# Patient Record
Sex: FEMALE | Race: WHITE | NOT HISPANIC OR LATINO | Employment: OTHER | URBAN - METROPOLITAN AREA
[De-identification: names, ages, dates, MRNs, and addresses within clinical notes are randomized per-mention and may not be internally consistent; named-entity substitution may affect disease eponyms.]

---

## 2017-02-28 ENCOUNTER — OFFICE VISIT (OUTPATIENT)
Dept: FAMILY MEDICINE | Facility: OTHER | Age: 77
End: 2017-02-28
Attending: FAMILY MEDICINE
Payer: MEDICARE

## 2017-02-28 VITALS
WEIGHT: 205.4 LBS | BODY MASS INDEX: 37.8 KG/M2 | TEMPERATURE: 97.8 F | RESPIRATION RATE: 14 BRPM | HEIGHT: 62 IN | DIASTOLIC BLOOD PRESSURE: 72 MMHG | HEART RATE: 72 BPM | SYSTOLIC BLOOD PRESSURE: 122 MMHG

## 2017-02-28 DIAGNOSIS — Z01.818 PREOP GENERAL PHYSICAL EXAM: Primary | ICD-10-CM

## 2017-02-28 LAB
ERYTHROCYTE [DISTWIDTH] IN BLOOD BY AUTOMATED COUNT: 14.6 % (ref 10–15)
HCT VFR BLD AUTO: 34.1 % (ref 35–47)
HGB BLD-MCNC: 11.5 G/DL (ref 11.7–15.7)
MCH RBC QN AUTO: 35.5 PG (ref 26.5–33)
MCHC RBC AUTO-ENTMCNC: 33.7 G/DL (ref 31.5–36.5)
MCV RBC AUTO: 105 FL (ref 78–100)
PLATELET # BLD AUTO: 182 10E9/L (ref 150–450)
RBC # BLD AUTO: 3.24 10E12/L (ref 3.8–5.2)
WBC # BLD AUTO: 7.4 10E9/L (ref 4–11)

## 2017-02-28 PROCEDURE — 93005 ELECTROCARDIOGRAM TRACING: CPT

## 2017-02-28 PROCEDURE — 99213 OFFICE O/P EST LOW 20 MIN: CPT

## 2017-02-28 PROCEDURE — 99214 OFFICE O/P EST MOD 30 MIN: CPT | Mod: 25 | Performed by: FAMILY MEDICINE

## 2017-02-28 PROCEDURE — 36415 COLL VENOUS BLD VENIPUNCTURE: CPT | Performed by: FAMILY MEDICINE

## 2017-02-28 PROCEDURE — 85027 COMPLETE CBC AUTOMATED: CPT | Performed by: FAMILY MEDICINE

## 2017-02-28 PROCEDURE — 93010 ELECTROCARDIOGRAM REPORT: CPT | Performed by: INTERNAL MEDICINE

## 2017-02-28 RX ORDER — OMEGA-3-ACID ETHYL ESTERS 1 G/1
1 CAPSULE, LIQUID FILLED ORAL DAILY
COMMUNITY
End: 2017-08-09

## 2017-02-28 ASSESSMENT — PAIN SCALES - GENERAL: PAINLEVEL: MODERATE PAIN (4)

## 2017-02-28 NOTE — PROGRESS NOTES
[facility]  [address]  [facility] [address]  [phone]  Dept: [phone]    PRE-OP EVALUATION:  Today's date: 2017    Ileana Davis (: 1940) presents for pre-operative evaluation assessment as requested by Dr. Yo.  She requires evaluation and anesthesia risk assessment prior to undergoing surgery/procedure for treatment of pain in foot .  Proposed procedure: right foot hammertoe correction toes 4 and 5     Date of Surgery/ Procedure: 3/6/17  Time of Surgery/ Procedure: [facility]  Hospital/Surgical Facility: [facility]   Fax number for surgical facility: [facility] to fax  Primary Physician: Iris Becerril  Type of Anesthesia Anticipated: General    Patient has a Health Care Directive or Living Will:  YES at home    1. NO - Do you have a history of heart attack, stroke, stent, bypass or surgery on an artery in the head, neck, heart or legs?  2. NO - Do you ever have any pain or discomfort in your chest?  3. NO - Do you have a history of  Heart Failure?  4. NO - Are you troubled by shortness of breath when: walking on the level, up a slight hill or at night?  5. NO - Do you currently have a cold, bronchitis or other respiratory infection?  6. NO - Do you have a cough, shortness of breath or wheezing?  7. NO - Do you sometimes get pains in the calves of your legs when you walk?  8. NO - Do you or anyone in your family have previous history of blood clots?  9. NO - Do you or does anyone in your family have a serious bleeding problem such as prolonged bleeding following surgeries or cuts?  10. YES - HAVE YOU EVER HAD PROBLEMS WITH ANEMIA OR BEEN TOLD TO TAKE IRON PILLS? After pregnancy  11. NO - Have you had any abnormal blood loss such as black, tarry or bloody stools, or abnormal vaginal bleeding?  12. YES - HAVE YOU EVER HAD A BLOOD TRANSFUSION? After childbirth  13. NO - Have you or any of your relatives ever had problems with anesthesia?  14. NO - Do  you have sleep apnea, excessive snoring or daytime drowsiness?  15. NO - DO YOU HAVE ANY PROSTHETIC HEART VALVES?   16. YES - Do you have prosthetic joints? BOTH KNEES  17. NO - Is there any chance that you may be pregnant?      HPI:                                                      Brief HPI related to upcoming procedure: Symptomatic hammertoes on right foot      HYPERTENSION - Patient has longstanding history of mod-severe HTN , currently denies any symptoms referable to elevated blood pressure. Specifically denies chest pain, palpitations, dyspnea, orthopnea, PND or peripheral edema. Blood pressure readings have been in normal range. Current medication regimen is as listed below. Patient denies any side effects of medication.                                                                                                                                                                                          .  HYPOTHYROIDISM - Patient has a longstanding history of chronic Hypothyroidism. Patient has been doing well, noting no tremor, insomnia, hair loss or changes in skin texture. Last TSH value of 1.28. Continues to take medications as directed, without adverse reactions or side effects.                                                                                                                                                                                                                        .    MEDICAL HISTORY:                                                      Patient Active Problem List    Diagnosis Date Noted     Autoimmune hepatitis (H) 06/02/2016     Priority: Medium     Anemia 08/20/2015     Priority: Medium     Advanced care planning/counseling discussion 09/21/2012     Priority: Medium     ACP (advance care planning) 06/02/2016     Advance Care Planning 6/2/2016: ACP Review of Chart / Resources Provided:  Reviewed chart for advance care plan.  Ileana Davis has been provided  information and resources to begin or update their advance care plan.  Added by Shelly Syed           Degeneration of lumbar or lumbosacral intervertebral disc 01/30/2015     Open wound of knee, leg, and ankle      Hypothyroidism 03/27/2013     Generalized anxiety disorder 03/27/2013     Benign essential hypertension 03/27/2013     GERD (gastroesophageal reflux disease) 03/27/2013     Hepatitis 03/27/2013     Contact dermatitis 03/27/2013     Rosacea 03/27/2013     Osteoarthritis 03/27/2013     Myalgia and myositis 03/27/2013      Past Medical History   Diagnosis Date     Anemia 8/20/2015     Autoimmune hepatitis (H) 6/2/2016     Benign paroxysmal positional vertigo 10/7/2010     Contact dermatitis and other eczema, due to unspecified cause 10/7/2010     Esophageal reflux 10/7/2010     Generalized anxiety disorder 10/7/2010     Generalized osteoarthrosis, involving multiple sites 10/7/2010     Hepatitis, unspecified 10/7/2010     Infected wound      Myalgia and myositis, unspecified 10/7/2010     fibromyalgia     Nonallopathic lesion of cervical region, not elsewhere classified 12/5/2001     Nonallopathic lesion of thoracic region, not elsewhere classified 3/31/2005     Open wound of knee, leg, and ankle      Rosacea 10/7/2010     Unspecified essential hypertension 10/7/2010     Unspecified hypothyroidism 10/7/2010     Past Surgical History   Procedure Laterality Date     Cholecystectomy       Gyn surgery  1985     NICHOLAS BSO     Gyn surgery       cessarian x 2     Gyn surgery       tubal sterilazation     Gi surgery       anal fistula     Appendectomy       Ent surgery       tonsillectomy     Eye surgery       bilateral cataract removal     Back surgery  2015     lumbar epidural injection     Orthopedic surgery       right knee     Orthopedic surgery  1986     plantar fascia release     Orthopedic surgery       left knee     Orthopedic surgery       right trigger finger release     Orthopedic surgery  2013      Right great toe MTPJ fusion, adductor tenotomy,correction of hammer toe     Orthopedic surgery  [id number]     multiple procedures left forefoot     Current Outpatient Prescriptions   Medication Sig Dispense Refill     omega-3 acid ethyl esters (LOVAZA) 1 G capsule Take 1 g by mouth daily       VITAMIN D, CHOLECALCIFEROL, PO Take 5,000 Units by mouth       cyclobenzaprine (FLEXERIL) 10 MG tablet Take 1 tablet (10 mg) by mouth 3 times daily 90 tablet 1     fluticasone (FLONASE) 50 MCG/ACT nasal spray Spray 2 sprays into both nostrils daily as needed 16 g 3     propranolol (INDERAL) 20 MG tablet Take 1 tablet (20 mg) by mouth daily 90 tablet 2     levothyroxine (SYNTHROID, LEVOTHROID) 112 MCG tablet Take one (1) tablet by mouth once daily 90 tablet 2     amitriptyline (ELAVIL) 25 MG tablet Take one (1) tablet by mouth at bedtime 90 tablet 2     ORDER FOR DME Equipment being ordered: LAVERNE stockings, 15-20 mmHg 2 Device 2     AzaTHIOprine (IMURAN PO) Take 50 mg by mouth 2 times daily        Polyethylene Glycol 3350 (MIRALAX PO) Take by mouth daily as needed        omeprazole (PRILOSEC) 40 MG capsule Take  by mouth daily. Before the same meal daily       Nystatin (NYAMYC) 440823 UNIT/GM POWD Apply to affected area nightly as needed for rash (Patient not taking: Reported on 2/28/2017) 1 Bottle 1     naproxen 500 MG TBEC Take 500 mg by mouth 2 times daily (with meals) 60 tablet 5     OTC products: None, except as noted above    Allergies   Allergen Reactions     Codeine Sulfate Other (See Comments)     hallucinations     Morphine Other (See Comments)     Hallucinations with iv therapy     Tape [Adhesive Tape]       Latex Allergy: NO    Social History   Substance Use Topics     Smoking status: Never Smoker     Smokeless tobacco: Never Used     Alcohol use No     History   Drug Use No       REVIEW OF SYSTEMS:                                                    Constitutional, neuro, ENT, endocrine, pulmonary, cardiac,  "gastrointestinal, genitourinary, musculoskeletal, integument and psychiatric systems are negative, except as otherwise noted.    EXAM:                                                    /72 (BP Location: Left arm, Patient Position: Chair, Cuff Size: Adult Large)  Pulse 72  Temp 97.8  F (36.6  C) (Tympanic)  Resp 14  Ht 5' 2\" (1.575 m)  Wt 205 lb 6.4 oz (93.2 kg)  BMI 37.57 kg/m2    GENERAL APPEARANCE: healthy, alert and no distress     EYES: EOMI, PERRL     HENT: ear canals and TM's normal and nose and mouth without ulcers or lesions     NECK: no adenopathy     RESP: lungs clear to auscultation - no rales, rhonchi or wheezes     CV: regular rates and rhythm, normal S1 S2, no S3 or S4 and no murmur, click or rub     ABDOMEN: bowel sounds normal     SKIN: small area of skin breakdown in pannus, no acute infection, healing well     NEURO: Normal strength and tone, sensory exam grossly normal, mentation intact and speech normal     PSYCH: mentation appears normal. and affect normal/bright    DIAGNOSTICS:                                                      EKG: appears normal, NSR, normal axis, normal intervals, no acute ST/T changes c/w ischemia, no LVH by voltage criteria, unchanged from previous tracings    Labs Resulted Today:   Results for orders placed or performed in visit on 02/28/17   CBC with platelets   Result Value Ref Range    WBC 7.4 4.0 - 11.0 10e9/L    RBC Count 3.24 (L) 3.8 - 5.2 10e12/L    Hemoglobin 11.5 (L) 11.7 - 15.7 g/dL    Hematocrit 34.1 (L) 35.0 - 47.0 %     (H) 78 - 100 fl    MCH 35.5 (H) 26.5 - 33.0 pg    MCHC 33.7 31.5 - 36.5 g/dL    RDW 14.6 10.0 - 15.0 %    Platelet Count 182 150 - 450 10e9/L       Recent Labs   Lab Test  11/11/16   1514  04/07/16   0909   HGB  11.4*  12.1   PLT  213  191   NA  137  138   POTASSIUM  3.8  4.2   CR  0.82  0.76        IMPRESSION:                                                    Reason for surgery/procedure: Symptomatic " devonte  Diagnosis/reason for consult: Cardiopulmonary clearance    The proposed surgical procedure is considered INTERMEDIATE risk.    REVISED CARDIAC RISK INDEX  The patient has the following serious cardiovascular risks for perioperative complications such as (MI, PE, VFib and 3  AV Block):  No serious cardiac risks  INTERPRETATION: 0 risks: Class I (very low risk - 0.4% complication rate)    The patient has the following additional risks for perioperative complications:  Autoimmune hepatitis  Poor pain tolerance      ICD-10-CM    1. Preop general physical exam Z01.818 CBC with platelets     EKG 12-lead complete w/read - (Clinic Performed)       RECOMMENDATIONS:                                                        Cardiovascular Risk  Performs 4 METs exercise without symptoms (Light housework (dusting, washing dishes) and Climb a flight of stairs) .   Patient is already on a Beta Blocker. Continue Betablocker therapy after surgery, using Beta blocker order set as necessary for NPO status.      --Patient is to take all scheduled medications on the day of surgery EXCEPT for modifications listed below.    Anticoagulant or Antiplatelet Medication Use  Hold all ASA/NSAIDs/Vitamins/Supplements 7-10 days prior to procedure         APPROVAL GIVEN to proceed with proposed procedure, without further diagnostic evaluation       Signed Electronically by: Iris Becerril MD    Copy of this evaluation report is provided to requesting physician.    [facility] Preop Guidelines

## 2017-02-28 NOTE — MR AVS SNAPSHOT
After Visit Summary   2/28/2017    Ileana Davis    MRN: [id number]           Patient Information     Date Of Birth          1940        Visit Information        Provider Department      2/28/2017 2:30 PM Iris Becerril MD [facility]        Today's Diagnoses     Preop general physical exam    -  1      Care Instructions      Before Your Surgery      Call your surgeon if there is any change in your health. This includes signs of a cold or flu (such as a sore throat, runny nose, cough, rash or fever).    Do not smoke, drink alcohol or take over the counter medicine (unless your surgeon or primary care doctor tells you to) for the 24 hours before and after surgery.    If you take prescribed drugs: Follow your doctor s orders about which medicines to take and which to stop until after surgery.    Eating and drinking prior to surgery: follow the instructions from your surgeon    Take a shower or bath the night before surgery. Use the soap your surgeon gave you to gently clean your skin. If you do not have soap from your surgeon, use your regular soap. Do not shave or scrub the surgery site.  Wear clean pajamas and have clean sheets on your bed.         Follow-ups after your visit        Follow-up notes from your care team     Return in about 1 year (around 2/28/2018).      Who to contact     If you have questions or need follow up information about today's clinic visit or your schedule please contact [facility] directly at [phone].  Normal or non-critical lab and imaging results will be communicated to you by MyChart, letter or phone within 4 business days after the clinic has received the results. If you do not hear from us within 7 days, please contact the clinic through MyChart or phone. If you have a critical or abnormal lab result, we will notify you by phone as soon as possible.  Submit refill requests through [employer] or call your pharmacy and they will  "forward the refill request to us. Please allow 3 business days for your refill to be completed.          Additional Information About Your Visit        [employer] Information     [employer] lets you send messages to your doctor, view your test results, renew your prescriptions, schedule appointments and more. To sign up, go to [email or web address] . Click on \"Log in\" on the left side of the screen, which will take you to the Welcome page. Then click on \"Sign up Now\" on the right side of the page.     You will be asked to enter the access code listed below, as well as some personal information. Please follow the directions to create your username and password.     Your access code is: [email or web address]  Expires: 2017  3:30 PM     Your access code will  in 90 days. If you need help or a new code, please call your [place] clinic or [phone].        Care EveryWhere ID     This is your [facility] EveryWhere ID. This could be used by other organizations to access your [place] medical records  [id number]        Your Vitals Were     Pulse Temperature Respirations Height BMI (Body Mass Index)       72 97.8  F (36.6  C) (Tympanic) 14 5' 2\" (1.575 m) 37.57 kg/m2        Blood Pressure from Last 3 Encounters:   17 122/72   16 146/84   16 122/74    Weight from Last 3 Encounters:   17 205 lb 6.4 oz (93.2 kg)   16 200 lb (90.7 kg)   16 189 lb (85.7 kg)              We Performed the Following     CBC with platelets     EKG 12-lead complete w/read - (Clinic Performed)          Today's Medication Changes          These changes are accurate as of: 17  3:30 PM.  If you have any questions, ask your nurse or doctor.               Stop taking these medicines if you haven't already. Please contact your care team if you have questions.     desonide 0.05 % cream   Commonly known as:  DESOWEN   Stopped by:  Iris Becerril MD                    Primary Care Provider Office Phone # Fax #    " Iris Becerril [phone]       [facility] [address] [facility] [address]        Thank you!     Thank you for choosing [facility]  for your care. Our goal is always to provide you with excellent care. Hearing back from our patients is one way we can continue to improve our services. Please take a few minutes to complete the written survey that you may receive in the mail after your visit with us. Thank you!             Your Updated Medication List - Protect others around you: Learn how to safely use, store and throw away your medicines at www.disposemymeds.org.          This list is accurate as of: 2/28/17  3:30 PM.  Always use your most recent med list.                   Brand Name Dispense Instructions for use    amitriptyline 25 MG tablet    ELAVIL    90 tablet    Take one (1) tablet by mouth at bedtime       cyclobenzaprine 10 MG tablet    FLEXERIL    90 tablet    Take 1 tablet (10 mg) by mouth 3 times daily       fluticasone 50 MCG/ACT spray    FLONASE    16 g    Spray 2 sprays into both nostrils daily as needed       IMURAN PO      Take 50 mg by mouth 2 times daily       levothyroxine 112 MCG tablet    SYNTHROID/LEVOTHROID    90 tablet    Take one (1) tablet by mouth once daily       MIRALAX PO      Take by mouth daily as needed       naproxen 500 MG Tbec     60 tablet    Take 500 mg by mouth 2 times daily (with meals)       NYAMYC 762113 UNIT/GM Powd     1 Bottle    Apply to affected area nightly as needed for rash       omega-3 acid ethyl esters 1 G capsule    Lovaza     Take 1 g by mouth daily       omeprazole 40 MG capsule    priLOSEC     Take  by mouth daily. Before the same meal daily       order for DME     2 Device    Equipment being ordered: LAVERNE stockings, 15-20 mmHg       propranolol 20 MG tablet    INDERAL    90 tablet    Take 1 tablet (20 mg) by mouth daily       VITAMIN D (CHOLECALCIFEROL) PO      Take 5,000 Units by mouth

## 2017-02-28 NOTE — NURSING NOTE
"Chief Complaint   Patient presents with     Pharyngitis     for right toe surgery with Dr. Yo at [facility] on 3/6     Wound Dehiscence     to  lower abdomen x 1 week       Initial /72 (BP Location: Left arm, Patient Position: Chair, Cuff Size: Adult Large)  Pulse 72  Temp 97.8  F (36.6  C) (Tympanic)  Resp 14  Ht 5' 2\" (1.575 m)  Wt 205 lb 6.4 oz (93.2 kg)  BMI 37.57 kg/m2 Estimated body mass index is 37.57 kg/(m^2) as calculated from the following:    Height as of this encounter: 5' 2\" (1.575 m).    Weight as of this encounter: 205 lb 6.4 oz (93.2 kg).  Medication Reconciliation: tran CAAL      "

## 2017-03-06 ENCOUNTER — TRANSFERRED RECORDS (OUTPATIENT)
Dept: HEALTH INFORMATION MANAGEMENT | Facility: HOSPITAL | Age: 77
End: 2017-03-06

## 2017-03-24 ENCOUNTER — TRANSFERRED RECORDS (OUTPATIENT)
Dept: HEALTH INFORMATION MANAGEMENT | Facility: HOSPITAL | Age: 77
End: 2017-03-24

## 2017-04-04 DIAGNOSIS — M60.9 MYOSITIS: ICD-10-CM

## 2017-04-04 DIAGNOSIS — R52 PAIN: ICD-10-CM

## 2017-04-04 DIAGNOSIS — M62.838 MUSCLE SPASM: ICD-10-CM

## 2017-04-04 DIAGNOSIS — M79.10 MYALGIA: ICD-10-CM

## 2017-04-06 RX ORDER — CYCLOBENZAPRINE HCL 10 MG
TABLET ORAL
Qty: 90 TABLET | Refills: 0 | Status: SHIPPED | OUTPATIENT
Start: 2017-04-06 | End: 2017-06-30

## 2017-04-10 DIAGNOSIS — R52 PAIN: ICD-10-CM

## 2017-04-10 DIAGNOSIS — M62.838 MUSCLE SPASM: ICD-10-CM

## 2017-04-10 DIAGNOSIS — E03.9 HYPOTHYROIDISM: ICD-10-CM

## 2017-04-11 RX ORDER — LEVOTHYROXINE SODIUM 112 UG/1
TABLET ORAL
Qty: 90 TABLET | Refills: 0 | Status: SHIPPED | OUTPATIENT
Start: 2017-04-11 | End: 2017-07-20

## 2017-04-11 RX ORDER — CYCLOBENZAPRINE HCL 10 MG
TABLET ORAL
Refills: 0 | OUTPATIENT
Start: 2017-04-11

## 2017-04-14 ENCOUNTER — TRANSFERRED RECORDS (OUTPATIENT)
Dept: HEALTH INFORMATION MANAGEMENT | Facility: HOSPITAL | Age: 77
End: 2017-04-14

## 2017-05-15 DIAGNOSIS — I10 HTN (HYPERTENSION): ICD-10-CM

## 2017-05-16 RX ORDER — PROPRANOLOL HYDROCHLORIDE 20 MG/1
TABLET ORAL
Qty: 90 TABLET | Refills: 1 | Status: SHIPPED | OUTPATIENT
Start: 2017-05-16 | End: 2017-10-18

## 2017-06-05 ENCOUNTER — APPOINTMENT (OUTPATIENT)
Dept: LAB | Facility: OTHER | Age: 77
End: 2017-06-05
Attending: NURSE PRACTITIONER
Payer: MEDICARE

## 2017-06-05 ENCOUNTER — OFFICE VISIT (OUTPATIENT)
Dept: FAMILY MEDICINE | Facility: OTHER | Age: 77
End: 2017-06-05
Attending: NURSE PRACTITIONER
Payer: MEDICARE

## 2017-06-05 ENCOUNTER — TELEPHONE (OUTPATIENT)
Dept: FAMILY MEDICINE | Facility: OTHER | Age: 77
End: 2017-06-05

## 2017-06-05 VITALS
WEIGHT: 205 LBS | BODY MASS INDEX: 37.49 KG/M2 | SYSTOLIC BLOOD PRESSURE: 118 MMHG | HEART RATE: 68 BPM | RESPIRATION RATE: 14 BRPM | TEMPERATURE: 98 F | DIASTOLIC BLOOD PRESSURE: 72 MMHG

## 2017-06-05 DIAGNOSIS — R30.0 DYSURIA: Primary | ICD-10-CM

## 2017-06-05 DIAGNOSIS — R82.79 ABNORMAL FINDINGS ON MICROBIOLOGICAL EXAMINATION OF URINE: ICD-10-CM

## 2017-06-05 LAB
ALBUMIN UR-MCNC: NEGATIVE MG/DL
APPEARANCE UR: ABNORMAL
BACTERIA #/AREA URNS HPF: ABNORMAL /HPF
BILIRUB UR QL STRIP: NEGATIVE
COLOR UR AUTO: YELLOW
GLUCOSE UR STRIP-MCNC: NEGATIVE MG/DL
HGB UR QL STRIP: ABNORMAL
KETONES UR STRIP-MCNC: NEGATIVE MG/DL
LEUKOCYTE ESTERASE UR QL STRIP: ABNORMAL
NITRATE UR QL: NEGATIVE
NON-SQ EPI CELLS #/AREA URNS LPF: ABNORMAL /LPF
PH UR STRIP: 7.5 PH (ref 5–7)
RBC #/AREA URNS AUTO: ABNORMAL /HPF (ref 0–2)
SP GR UR STRIP: 1.01 (ref 1–1.03)
URN SPEC COLLECT METH UR: ABNORMAL
UROBILINOGEN UR STRIP-ACNC: 0.2 EU/DL (ref 0.2–1)
WBC #/AREA URNS AUTO: >100 /HPF (ref 0–2)

## 2017-06-05 PROCEDURE — 99212 OFFICE O/P EST SF 10 MIN: CPT

## 2017-06-05 PROCEDURE — 87186 SC STD MICRODIL/AGAR DIL: CPT | Mod: ZL | Performed by: NURSE PRACTITIONER

## 2017-06-05 PROCEDURE — 87086 URINE CULTURE/COLONY COUNT: CPT | Mod: ZL | Performed by: NURSE PRACTITIONER

## 2017-06-05 PROCEDURE — 87088 URINE BACTERIA CULTURE: CPT | Mod: ZL | Performed by: NURSE PRACTITIONER

## 2017-06-05 PROCEDURE — 99213 OFFICE O/P EST LOW 20 MIN: CPT | Performed by: NURSE PRACTITIONER

## 2017-06-05 PROCEDURE — 81001 URINALYSIS AUTO W/SCOPE: CPT | Mod: ZL | Performed by: NURSE PRACTITIONER

## 2017-06-05 RX ORDER — CIPROFLOXACIN 250 MG/1
250 TABLET, FILM COATED ORAL 2 TIMES DAILY
Qty: 14 TABLET | Refills: 0 | Status: SHIPPED | OUTPATIENT
Start: 2017-06-05 | End: 2017-06-12

## 2017-06-05 ASSESSMENT — ANXIETY QUESTIONNAIRES
1. FEELING NERVOUS, ANXIOUS, OR ON EDGE: SEVERAL DAYS
2. NOT BEING ABLE TO STOP OR CONTROL WORRYING: SEVERAL DAYS
6. BECOMING EASILY ANNOYED OR IRRITABLE: NOT AT ALL
3. WORRYING TOO MUCH ABOUT DIFFERENT THINGS: SEVERAL DAYS
5. BEING SO RESTLESS THAT IT IS HARD TO SIT STILL: NEARLY EVERY DAY
IF YOU CHECKED OFF ANY PROBLEMS ON THIS QUESTIONNAIRE, HOW DIFFICULT HAVE THESE PROBLEMS MADE IT FOR YOU TO DO YOUR WORK, TAKE CARE OF THINGS AT HOME, OR GET ALONG WITH OTHER PEOPLE: NOT DIFFICULT AT ALL

## 2017-06-05 ASSESSMENT — PATIENT HEALTH QUESTIONNAIRE - PHQ9: 5. POOR APPETITE OR OVEREATING: MORE THAN HALF THE DAYS

## 2017-06-05 NOTE — MR AVS SNAPSHOT
After Visit Summary   6/5/2017    Ileana Davis    MRN: [id number]           Patient Information     Date Of Birth          1940        Visit Information        Provider Department      6/5/2017 3:30 PM Nancy White NP [facility]        Today's Diagnoses     Dysuria    -  1    Abnormal findings on microbiological examination of urine          Care Instructions    Results for orders placed or performed in visit on 06/05/17   *UA reflex to Microscopic and Culture - [facility]   Result Value Ref Range    Color Urine Yellow     Appearance Urine Slightly Cloudy     Glucose Urine Negative NEG mg/dL    Bilirubin Urine Negative NEG    Ketones Urine Negative NEG mg/dL    Specific Gravity Urine 1.010 1.003 - 1.035    Blood Urine Small (A) NEG    pH Urine 7.5 (H) 5.0 - 7.0 pH    Protein Albumin Urine Negative NEG mg/dL    Urobilinogen Urine 0.2 0.2 - 1.0 EU/dL    Nitrite Urine Negative NEG    Leukocyte Esterase Urine Large (A) NEG    Source Midstream Urine    Urine Microscopic   Result Value Ref Range    WBC Urine >100 (A) 0 - 2 /HPF    RBC Urine 2-5 (A) 0 - 2 /HPF    Squamous Epithelial /LPF Urine Few FEW /LPF    Bacteria Urine Moderate (A) NEG /HPF         ASSESSMENT/PLAN:                                                      1. Dysuria  -UA reflex to Microscopic and Culture - [facility]  - Urine Microscopic  - ciprofloxacin (CIPRO) 250 MG tablet; Take 1 tablet (250 mg) by mouth 2 times daily for 7 days  Dispense: 14 tablet; Refill: 0  - AZO OTC for burning    2. Abnormal findings on microbiological examination of urine  - Urine Culture Aerobic Bacterial    Nancy White NP  [facility]            Follow-ups after your visit        Who to contact     If you have questions or need follow up information about today's clinic visit or your schedule please contact [facility] directly at [phone].  Normal or non-critical lab and imaging results will be  "communicated to you by [employer], letter or phone within 4 business days after the clinic has received the results. If you do not hear from us within 7 days, please contact the clinic through [employer] or phone. If you have a critical or abnormal lab result, we will notify you by phone as soon as possible.  Submit refill requests through [employer] or call your pharmacy and they will forward the refill request to us. Please allow 3 business days for your refill to be completed.          Additional Information About Your Visit        [employer] Information     [employer] lets you send messages to your doctor, view your test results, renew your prescriptions, schedule appointments and more. To sign up, go to [email or web address] . Click on \"Log in\" on the left side of the screen, which will take you to the Welcome page. Then click on \"Sign up Now\" on the right side of the page.     You will be asked to enter the access code listed below, as well as some personal information. Please follow the directions to create your username and password.     Your access code is: [email or web address]  Expires: 9/3/2017  4:23 PM     Your access code will  in 90 days. If you need help or a new code, please call your [place] clinic or [phone].        Care EveryWhere ID     This is your Care EveryWhere ID. This could be used by other organizations to access your [place] medical records  [id number]        Your Vitals Were     Pulse Temperature Respirations Breastfeeding? BMI (Body Mass Index)       68 98  F (36.7  C) (Tympanic) 14 No 37.49 kg/m2        Blood Pressure from Last 3 Encounters:   17 118/72   17 122/72   16 146/84    Weight from Last 3 Encounters:   17 205 lb (93 kg)   17 205 lb 6.4 oz (93.2 kg)   16 200 lb (90.7 kg)              We Performed the Following     *UA reflex to Microscopic and Culture - [facility]     Urine Culture Aerobic Bacterial     Urine Microscopic          Today's " Medication Changes          These changes are accurate as of: 6/5/17  4:23 PM.  If you have any questions, ask your nurse or doctor.               Start taking these medicines.        Dose/Directions    ciprofloxacin 250 MG tablet   Commonly known as:  CIPRO   Used for:  Dysuria   Started by:  Nancy White NP        Dose:  250 mg   Take 1 tablet (250 mg) by mouth 2 times daily for 7 days   Quantity:  14 tablet   Refills:  0            Where to get your medicines      These medications were sent to [facility] [address]   [address] , [facility] [address]     Phone:  [phone]     ciprofloxacin 250 MG tablet                Primary Care Provider Office Phone # Fax #    Iris Becerril [phone]       [facility] [address] [facility] [address]        Thank you!     Thank you for choosing [facility]  for your care. Our goal is always to provide you with excellent care. Hearing back from our patients is one way we can continue to improve our services. Please take a few minutes to complete the written survey that you may receive in the mail after your visit with us. Thank you!             Your Updated Medication List - Protect others around you: Learn how to safely use, store and throw away your medicines at www.disposemymeds.org.          This list is accurate as of: 6/5/17  4:23 PM.  Always use your most recent med list.                   Brand Name Dispense Instructions for use    amitriptyline 25 MG tablet    ELAVIL    90 tablet    Take one (1) tablet by mouth at bedtime       ciprofloxacin 250 MG tablet    CIPRO    14 tablet    Take 1 tablet (250 mg) by mouth 2 times daily for 7 days       cyclobenzaprine 10 MG tablet    FLEXERIL    90 tablet    Take 1 tablet (10 mg) by mouth 3 times daily       fluticasone 50 MCG/ACT spray    FLONASE    16 g    Spray 2 sprays into both nostrils daily as needed        IMURAN PO      Take 50 mg by mouth 2 times daily       levothyroxine 112 MCG tablet    SYNTHROID/LEVOTHROID    90 tablet    Take one (1) tablet by mouth once daily       MIRALAX PO      Take by mouth daily as needed       naproxen 500 MG Tbec     60 tablet    Take 500 mg by mouth 2 times daily (with meals)       NYAMYC 273094 UNIT/GM Powd   Generic drug:  nystatin     1 Bottle    Apply to affected area nightly as needed for rash       omega-3 acid ethyl esters 1 G capsule    Lovaza     Take 1 g by mouth daily       omeprazole 40 MG capsule    priLOSEC     Take  by mouth daily. Before the same meal daily       order for DME     2 Device    Equipment being ordered: LAVERNE stockings, 15-20 mmHg       propranolol 20 MG tablet    INDERAL    90 tablet    Take 1 tablet (20 mg) by mouth daily       VITAMIN D (CHOLECALCIFEROL) PO      Take 5,000 Units by mouth

## 2017-06-05 NOTE — PROGRESS NOTES
SUBJECTIVE:                                                    Ileana Davis is a 76 year old female who presents to clinic today for the following health issues:      URINARY TRACT SYMPTOMS     Onset: 4 days    Description:   Painful urination (Dysuria): YES  Blood in urine (Hematuria): no   Delay in urine (Hesitency): no     Intensity: moderate    Progression of Symptoms:  worsening    Accompanying Signs & Symptoms:  Fever/chills: no   Flank pain no   Nausea and vomiting: no   Any vaginal symptoms: none  Abdominal/Pelvic Pain: no    History:   History of frequent UTI's: no   History of kidney stones: no   Sexually Active: no   Possibility of pregnancy: No    Precipitating factors:   no         Therapies Tried and outcome: fluids        Problem list and histories reviewed & adjusted, as indicated.  Additional history: as documented    Patient Active Problem List   Diagnosis     Hypothyroidism     Generalized anxiety disorder     Benign essential hypertension     GERD (gastroesophageal reflux disease)     Hepatitis     Contact dermatitis     Rosacea     Osteoarthritis     Myalgia and myositis     Advanced care planning/counseling discussion     Open wound of knee, leg, and ankle     Degeneration of lumbar or lumbosacral intervertebral disc     Anemia     ACP (advance care planning)     Autoimmune hepatitis (H)     Past Surgical History:   Procedure Laterality Date     APPENDECTOMY       BACK SURGERY  2015    lumbar epidural injection     CHOLECYSTECTOMY       ENT SURGERY      tonsillectomy     EYE SURGERY      bilateral cataract removal     GI SURGERY      anal fistula     GYN SURGERY  1985    NICHOLAS BSO     GYN SURGERY      cessarian x 2     GYN SURGERY      tubal sterilazation     ORTHOPEDIC SURGERY      right knee     ORTHOPEDIC SURGERY  1986    plantar fascia release     ORTHOPEDIC SURGERY      left knee     ORTHOPEDIC SURGERY      right trigger finger release     ORTHOPEDIC SURGERY  2013    Right great toe  MTPJ fusion, adductor tenotomy,correction of hammer toe     ORTHOPEDIC SURGERY  [id number]    multiple procedures left forefoot       Social History   Substance Use Topics     Smoking status: Never Smoker     Smokeless tobacco: Never Used     Alcohol use No     Family History   Problem Relation Age of Onset     Arthritis Mother      rheumatoid     HEART DISEASE Mother      CHF     Alcohol/Drug Father      alcoholism     Allergies Father      Thyroid Disease Other      DIABETES No family hx of      Asthma No family hx of          Current Outpatient Prescriptions   Medication Sig Dispense Refill     propranolol (INDERAL) 20 MG tablet Take 1 tablet (20 mg) by mouth daily 90 tablet 1     levothyroxine (SYNTHROID/LEVOTHROID) 112 MCG tablet Take one (1) tablet by mouth once daily 90 tablet 0     cyclobenzaprine (FLEXERIL) 10 MG tablet Take 1 tablet (10 mg) by mouth 3 times daily 90 tablet 0     amitriptyline (ELAVIL) 25 MG tablet Take one (1) tablet by mouth at bedtime 90 tablet 0     omega-3 acid ethyl esters (LOVAZA) 1 G capsule Take 1 g by mouth daily       VITAMIN D, CHOLECALCIFEROL, PO Take 5,000 Units by mouth       fluticasone (FLONASE) 50 MCG/ACT nasal spray Spray 2 sprays into both nostrils daily as needed 16 g 3     Nystatin (NYAMYC) 016656 UNIT/GM POWD Apply to affected area nightly as needed for rash 1 Bottle 1     naproxen 500 MG TBEC Take 500 mg by mouth 2 times daily (with meals) 60 tablet 5     ORDER FOR DME Equipment being ordered: LAVERNE stockings, 15-20 mmHg 2 Device 2     AzaTHIOprine (IMURAN PO) Take 50 mg by mouth 2 times daily        Polyethylene Glycol 3350 (MIRALAX PO) Take by mouth daily as needed        omeprazole (PRILOSEC) 40 MG capsule Take  by mouth daily. Before the same meal daily       Allergies   Allergen Reactions     Codeine Sulfate Other (See Comments)     hallucinations     Morphine Other (See Comments)     Hallucinations with iv therapy     Tape [Adhesive Tape]      BP Readings from Last  3 Encounters:   06/05/17 118/72   02/28/17 122/72   11/11/16 146/84    Wt Readings from Last 3 Encounters:   06/05/17 205 lb (93 kg)   02/28/17 205 lb 6.4 oz (93.2 kg)   11/11/16 200 lb (90.7 kg)                  Labs reviewed in EPIC    Reviewed and updated as needed this visit by clinical staff  Tobacco  Allergies  Meds       Reviewed and updated as needed this visit by Provider         ROS:  Constitutional, HEENT, cardiovascular, pulmonary, gi and gu systems are negative, except as otherwise noted.      OBJECTIVE:                                                    /72 (BP Location: Right arm, Patient Position: Chair, Cuff Size: Adult Large)  Pulse 68  Temp 98  F (36.7  C) (Tympanic)  Resp 14  Wt 205 lb (93 kg)  Breastfeeding? No  BMI 37.49 kg/m2  Body mass index is 37.49 kg/(m^2).     GENERAL: healthy, alert and no distress  EYES: Eyes grossly normal to inspection, PERRL and conjunctivae and sclerae normal  HENT: ear canals and TM's normal, nose and mouth without ulcers or lesions  NECK: no adenopathy, no asymmetry, masses, or scars and thyroid normal to palpation  RESP: lungs clear to auscultation - no rales, rhonchi or wheezes  CV: regular rate and rhythm, normal S1 S2, no S3 or S4, no murmur, click or rub, no peripheral edema and peripheral pulses strong  ABDOMEN: soft, nontender, no hepatosplenomegaly, no masses and bowel sounds normal  MS: no gross musculoskeletal defects noted, no edema  SKIN: no suspicious lesions or rashes      Results for orders placed or performed in visit on 06/05/17 (from the past 24 hour(s))   *UA reflex to Microscopic and Culture - [facility]   Result Value Ref Range    Color Urine Yellow     Appearance Urine Slightly Cloudy     Glucose Urine Negative NEG mg/dL    Bilirubin Urine Negative NEG    Ketones Urine Negative NEG mg/dL    Specific Gravity Urine 1.010 1.003 - 1.035    Blood Urine Small (A) NEG    pH Urine 7.5 (H) 5.0 - 7.0 pH    Protein Albumin Urine  Negative NEG mg/dL    Urobilinogen Urine 0.2 0.2 - 1.0 EU/dL    Nitrite Urine Negative NEG    Leukocyte Esterase Urine Large (A) NEG    Source Midstream Urine    Urine Microscopic   Result Value Ref Range    WBC Urine >100 (A) 0 - 2 /HPF    RBC Urine 2-5 (A) 0 - 2 /HPF    Squamous Epithelial /LPF Urine Few FEW /LPF    Bacteria Urine Moderate (A) NEG /HPF        ASSESSMENT/PLAN:                                                      1. Dysuria  -UA reflex to Microscopic and Culture - [facility]  - Urine Microscopic  - ciprofloxacin (CIPRO) 250 MG tablet; Take 1 tablet (250 mg) by mouth 2 times daily for 7 days  Dispense: 14 tablet; Refill: 0  - AZO OTC for burning    2. Abnormal findings on microbiological examination of urine  - Urine Culture Aerobic Bacterial    Nancy White NP  [facility]

## 2017-06-05 NOTE — PATIENT INSTRUCTIONS
Results for orders placed or performed in visit on 06/05/17   *UA reflex to Microscopic and Culture - [facility]/[place]   Result Value Ref Range    Color Urine Yellow     Appearance Urine Slightly Cloudy     Glucose Urine Negative NEG mg/dL    Bilirubin Urine Negative NEG    Ketones Urine Negative NEG mg/dL    Specific Gravity Urine 1.010 1.003 - 1.035    Blood Urine Small (A) NEG    pH Urine 7.5 (H) 5.0 - 7.0 pH    Protein Albumin Urine Negative NEG mg/dL    Urobilinogen Urine 0.2 0.2 - 1.0 EU/dL    Nitrite Urine Negative NEG    Leukocyte Esterase Urine Large (A) NEG    Source Midstream Urine    Urine Microscopic   Result Value Ref Range    WBC Urine >100 (A) 0 - 2 /HPF    RBC Urine 2-5 (A) 0 - 2 /HPF    Squamous Epithelial /LPF Urine Few FEW /LPF    Bacteria Urine Moderate (A) NEG /HPF         ASSESSMENT/PLAN:                                                      1. Dysuria  -UA reflex to Microscopic and Culture - [facility]  - Urine Microscopic  - ciprofloxacin (CIPRO) 250 MG tablet; Take 1 tablet (250 mg) by mouth 2 times daily for 7 days  Dispense: 14 tablet; Refill: 0  - AZO OTC for burning    2. Abnormal findings on microbiological examination of urine  - Urine Culture Aerobic Bacterial    Nancy White NP  [facility]

## 2017-06-05 NOTE — NURSING NOTE
"Chief Complaint   Patient presents with     UTI     Patient reports cramping  and frequency.       Initial /72 (BP Location: Right arm, Patient Position: Chair, Cuff Size: Adult Large)  Pulse 68  Temp 98  F (36.7  C) (Tympanic)  Resp 14  Wt 205 lb (93 kg)  Breastfeeding? No  BMI 37.49 kg/m2 Estimated body mass index is 37.49 kg/(m^2) as calculated from the following:    Height as of 2/28/17: 5' 2\" (1.575 m).    Weight as of this encounter: 205 lb (93 kg).  Medication Reconciliation: complete   Shell Lieberman      "

## 2017-06-05 NOTE — TELEPHONE ENCOUNTER
11:08 AM    Reason for Call: OVERBOOK    Patient is having the following symptoms: UTI WOULD LIKE TO BE SEEN TODAY  The patient is requesting an appointment for FLAIM     Was an appointment offered for this call? No    Preferred method for responding to this message: Telephone Call    If we cannot reach you directly, may we leave a detailed response at the number you provided? Yes    Can this message wait until your PCP/provider returns, if unavailable today? No,     Hansa Antunez

## 2017-06-06 ASSESSMENT — PATIENT HEALTH QUESTIONNAIRE - PHQ9: SUM OF ALL RESPONSES TO PHQ QUESTIONS 1-9: 12

## 2017-06-07 LAB
BACTERIA SPEC CULT: ABNORMAL
Lab: ABNORMAL
MICRO REPORT STATUS: ABNORMAL
MICROORGANISM SPEC CULT: ABNORMAL
SPECIMEN SOURCE: ABNORMAL

## 2017-06-30 DIAGNOSIS — R52 PAIN: ICD-10-CM

## 2017-06-30 DIAGNOSIS — M62.838 MUSCLE SPASM: ICD-10-CM

## 2017-06-30 RX ORDER — CYCLOBENZAPRINE HCL 10 MG
TABLET ORAL
Qty: 90 TABLET | Refills: 0 | Status: SHIPPED | OUTPATIENT
Start: 2017-06-30 | End: 2017-08-30

## 2017-06-30 NOTE — TELEPHONE ENCOUNTER
Flexeril      Last Written Prescription Date:  4/6/17  Last Fill Quantity: 90,   # refills: 0  Last Office Visit with [facility] prescribing provider: 6/5/17  Future Office visit:       Routing refill request to provider for review/approval because:  Drug not on the [facility] or [facility] refill protocol or controlled substance

## 2017-07-10 DIAGNOSIS — M60.9 MYOSITIS: ICD-10-CM

## 2017-07-10 DIAGNOSIS — M79.10 MYALGIA: ICD-10-CM

## 2017-07-11 ENCOUNTER — TELEPHONE (OUTPATIENT)
Dept: FAMILY MEDICINE | Facility: OTHER | Age: 77
End: 2017-07-11

## 2017-07-11 NOTE — TELEPHONE ENCOUNTER
1:22 PM    Reason for Call: Phone Call    Description: patient has a colonoscopy coming up and would like to know if she will need to take some form of antibiotic    Was an appointment offered for this call? n/a    Preferred method for responding to this message: Telephone Call    If we cannot reach you directly, may we leave a detailed response at the number you provided? Yes    Can this message wait until your PCP/provider returns, if available today? Not applicable, provider in    Vivian Fairchild

## 2017-07-20 DIAGNOSIS — E03.9 HYPOTHYROIDISM: ICD-10-CM

## 2017-07-20 NOTE — TELEPHONE ENCOUNTER
Levothyroxine      Last Written Prescription Date: 4/11/2017  Last Quantity: 90, # refills: 0  Last Office Visit with G, P or [facility] prescribing provider: 6/05/2017        TSH   Date Value Ref Range Status   04/07/2016 1.28 0.40 - 4.00 mU/L Final

## 2017-07-24 RX ORDER — LEVOTHYROXINE SODIUM 112 UG/1
TABLET ORAL
Qty: 90 TABLET | Refills: 1 | Status: SHIPPED | OUTPATIENT
Start: 2017-07-24 | End: 2018-01-16

## 2017-07-27 ENCOUNTER — TRANSFERRED RECORDS (OUTPATIENT)
Dept: HEALTH INFORMATION MANAGEMENT | Facility: HOSPITAL | Age: 77
End: 2017-07-27

## 2017-07-31 ENCOUNTER — TRANSFERRED RECORDS (OUTPATIENT)
Dept: HEALTH INFORMATION MANAGEMENT | Facility: HOSPITAL | Age: 77
End: 2017-07-31

## 2017-07-31 LAB
ALT SERPL-CCNC: 7 IU/L (ref 6–31)
AST SERPL-CCNC: 19 IU/L (ref 10–40)
CREAT SERPL-MCNC: 0.92 MG/DL (ref 0.4–1)
GLUCOSE SERPL-MCNC: 95 MG/DL (ref 70–100)
INR PPP: 1.3 (ref 0.9–1.1)
POTASSIUM SERPL-SCNC: 3.6 MEQ/L (ref 3.4–5.1)

## 2017-08-01 ENCOUNTER — TELEPHONE (OUTPATIENT)
Dept: FAMILY MEDICINE | Facility: OTHER | Age: 77
End: 2017-08-01

## 2017-08-01 NOTE — TELEPHONE ENCOUNTER
10:15 AM    Reason for Call: OVERBOOK    Patient is having the following symptoms: ER Follow up      The patient is requesting an appointment for   Within 2 days  with  Dr. Bruno    Was an appointment offered for this call?   No    Preferred method for responding to this message: [phone]    If we cannot reach you directly, may we leave a detailed response at the number you provided?   Yes      Kaylah García

## 2017-08-03 ENCOUNTER — TRANSFERRED RECORDS (OUTPATIENT)
Dept: HEALTH INFORMATION MANAGEMENT | Facility: HOSPITAL | Age: 77
End: 2017-08-03

## 2017-08-08 DIAGNOSIS — J30.2 SEASONAL ALLERGIC RHINITIS: ICD-10-CM

## 2017-08-09 ENCOUNTER — OFFICE VISIT (OUTPATIENT)
Dept: FAMILY MEDICINE | Facility: OTHER | Age: 77
End: 2017-08-09
Attending: FAMILY MEDICINE
Payer: MEDICARE

## 2017-08-09 VITALS
RESPIRATION RATE: 16 BRPM | WEIGHT: 202 LBS | HEIGHT: 62 IN | TEMPERATURE: 98.4 F | SYSTOLIC BLOOD PRESSURE: 128 MMHG | BODY MASS INDEX: 37.17 KG/M2 | HEART RATE: 72 BPM | DIASTOLIC BLOOD PRESSURE: 76 MMHG

## 2017-08-09 DIAGNOSIS — R51.9 ACUTE INTRACTABLE HEADACHE, UNSPECIFIED HEADACHE TYPE: Primary | ICD-10-CM

## 2017-08-09 PROCEDURE — 99212 OFFICE O/P EST SF 10 MIN: CPT

## 2017-08-09 PROCEDURE — 99214 OFFICE O/P EST MOD 30 MIN: CPT | Performed by: FAMILY MEDICINE

## 2017-08-09 RX ORDER — PREDNISONE 20 MG/1
40 TABLET ORAL DAILY
Qty: 60 TABLET | Refills: 1 | Status: SHIPPED | OUTPATIENT
Start: 2017-08-09 | End: 2017-08-28

## 2017-08-09 ASSESSMENT — ANXIETY QUESTIONNAIRES
IF YOU CHECKED OFF ANY PROBLEMS ON THIS QUESTIONNAIRE, HOW DIFFICULT HAVE THESE PROBLEMS MADE IT FOR YOU TO DO YOUR WORK, TAKE CARE OF THINGS AT HOME, OR GET ALONG WITH OTHER PEOPLE: NOT DIFFICULT AT ALL
7. FEELING AFRAID AS IF SOMETHING AWFUL MIGHT HAPPEN: NOT AT ALL
4. TROUBLE RELAXING: NOT AT ALL
2. NOT BEING ABLE TO STOP OR CONTROL WORRYING: NEARLY EVERY DAY
GAD7 TOTAL SCORE: 7
6. BECOMING EASILY ANNOYED OR IRRITABLE: NOT AT ALL
3. WORRYING TOO MUCH ABOUT DIFFERENT THINGS: NEARLY EVERY DAY
1. FEELING NERVOUS, ANXIOUS, OR ON EDGE: SEVERAL DAYS
5. BEING SO RESTLESS THAT IT IS HARD TO SIT STILL: NOT AT ALL

## 2017-08-09 ASSESSMENT — PATIENT HEALTH QUESTIONNAIRE - PHQ9: SUM OF ALL RESPONSES TO PHQ QUESTIONS 1-9: 1

## 2017-08-09 NOTE — MR AVS SNAPSHOT
After Visit Summary   8/9/2017    Ileana Davis    MRN: [id number]           Patient Information     Date Of Birth          1940        Visit Information        Provider Department      8/9/2017 4:00 PM Iris Becerril MD [facility]        Today's Diagnoses     Acute intractable headache, unspecified headache type    -  1       Follow-ups after your visit        Additional Services     GENERAL SURG ADULT REFERRAL       Your provider has referred you to: [facility] for consideration of temporal artery biopsy    Please be aware that coverage of these services is subject to the terms and limitations of your health insurance plan.  Call member services at your health plan with any benefit or coverage questions.      Please bring the following with you to your appointment:    (1) Any X-Rays, CTs or MRIs which have been performed.  Contact the facility where they were done to arrange for  prior to your scheduled appointment.   (2) List of current medications   (3) This referral request   (4) Any documents/labs given to you for this referral                  Follow-up notes from your care team     Return if symptoms worsen or fail to improve.      Your next 10 appointments already scheduled     Aug 24, 2017  2:30 PM CDT   (Arrive by 2:15 PM)   [facility]    [address]  [place] [address]   [phone]            Sep 26, 2017  2:00 PM CDT   (Arrive by 1:45 PM)   New Visit with Adalberto Martinez DO   [facility] ([facility] )    [address]  [place] [address]   [phone]              Who to contact     If you have questions or need follow up information about today's clinic visit or your schedule please contact [facility] directly at [phone].  Normal or non-critical lab and imaging results  "will be communicated to you by MyChart, letter or phone within 4 business days after the clinic has received the results. If you do not hear from us within 7 days, please contact the clinic through [employer] or phone. If you have a critical or abnormal lab result, we will notify you by phone as soon as possible.  Submit refill requests through [employer] or call your pharmacy and they will forward the refill request to us. Please allow 3 business days for your refill to be completed.          Additional Information About Your Visit        [employer] Information     [employer] lets you send messages to your doctor, view your test results, renew your prescriptions, schedule appointments and more. To sign up, go to [email or web address] . Click on \"Log in\" on the left side of the screen, which will take you to the Welcome page. Then click on \"Sign up Now\" on the right side of the page.     You will be asked to enter the access code listed below, as well as some personal information. Please follow the directions to create your username and password.     Your access code is: [email or web address]  Expires: 9/3/2017  4:23 PM     Your access code will  in 90 days. If you need help or a new code, please call your [facility] clinic or [phone].        Care EveryWhere ID     This is your Care EveryWhere ID. This could be used by other organizations to access your [facility] medical records  [id number]        Your Vitals Were     Pulse Temperature Respirations Height BMI (Body Mass Index)       72 98.4  F (36.9  C) (Tympanic) 16 5' 2\" (1.575 m) 36.95 kg/m2        Blood Pressure from Last 3 Encounters:   17 128/76   17 118/72   17 122/72    Weight from Last 3 Encounters:   17 202 lb (91.6 kg)   17 205 lb (93 kg)   17 205 lb 6.4 oz (93.2 kg)              We Performed the Following     GENERAL SURG ADULT REFERRAL          Today's Medication Changes          These changes are accurate as of: 17 11:59 " PM.  If you have any questions, ask your nurse or doctor.               Start taking these medicines.        Dose/Directions    predniSONE 20 MG tablet   Commonly known as:  DELTASONE   Used for:  Acute intractable headache, unspecified headache type   Started by:  Iris Becerril MD        Dose:  40 mg   Take 2 tablets (40 mg) by mouth daily   Quantity:  60 tablet   Refills:  1         Stop taking these medicines if you haven't already. Please contact your care team if you have questions.     omega-3 acid ethyl esters 1 G capsule   Commonly known as:  Lovaza   Stopped by:  Iris Becerril MD           VITAMIN D (CHOLECALCIFEROL) PO   Stopped by:  Iris Becerril MD                Where to get your medicines      These medications were sent to [facility] - [place] - [address]  [address], [place] [address]     Phone:  [phone]     predniSONE 20 MG tablet                Primary Care Provider Office Phone # Fax #    Iris Becerril [phone] [address] [facility] [address]        Equal Access to Services     [facility]: Hadii taye ku hadasho Soomaali, waaxda luqadaha, qaybta kaalmada adeegyada, waxay jacques may . So [facility] [phone].    ATENCIÓN: Si habla español, tiene a nadujar disposición servicios gratuitos de asistencia lingüística. [facility] [phone].    We comply with applicable federal civil rights laws and [place] laws. We do not discriminate on the basis of race, color, national origin, age, disability sex, sexual orientation or gender identity.            Thank you!     Thank you for choosing [facility]  for your care. Our goal is always to provide you with excellent care. Hearing back from our patients is one way we can continue to improve our services. Please take a few minutes to complete the written survey that you may receive in the mail after your visit with us. Thank you!              Your Updated Medication List - Protect others around you: Learn how to safely use, store and throw away your medicines at www.disposemymeds.org.          This list is accurate as of: 8/9/17 11:59 PM.  Always use your most recent med list.                   Brand Name Dispense Instructions for use Diagnosis    amitriptyline 25 MG tablet    ELAVIL    90 tablet    Take one (1) tablet by mouth at bedtime    Myalgia, Myositis       cyclobenzaprine 10 MG tablet    FLEXERIL    90 tablet    Take 1 tablet (10 mg) by mouth 3 times daily    Muscle spasm, Pain       fluticasone 50 MCG/ACT spray    FLONASE    16 g    Spray 2 sprays into both nostrils daily as needed    Seasonal allergic rhinitis       IMURAN PO      Take 50 mg by mouth 2 times daily        levothyroxine 112 MCG tablet    SYNTHROID/LEVOTHROID    90 tablet    Take one (1) tablet by mouth once daily    Hypothyroidism       MIRALAX PO      Take by mouth daily as needed        naproxen 500 MG Tbec     60 tablet    Take 500 mg by mouth 2 times daily (with meals)    Degeneration of lumbar or lumbosacral intervertebral disc       NYAMYC 100062 UNIT/GM Powd   Generic drug:  nystatin     1 Bottle    Apply to affected area nightly as needed for rash    Yeast dermatitis       omeprazole 40 MG capsule    priLOSEC     Take  by mouth daily. Before the same meal daily        order for DME     2 Device    Equipment being ordered: LAVERNE stockings, 15-20 mmHg    Peripheral edema       predniSONE 20 MG tablet    DELTASONE    60 tablet    Take 2 tablets (40 mg) by mouth daily    Acute intractable headache, unspecified headache type       propranolol 20 MG tablet    INDERAL    90 tablet    Take 1 tablet (20 mg) by mouth daily    HTN (hypertension)

## 2017-08-09 NOTE — Clinical Note
I see that surgery appointment is not until 9/26.  If our surgeons cannot fit her in in the next couple of weeks, please check with Sheldon Mcallister.

## 2017-08-09 NOTE — NURSING NOTE
"Chief Complaint   Patient presents with     Hospital F/U     headaches       Initial /76 (BP Location: Left arm, Patient Position: Sitting, Cuff Size: Adult Regular)  Pulse 72  Temp 98.4  F (36.9  C) (Tympanic)  Resp 16  Ht 5' 2\" (1.575 m)  Wt 202 lb (91.6 kg)  BMI 36.95 kg/m2 Estimated body mass index is 36.95 kg/(m^2) as calculated from the following:    Height as of this encounter: 5' 2\" (1.575 m).    Weight as of this encounter: 202 lb (91.6 kg).  Medication Reconciliation: complete     Shraddha Reeves      "

## 2017-08-09 NOTE — TELEPHONE ENCOUNTER
Flonase      Last Written Prescription Date: 05/25/2017  Last Fill Quantity: 16g,  # refills: 0   Last Office Visit with [facility], [facility] or [facility] prescribing provider: 08/03/2017                                         Next 5 appointments (look out 90 days)     Aug 09, 2017  4:00 PM CDT   (Arrive by 3:45 PM)   SHORT with Iris Becerril MD   [facility] ([facility] )    [address]  [facility] [address]   [phone]

## 2017-08-10 RX ORDER — FLUTICASONE PROPIONATE 50 MCG
SPRAY, SUSPENSION (ML) NASAL
Qty: 16 G | Refills: 3 | Status: SHIPPED | OUTPATIENT
Start: 2017-08-10 | End: 2018-08-13

## 2017-08-10 ASSESSMENT — ANXIETY QUESTIONNAIRES: GAD7 TOTAL SCORE: 7

## 2017-08-10 NOTE — PROGRESS NOTES
SUBJECTIVE:                                                    Ileana Davis is a 76 year old female who presents to clinic today for the following health issues:    Headache  Onset: a couple of weeks ago    Description:   Location: unilateral in the left frontal area   Character: throbbing pain, dull pain  Frequency:  Constant, daily  Duration:  Couple of weeks    Intensity: mild, moderate    Progression of Symptoms:  Improving slightly, but still there    Accompanying Signs & Symptoms:  Stiff neck: no  Neck or upper back pain: no  Fever: YES - at onset  Sinus pressure: no  Nausea or vomiting: no  Dizziness: no  Numbness: no  Weakness: no  Visual changes: no    History:   Head trauma: no  Family history of migraines: YES- daughter has migraines  Previous tests for headaches: see below  Neurologist evaluations: no  Able to do daily activities: YES  Wake with a headaches: YES  Do headaches wake you up: no  Daily pain medication use: no  Work/school stressors/changes: no    Precipitating factors:   Does light make it worse: no  Does sound make it worse: no    Alleviating factors:  Does sleep help: no    Therapies Tried and outcome: Tylenol, ibuprofen have not helped      Patient was in ER twice for headaches.  The first time ([facility]), fever was present, so she did have spinal tap, which was negative for signs of infection.  She was brought to the ER just last week due to confusion.  Headaches were evalauted again at that time, and confusion was thought to be due to tramadol use, which was given in ER for headaches.    Patient notes headache is present over left eye and into temple region.  She denies any visual changes, but has had some jaw symptoms.  She is over the age of 49, she did have fever at onset, and her ESR was quite elevated in the ER.  Suspicion should be high for temporal arteritis, but this was not considered by ER providers as pain is above eye and into temple, not just over  temple.      Problem list and histories reviewed & adjusted, as indicated.  Additional history: as documented    Patient Active Problem List   Diagnosis     Hypothyroidism     Generalized anxiety disorder     Benign essential hypertension     GERD (gastroesophageal reflux disease)     Hepatitis     Contact dermatitis     Rosacea     Osteoarthritis     Myalgia and myositis     Advanced care planning/counseling discussion     Open wound of knee, leg, and ankle     Degeneration of lumbar or lumbosacral intervertebral disc     Anemia     ACP (advance care planning)     Autoimmune hepatitis (H)     Past Surgical History:   Procedure Laterality Date     APPENDECTOMY       BACK SURGERY  2015    lumbar epidural injection     CHOLECYSTECTOMY       COLONOSCOPY  07/27/2017    [facility]     ENT SURGERY      tonsillectomy     ESOPHAGOGASTRODUODENOSCOPY  07/27/2017    [facility]     EYE SURGERY      bilateral cataract removal     GI SURGERY      anal fistula     GYN SURGERY  1985    [facility] BSO     GYN SURGERY      cessarian x 2     GYN SURGERY      tubal sterilazation     ORTHOPEDIC SURGERY      right knee     ORTHOPEDIC SURGERY  1986    plantar fascia release     ORTHOPEDIC SURGERY      left knee     ORTHOPEDIC SURGERY      right trigger finger release     ORTHOPEDIC SURGERY  2013    Right great toe MTPJ fusion, adductor tenotomy,correction of hammer toe     ORTHOPEDIC SURGERY  [id number]    multiple procedures left forefoot       Social History   Substance Use Topics     Smoking status: Never Smoker     Smokeless tobacco: Never Used     Alcohol use No     Family History   Problem Relation Age of Onset     Arthritis Mother      rheumatoid     HEART DISEASE Mother      CHF     Alcohol/Drug Father      alcoholism     Allergies Father      Thyroid Disease Other      DIABETES No family hx of      Asthma No family hx of          Current Outpatient Prescriptions   Medication Sig Dispense Refill     predniSONE (DELTASONE) 20 MG tablet Take 2  "tablets (40 mg) by mouth daily 60 tablet 1     levothyroxine (SYNTHROID/LEVOTHROID) 112 MCG tablet Take one (1) tablet by mouth once daily 90 tablet 1     amitriptyline (ELAVIL) 25 MG tablet Take one (1) tablet by mouth at bedtime 90 tablet 0     cyclobenzaprine (FLEXERIL) 10 MG tablet Take 1 tablet (10 mg) by mouth 3 times daily 90 tablet 0     propranolol (INDERAL) 20 MG tablet Take 1 tablet (20 mg) by mouth daily 90 tablet 1     fluticasone (FLONASE) 50 MCG/ACT nasal spray Spray 2 sprays into both nostrils daily as needed 16 g 3     Nystatin (NYAMYC) 639464 UNIT/GM POWD Apply to affected area nightly as needed for rash 1 Bottle 1     naproxen 500 MG TBEC Take 500 mg by mouth 2 times daily (with meals) 60 tablet 5     ORDER FOR DME Equipment being ordered: LAVERNE stockings, 15-20 mmHg 2 Device 2     AzaTHIOprine (IMURAN PO) Take 50 mg by mouth 2 times daily        Polyethylene Glycol 3350 (MIRALAX PO) Take by mouth daily as needed        omeprazole (PRILOSEC) 40 MG capsule Take  by mouth daily. Before the same meal daily       Allergies   Allergen Reactions     Codeine Sulfate Other (See Comments)     hallucinations     Morphine Other (See Comments)     Hallucinations with iv therapy     Tape [Adhesive Tape]          Reviewed and updated as needed this visit by clinical staffTobacco  Allergies  Meds  Problems  Med Hx  Surg Hx  Fam Hx  Soc Hx        Reviewed and updated as needed this visit by Provider         ROS:  Constitutional, HEENT, cardiovascular, pulmonary, gi and gu systems are negative, except as otherwise noted.      OBJECTIVE:   /76 (BP Location: Left arm, Patient Position: Sitting, Cuff Size: Adult Regular)  Pulse 72  Temp 98.4  F (36.9  C) (Tympanic)  Resp 16  Ht 5' 2\" (1.575 m)  Wt 202 lb (91.6 kg)  BMI 36.95 kg/m2  Body mass index is 36.95 kg/(m^2).  GENERAL: alert and no distress  NEURO: mentation intact and headache indicated over left eye and into left temple  PSYCH: mentation " appears normal, affect normal/bright    Diagnostic Test Results:  none     ASSESSMENT/PLAN:     1. Acute intractable headache, unspecified headache type  I respectfully disagree with ER providers and think temporal arteritis should be pretty high on differential.  Other causes have been ruled out with negative imaging and negative LP.  Will start high dose steroids and refer to General Surgery for consideration of temporal artery biopsy.  Follow-up here as directed.  - GENERAL SURG ADULT REFERRAL  - predniSONE (DELTASONE) 20 MG tablet; Take 2 tablets (40 mg) by mouth daily  Dispense: 60 tablet; Refill: 1    Over 30 minutes are spent with the patient, over 50% of which was in education and counseling regarding current conditions and treatment/therapy options/risks/benefits/etc.      Iris Becerril MD  [facility]

## 2017-08-14 DIAGNOSIS — R51.9 ACUTE INTRACTABLE HEADACHE, UNSPECIFIED HEADACHE TYPE: Primary | ICD-10-CM

## 2017-08-22 ENCOUNTER — TRANSFERRED RECORDS (OUTPATIENT)
Dept: HEALTH INFORMATION MANAGEMENT | Facility: HOSPITAL | Age: 77
End: 2017-08-22

## 2017-08-28 ENCOUNTER — OFFICE VISIT (OUTPATIENT)
Dept: FAMILY MEDICINE | Facility: OTHER | Age: 77
End: 2017-08-28
Attending: FAMILY MEDICINE
Payer: MEDICARE

## 2017-08-28 VITALS
BODY MASS INDEX: 37.17 KG/M2 | DIASTOLIC BLOOD PRESSURE: 78 MMHG | TEMPERATURE: 98.6 F | HEIGHT: 62 IN | SYSTOLIC BLOOD PRESSURE: 120 MMHG | WEIGHT: 202 LBS

## 2017-08-28 DIAGNOSIS — R51.9 ACUTE INTRACTABLE HEADACHE, UNSPECIFIED HEADACHE TYPE: ICD-10-CM

## 2017-08-28 PROBLEM — E66.01 MORBID OBESITY (H): Status: ACTIVE | Noted: 2017-08-28

## 2017-08-28 PROCEDURE — 99213 OFFICE O/P EST LOW 20 MIN: CPT | Performed by: FAMILY MEDICINE

## 2017-08-28 PROCEDURE — 99212 OFFICE O/P EST SF 10 MIN: CPT | Performed by: COUNSELOR

## 2017-08-28 RX ORDER — PREDNISONE 20 MG/1
30 TABLET ORAL DAILY
Qty: 60 TABLET | Refills: 1 | COMMUNITY
Start: 2017-08-28 | End: 2017-10-19

## 2017-08-28 ASSESSMENT — ANXIETY QUESTIONNAIRES
3. WORRYING TOO MUCH ABOUT DIFFERENT THINGS: SEVERAL DAYS
GAD7 TOTAL SCORE: 6
2. NOT BEING ABLE TO STOP OR CONTROL WORRYING: NEARLY EVERY DAY
IF YOU CHECKED OFF ANY PROBLEMS ON THIS QUESTIONNAIRE, HOW DIFFICULT HAVE THESE PROBLEMS MADE IT FOR YOU TO DO YOUR WORK, TAKE CARE OF THINGS AT HOME, OR GET ALONG WITH OTHER PEOPLE: SOMEWHAT DIFFICULT
7. FEELING AFRAID AS IF SOMETHING AWFUL MIGHT HAPPEN: NOT AT ALL
6. BECOMING EASILY ANNOYED OR IRRITABLE: NOT AT ALL
1. FEELING NERVOUS, ANXIOUS, OR ON EDGE: SEVERAL DAYS
5. BEING SO RESTLESS THAT IT IS HARD TO SIT STILL: SEVERAL DAYS

## 2017-08-28 ASSESSMENT — PATIENT HEALTH QUESTIONNAIRE - PHQ9
5. POOR APPETITE OR OVEREATING: NOT AT ALL
SUM OF ALL RESPONSES TO PHQ QUESTIONS 1-9: 11

## 2017-08-28 ASSESSMENT — PAIN SCALES - GENERAL: PAINLEVEL: MILD PAIN (3)

## 2017-08-28 NOTE — MR AVS SNAPSHOT
"              After Visit Summary   2017    Ileana Davis    MRN: [id number]           Patient Information     Date Of Birth          1940        Visit Information        Provider Department      2017 2:45 PM Iris eBcerril MD [facility]        Today's Diagnoses     Acute intractable headache, unspecified headache type           Follow-ups after your visit        Follow-up notes from your care team     Return in about 4 weeks (around 2017).      Who to contact     If you have questions or need follow up information about today's clinic visit or your schedule please contact [facility] directly at [phone].  Normal or non-critical lab and imaging results will be communicated to you by MyChart, letter or phone within 4 business days after the clinic has received the results. If you do not hear from us within 7 days, please contact the clinic through MyChart or phone. If you have a critical or abnormal lab result, we will notify you by phone as soon as possible.  Submit refill requests through [employer] or call your pharmacy and they will forward the refill request to us. Please allow 3 business days for your refill to be completed.          Additional Information About Your Visit        MyChart Information     [employer] lets you send messages to your doctor, view your test results, renew your prescriptions, schedule appointments and more. To sign up, go to [email or web address] . Click on \"Log in\" on the left side of the screen, which will take you to the Welcome page. Then click on \"Sign up Now\" on the right side of the page.     You will be asked to enter the access code listed below, as well as some personal information. Please follow the directions to create your username and password.     Your access code is: [email or web address]  Expires: 9/3/2017  4:23 PM     Your access code will  in 90 days. If you need help or a new code, please call your [place] " "clinic or [phone].        Care EveryWhere ID     This is your Care EveryWhere ID. This could be used by other organizations to access your [facility] medical records  [id number]        Your Vitals Were     Temperature Height BMI (Body Mass Index)             98.6  F (37  C) (Tympanic) 5' 2\" (1.575 m) 36.95 kg/m2          Blood Pressure from Last 3 Encounters:   08/28/17 120/78   08/09/17 128/76   06/05/17 118/72    Weight from Last 3 Encounters:   08/28/17 202 lb (91.6 kg)   08/09/17 202 lb (91.6 kg)   06/05/17 205 lb (93 kg)              Today, you had the following     No orders found for display         Today's Medication Changes          These changes are accurate as of: 8/28/17  3:35 PM.  If you have any questions, ask your nurse or doctor.               These medicines have changed or have updated prescriptions.        Dose/Directions    predniSONE 20 MG tablet   Commonly known as:  DELTASONE   This may have changed:  how much to take   Used for:  Acute intractable headache, unspecified headache type   Changed by:  Iris Becerril MD        Dose:  30 mg   Take 1.5 tablets (30 mg) by mouth daily   Quantity:  60 tablet   Refills:  1                Primary Care Provider Office Phone # Fax #    Iris Becerril [phone] [address] [facility] [address]        Equal Access to Services     [facility]: Hadii taye verdugo [place], waaxda luqadaha, qaybta kaalmabaljit duong . So [facility] [phone].    ATENCIÓN: Si habla español, tiene a andujar disposición servicios gratuitos de asistencia lingüística. Llame al [phone].    We comply with applicable federal civil rights laws and [place] laws. We do not discriminate on the basis of race, color, national origin, age, disability sex, sexual orientation or gender identity.            Thank you!     Thank you for choosing [facility]  for your care. Our goal is " always to provide you with excellent care. Hearing back from our patients is one way we can continue to improve our services. Please take a few minutes to complete the written survey that you may receive in the mail after your visit with us. Thank you!             Your Updated Medication List - Protect others around you: Learn how to safely use, store and throw away your medicines at www.disposemymeds.org.          This list is accurate as of: 8/28/17  3:35 PM.  Always use your most recent med list.                   Brand Name Dispense Instructions for use Diagnosis    amitriptyline 25 MG tablet    ELAVIL    90 tablet    Take one (1) tablet by mouth at bedtime    Myalgia, Myositis       cyclobenzaprine 10 MG tablet    FLEXERIL    90 tablet    Take 1 tablet (10 mg) by mouth 3 times daily    Muscle spasm, Pain       fluticasone 50 MCG/ACT spray    FLONASE    16 g    Spray 2 sprays into both nostrils daily as needed    Seasonal allergic rhinitis       IMURAN PO      Take 50 mg by mouth 2 times daily        levothyroxine 112 MCG tablet    SYNTHROID/LEVOTHROID    90 tablet    Take one (1) tablet by mouth once daily    Hypothyroidism       MIRALAX PO      Take by mouth daily as needed        NYAMYC 251639 UNIT/GM Powd   Generic drug:  nystatin     1 Bottle    Apply to affected area nightly as needed for rash    Yeast dermatitis       omeprazole 40 MG capsule    priLOSEC     Take  by mouth daily. Before the same meal daily        predniSONE 20 MG tablet    DELTASONE    60 tablet    Take 1.5 tablets (30 mg) by mouth daily    Acute intractable headache, unspecified headache type       propranolol 20 MG tablet    INDERAL    90 tablet    Take 1 tablet (20 mg) by mouth daily    HTN (hypertension)

## 2017-08-28 NOTE — PROGRESS NOTES
SUBJECTIVE:   Ileana Davis is a 76 year old female who presents to clinic today for the following health issues:    Migraine//Headache Follow-Up    Headaches symptoms:  Improved , not constant anymore, only intermittent twinges    Frequency: not every day     Duration of headaches: moments now    Able to do normal daily activities/work with migraines: Yes    Rescue/Relief medication:nothing              Effectiveness: n/a    Preventative medication: started on prednisone for presumed temporal arteritis    Neurologic complications: No new stroke-like symptoms, loss of vision or speech, numbness or weakness    Patient feels she is overall doing better.  She did have temporal artery biopsy, and biopsy was negative.  There is a possibility of false negative test, and patient is doing well with prednisone.          Problem list and histories reviewed & adjusted, as indicated.  Additional history: as documented    Patient Active Problem List   Diagnosis     Hypothyroidism     Generalized anxiety disorder     Benign essential hypertension     GERD (gastroesophageal reflux disease)     Hepatitis     Contact dermatitis     Rosacea     Osteoarthritis     Myalgia and myositis     Advanced care planning/counseling discussion     Open wound of knee, leg, and ankle     Degeneration of lumbar or lumbosacral intervertebral disc     Anemia     ACP (advance care planning)     Autoimmune hepatitis (H)     Morbid obesity (H)     Past Surgical History:   Procedure Laterality Date     APPENDECTOMY       BACK SURGERY  2015    lumbar epidural injection     CHOLECYSTECTOMY       COLONOSCOPY  07/27/2017    [facility]     COLONOSCOPY - HIM SCAN  05/15/2001    Small internal hemorrhoids; otherwise normal;[facility]     COLONOSCOPY - HIM SCAN  12/14/2006    Colonoscopy, [facility]     ENT SURGERY      tonsillectomy     ESOPHAGOGASTRODUODENOSCOPY  07/27/2017    [facility]     EYE SURGERY      bilateral cataract removal     GI SURGERY       anal fistula     GYN SURGERY  1985    [facility]     GYN SURGERY      cessarian x 2     GYN SURGERY      tubal sterilazation     ORTHOPEDIC SURGERY      right knee     ORTHOPEDIC SURGERY  1986    plantar fascia release     ORTHOPEDIC SURGERY      left knee     ORTHOPEDIC SURGERY      right trigger finger release     ORTHOPEDIC SURGERY  2013    Right great toe MTPJ fusion, adductor tenotomy,correction of hammer toe     ORTHOPEDIC SURGERY  [id number]    multiple procedures left forefoot       Social History   Substance Use Topics     Smoking status: Never Smoker     Smokeless tobacco: Never Used     Alcohol use No     Family History   Problem Relation Age of Onset     Arthritis Mother      rheumatoid     HEART DISEASE Mother      CHF     Alcohol/Drug Father      alcoholism     Allergies Father      Thyroid Disease Other      DIABETES No family hx of      Asthma No family hx of          Current Outpatient Prescriptions   Medication Sig Dispense Refill     predniSONE (DELTASONE) 20 MG tablet Take 1.5 tablets (30 mg) by mouth daily 60 tablet 1     fluticasone (FLONASE) 50 MCG/ACT spray Spray 2 sprays into both nostrils daily as needed 16 g 3     levothyroxine (SYNTHROID/LEVOTHROID) 112 MCG tablet Take one (1) tablet by mouth once daily 90 tablet 1     amitriptyline (ELAVIL) 25 MG tablet Take one (1) tablet by mouth at bedtime 90 tablet 0     cyclobenzaprine (FLEXERIL) 10 MG tablet Take 1 tablet (10 mg) by mouth 3 times daily 90 tablet 0     propranolol (INDERAL) 20 MG tablet Take 1 tablet (20 mg) by mouth daily 90 tablet 1     Nystatin (NYAMYC) 343572 UNIT/GM POWD Apply to affected area nightly as needed for rash 1 Bottle 1     AzaTHIOprine (IMURAN PO) Take 50 mg by mouth 2 times daily        Polyethylene Glycol 3350 (MIRALAX PO) Take by mouth daily as needed        omeprazole (PRILOSEC) 40 MG capsule Take  by mouth daily. Before the same meal daily       Allergies   Allergen Reactions     Codeine Sulfate Other (See Comments)  "    hallucinations     Morphine Other (See Comments)     Hallucinations with iv therapy     Tape [Adhesive Tape]          Reviewed and updated as needed this visit by clinical staffTobacco  Allergies  Meds       Reviewed and updated as needed this visit by Provider         ROS:  Constitutional, HEENT, cardiovascular, pulmonary, gi and gu systems are negative, except as otherwise noted.      OBJECTIVE:   /78  Temp 98.6  F (37  C) (Tympanic)  Ht 5' 2\" (1.575 m)  Wt 202 lb (91.6 kg)  BMI 36.95 kg/m2  Body mass index is 36.95 kg/(m^2).  GENERAL: healthy, alert and no distress  PSYCH: mentation appears normal, affect normal/bright    Diagnostic Test Results:  none     ASSESSMENT/PLAN:     1. Acute intractable headache, unspecified headache type  Will decrease dose to 30 mg daily.  New medication list is printed and patient is instructed how to take prednisone now.  Follow-up in 3-4 weeks, sooner as needed.  - predniSONE (DELTASONE) 20 MG tablet; Take 1.5 tablets (30 mg) by mouth daily  Dispense: 60 tablet; Refill: 1      Iris Becerril MD  [facility]    "

## 2017-08-28 NOTE — NURSING NOTE
"Chief Complaint   Patient presents with     Headache     Follow up       Initial /78  Temp 98.6  F (37  C) (Tympanic)  Ht 5' 2\" (1.575 m)  Wt 202 lb (91.6 kg)  BMI 36.95 kg/m2 Estimated body mass index is 36.95 kg/(m^2) as calculated from the following:    Height as of this encounter: 5' 2\" (1.575 m).    Weight as of this encounter: 202 lb (91.6 kg).  Medication Reconciliation: complete     Viola Knowles      "

## 2017-08-29 ASSESSMENT — ANXIETY QUESTIONNAIRES: GAD7 TOTAL SCORE: 6

## 2017-08-30 DIAGNOSIS — R52 PAIN: ICD-10-CM

## 2017-08-30 DIAGNOSIS — M62.838 MUSCLE SPASM: ICD-10-CM

## 2017-08-30 RX ORDER — CYCLOBENZAPRINE HCL 10 MG
TABLET ORAL
Qty: 90 TABLET | Refills: 0 | Status: SHIPPED | OUTPATIENT
Start: 2017-08-30 | End: 2017-11-28

## 2017-09-18 ENCOUNTER — TELEPHONE (OUTPATIENT)
Dept: FAMILY MEDICINE | Facility: OTHER | Age: 77
End: 2017-09-18

## 2017-09-18 NOTE — TELEPHONE ENCOUNTER
She should start by cutting down to 20 mg daily for about 2 weeks, then 10 mg daily for 2 weeks, then 10 mg every other day for 2 weeks then stop.

## 2017-09-18 NOTE — TELEPHONE ENCOUNTER
"9:30 AM    Reason for Call: Phone Call    Description: Patient would like to speak with nurse regarding weaning off prednisone and symptoms she has been having. Patient states \" I am bloated in the face and around eyes. I am nervous, not sleeping and waking up shaky. I have gained weight, 2 pounds, and can't stop eating. I feel hungry all the time.\" Patient uses [facility] in [place].    Was an appointment offered for this call? No  If yes : Appointment type              Date    Preferred method for responding to this message: Telephone Call  What is your phone number ?    If we cannot reach you directly, may we leave a detailed response at the number you provided? Yes    Can this message wait until your PCP/provider returns, if available today? Not applicable,     Odalis Rowe"

## 2017-10-05 DIAGNOSIS — M79.10 MYALGIA: ICD-10-CM

## 2017-10-05 DIAGNOSIS — M60.9 MYOSITIS: ICD-10-CM

## 2017-10-12 ENCOUNTER — TELEPHONE (OUTPATIENT)
Dept: FAMILY MEDICINE | Facility: OTHER | Age: 77
End: 2017-10-12

## 2017-10-12 ENCOUNTER — TRANSFERRED RECORDS (OUTPATIENT)
Dept: HEALTH INFORMATION MANAGEMENT | Facility: HOSPITAL | Age: 77
End: 2017-10-12

## 2017-10-12 NOTE — TELEPHONE ENCOUNTER
Weaning down on prednisone and down to 10mg and she has had a few days of cramping in chest like a heart attack it comes and goes horrible, is this a part of weaning down this medicaiton

## 2017-10-13 ENCOUNTER — TRANSFERRED RECORDS (OUTPATIENT)
Dept: HEALTH INFORMATION MANAGEMENT | Facility: HOSPITAL | Age: 77
End: 2017-10-13

## 2017-10-18 DIAGNOSIS — I10 HTN (HYPERTENSION): ICD-10-CM

## 2017-10-19 ENCOUNTER — OFFICE VISIT (OUTPATIENT)
Dept: FAMILY MEDICINE | Facility: OTHER | Age: 77
End: 2017-10-19
Attending: FAMILY MEDICINE
Payer: MEDICARE

## 2017-10-19 VITALS
HEART RATE: 83 BPM | BODY MASS INDEX: 37.73 KG/M2 | DIASTOLIC BLOOD PRESSURE: 76 MMHG | RESPIRATION RATE: 18 BRPM | HEIGHT: 62 IN | OXYGEN SATURATION: 94 % | SYSTOLIC BLOOD PRESSURE: 120 MMHG | WEIGHT: 205 LBS

## 2017-10-19 DIAGNOSIS — R07.89 ATYPICAL CHEST PAIN: Primary | ICD-10-CM

## 2017-10-19 PROCEDURE — 99213 OFFICE O/P EST LOW 20 MIN: CPT

## 2017-10-19 PROCEDURE — 99214 OFFICE O/P EST MOD 30 MIN: CPT | Performed by: FAMILY MEDICINE

## 2017-10-19 ASSESSMENT — ANXIETY QUESTIONNAIRES
5. BEING SO RESTLESS THAT IT IS HARD TO SIT STILL: NOT AT ALL
7. FEELING AFRAID AS IF SOMETHING AWFUL MIGHT HAPPEN: NOT AT ALL
4. TROUBLE RELAXING: MORE THAN HALF THE DAYS
3. WORRYING TOO MUCH ABOUT DIFFERENT THINGS: MORE THAN HALF THE DAYS
IF YOU CHECKED OFF ANY PROBLEMS ON THIS QUESTIONNAIRE, HOW DIFFICULT HAVE THESE PROBLEMS MADE IT FOR YOU TO DO YOUR WORK, TAKE CARE OF THINGS AT HOME, OR GET ALONG WITH OTHER PEOPLE: SOMEWHAT DIFFICULT
GAD7 TOTAL SCORE: 11
6. BECOMING EASILY ANNOYED OR IRRITABLE: NEARLY EVERY DAY
2. NOT BEING ABLE TO STOP OR CONTROL WORRYING: SEVERAL DAYS
1. FEELING NERVOUS, ANXIOUS, OR ON EDGE: NEARLY EVERY DAY

## 2017-10-19 ASSESSMENT — PATIENT HEALTH QUESTIONNAIRE - PHQ9: SUM OF ALL RESPONSES TO PHQ QUESTIONS 1-9: 17

## 2017-10-19 NOTE — NURSING NOTE
"Chief Complaint   Patient presents with     Hospital F/U     med reactions       Initial /76 (BP Location: Left arm, Patient Position: Sitting, Cuff Size: Adult Large)  Pulse 83  Resp 18  Ht 5' 2\" (1.575 m)  Wt 205 lb (93 kg)  SpO2 94%  BMI 37.49 kg/m2 Estimated body mass index is 37.49 kg/(m^2) as calculated from the following:    Height as of this encounter: 5' 2\" (1.575 m).    Weight as of this encounter: 205 lb (93 kg).  Medication Reconciliation: complete     Shraddha Reeves      "

## 2017-10-19 NOTE — MR AVS SNAPSHOT
"              After Visit Summary   10/19/2017    Ileana Davis    MRN: [id number]           Patient Information     Date Of Birth          1940        Visit Information        Provider Department      10/19/2017 2:45 PM Iris Becerril MD [facility]        Today's Diagnoses     Atypical chest pain    -  1       Follow-ups after your visit        Follow-up notes from your care team     Return if symptoms worsen or fail to improve.      Who to contact     If you have questions or need follow up information about today's clinic visit or your schedule please contact [facility] directly at [phone].  Normal or non-critical lab and imaging results will be communicated to you by AuditionBoothhart, letter or phone within 4 business days after the clinic has received the results. If you do not hear from us within 7 days, please contact the clinic through AuditionBoothhart or phone. If you have a critical or abnormal lab result, we will notify you by phone as soon as possible.  Submit refill requests through SnapOne or call your pharmacy and they will forward the refill request to us. Please allow 3 business days for your refill to be completed.          Additional Information About Your Visit        MyChart Information     SnapOne lets you send messages to your doctor, view your test results, renew your prescriptions, schedule appointments and more. To sign up, go to [email or web address] . Click on \"Log in\" on the left side of the screen, which will take you to the Welcome page. Then click on \"Sign up Now\" on the right side of the page.     You will be asked to enter the access code listed below, as well as some personal information. Please follow the directions to create your username and password.     Your access code is: [email or web address]  Expires: 2018  4:33 PM     Your access code will  in 90 days. If you need help or a new code, please call your [facility] or [phone].      " "  Care EveryWhere ID     This is your Care EveryWhere ID. This could be used by other organizations to access your [place] medical records  [id number]        Your Vitals Were     Pulse Respirations Height Pulse Oximetry BMI (Body Mass Index)       83 18 5' 2\" (1.575 m) 94% 37.49 kg/m2        Blood Pressure from Last 3 Encounters:   10/19/17 120/76   08/28/17 120/78   08/09/17 128/76    Weight from Last 3 Encounters:   10/19/17 205 lb (93 kg)   08/28/17 202 lb (91.6 kg)   08/09/17 202 lb (91.6 kg)              Today, you had the following     No orders found for display         Today's Medication Changes          These changes are accurate as of: 10/19/17  4:33 PM.  If you have any questions, ask your nurse or doctor.               Stop taking these medicines if you haven't already. Please contact your care team if you have questions.     predniSONE 20 MG tablet   Commonly known as:  DELTASONE   Stopped by:  Iris Becerril MD                    Primary Care Provider Office Phone # Fax #    Iris Becerril [phone] [address] [facility] [address]        Equal Access to Services     MECHELLE RIDLEY : Nadine Mariscal, warandellda jerson, qaybta kaalmada mert, baljit gonzalez. So [facility] [phone].    ATENCIÓN: Si habla español, tiene a andujar disposición servicios gratuitos de asistencia lingüística. Llame al [phone].    We comply with applicable federal civil rights laws and [place] laws. We do not discriminate on the basis of race, color, national origin, age, disability, sex, sexual orientation, or gender identity.            Thank you!     Thank you for choosing [facility]  for your care. Our goal is always to provide you with excellent care. Hearing back from our patients is one way we can continue to improve our services. Please take a few minutes to complete the written survey that you may receive in the " mail after your visit with us. Thank you!             Your Updated Medication List - Protect others around you: Learn how to safely use, store and throw away your medicines at www.disposemymeds.org.          This list is accurate as of: 10/19/17  4:33 PM.  Always use your most recent med list.                   Brand Name Dispense Instructions for use Diagnosis    amitriptyline 25 MG tablet    ELAVIL    90 tablet    Take one (1) tablet by mouth at bedtime    Myalgia, Myositis       cyclobenzaprine 10 MG tablet    FLEXERIL    90 tablet    Take 1 tablet (10 mg) by mouth 3 times daily    Muscle spasm, Pain       fluticasone 50 MCG/ACT spray    FLONASE    16 g    Spray 2 sprays into both nostrils daily as needed    Seasonal allergic rhinitis       IMURAN PO      Take 50 mg by mouth 2 times daily        levothyroxine 112 MCG tablet    SYNTHROID/LEVOTHROID    90 tablet    Take one (1) tablet by mouth once daily    Hypothyroidism       MIRALAX PO      Take by mouth daily as needed        NYAMYC 577472 UNIT/GM Powd   Generic drug:  nystatin     1 Bottle    Apply to affected area nightly as needed for rash    Yeast dermatitis       omeprazole 40 MG capsule    priLOSEC     Take  by mouth daily. Before the same meal daily        propranolol 20 MG tablet    INDERAL    90 tablet    Take 1 tablet (20 mg) by mouth daily    HTN (hypertension)

## 2017-10-19 NOTE — PROGRESS NOTES
SUBJECTIVE:   Ileana Davis is a 77 year old female who presents to clinic today for the following health issues:      ED/UC Followup:    Facility:  [facility]  Date of visit: October 12-14  Reason for visit:  CHEST PAIN, ANGIOPLASTY  Current Status: No further chest pain, but not feeling well overall.  Upset that she was given Fentanyl, and had an adverse reaction.  Upset that she missed her planned trip to [place] to visit family.  Upset that she had to stay at the hospital an extra day due to her reaction to medication.  Upset that she had a bed alarm.         She is just completing her prednisone, and understands that she will feel a little crummy for a little bit while that is getting out of her system.      Problem list and histories reviewed & adjusted, as indicated.  Additional history: as documented    Patient Active Problem List   Diagnosis     Hypothyroidism     Generalized anxiety disorder     Benign essential hypertension     GERD (gastroesophageal reflux disease)     Hepatitis     Contact dermatitis     Rosacea     Osteoarthritis     Myalgia and myositis     Advanced care planning/counseling discussion     Open wound of knee, leg, and ankle     Degeneration of lumbar or lumbosacral intervertebral disc     Anemia     ACP (advance care planning)     Autoimmune hepatitis (H)     Morbid obesity (H)     Past Surgical History:   Procedure Laterality Date     APPENDECTOMY       BACK SURGERY  2015    lumbar epidural injection     CARDIAC SURGERY  10/2017    angioplasty     CHOLECYSTECTOMY       COLONOSCOPY  07/27/2017    [facility]     COLONOSCOPY - HIM SCAN  05/15/2001    Small internal hemorrhoids; otherwise normal;[facility]     COLONOSCOPY - HIM SCAN  12/14/2006    Colonoscopy, [facility]     ENT SURGERY      tonsillectomy     ESOPHAGOGASTRODUODENOSCOPY  07/27/2017    [facility]     EYE SURGERY      bilateral cataract removal     GI SURGERY      anal fistula     GYN SURGERY  1985    [facility]  [facility]     GYN SURGERY      cessarian x 2     GYN SURGERY      tubal sterilazation     ORTHOPEDIC SURGERY      right knee     ORTHOPEDIC SURGERY  1986    plantar fascia release     ORTHOPEDIC SURGERY      left knee     ORTHOPEDIC SURGERY      right trigger finger release     ORTHOPEDIC SURGERY  2013    Right great toe MTPJ fusion, adductor tenotomy,correction of hammer toe     ORTHOPEDIC SURGERY  [id number]    multiple procedures left forefoot       Social History   Substance Use Topics     Smoking status: Never Smoker     Smokeless tobacco: Never Used     Alcohol use No     Family History   Problem Relation Age of Onset     Arthritis Mother      rheumatoid     HEART DISEASE Mother      CHF     Alcohol/Drug Father      alcoholism     Allergies Father      Thyroid Disease Other      DIABETES No family hx of      Asthma No family hx of          Current Outpatient Prescriptions   Medication Sig Dispense Refill     amitriptyline (ELAVIL) 25 MG tablet Take one (1) tablet by mouth at bedtime 90 tablet 1     cyclobenzaprine (FLEXERIL) 10 MG tablet Take 1 tablet (10 mg) by mouth 3 times daily 90 tablet 0     predniSONE (DELTASONE) 20 MG tablet Take 1.5 tablets (30 mg) by mouth daily 60 tablet 1     fluticasone (FLONASE) 50 MCG/ACT spray Spray 2 sprays into both nostrils daily as needed 16 g 3     levothyroxine (SYNTHROID/LEVOTHROID) 112 MCG tablet Take one (1) tablet by mouth once daily 90 tablet 1     propranolol (INDERAL) 20 MG tablet Take 1 tablet (20 mg) by mouth daily 90 tablet 1     Nystatin (NYAMYC) 434178 UNIT/GM POWD Apply to affected area nightly as needed for rash 1 Bottle 1     AzaTHIOprine (IMURAN PO) Take 50 mg by mouth 2 times daily        Polyethylene Glycol 3350 (MIRALAX PO) Take by mouth daily as needed        omeprazole (PRILOSEC) 40 MG capsule Take  by mouth daily. Before the same meal daily       Allergies   Allergen Reactions     Fentanyl Other (See Comments)     Severe agitation when used during angiogram  "    Codeine Sulfate Other (See Comments)     hallucinations     Morphine Other (See Comments)     Hallucinations with iv therapy     Tape [Adhesive Tape]      Tramadol Other (See Comments)     hallucination         Reviewed and updated as needed this visit by clinical staffTobacco  Allergies  Meds  Problems  Med Hx  Surg Hx  Fam Hx  Soc Hx        Reviewed and updated as needed this visit by Provider         ROS:  Constitutional, HEENT, cardiovascular, pulmonary, gi and gu systems are negative, except as otherwise noted.      OBJECTIVE:   /76 (BP Location: Left arm, Patient Position: Sitting, Cuff Size: Adult Large)  Pulse 83  Resp 18  Ht 5' 2\" (1.575 m)  Wt 205 lb (93 kg)  SpO2 94%  BMI 37.49 kg/m2  Body mass index is 37.49 kg/(m^2).  GENERAL: healthy, alert and no distress  PSYCH: mentation appears normal, affect normal/bright    Diagnostic Test Results:  none     ASSESSMENT/PLAN:     1. Atypical chest pain  Cardiac work-up negative.  I did  the patient that her general \"lousy\" feeling will improve as she is off the steroids for longer.  Follow-up if symptoms worsen or do not improve.      Over 25 minutes are spent with the patient, over 50% of which was in education and counseling regarding current conditions and treatment/therapy options/risks/benefits/etc.    Iris Becerril MD  [facility]"

## 2017-10-20 ASSESSMENT — ANXIETY QUESTIONNAIRES: GAD7 TOTAL SCORE: 11

## 2017-10-23 RX ORDER — PROPRANOLOL HYDROCHLORIDE 20 MG/1
TABLET ORAL
Qty: 90 TABLET | Refills: 1 | Status: SHIPPED | OUTPATIENT
Start: 2017-10-23 | End: 2018-05-14

## 2017-10-25 ENCOUNTER — TELEPHONE (OUTPATIENT)
Dept: FAMILY MEDICINE | Facility: OTHER | Age: 77
End: 2017-10-25

## 2017-10-25 NOTE — TELEPHONE ENCOUNTER
"Son calls, pt was in [facility] , why I don't know. Is very concerned about her condition, was given fentanyl and had a reaction to it.  Pt is very confused and \"not right\".  Told pt if he was concerned about her health and safety should call the police to check on her, outside of that there's nothing we can do.  Reminded him we don't have a release to speak with him about her.  Say's we do have a release.  Told him I would relay the conversation to    "

## 2017-10-25 NOTE — TELEPHONE ENCOUNTER
Called to check on pt, reports still having chest pain/pressure on and off, has no nitro.  Feeling better after the fentanyl got out of system.  Also said it was ok to speak with her son  regarding her health care. told her to call if needs anything further.

## 2017-10-25 NOTE — TELEPHONE ENCOUNTER
Patient was recently hospitalized at [facility] (transferred from [place] with chest pain) and had angiogram.  During the procedure, she was given Fentanyl, and had an adverse reaction to it (combative, confused, etc).  She was kept an extra night, and discharge the next day after symptoms cleared.  Fentanyl has been added to her allergy list.  Can we just call and check in on patient, and then ask if we have permission to talk to her son?

## 2017-10-25 NOTE — TELEPHONE ENCOUNTER
11:28 AM    Reason for Call: Phone Call    Description: Pts son called and states that he would like to have a call back regarding some information on his mother, I looked in her chart and could not find anything stating he could have her information but he insisted on a nurse calling him back.    Was an appointment offered for this call? No  If yes : Appointment type              Date    Preferred method for responding to this message: Telephone Call  What is your phone number ?    If we cannot reach you directly, may we leave a detailed response at the number you provided? Yes    Can this message wait until your PCP/provider returns, if available today? Yes    Nirmala Perez

## 2017-11-14 ENCOUNTER — OFFICE VISIT (OUTPATIENT)
Dept: FAMILY MEDICINE | Facility: OTHER | Age: 77
End: 2017-11-14
Attending: FAMILY MEDICINE
Payer: MEDICARE

## 2017-11-14 VITALS
TEMPERATURE: 97 F | HEART RATE: 76 BPM | DIASTOLIC BLOOD PRESSURE: 80 MMHG | BODY MASS INDEX: 40.4 KG/M2 | SYSTOLIC BLOOD PRESSURE: 136 MMHG | HEIGHT: 61 IN | OXYGEN SATURATION: 96 % | RESPIRATION RATE: 18 BRPM | WEIGHT: 214 LBS

## 2017-11-14 DIAGNOSIS — I10 BENIGN ESSENTIAL HYPERTENSION: ICD-10-CM

## 2017-11-14 DIAGNOSIS — Z01.818 PREOP GENERAL PHYSICAL EXAM: Primary | ICD-10-CM

## 2017-11-14 DIAGNOSIS — Z23 NEED FOR VACCINATION WITH 13-POLYVALENT PNEUMOCOCCAL CONJUGATE VACCINE: ICD-10-CM

## 2017-11-14 DIAGNOSIS — E03.9 HYPOTHYROIDISM, UNSPECIFIED TYPE: ICD-10-CM

## 2017-11-14 DIAGNOSIS — M51.379 DEGENERATION OF LUMBAR OR LUMBOSACRAL INTERVERTEBRAL DISC: ICD-10-CM

## 2017-11-14 LAB
ERYTHROCYTE [DISTWIDTH] IN BLOOD BY AUTOMATED COUNT: 14.3 % (ref 10–15)
HCT VFR BLD AUTO: 33.5 % (ref 35–47)
HGB BLD-MCNC: 11.3 G/DL (ref 11.7–15.7)
MCH RBC QN AUTO: 35.9 PG (ref 26.5–33)
MCHC RBC AUTO-ENTMCNC: 33.7 G/DL (ref 31.5–36.5)
MCV RBC AUTO: 106 FL (ref 78–100)
PLATELET # BLD AUTO: 275 10E9/L (ref 150–450)
RBC # BLD AUTO: 3.15 10E12/L (ref 3.8–5.2)
WBC # BLD AUTO: 6.6 10E9/L (ref 4–11)

## 2017-11-14 PROCEDURE — 90471 IMMUNIZATION ADMIN: CPT | Performed by: FAMILY MEDICINE

## 2017-11-14 PROCEDURE — 99213 OFFICE O/P EST LOW 20 MIN: CPT

## 2017-11-14 PROCEDURE — 36415 COLL VENOUS BLD VENIPUNCTURE: CPT | Mod: ZL | Performed by: FAMILY MEDICINE

## 2017-11-14 PROCEDURE — G0009 ADMIN PNEUMOCOCCAL VACCINE: HCPCS | Performed by: FAMILY MEDICINE

## 2017-11-14 PROCEDURE — 90670 PCV13 VACCINE IM: CPT | Performed by: FAMILY MEDICINE

## 2017-11-14 PROCEDURE — 85027 COMPLETE CBC AUTOMATED: CPT | Mod: ZL | Performed by: FAMILY MEDICINE

## 2017-11-14 PROCEDURE — 99214 OFFICE O/P EST MOD 30 MIN: CPT | Performed by: FAMILY MEDICINE

## 2017-11-14 ASSESSMENT — PATIENT HEALTH QUESTIONNAIRE - PHQ9
SUM OF ALL RESPONSES TO PHQ QUESTIONS 1-9: 14
5. POOR APPETITE OR OVEREATING: SEVERAL DAYS

## 2017-11-14 ASSESSMENT — ANXIETY QUESTIONNAIRES
3. WORRYING TOO MUCH ABOUT DIFFERENT THINGS: SEVERAL DAYS
6. BECOMING EASILY ANNOYED OR IRRITABLE: MORE THAN HALF THE DAYS
IF YOU CHECKED OFF ANY PROBLEMS ON THIS QUESTIONNAIRE, HOW DIFFICULT HAVE THESE PROBLEMS MADE IT FOR YOU TO DO YOUR WORK, TAKE CARE OF THINGS AT HOME, OR GET ALONG WITH OTHER PEOPLE: SOMEWHAT DIFFICULT
7. FEELING AFRAID AS IF SOMETHING AWFUL MIGHT HAPPEN: NEARLY EVERY DAY
GAD7 TOTAL SCORE: 13
5. BEING SO RESTLESS THAT IT IS HARD TO SIT STILL: MORE THAN HALF THE DAYS
1. FEELING NERVOUS, ANXIOUS, OR ON EDGE: NEARLY EVERY DAY
2. NOT BEING ABLE TO STOP OR CONTROL WORRYING: SEVERAL DAYS

## 2017-11-14 ASSESSMENT — PAIN SCALES - GENERAL: PAINLEVEL: EXTREME PAIN (8)

## 2017-11-14 NOTE — PROGRESS NOTES
[facility]  [address]  [facility] [address]  [phone]  Dept: [phone]    PRE-OP EVALUATION:  Today's date: 2017    Ileana Davis (: 1940) presents for pre-operative evaluation assessment as requested by Dr. Harden.  She requires evaluation and anesthesia risk assessment prior to undergoing surgery/procedure for treatment of back pain .  Proposed procedure: Spinal injection    Date of Surgery/ Procedure: 17  Time of Surgery/ Procedure: [facility]  Hospital/Surgical Facility: [facility]  Fax number for surgical facility:   Primary Physician: Iris Becerril  Type of Anesthesia Anticipated: to be determined    Patient has a Health Care Directive or Living Will:  YES - will bring a copy    1. NO - DO YOU HAVE A HISTORY OF HEART ATTACK, STROKE, STENT, BYPASS OR SURGERY ON AN ARTERY IN THE HEAD, NECK, HEART OR LEG?   2. YES - DO YOU EVER HAVE ANY PAIN OR DISCOMFORT IN YOUR CHEST? Chest pain, work-up negative  3. NO - Do you have a history of  Heart Failure?  4. YES - ARE YOUR TROUBLED BY SHORTNESS OF BREATH WHEN WALKING ON THE LEVEL, UP A SLIGHT HILL OR AT NIGHT? All of the above, chronic and stable  5. NO - Do you currently have a cold, bronchitis or other respiratory infection?  6. YES - DO YOU HAVE A COUGH, SHORTNESS OF BREATH OR WHEEZING? Dry cough all the time  7. NO - Do you sometimes get pains in the calves of your legs when you walk?  8. NO - Do you or anyone in your family have previous history of blood clots?  9. NO - Do you or does anyone in your family have a serious bleeding problem such as prolonged bleeding following surgeries or cuts?  10. NO - Have you ever had problems with anemia or been told to take iron pills?  11. NO - Have you had any abnormal blood loss such as black, tarry or bloody stools, or abnormal vaginal bleeding?  12. NO - Have you ever had a blood transfusion?  13. NO - Have you or any of your relatives ever had  problems with anesthesia?  14. NO - Do you have sleep apnea, excessive snoring or daytime drowsiness?  15. NO - Do you have any prosthetic heart valves?  16. YES - DO YOU HAVE PROSTHETIC JOINTS? Bilateral knees  17. NO - Is there any chance that you may be pregnant?        HPI:                                                      Brief HPI related to upcoming procedure: Recurrent back pain      HYPERTENSION - Patient has longstanding history of mod-severe HTN , currently denies any symptoms referable to elevated blood pressure. Specifically denies chest pain, palpitations, dyspnea, orthopnea, PND or peripheral edema. Blood pressure readings have been in normal range. Current medication regimen is as listed below. Patient denies any side effects of medication.                                                                                                                                                                                          .  HYPOTHYROIDISM - Patient has a longstanding history of chronic Hypothyroidism. Patient has been doing well, noting no tremor, insomnia, hair loss or changes in skin texture. Continues to take medications as directed, without adverse reactions or side effects.                                                                                                                                                                                                                        .    MEDICAL HISTORY:                                                    Patient Active Problem List    Diagnosis Date Noted     Morbid obesity (H) 08/28/2017     Priority: Medium     ACP (advance care planning) 06/02/2016     Priority: Medium     Advance Care Planning 6/2/2016: ACP Review of Chart / Resources Provided:  Reviewed chart for advance care plan.  Ileana Davis has been provided information and resources to begin or update their advance care plan.  Added by Shelly Syed            Autoimmune hepatitis (H) 06/02/2016     Priority: Medium     Anemia 08/20/2015     Priority: Medium     Degeneration of lumbar or lumbosacral intervertebral disc 01/30/2015     Priority: Medium     Open wound of knee, leg, and ankle      Priority: Medium     Hypothyroidism 03/27/2013     Priority: Medium     Generalized anxiety disorder 03/27/2013     Priority: Medium     Benign essential hypertension 03/27/2013     Priority: Medium     GERD (gastroesophageal reflux disease) 03/27/2013     Priority: Medium     Hepatitis 03/27/2013     Priority: Medium     Contact dermatitis 03/27/2013     Priority: Medium     Rosacea 03/27/2013     Priority: Medium     Osteoarthritis 03/27/2013     Priority: Medium     Myalgia and myositis 03/27/2013     Priority: Medium     Advanced care planning/counseling discussion 09/21/2012     Priority: Medium      Past Medical History:   Diagnosis Date     Anemia 8/20/2015     Autoimmune hepatitis (H) 6/2/2016     Benign paroxysmal positional vertigo 10/7/2010     Contact dermatitis and other eczema, due to unspecified cause 10/7/2010     Esophageal reflux 10/7/2010     Generalized anxiety disorder 10/7/2010     Generalized osteoarthrosis, involving multiple sites 10/7/2010     Hepatitis, unspecified 10/7/2010     Infected wound      Myalgia and myositis, unspecified 10/7/2010    fibromyalgia     Nonallopathic lesion of cervical region, not elsewhere classified 12/5/2001     Nonallopathic lesion of thoracic region, not elsewhere classified 3/31/2005     Open wound of knee, leg, and ankle      Rosacea 10/7/2010     Unspecified essential hypertension 10/7/2010     Unspecified hypothyroidism 10/7/2010     Past Surgical History:   Procedure Laterality Date     APPENDECTOMY       BACK SURGERY  2015    lumbar epidural injection     CARDIAC SURGERY  10/2017    angioplasty     CHOLECYSTECTOMY       COLONOSCOPY  07/27/2017    [facility]     COLONOSCOPY - HIM SCAN  05/15/2001    Small internal  hemorrhoids; otherwise normal;[facility]     COLONOSCOPY - HIM SCAN  12/14/2006    Colonoscopy, REMV LESN, SNARE     ENT SURGERY      tonsillectomy     ESOPHAGOGASTRODUODENOSCOPY  07/27/2017    [facility]     EYE SURGERY      bilateral cataract removal     GI SURGERY      anal fistula     GYN SURGERY  1985    NICHOLAS BSO     GYN SURGERY      cessarian x 2     GYN SURGERY      tubal sterilazation     ORTHOPEDIC SURGERY      right knee     ORTHOPEDIC SURGERY  1986    plantar fascia release     ORTHOPEDIC SURGERY      left knee     ORTHOPEDIC SURGERY      right trigger finger release     ORTHOPEDIC SURGERY  2013    Right great toe MTPJ fusion, adductor tenotomy,correction of hammer toe     ORTHOPEDIC SURGERY  [id number]    multiple procedures left forefoot     Current Outpatient Prescriptions   Medication Sig Dispense Refill     NAPROXEN PO Take 250 mg by mouth as needed for moderate pain Patient states she only take when needed.       propranolol (INDERAL) 20 MG tablet Take 1 tablet (20 mg) by mouth daily 90 tablet 1     amitriptyline (ELAVIL) 25 MG tablet Take one (1) tablet by mouth at bedtime 90 tablet 1     cyclobenzaprine (FLEXERIL) 10 MG tablet Take 1 tablet (10 mg) by mouth 3 times daily 90 tablet 0     fluticasone (FLONASE) 50 MCG/ACT spray Spray 2 sprays into both nostrils daily as needed 16 g 3     levothyroxine (SYNTHROID/LEVOTHROID) 112 MCG tablet Take one (1) tablet by mouth once daily 90 tablet 1     Nystatin (NYAMYC) 634663 UNIT/GM POWD Apply to affected area nightly as needed for rash 1 Bottle 1     AzaTHIOprine (IMURAN PO) Take 50 mg by mouth 2 times daily        Polyethylene Glycol 3350 (MIRALAX PO) Take by mouth daily as needed        omeprazole (PRILOSEC) 40 MG capsule Take  by mouth daily. Before the same meal daily       OTC products: None, except as noted above    Allergies   Allergen Reactions     Fentanyl Other (See Comments)     Severe agitation when used during angiogram     Codeine Sulfate Other (See  "Comments)     hallucinations     Fentanyl And Related      Other reaction(s): Confusion     Morphine Other (See Comments)     Hallucinations with iv therapy     Tape [Adhesive Tape]      Tramadol Other (See Comments)     hallucination      Latex Allergy: NO; does react to adhesives    Social History   Substance Use Topics     Smoking status: Never Smoker     Smokeless tobacco: Never Used     Alcohol use No     History   Drug Use No       REVIEW OF SYSTEMS:                                                    Constitutional, neuro, ENT, endocrine, pulmonary, cardiac, gastrointestinal, genitourinary, musculoskeletal, integument and psychiatric systems are negative, except as otherwise noted.      EXAM:                                                    /80 (BP Location: Left arm, Patient Position: Sitting, Cuff Size: Adult Regular)  Pulse 76  Temp 97  F (36.1  C) (Tympanic)  Resp 18  Ht 5' 1\" (1.549 m)  Wt 214 lb (97.1 kg)  SpO2 96%  BMI 40.43 kg/m2    GENERAL APPEARANCE: healthy, alert and no distress     EYES: EOMI, PERRL     HENT: ear canals and TM's normal and nose and mouth without ulcers or lesions     NECK: no adenopathy     RESP: lungs clear to auscultation - no rales, rhonchi or wheezes     CV: regular rates and rhythm, normal S1 S2, no S3 or S4 and no murmur, click or rub     SKIN: no suspicious lesions or rashes     NEURO: Normal strength and tone, sensory exam grossly normal, mentation intact and speech normal     PSYCH: mentation appears normal. and affect normal/bright    DIAGNOSTICS:                                                      EKG: Patient had EKG and then had angiogram which was negative in 10/2017    Labs Resulted Today:   Results for orders placed or performed in visit on 11/14/17   CBC with platelets   Result Value Ref Range    WBC 6.6 4.0 - 11.0 10e9/L    RBC Count 3.15 (L) 3.8 - 5.2 10e12/L    Hemoglobin 11.3 (L) 11.7 - 15.7 g/dL    Hematocrit 33.5 (L) 35.0 - 47.0 %     " (H) 78 - 100 fl    MCH 35.9 (H) 26.5 - 33.0 pg    MCHC 33.7 31.5 - 36.5 g/dL    RDW 14.3 10.0 - 15.0 %    Platelet Count 275 150 - 450 10e9/L       Recent Labs   Lab Test 07/31/17 02/28/17   1501  11/11/16   1514  04/07/16   0909   HGB   --   11.5*  11.4*  12.1   PLT   --   182  213  191   INR  1.3*   --    --    --    NA   --    --   137  138   POTASSIUM  3.6   --   3.8  4.2   CR  0.92   --   0.82  0.76        IMPRESSION:                                                    Reason for surgery/procedure: Recurrent back pain  Diagnosis/reason for consult: Cardiopulmonary clearance    The proposed surgical procedure is considered LOW risk.    REVISED CARDIAC RISK INDEX  The patient has the following serious cardiovascular risks for perioperative complications such as (MI, PE, VFib and 3  AV Block):  No serious cardiac risks  INTERPRETATION: 0 risks: Class I (very low risk - 0.4% complication rate)    The patient has the following additional risks for perioperative complications:  No identified additional risks      ICD-10-CM    1. Preop general physical exam Z01.818 CBC with platelets   2. Degeneration of lumbar or lumbosacral intervertebral disc M51.37    3. Benign essential hypertension I10 CANCELED: Basic metabolic panel   4. Hypothyroidism, unspecified type E03.9    5. Need for vaccination with 13-polyvalent pneumococcal conjugate vaccine Z23 PNEUMOCOCCAL CONJ VACCINE 13 VALENT IM     ADMIN 1st VACCINE       RECOMMENDATIONS:                                                        Cardiovascular Risk  Patient had clear angiogram in 10/2017        --Patient is to take all scheduled medications on the day of surgery EXCEPT for modifications listed below.    Anticoagulant or Antiplatelet Medication Use  Hold all ASA/NSAIDs/Vitamins/Supplements 7-10 days prior to procedure           APPROVAL GIVEN to proceed with proposed procedure, without further diagnostic evaluation       Signed Electronically by: Iris Becerril  MD    Copy of this evaluation report is provided to requesting physician.    [place] Preop Guidelines

## 2017-11-14 NOTE — MR AVS SNAPSHOT
After Visit Summary   11/14/2017    Ileana Davis    MRN: [id number]           Patient Information     Date Of Birth          1940        Visit Information        Provider Department      11/14/2017 11:30 AM Iris Becerril MD [facility]        Today's Diagnoses     Preop general physical exam    -  1    Degeneration of lumbar or lumbosacral intervertebral disc        Benign essential hypertension        Hypothyroidism, unspecified type        Need for vaccination with 13-polyvalent pneumococcal conjugate vaccine          Care Instructions      Before Your Surgery      Call your surgeon if there is any change in your health. This includes signs of a cold or flu (such as a sore throat, runny nose, cough, rash or fever).    Do not smoke, drink alcohol or take over the counter medicine (unless your surgeon or primary care doctor tells you to) for the 24 hours before and after surgery.    If you take prescribed drugs: Follow your doctor s orders about which medicines to take and which to stop until after surgery.    Eating and drinking prior to surgery: follow the instructions from your surgeon    Take a shower or bath the night before surgery. Use the soap your surgeon gave you to gently clean your skin. If you do not have soap from your surgeon, use your regular soap. Do not shave or scrub the surgery site.  Wear clean pajamas and have clean sheets on your bed.           Follow-ups after your visit        Follow-up notes from your care team     Return if symptoms worsen or fail to improve.      Who to contact     If you have questions or need follow up information about today's clinic visit or your schedule please contact [facility] directly at [phone].  Normal or non-critical lab and imaging results will be communicated to you by MyChart, letter or phone within 4 business days after the clinic has received the results. If you do not hear from us within 7  "days, please contact the clinic through [employer] or phone. If you have a critical or abnormal lab result, we will notify you by phone as soon as possible.  Submit refill requests through [employer] or call your pharmacy and they will forward the refill request to us. Please allow 3 business days for your refill to be completed.          Additional Information About Your Visit        [employer] Information     [employer] lets you send messages to your doctor, view your test results, renew your prescriptions, schedule appointments and more. To sign up, go to [email or web address] . Click on \"Log in\" on the left side of the screen, which will take you to the Welcome page. Then click on \"Sign up Now\" on the right side of the page.     You will be asked to enter the access code listed below, as well as some personal information. Please follow the directions to create your username and password.     Your access code is: [email or web address]  Expires: 2018  3:33 PM     Your access code will  in 90 days. If you need help or a new code, please call your [place] clinic or [phone].        Care EveryWhere ID     This is your Care EveryWhere ID. This could be used by other organizations to access your [place] medical records  [id number]        Your Vitals Were     Pulse Temperature Respirations Height Pulse Oximetry BMI (Body Mass Index)    76 97  F (36.1  C) (Tympanic) 18 5' 1\" (1.549 m) 96% 40.43 kg/m2       Blood Pressure from Last 3 Encounters:   17 136/80   10/19/17 120/76   17 120/78    Weight from Last 3 Encounters:   17 214 lb (97.1 kg)   10/19/17 205 lb (93 kg)   17 202 lb (91.6 kg)              We Performed the Following     ADMIN 1st VACCINE     CBC with platelets     PNEUMOCOCCAL CONJ VACCINE 13 VALENT IM        Primary Care Provider Office Phone # Fax #    Iris Becerril [phone] [address] [facility] [address]        Equal Access to Services "     GISELLE RIDLEY : Hadii aad ku kartik Mariscal, waaxda luqadaha, qaybta kaalmada adeelver, baljit jacques yeimyangie sumnerluz marinarolando may . So [facility] [phone].    ATENCIÓN: Si habla español, tiene a andujar disposición servicios gratuitos de asistencia lingüística. Llame al [phone].    We comply with applicable federal civil rights laws and [place] laws. We do not discriminate on the basis of race, color, national origin, age, disability, sex, sexual orientation, or gender identity.            Thank you!     Thank you for choosing [facility]  for your care. Our goal is always to provide you with excellent care. Hearing back from our patients is one way we can continue to improve our services. Please take a few minutes to complete the written survey that you may receive in the mail after your visit with us. Thank you!             Your Updated Medication List - Protect others around you: Learn how to safely use, store and throw away your medicines at www.disposemymeds.org.          This list is accurate as of: 11/14/17 11:59 PM.  Always use your most recent med list.                   Brand Name Dispense Instructions for use Diagnosis    amitriptyline 25 MG tablet    ELAVIL    90 tablet    Take one (1) tablet by mouth at bedtime    Myalgia, Myositis       cyclobenzaprine 10 MG tablet    FLEXERIL    90 tablet    Take 1 tablet (10 mg) by mouth 3 times daily    Muscle spasm, Pain       fluticasone 50 MCG/ACT spray    FLONASE    16 g    Spray 2 sprays into both nostrils daily as needed    Seasonal allergic rhinitis       IMURAN PO      Take 50 mg by mouth 2 times daily        levothyroxine 112 MCG tablet    SYNTHROID/LEVOTHROID    90 tablet    Take one (1) tablet by mouth once daily    Hypothyroidism       MIRALAX PO      Take by mouth daily as needed        NAPROXEN PO      Take 250 mg by mouth as needed for moderate pain Patient states she only take when needed.        NYAMYC 294540 UNIT/GM Powd   Generic  drug:  nystatin     1 Bottle    Apply to affected area nightly as needed for rash    Yeast dermatitis       omeprazole 40 MG capsule    priLOSEC     Take  by mouth daily. Before the same meal daily        propranolol 20 MG tablet    INDERAL    90 tablet    Take 1 tablet (20 mg) by mouth daily    HTN (hypertension)

## 2017-11-14 NOTE — NURSING NOTE
"Chief Complaint   Patient presents with     Pre-Op Exam     11/17/17, spine injection, Dr. Cagle, [facility]       Initial /80 (BP Location: Left arm, Patient Position: Sitting, Cuff Size: Adult Regular)  Pulse 76  Temp 97  F (36.1  C) (Tympanic)  Resp 18  Ht 5' 1\" (1.549 m)  Wt 214 lb (97.1 kg)  SpO2 96%  BMI 40.43 kg/m2 Estimated body mass index is 40.43 kg/(m^2) as calculated from the following:    Height as of this encounter: 5' 1\" (1.549 m).    Weight as of this encounter: 214 lb (97.1 kg).  Medication Reconciliation: complete   Myla Evans MA  "

## 2017-11-15 ASSESSMENT — ANXIETY QUESTIONNAIRES: GAD7 TOTAL SCORE: 13

## 2017-11-20 NOTE — PROGRESS NOTES
Faxed PreOp 11/14/17 & labs to Dr. Modi, [facility]  Ph. [phone]  Fx. [phone]

## 2017-11-28 DIAGNOSIS — M62.838 MUSCLE SPASM: ICD-10-CM

## 2017-11-28 DIAGNOSIS — R52 PAIN: ICD-10-CM

## 2017-11-28 RX ORDER — CYCLOBENZAPRINE HCL 10 MG
10 TABLET ORAL 3 TIMES DAILY PRN
Qty: 90 TABLET | Refills: 0 | Status: SHIPPED | OUTPATIENT
Start: 2017-11-28 | End: 2018-01-31

## 2018-01-16 DIAGNOSIS — E03.9 HYPOTHYROIDISM: ICD-10-CM

## 2018-01-16 DIAGNOSIS — E03.9 HYPOTHYROIDISM, UNSPECIFIED TYPE: Primary | ICD-10-CM

## 2018-01-16 RX ORDER — LEVOTHYROXINE SODIUM 112 UG/1
112 TABLET ORAL DAILY
Qty: 90 TABLET | Refills: 1 | Status: SHIPPED | OUTPATIENT
Start: 2018-01-16 | End: 2018-04-19

## 2018-01-16 NOTE — TELEPHONE ENCOUNTER
levothyroxine (SYNTHROID/LEVOTHROID) 112 MCG tablet  Last Written Prescription Date:  7/24/17  Last Fill Quantity: 90,   # refills: 1  Last Office Visit: 11/14/17  Future Office visit:

## 2018-01-31 DIAGNOSIS — B37.2 YEAST DERMATITIS: ICD-10-CM

## 2018-01-31 DIAGNOSIS — R52 PAIN: ICD-10-CM

## 2018-01-31 DIAGNOSIS — M62.838 MUSCLE SPASM: ICD-10-CM

## 2018-01-31 RX ORDER — NYSTATIN 100000 [USP'U]/G
POWDER TOPICAL
Qty: 60 G | Refills: 1 | Status: SHIPPED | OUTPATIENT
Start: 2018-01-31 | End: 2019-09-16

## 2018-01-31 NOTE — TELEPHONE ENCOUNTER
Nystatin Powder      Last Written Prescription Date:  6/17/2016  Last Fill Quantity: 1,   # refills: 1  Last Office Visit: 11/14/2017    Flexeril      Last Written Prescription Date:  11/28/2017  Last Fill Quantity: 90,   # refills: 0  Last Office Visit: 11/14/2017  Future Office visit:         Future Office visit:

## 2018-02-01 RX ORDER — CYCLOBENZAPRINE HCL 10 MG
TABLET ORAL
Qty: 90 TABLET | Refills: 1 | Status: SHIPPED | OUTPATIENT
Start: 2018-02-01 | End: 2018-04-26

## 2018-04-04 DIAGNOSIS — M79.10 MYALGIA: ICD-10-CM

## 2018-04-04 DIAGNOSIS — R52 PAIN: ICD-10-CM

## 2018-04-04 DIAGNOSIS — M62.838 MUSCLE SPASM: ICD-10-CM

## 2018-04-04 DIAGNOSIS — M60.9 MYOSITIS: ICD-10-CM

## 2018-04-04 RX ORDER — CYCLOBENZAPRINE HCL 10 MG
TABLET ORAL
Qty: 90 TABLET | Refills: 0 | Status: SHIPPED | OUTPATIENT
Start: 2018-04-04 | End: 2018-06-22

## 2018-04-04 NOTE — TELEPHONE ENCOUNTER
Flexeril  Last Written Prescription Date:  2/1/18  Last Fill Qty:  90, # Refills:  1  Last Office Visit:  11/14/17    PCP is Dr. Jones.  Medication is pended.  Thank you.

## 2018-04-12 DIAGNOSIS — K75.4 AUTOIMMUNE HEPATITIS (H): Primary | ICD-10-CM

## 2018-04-13 RX ORDER — AZATHIOPRINE 50 MG/1
TABLET ORAL
Qty: 60 TABLET | Refills: 2 | Status: SHIPPED | OUTPATIENT
Start: 2018-04-13 | End: 2018-07-18

## 2018-04-19 DIAGNOSIS — E03.9 HYPOTHYROIDISM, UNSPECIFIED TYPE: ICD-10-CM

## 2018-04-20 RX ORDER — LEVOTHYROXINE SODIUM 112 UG/1
TABLET ORAL
Qty: 90 TABLET | Refills: 0 | Status: SHIPPED | OUTPATIENT
Start: 2018-04-20 | End: 2018-10-13

## 2018-04-20 NOTE — TELEPHONE ENCOUNTER
Thyroid Protocol Failed  levothyroxine (SYNTHROID/LEVOTHROID) 112 MCG tablet [Pharmacy Med Name: Levothyroxine Sodium Tablet 112 MCG]       Rerun Protocol (4/19/2018 10:07 AM)        Normal TSH on file in past 12 months           Recent Labs   Lab Test  04/07/16   0909   TSH  1.28             Synthroid      Last Written Prescription Date:  1/16/18  Last Fill Quantity: 90,   # refills: 1  Last Office Visit: 11/14/17  Future Office visit:       Routing refill request to provider for review/approval because:  Lab    Dr. Jones to advise.

## 2018-04-26 ENCOUNTER — OFFICE VISIT (OUTPATIENT)
Dept: FAMILY MEDICINE | Facility: OTHER | Age: 78
End: 2018-04-26
Attending: FAMILY MEDICINE
Payer: MEDICARE

## 2018-04-26 VITALS
WEIGHT: 208 LBS | BODY MASS INDEX: 39.27 KG/M2 | HEIGHT: 61 IN | RESPIRATION RATE: 18 BRPM | HEART RATE: 65 BPM | TEMPERATURE: 97.4 F | SYSTOLIC BLOOD PRESSURE: 114 MMHG | DIASTOLIC BLOOD PRESSURE: 76 MMHG | OXYGEN SATURATION: 96 %

## 2018-04-26 DIAGNOSIS — R82.79 ABNORMAL FINDINGS ON MICROBIOLOGICAL EXAMINATION OF URINE: ICD-10-CM

## 2018-04-26 DIAGNOSIS — R31.9 URINARY TRACT INFECTION WITH HEMATURIA, SITE UNSPECIFIED: Primary | ICD-10-CM

## 2018-04-26 DIAGNOSIS — E03.9 HYPOTHYROIDISM, UNSPECIFIED TYPE: ICD-10-CM

## 2018-04-26 DIAGNOSIS — R35.0 URINARY FREQUENCY: ICD-10-CM

## 2018-04-26 DIAGNOSIS — N39.0 URINARY TRACT INFECTION WITH HEMATURIA, SITE UNSPECIFIED: Primary | ICD-10-CM

## 2018-04-26 PROBLEM — F32.0 MILD MAJOR DEPRESSION (H): Status: ACTIVE | Noted: 2018-04-26

## 2018-04-26 LAB
ALBUMIN UR-MCNC: NEGATIVE MG/DL
APPEARANCE UR: ABNORMAL
BACTERIA #/AREA URNS HPF: ABNORMAL /HPF
BILIRUB UR QL STRIP: NEGATIVE
COLOR UR AUTO: YELLOW
GLUCOSE UR STRIP-MCNC: NEGATIVE MG/DL
HGB UR QL STRIP: ABNORMAL
KETONES UR STRIP-MCNC: NEGATIVE MG/DL
LEUKOCYTE ESTERASE UR QL STRIP: ABNORMAL
NITRATE UR QL: POSITIVE
PH UR STRIP: 6 PH (ref 5–7)
RBC #/AREA URNS AUTO: ABNORMAL /HPF
SOURCE: ABNORMAL
SP GR UR STRIP: 1.01 (ref 1–1.03)
TSH SERPL DL<=0.005 MIU/L-ACNC: 0.94 MU/L (ref 0.4–4)
UROBILINOGEN UR STRIP-ACNC: 0.2 EU/DL (ref 0.2–1)
WBC #/AREA URNS AUTO: >100 /HPF

## 2018-04-26 PROCEDURE — 99214 OFFICE O/P EST MOD 30 MIN: CPT | Performed by: FAMILY MEDICINE

## 2018-04-26 PROCEDURE — 84443 ASSAY THYROID STIM HORMONE: CPT | Mod: ZL | Performed by: FAMILY MEDICINE

## 2018-04-26 PROCEDURE — 87088 URINE BACTERIA CULTURE: CPT | Mod: ZL | Performed by: FAMILY MEDICINE

## 2018-04-26 PROCEDURE — 36415 COLL VENOUS BLD VENIPUNCTURE: CPT | Mod: ZL | Performed by: FAMILY MEDICINE

## 2018-04-26 PROCEDURE — G0463 HOSPITAL OUTPT CLINIC VISIT: HCPCS

## 2018-04-26 PROCEDURE — 87086 URINE CULTURE/COLONY COUNT: CPT | Mod: ZL | Performed by: FAMILY MEDICINE

## 2018-04-26 PROCEDURE — 87186 SC STD MICRODIL/AGAR DIL: CPT | Mod: ZL | Performed by: FAMILY MEDICINE

## 2018-04-26 PROCEDURE — 81001 URINALYSIS AUTO W/SCOPE: CPT | Mod: ZL | Performed by: FAMILY MEDICINE

## 2018-04-26 RX ORDER — SULFAMETHOXAZOLE/TRIMETHOPRIM 800-160 MG
1 TABLET ORAL 2 TIMES DAILY
Qty: 14 TABLET | Refills: 0 | Status: SHIPPED | OUTPATIENT
Start: 2018-04-26 | End: 2018-05-03

## 2018-04-26 ASSESSMENT — ANXIETY QUESTIONNAIRES
2. NOT BEING ABLE TO STOP OR CONTROL WORRYING: SEVERAL DAYS
IF YOU CHECKED OFF ANY PROBLEMS ON THIS QUESTIONNAIRE, HOW DIFFICULT HAVE THESE PROBLEMS MADE IT FOR YOU TO DO YOUR WORK, TAKE CARE OF THINGS AT HOME, OR GET ALONG WITH OTHER PEOPLE: SOMEWHAT DIFFICULT
6. BECOMING EASILY ANNOYED OR IRRITABLE: NOT AT ALL
1. FEELING NERVOUS, ANXIOUS, OR ON EDGE: SEVERAL DAYS
5. BEING SO RESTLESS THAT IT IS HARD TO SIT STILL: NOT AT ALL
3. WORRYING TOO MUCH ABOUT DIFFERENT THINGS: SEVERAL DAYS
GAD7 TOTAL SCORE: 3
7. FEELING AFRAID AS IF SOMETHING AWFUL MIGHT HAPPEN: NOT AT ALL

## 2018-04-26 ASSESSMENT — PATIENT HEALTH QUESTIONNAIRE - PHQ9: 5. POOR APPETITE OR OVEREATING: NOT AT ALL

## 2018-04-26 ASSESSMENT — PAIN SCALES - GENERAL: PAINLEVEL: MODERATE PAIN (4)

## 2018-04-26 NOTE — PROGRESS NOTES
SUBJECTIVE:                                                    Ileana Davis is a 77 year old female who presents to clinic today for the following health issues:      URINARY TRACT SYMPTOMS      Duration: about a month    Description  frequency, urgency, retention, incontinence and back pain    Intensity:  moderate    Accompanying signs and symptoms:  Fever/chills: YES- chills  Flank pain YES  Nausea and vomiting: no   Vaginal symptoms: none  Abdominal/Pelvic Pain: YES    History  History of frequent UTI's: YES  History of kidney stones: no   Sexually Active: no   Possibility of pregnancy: No    Precipitating or alleviating factors: None    Therapies tried and outcome: cranberry juice, increase fluid intake and naproxin   Outcome: it does for a little while but then it comes back      Hypothyroidism Follow-up      Since last visit, patient describes the following symptoms: Weight stable, no hair loss, no skin changes, no constipation, no loose stools      Problem list and histories reviewed & adjusted, as indicated.  Additional history: as documented    Patient Active Problem List   Diagnosis     Hypothyroidism     Generalized anxiety disorder     Benign essential hypertension     GERD (gastroesophageal reflux disease)     Hepatitis     Contact dermatitis     Rosacea     Osteoarthritis     Fibromyalgia     Advanced care planning/counseling discussion     Open wound of knee, leg, and ankle     Degeneration of lumbar or lumbosacral intervertebral disc     Anemia     ACP (advance care planning)     Autoimmune hepatitis (H)     Morbid obesity (H)     Mild major depression (H)     Past Surgical History:   Procedure Laterality Date     APPENDECTOMY       BACK SURGERY  2015    lumbar epidural injection     CARDIAC SURGERY  10/2017    angioplasty     CHOLECYSTECTOMY       COLONOSCOPY  07/27/2017    [facility]     COLONOSCOPY - HIM SCAN  05/15/2001    Small internal hemorrhoids; otherwise normal;[facility]     COLONOSCOPY -  HIM SCAN  12/14/2006    Colonoscopy, REMV LESN, SNARE     ENT SURGERY      tonsillectomy     ESOPHAGOGASTRODUODENOSCOPY  07/27/2017    [facility]     EYE SURGERY      bilateral cataract removal     GI SURGERY      anal fistula     GYN SURGERY  1985    NICHOLAS BSO     GYN SURGERY      cessarian x 2     GYN SURGERY      tubal sterilazation     ORTHOPEDIC SURGERY      right knee     ORTHOPEDIC SURGERY  1986    plantar fascia release     ORTHOPEDIC SURGERY      left knee     ORTHOPEDIC SURGERY      right trigger finger release     ORTHOPEDIC SURGERY  2013    Right great toe MTPJ fusion, adductor tenotomy,correction of hammer toe     ORTHOPEDIC SURGERY  [id number]    multiple procedures left forefoot       Social History   Substance Use Topics     Smoking status: Never Smoker     Smokeless tobacco: Never Used     Alcohol use No     Family History   Problem Relation Age of Onset     Arthritis Mother      rheumatoid     HEART DISEASE Mother      CHF     Alcohol/Drug Father      alcoholism     Allergies Father      Thyroid Disease Other      DIABETES No family hx of      Asthma No family hx of          Current Outpatient Prescriptions   Medication Sig Dispense Refill     amitriptyline (ELAVIL) 25 MG tablet Take one (1) tablet by mouth at bedtime 90 tablet 1     azaTHIOprine (IMURAN) 50 MG tablet Take two (2) tablets by mouth once daily 60 tablet 2     cyclobenzaprine (FLEXERIL) 10 MG tablet Take 1 tablet (10 mg) by mouth 3 times daily as needed for muscle spasms 90 tablet 0     fluticasone (FLONASE) 50 MCG/ACT spray Spray 2 sprays into both nostrils daily as needed 16 g 3     levothyroxine (SYNTHROID/LEVOTHROID) 112 MCG tablet Take 1 tablet (112 mcg) by mouth daily 90 tablet 0     NAPROXEN PO Take 250 mg by mouth as needed for moderate pain Patient states she only take when needed.       nystatin (MYCOSTATIN) 768322 UNIT/GM POWD Apply to affected area nightly as needed for rash 60 g 1     omeprazole (PRILOSEC) 40 MG capsule Take   "by mouth daily. Before the same meal daily       Polyethylene Glycol 3350 (MIRALAX PO) Take by mouth daily as needed        propranolol (INDERAL) 20 MG tablet Take 1 tablet (20 mg) by mouth daily 90 tablet 1     sulfamethoxazole-trimethoprim (BACTRIM DS/SEPTRA DS) 800-160 MG per tablet Take 1 tablet by mouth 2 times daily for 7 days 14 tablet 0     Allergies   Allergen Reactions     Fentanyl Other (See Comments)     Severe agitation when used during angiogram     Codeine Sulfate Other (See Comments)     hallucinations     Fentanyl And Related      Other reaction(s): Confusion     Morphine Other (See Comments)     Hallucinations with iv therapy     Tape [Adhesive Tape]      Tramadol Other (See Comments)     hallucination       ROS:  Constitutional, HEENT, cardiovascular, pulmonary, gi and gu systems are negative, except as otherwise noted.    OBJECTIVE:     /76 (BP Location: Right arm, Patient Position: Sitting, Cuff Size: Adult Regular)  Pulse 65  Temp 97.4  F (36.3  C) (Tympanic)  Resp 18  Ht 5' 1\" (1.549 m)  Wt 208 lb (94.3 kg)  SpO2 96%  BMI 39.3 kg/m2  Body mass index is 39.3 kg/(m^2).  GENERAL: healthy, alert and no distress  PSYCH: mentation appears normal, affect normal/bright    Diagnostic Test Results:  Results for orders placed or performed in visit on 04/26/18 (from the past 24 hour(s))   *UA reflex to Microscopic and Culture - [facility]   Result Value Ref Range    Color Urine Yellow     Appearance Urine Cloudy     Glucose Urine Negative NEG^Negative mg/dL    Bilirubin Urine Negative NEG^Negative    Ketones Urine Negative NEG^Negative mg/dL    Specific Gravity Urine 1.010 1.003 - 1.035    Blood Urine Small (A) NEG^Negative    pH Urine 6.0 5.0 - 7.0 pH    Protein Albumin Urine Negative NEG^Negative mg/dL    Urobilinogen Urine 0.2 0.2 - 1.0 EU/dL    Nitrite Urine Positive (A) NEG^Negative    Leukocyte Esterase Urine Large (A) NEG^Negative    Source Midstream Urine    Urine Microscopic "   Result Value Ref Range    WBC Urine >100 (A) OTO5^0 - 5 /HPF    RBC Urine 5-10 (A) OTO2^O - 2 /HPF    Bacteria Urine Many (A) NEG^Negative /HPF       ASSESSMENT/PLAN:     1. Urinary tract infection with hematuria, site unspecified  Bactrim prescribed.  Symptomatic cares recommended.  Follow-up if no improvement noted.  - sulfamethoxazole-trimethoprim (BACTRIM DS/SEPTRA DS) 800-160 MG per tablet; Take 1 tablet by mouth 2 times daily for 7 days  Dispense: 14 tablet; Refill: 0    2. Urinary frequency  - *UA reflex to Microscopic and Culture - [facility]  - Urine Microscopic    3. Abnormal findings on microbiological examination of urine  - Urine Culture Aerobic Bacterial    4. Hypothyroidism, unspecified type  Labs updated.  - TSH        Iris Becerril MD  [facility]

## 2018-04-26 NOTE — MR AVS SNAPSHOT
"              After Visit Summary   4/26/2018    Ileana Davis    MRN: [id number]           Patient Information     Date Of Birth          1940        Visit Information        Provider Department      4/26/2018 10:15 AM Iris Becerril MD [facility]        Today's Diagnoses     Urinary tract infection with hematuria, site unspecified    -  1    Urinary frequency        Abnormal findings on microbiological examination of urine        Hypothyroidism, unspecified type           Follow-ups after your visit        Follow-up notes from your care team     Return if symptoms worsen or fail to improve.      Who to contact     If you have questions or need follow up information about today's clinic visit or your schedule please contact [facility] directly at [phone].  Normal or non-critical lab and imaging results will be communicated to you by MyChart, letter or phone within 4 business days after the clinic has received the results. If you do not hear from us within 7 days, please contact the clinic through MyChart or phone. If you have a critical or abnormal lab result, we will notify you by phone as soon as possible.  Submit refill requests through [employer] or call your pharmacy and they will forward the refill request to us. Please allow 3 business days for your refill to be completed.          Additional Information About Your Visit        MyChart Information     [employer] lets you send messages to your doctor, view your test results, renew your prescriptions, schedule appointments and more. To sign up, go to [email or web address] . Click on \"Log in\" on the left side of the screen, which will take you to the Welcome page. Then click on \"Sign up Now\" on the right side of the page.     You will be asked to enter the access code listed below, as well as some personal information. Please follow the directions to create your username and password.     Your access code is: " [email or web address]  Expires: 2018 12:42 PM     Your access code will  in 90 days. If you need help or a new code, please call your [place] clinic or [phone].        Care EveryWhere ID     This is your Care EveryWhere ID. This could be used by other organizations to access your [place] medical records  [id number]        Your Vitals Were     Pulse Temperature Respirations Height Pulse Oximetry BMI (Body Mass Index)    65 97.4  F (36.3  C) (Tympanic) 18 5' 1\" (1.549 m) 96% 39.3 kg/m2       Blood Pressure from Last 3 Encounters:   18 114/76   17 136/80   10/19/17 120/76    Weight from Last 3 Encounters:   18 208 lb (94.3 kg)   17 214 lb (97.1 kg)   10/19/17 205 lb (93 kg)              We Performed the Following     *UA reflex to Microscopic and Culture - [facility]     Urine Culture Aerobic Bacterial     Urine Microscopic          Today's Medication Changes          These changes are accurate as of 18 12:42 PM.  If you have any questions, ask your nurse or doctor.               Start taking these medicines.        Dose/Directions    sulfamethoxazole-trimethoprim 800-160 MG per tablet   Commonly known as:  BACTRIM DS/SEPTRA DS   Used for:  Urinary tract infection with hematuria, site unspecified   Started by:  Iris Becerril MD        Dose:  1 tablet   Take 1 tablet by mouth 2 times daily for 7 days   Quantity:  14 tablet   Refills:  0            Where to get your medicines      These medications were sent to [facility] - [place], [place] - [address]   [address] [facility] [address]     Phone:  [phone]     sulfamethoxazole-trimethoprim 800-160 MG per tablet                Primary Care Provider Office Phone # Fax #    Iris Becerril [phone] [address] [facility] [address]        Equal Access to Services     GISELLE RIDLEY AH: Hadii taye caldwell hadasho Soomaali, waaxda luqadaha, qaybta " baljit diazflora may ah. So [facility] [phone].    ATENCIÓN: Si romelia salcedo, tiene a andujar disposición servicios gratuitos de asistencia lingüística. Kristen al [phone].    We comply with applicable federal civil rights laws and [place] laws. We do not discriminate on the basis of race, color, national origin, age, disability, sex, sexual orientation, or gender identity.            Thank you!     Thank you for choosing [facility]  for your care. Our goal is always to provide you with excellent care. Hearing back from our patients is one way we can continue to improve our services. Please take a few minutes to complete the written survey that you may receive in the mail after your visit with us. Thank you!             Your Updated Medication List - Protect others around you: Learn how to safely use, store and throw away your medicines at www.disposemymeds.org.          This list is accurate as of 4/26/18 12:42 PM.  Always use your most recent med list.                   Brand Name Dispense Instructions for use Diagnosis    amitriptyline 25 MG tablet    ELAVIL    90 tablet    Take one (1) tablet by mouth at bedtime    Myalgia, Myositis       azaTHIOprine 50 MG tablet    IMURAN    60 tablet    Take two (2) tablets by mouth once daily    Autoimmune hepatitis (H)       cyclobenzaprine 10 MG tablet    FLEXERIL    90 tablet    Take 1 tablet (10 mg) by mouth 3 times daily as needed for muscle spasms    Muscle spasm, Pain       fluticasone 50 MCG/ACT spray    FLONASE    16 g    Spray 2 sprays into both nostrils daily as needed    Seasonal allergic rhinitis       levothyroxine 112 MCG tablet    SYNTHROID/LEVOTHROID    90 tablet    Take 1 tablet (112 mcg) by mouth daily    Hypothyroidism, unspecified type       MIRALAX PO      Take by mouth daily as needed        NAPROXEN PO      Take 250 mg by mouth as needed for moderate pain Patient states she only take when needed.         nystatin [id number] UNIT/GM Powd    MYCOSTATIN    60 g    Apply to affected area nightly as needed for rash    Yeast dermatitis       omeprazole 40 MG capsule    priLOSEC     Take  by mouth daily. Before the same meal daily        propranolol 20 MG tablet    INDERAL    90 tablet    Take 1 tablet (20 mg) by mouth daily    HTN (hypertension)       sulfamethoxazole-trimethoprim 800-160 MG per tablet    BACTRIM DS/SEPTRA DS    14 tablet    Take 1 tablet by mouth 2 times daily for 7 days    Urinary tract infection with hematuria, site unspecified

## 2018-04-26 NOTE — NURSING NOTE
"Chief Complaint   Patient presents with     UTI       Initial /76 (BP Location: Right arm, Patient Position: Sitting, Cuff Size: Adult Regular)  Pulse 65  Temp 97.4  F (36.3  C) (Tympanic)  Resp 18  Ht 5' 1\" (1.549 m)  Wt 208 lb (94.3 kg)  SpO2 96%  BMI 39.3 kg/m2 Estimated body mass index is 39.3 kg/(m^2) as calculated from the following:    Height as of this encounter: 5' 1\" (1.549 m).    Weight as of this encounter: 208 lb (94.3 kg).  Medication Reconciliation: complete   Myla Evans MA  "

## 2018-04-27 ASSESSMENT — PATIENT HEALTH QUESTIONNAIRE - PHQ9: SUM OF ALL RESPONSES TO PHQ QUESTIONS 1-9: 14

## 2018-04-27 ASSESSMENT — ANXIETY QUESTIONNAIRES: GAD7 TOTAL SCORE: 3

## 2018-04-28 LAB
BACTERIA SPEC CULT: ABNORMAL
SPECIMEN SOURCE: ABNORMAL

## 2018-05-18 ENCOUNTER — TRANSFERRED RECORDS (OUTPATIENT)
Dept: HEALTH INFORMATION MANAGEMENT | Facility: CLINIC | Age: 78
End: 2018-05-18

## 2018-05-18 ENCOUNTER — TELEPHONE (OUTPATIENT)
Dept: FAMILY MEDICINE | Facility: OTHER | Age: 78
End: 2018-05-18

## 2018-05-18 NOTE — TELEPHONE ENCOUNTER
Patient called and her left hip sciatica area is 10plus/10.Cannot get in for injection until July.Could barely walk back to bed this AM. Updated her that she may need to go to ER/UC. Patient would like to know MD recommendations.Please advise.Thank you.

## 2018-05-22 ENCOUNTER — OFFICE VISIT (OUTPATIENT)
Dept: FAMILY MEDICINE | Facility: OTHER | Age: 78
End: 2018-05-22
Attending: FAMILY MEDICINE
Payer: MEDICARE

## 2018-05-22 VITALS
DIASTOLIC BLOOD PRESSURE: 80 MMHG | RESPIRATION RATE: 18 BRPM | BODY MASS INDEX: 39.46 KG/M2 | OXYGEN SATURATION: 91 % | HEART RATE: 78 BPM | HEIGHT: 61 IN | TEMPERATURE: 98.6 F | SYSTOLIC BLOOD PRESSURE: 150 MMHG | WEIGHT: 209 LBS

## 2018-05-22 DIAGNOSIS — M54.32 SCIATICA OF LEFT SIDE: Primary | ICD-10-CM

## 2018-05-22 PROCEDURE — G0463 HOSPITAL OUTPT CLINIC VISIT: HCPCS

## 2018-05-22 PROCEDURE — 99213 OFFICE O/P EST LOW 20 MIN: CPT | Performed by: FAMILY MEDICINE

## 2018-05-22 RX ORDER — HYDROCODONE BITARTRATE AND ACETAMINOPHEN 5; 325 MG/1; MG/1
1 TABLET ORAL EVERY 6 HOURS PRN
COMMUNITY
End: 2018-06-20

## 2018-05-22 RX ORDER — PREDNISONE 20 MG/1
20 TABLET ORAL DAILY
Qty: 5 TABLET | Refills: 0 | Status: SHIPPED | OUTPATIENT
Start: 2018-05-22 | End: 2018-05-27

## 2018-05-22 ASSESSMENT — ANXIETY QUESTIONNAIRES
GAD7 TOTAL SCORE: 0
7. FEELING AFRAID AS IF SOMETHING AWFUL MIGHT HAPPEN: NOT AT ALL
3. WORRYING TOO MUCH ABOUT DIFFERENT THINGS: NOT AT ALL
6. BECOMING EASILY ANNOYED OR IRRITABLE: NOT AT ALL
IF YOU CHECKED OFF ANY PROBLEMS ON THIS QUESTIONNAIRE, HOW DIFFICULT HAVE THESE PROBLEMS MADE IT FOR YOU TO DO YOUR WORK, TAKE CARE OF THINGS AT HOME, OR GET ALONG WITH OTHER PEOPLE: NOT DIFFICULT AT ALL
2. NOT BEING ABLE TO STOP OR CONTROL WORRYING: NOT AT ALL
1. FEELING NERVOUS, ANXIOUS, OR ON EDGE: NOT AT ALL
5. BEING SO RESTLESS THAT IT IS HARD TO SIT STILL: NOT AT ALL

## 2018-05-22 ASSESSMENT — PAIN SCALES - GENERAL: PAINLEVEL: MODERATE PAIN (4)

## 2018-05-22 ASSESSMENT — PATIENT HEALTH QUESTIONNAIRE - PHQ9: 5. POOR APPETITE OR OVEREATING: NOT AT ALL

## 2018-05-22 NOTE — MR AVS SNAPSHOT
"              After Visit Summary   2018    Ileana Davis    MRN: [id number]           Patient Information     Date Of Birth          1940        Visit Information        Provider Department      2018 2:30 PM Iris Becerril MD [facility]        Today's Diagnoses     Sciatica of left side    -  1       Follow-ups after your visit        Follow-up notes from your care team     Return if symptoms worsen or fail to improve.      Who to contact     If you have questions or need follow up information about today's clinic visit or your schedule please contact [facility] directly at [phone].  Normal or non-critical lab and imaging results will be communicated to you by [employer], letter or phone within 4 business days after the clinic has received the results. If you do not hear from us within 7 days, please contact the clinic through [employer] or phone. If you have a critical or abnormal lab result, we will notify you by phone as soon as possible.  Submit refill requests through [employer] or call your pharmacy and they will forward the refill request to us. Please allow 3 business days for your refill to be completed.          Additional Information About Your Visit        MyChart Information     [employer] lets you send messages to your doctor, view your test results, renew your prescriptions, schedule appointments and more. To sign up, go to [email or web address] . Click on \"Log in\" on the left side of the screen, which will take you to the Welcome page. Then click on \"Sign up Now\" on the right side of the page.     You will be asked to enter the access code listed below, as well as some personal information. Please follow the directions to create your username and password.     Your access code is: [email or web address]  Expires: 2018 12:42 PM     Your access code will  in 90 days. If you need help or a new code, please call your [facility] or [phone].      " "  Care EveryWhere ID     This is your Care EveryWhere ID. This could be used by other organizations to access your [place] medical records  [id number]        Your Vitals Were     Pulse Temperature Respirations Height Pulse Oximetry BMI (Body Mass Index)    78 98.6  F (37  C) (Tympanic) 18 5' 1\" (1.549 m) 91% 39.49 kg/m2       Blood Pressure from Last 3 Encounters:   05/22/18 150/80   04/26/18 114/76   11/14/17 136/80    Weight from Last 3 Encounters:   05/22/18 209 lb (94.8 kg)   04/26/18 208 lb (94.3 kg)   11/14/17 214 lb (97.1 kg)              Today, you had the following     No orders found for display         Today's Medication Changes          These changes are accurate as of 5/22/18  2:41 PM.  If you have any questions, ask your nurse or doctor.               Start taking these medicines.        Dose/Directions    predniSONE 20 MG tablet   Commonly known as:  DELTASONE   Used for:  Sciatica of left side   Started by:  Iris Becerril MD        Dose:  20 mg   Take 1 tablet (20 mg) by mouth daily for 5 days   Quantity:  5 tablet   Refills:  0            Where to get your medicines      These medications were sent to [id number] - [place], [place] - [address]  [address], [facility] [address]     Phone:  [phone]     predniSONE 20 MG tablet               Information about OPIOIDS     PRESCRIPTION OPIOIDS: WHAT YOU NEED TO KNOW   You have a prescription for an opioid (narcotic) pain medicine. Opioids can cause addiction. If you have a history of chemical dependency of any type, you are at a higher risk of becoming addicted to opioids. Only take this medicine after all other options have been tried. Take it for as short a time and as few doses as possible.     Do not:    Drive. If you drive while taking these medicines, you could be arrested for driving under the influence (DUI).    Operate heavy machinery    Do any other dangerous activities while " taking these medicines.     Drink any alcohol while taking these medicines.      Take with any other medicines that contain acetaminophen. Read all labels carefully. Look for the word  acetaminophen  or  Tylenol.  Ask your pharmacist if you have questions or are unsure.    Store your pills in a secure place, locked if possible. We will not replace any lost or stolen medicine. If you don t finish your medicine, please throw away (dispose) as directed by your pharmacist. The [place] Pollution [employer] has more information about safe disposal: https://www.pca.[facility].[place].us/living-green/managing-unwanted-medications    All opioids tend to cause constipation. Drink plenty of water and eat foods that have a lot of fiber, such as fruits, vegetables, prune juice, apple juice and high-fiber cereal. Take a laxative (Miralax, milk of magnesia, Colace, Senna) if you don t move your bowels at least every other day.          Primary Care Provider Office Phone # Fax #    Iris Becerril [phone] [address] [facility] [address]        Equal Access to Services     [facility]: Hadii taye mio [place], waaxda luqadaha, qaybta kaalmakim painting, baljit may . So [facility] [phone].    ATENCIÓN: Si habla español, tiene a andujar disposición servicios gratuitos de asistencia lingüística. Kristen al [phone].    We comply with applicable federal civil rights laws and [place] laws. We do not discriminate on the basis of race, color, national origin, age, disability, sex, sexual orientation, or gender identity.            Thank you!     Thank you for choosing [facility]  for your care. Our goal is always to provide you with excellent care. Hearing back from our patients is one way we can continue to improve our services. Please take a few minutes to complete the written survey that you may receive in the mail after your visit with us.  Thank you!             Your Updated Medication List - Protect others around you: Learn how to safely use, store and throw away your medicines at www.disposemymeds.org.          This list is accurate as of 5/22/18  2:41 PM.  Always use your most recent med list.                   Brand Name Dispense Instructions for use Diagnosis    amitriptyline 25 MG tablet    ELAVIL    90 tablet    Take one (1) tablet by mouth at bedtime    Myalgia, Myositis       azaTHIOprine 50 MG tablet    IMURAN    60 tablet    Take two (2) tablets by mouth once daily    Autoimmune hepatitis (H)       cyclobenzaprine 10 MG tablet    FLEXERIL    90 tablet    Take 1 tablet (10 mg) by mouth 3 times daily as needed for muscle spasms    Muscle spasm, Pain       fluticasone 50 MCG/ACT spray    FLONASE    16 g    Spray 2 sprays into both nostrils daily as needed    Seasonal allergic rhinitis       HYDROcodone-acetaminophen 5-325 MG per tablet    NORCO     Take 1 tablet by mouth every 6 hours as needed for severe pain        levothyroxine 112 MCG tablet    SYNTHROID/LEVOTHROID    90 tablet    Take 1 tablet (112 mcg) by mouth daily    Hypothyroidism, unspecified type       MIRALAX PO      Take by mouth daily as needed        NAPROXEN PO      Take 250 mg by mouth as needed for moderate pain Patient states she only take when needed.        nystatin [id number] UNIT/GM Powd    MYCOSTATIN    60 g    Apply to affected area nightly as needed for rash    Yeast dermatitis       omeprazole 40 MG capsule    priLOSEC     Take  by mouth daily. Before the same meal daily        predniSONE 20 MG tablet    DELTASONE    5 tablet    Take 1 tablet (20 mg) by mouth daily for 5 days    Sciatica of left side       propranolol 20 MG tablet    INDERAL    90 tablet    TAKE 1 TABLET (20 MG) BY MOUTH DAILY    HTN (hypertension)

## 2018-05-22 NOTE — PROGRESS NOTES
SUBJECTIVE:   Ileana Davis is a 77 year old female who presents to clinic today for the following health issues:      Back Pain Follow Up      Description:   Location of pain:  left  Character of pain: stabbing and intermittent  Pain radiation: Does not radiate and radiates into the left buttocks  Since last visit, pain is:  worsened  New numbness or weakness in legs, not attributed to pain:  no     Intensity: At its worst 10/10    History:   Pain interferes with job: Not applicable  Therapies tried without relief: opioids, sitting and standing  Therapies tried with relief: opioids and steroid injection     Accompanying Signs & Symptoms:  Risk of Fracture:  Age >64  Risk of Cauda Equina:  None  Risk of Infection:  None  Risk of Cancer:  None        Problem list and histories reviewed & adjusted, as indicated.  Additional history: as documented    Patient Active Problem List   Diagnosis     Hypothyroidism     Generalized anxiety disorder     Benign essential hypertension     GERD (gastroesophageal reflux disease)     Hepatitis     Contact dermatitis     Rosacea     Osteoarthritis     Fibromyalgia     Advanced care planning/counseling discussion     Open wound of knee, leg, and ankle     Degeneration of lumbar or lumbosacral intervertebral disc     Anemia     ACP (advance care planning)     Autoimmune hepatitis (H)     Morbid obesity (H)     Mild major depression (H)     Past Surgical History:   Procedure Laterality Date     APPENDECTOMY       BACK SURGERY  2015    lumbar epidural injection     CARDIAC SURGERY  10/2017    angioplasty     CHOLECYSTECTOMY       COLONOSCOPY  07/27/2017    [facility]     COLONOSCOPY - HIM SCAN  05/15/2001    Small internal hemorrhoids; otherwise normal;EB     COLONOSCOPY - HIM SCAN  12/14/2006    [facility]     ENT SURGERY      tonsillectomy     ESOPHAGOGASTRODUODENOSCOPY  07/27/2017    [facility]     EYE SURGERY      bilateral cataract removal     GI SURGERY       anal fistula     GYN SURGERY  1985    NICHOLAS BSO     GYN SURGERY      cessarian x 2     GYN SURGERY      tubal sterilazation     ORTHOPEDIC SURGERY      right knee     ORTHOPEDIC SURGERY  1986    plantar fascia release     ORTHOPEDIC SURGERY      left knee     ORTHOPEDIC SURGERY      right trigger finger release     ORTHOPEDIC SURGERY  2013    Right great toe MTPJ fusion, adductor tenotomy,correction of hammer toe     ORTHOPEDIC SURGERY  [id number]    multiple procedures left forefoot       Social History   Substance Use Topics     Smoking status: Never Smoker     Smokeless tobacco: Never Used     Alcohol use No     Family History   Problem Relation Age of Onset     Arthritis Mother      rheumatoid     HEART DISEASE Mother      CHF     Alcohol/Drug Father      alcoholism     Allergies Father      Thyroid Disease Other      DIABETES No family hx of      Asthma No family hx of          Current Outpatient Prescriptions   Medication Sig Dispense Refill     amitriptyline (ELAVIL) 25 MG tablet Take one (1) tablet by mouth at bedtime 90 tablet 1     azaTHIOprine (IMURAN) 50 MG tablet Take two (2) tablets by mouth once daily 60 tablet 2     cyclobenzaprine (FLEXERIL) 10 MG tablet Take 1 tablet (10 mg) by mouth 3 times daily as needed for muscle spasms 90 tablet 0     fluticasone (FLONASE) 50 MCG/ACT spray Spray 2 sprays into both nostrils daily as needed 16 g 3     HYDROcodone-acetaminophen (NORCO) 5-325 MG per tablet Take 1 tablet by mouth every 6 hours as needed for severe pain       levothyroxine (SYNTHROID/LEVOTHROID) 112 MCG tablet Take 1 tablet (112 mcg) by mouth daily 90 tablet 0     nystatin (MYCOSTATIN) 474375 UNIT/GM POWD Apply to affected area nightly as needed for rash 60 g 1     omeprazole (PRILOSEC) 40 MG capsule Take  by mouth daily. Before the same meal daily       Polyethylene Glycol 3350 (MIRALAX PO) Take by mouth daily as needed        predniSONE (DELTASONE) 20 MG tablet Take 1 tablet (20 mg) by mouth daily  "for 5 days 5 tablet 0     propranolol (INDERAL) 20 MG tablet TAKE 1 TABLET (20 MG) BY MOUTH DAILY 90 tablet 2     NAPROXEN PO Take 250 mg by mouth as needed for moderate pain Patient states she only take when needed.       Allergies   Allergen Reactions     Fentanyl Other (See Comments)     Severe agitation when used during angiogram     Codeine Sulfate Other (See Comments)     hallucinations     Fentanyl And Related      Other reaction(s): Confusion     Morphine Other (See Comments)     Hallucinations with iv therapy     Tape [Adhesive Tape]      Tramadol Other (See Comments)     hallucination       Reviewed and updated as needed this visit by clinical staff  Tobacco  Allergies  Meds       Reviewed and updated as needed this visit by Provider         ROS:  Constitutional, HEENT, cardiovascular, pulmonary, gi and gu systems are negative, except as otherwise noted.    OBJECTIVE:     /80  Pulse 78  Temp 98.6  F (37  C) (Tympanic)  Resp 18  Ht 5' 1\" (1.549 m)  Wt 209 lb (94.8 kg)  SpO2 91%  BMI 39.49 kg/m2  Body mass index is 39.49 kg/(m^2).  GENERAL: healthy, alert and no distress  PSYCH: mentation appears normal, affect normal/bright    Diagnostic Test Results:  none     ASSESSMENT/PLAN:     1. Sciatica of left side  Prednisone burst prescribed, as this has helped in the past.  Schedule with Dr. Modi when he returns to clinic.  Follow-up as needed.  - predniSONE (DELTASONE) 20 MG tablet; Take 1 tablet (20 mg) by mouth daily for 5 days  Dispense: 5 tablet; Refill: 0        Iris Becerril MD  [facility] [place]  "

## 2018-05-23 ASSESSMENT — PATIENT HEALTH QUESTIONNAIRE - PHQ9: SUM OF ALL RESPONSES TO PHQ QUESTIONS 1-9: 4

## 2018-05-23 ASSESSMENT — ANXIETY QUESTIONNAIRES: GAD7 TOTAL SCORE: 0

## 2018-06-14 ENCOUNTER — TRANSFERRED RECORDS (OUTPATIENT)
Dept: HEALTH INFORMATION MANAGEMENT | Facility: CLINIC | Age: 78
End: 2018-06-14

## 2018-06-15 ENCOUNTER — TELEPHONE (OUTPATIENT)
Dept: FAMILY MEDICINE | Facility: OTHER | Age: 78
End: 2018-06-15

## 2018-06-15 ENCOUNTER — TRANSFERRED RECORDS (OUTPATIENT)
Dept: HEALTH INFORMATION MANAGEMENT | Facility: CLINIC | Age: 78
End: 2018-06-15

## 2018-06-15 NOTE — TELEPHONE ENCOUNTER
Son called and patient in ER yesterday for right shoulder and chest wall pain.FU ER visit needed.They did give her Oxycodone and did help.FU recommended 4 days.She is doing OK today and was tired,and hurt when she had to pick something off the floor.She layed down and is feeling better now.Updated if s/s increase over weekend go to ER/UC.Verbalized understanding.Appointment scheduled.

## 2018-06-20 ENCOUNTER — OFFICE VISIT (OUTPATIENT)
Dept: FAMILY MEDICINE | Facility: OTHER | Age: 78
End: 2018-06-20
Attending: FAMILY MEDICINE
Payer: MEDICARE

## 2018-06-20 VITALS
HEART RATE: 61 BPM | DIASTOLIC BLOOD PRESSURE: 78 MMHG | WEIGHT: 205 LBS | TEMPERATURE: 97 F | BODY MASS INDEX: 38.73 KG/M2 | RESPIRATION RATE: 14 BRPM | OXYGEN SATURATION: 99 % | SYSTOLIC BLOOD PRESSURE: 136 MMHG

## 2018-06-20 DIAGNOSIS — S46.911D STRAIN OF RIGHT SHOULDER, SUBSEQUENT ENCOUNTER: Primary | ICD-10-CM
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
## 2018-06-20 PROCEDURE — 99213 OFFICE O/P EST LOW 20 MIN: CPT | Performed by: FAMILY MEDICINE

## 2018-06-20 PROCEDURE — G0463 HOSPITAL OUTPT CLINIC VISIT: HCPCS

## 2018-06-20 RX ORDER — DOXYCYCLINE HYCLATE 100 MG
100 TABLET ORAL
COMMUNITY
End: 2019-03-08

## 2018-06-20 ASSESSMENT — ANXIETY QUESTIONNAIRES
IF YOU CHECKED OFF ANY PROBLEMS ON THIS QUESTIONNAIRE, HOW DIFFICULT HAVE THESE PROBLEMS MADE IT FOR YOU TO DO YOUR WORK, TAKE CARE OF THINGS AT HOME, OR GET ALONG WITH OTHER PEOPLE: SOMEWHAT DIFFICULT
7. FEELING AFRAID AS IF SOMETHING AWFUL MIGHT HAPPEN: NOT AT ALL
3. WORRYING TOO MUCH ABOUT DIFFERENT THINGS: MORE THAN HALF THE DAYS
1. FEELING NERVOUS, ANXIOUS, OR ON EDGE: SEVERAL DAYS
2. NOT BEING ABLE TO STOP OR CONTROL WORRYING: MORE THAN HALF THE DAYS
6. BECOMING EASILY ANNOYED OR IRRITABLE: SEVERAL DAYS
GAD7 TOTAL SCORE: 6
5. BEING SO RESTLESS THAT IT IS HARD TO SIT STILL: NOT AT ALL

## 2018-06-20 ASSESSMENT — PAIN SCALES - GENERAL: PAINLEVEL: MODERATE PAIN (5)

## 2018-06-20 ASSESSMENT — PATIENT HEALTH QUESTIONNAIRE - PHQ9: 5. POOR APPETITE OR OVEREATING: NOT AT ALL

## 2018-06-20 NOTE — PROGRESS NOTES
SUBJECTIVE:   Ileana Davis is a 77 year old female who presents to clinic today for the following health issues:      ED/UC Followup:    Facility:  [facility]  Date of visit: 6/15/18  Reason for visit: shoulder pain and tick bite  Current Status: Improved overall.  Pain is controlled with low dose Tylenol daily, and rash seems to be improving.  She is tolerating medication OK.         Problem list and histories reviewed & adjusted, as indicated.  Additional history: as documented    Patient Active Problem List   Diagnosis     Hypothyroidism     Generalized anxiety disorder     Benign essential hypertension     GERD (gastroesophageal reflux disease)     Hepatitis     Contact dermatitis     Rosacea     Osteoarthritis     Fibromyalgia     Advanced care planning/counseling discussion     Open wound of knee, leg, and ankle     Degeneration of lumbar or lumbosacral intervertebral disc     Anemia     ACP (advance care planning)     Autoimmune hepatitis (H)     Morbid obesity (H)     Mild major depression (H)     Past Surgical History:   Procedure Laterality Date     APPENDECTOMY       BACK SURGERY  2015    lumbar epidural injection     CARDIAC SURGERY  10/2017    angioplasty     CHOLECYSTECTOMY       COLONOSCOPY  07/27/2017    [facility]     COLONOSCOPY - HIM SCAN  05/15/2001    Small internal hemorrhoids; otherwise normal;[facility]     COLONOSCOPY - HIM SCAN  12/14/2006    Colonoscopy, [facility], SNARE     ENT SURGERY      tonsillectomy     ESOPHAGOGASTRODUODENOSCOPY  07/27/2017    [facility]     EYE SURGERY      bilateral cataract removal     GI SURGERY      anal fistula     GYN SURGERY  1985    NICHOLAS BSO     GYN SURGERY      cessarian x 2     GYN SURGERY      tubal sterilazation     ORTHOPEDIC SURGERY      right knee     ORTHOPEDIC SURGERY  1986    plantar fascia release     ORTHOPEDIC SURGERY      left knee     ORTHOPEDIC SURGERY      right trigger finger release     ORTHOPEDIC SURGERY  2013    Right great toe MTPJ  fusion, adductor tenotomy,correction of hammer toe     ORTHOPEDIC SURGERY  [id number]    multiple procedures left forefoot       Social History   Substance Use Topics     Smoking status: Never Smoker     Smokeless tobacco: Never Used     Alcohol use No     Family History   Problem Relation Age of Onset     Arthritis Mother      rheumatoid     HEART DISEASE Mother      CHF     Alcohol/Drug Father      alcoholism     Allergies Father      Thyroid Disease Other      Diabetes No family hx of      Asthma No family hx of          Current Outpatient Prescriptions   Medication Sig Dispense Refill     Acetaminophen (TYLENOL PO) Take 500 mg by mouth       amitriptyline (ELAVIL) 25 MG tablet Take one (1) tablet by mouth at bedtime 90 tablet 1     azaTHIOprine (IMURAN) 50 MG tablet Take two (2) tablets by mouth once daily 60 tablet 2     cyclobenzaprine (FLEXERIL) 10 MG tablet Take 1 tablet (10 mg) by mouth 3 times daily as needed for muscle spasms 90 tablet 0     doxycycline (VIBRA-TABS) 100 MG tablet Take 100 mg by mouth       fluticasone (FLONASE) 50 MCG/ACT spray Spray 2 sprays into both nostrils daily as needed 16 g 3     IBUPROFEN PO Take 1,000 mg by mouth       levothyroxine (SYNTHROID/LEVOTHROID) 112 MCG tablet Take 1 tablet (112 mcg) by mouth daily 90 tablet 0     NAPROXEN PO Take 250 mg by mouth as needed for moderate pain Patient states she only take when needed.       nystatin (MYCOSTATIN) 000313 UNIT/GM POWD Apply to affected area nightly as needed for rash 60 g 1     omeprazole (PRILOSEC) 40 MG capsule Take  by mouth daily. Before the same meal daily       Polyethylene Glycol 3350 (MIRALAX PO) Take by mouth daily as needed        propranolol (INDERAL) 20 MG tablet TAKE 1 TABLET (20 MG) BY MOUTH DAILY 90 tablet 2     Allergies   Allergen Reactions     Fentanyl Other (See Comments)     Severe agitation when used during angiogram     Codeine Sulfate Other (See Comments)     hallucinations     Fentanyl And Related       Other reaction(s): Confusion     Morphine Other (See Comments)     Hallucinations with iv therapy     Tape [Adhesive Tape]      Tramadol Other (See Comments)     hallucination       Reviewed and updated as needed this visit by clinical staff  Tobacco  Meds       Reviewed and updated as needed this visit by Provider         ROS:  Constitutional, HEENT, cardiovascular, pulmonary, gi and gu systems are negative, except as otherwise noted.    OBJECTIVE:     /78 (BP Location: Right arm, Patient Position: Sitting, Cuff Size: Adult Regular)  Pulse 61  Temp 97  F (36.1  C) (Tympanic)  Resp 14  Wt 205 lb (93 kg)  SpO2 99%  BMI 38.73 kg/m2  Body mass index is 38.73 kg/(m^2).  GENERAL: healthy, alert and no distress  SKIN: mild erythematous rash on left upper arm  PSYCH: mentation appears normal, affect normal/bright    Diagnostic Test Results:  none     ASSESSMENT/PLAN:     1. Strain of right shoulder, subsequent encounter  Continue tylenol and ibuprofen as recommended.    2. Tick bite, subsequent encounter  Complete doxycycline.          Iris Becerril MD  [facility]

## 2018-06-20 NOTE — MR AVS SNAPSHOT
After Visit Summary   6/20/2018    Ileana Davis    MRN: [id number]           Patient Information     Date Of Birth          1940        Visit Information        Provider Department      6/20/2018 11:30 AM Iris Becerril MD [facility]        Today's Diagnoses     Strain of right shoulder, subsequent encounter    -  1    Tick bite, subsequent encounter           Follow-ups after your visit        Follow-up notes from your care team     Return if symptoms worsen or fail to improve.      Who to contact     If you have questions or need follow up information about today's clinic visit or your schedule please contact [facility] directly at [phone].  Normal or non-critical lab and imaging results will be communicated to you by MyChart, letter or phone within 4 business days after the clinic has received the results. If you do not hear from us within 7 days, please contact the clinic through MyChart or phone. If you have a critical or abnormal lab result, we will notify you by phone as soon as possible.  Submit refill requests through [employer] or call your pharmacy and they will forward the refill request to us. Please allow 3 business days for your refill to be completed.          Additional Information About Your Visit        Care EveryWhere ID     This is your Care EveryWhere ID. This could be used by other organizations to access your [place] medical records  [id number]        Your Vitals Were     Pulse Temperature Respirations Pulse Oximetry BMI (Body Mass Index)       61 97  F (36.1  C) (Tympanic) 14 99% 38.73 kg/m2        Blood Pressure from Last 3 Encounters:   06/20/18 136/78   05/22/18 150/80   04/26/18 114/76    Weight from Last 3 Encounters:   06/20/18 205 lb (93 kg)   05/22/18 209 lb (94.8 kg)   04/26/18 208 lb (94.3 kg)              Today, you had the following     No orders found for display       Primary Care Provider Office Phone # Fax #     Iris Becerril [phone]       [address] [facility] [address]        Equal Access to Services     GISELLE RIDLEY : Nadine Mariscal, sarah arthur, hennytonny aguirrepalakkim painting, waxsherlyn jordanain hayhomero johnsonflora taishaluz marinarolando gonzalez. So [facility] [phone].    ATENCIÓN: Si habla español, tiene a andujar disposición servicios gratuitos de asistencia lingüística. Llame al [phone].    We comply with applicable federal civil rights laws and [place] laws. We do not discriminate on the basis of race, color, national origin, age, disability, sex, sexual orientation, or gender identity.            Thank you!     Thank you for choosing [facility]]  for your care. Our goal is always to provide you with excellent care. Hearing back from our patients is one way we can continue to improve our services. Please take a few minutes to complete the written survey that you may receive in the mail after your visit with us. Thank you!             Your Updated Medication List - Protect others around you: Learn how to safely use, store and throw away your medicines at www.disposemymeds.org.          This list is accurate as of 6/20/18  1:49 PM.  Always use your most recent med list.                   Brand Name Dispense Instructions for use Diagnosis    amitriptyline 25 MG tablet    ELAVIL    90 tablet    Take one (1) tablet by mouth at bedtime    Myalgia, Myositis       azaTHIOprine 50 MG tablet    IMURAN    60 tablet    Take two (2) tablets by mouth once daily    Autoimmune hepatitis (H)       cyclobenzaprine 10 MG tablet    FLEXERIL    90 tablet    Take 1 tablet (10 mg) by mouth 3 times daily as needed for muscle spasms    Muscle spasm, Pain       doxycycline 100 MG tablet    VIBRA-TABS     Take 100 mg by mouth        fluticasone 50 MCG/ACT spray    FLONASE    16 g    Spray 2 sprays into both nostrils daily as needed    Seasonal allergic rhinitis       IBUPROFEN PO      Take 1,000 mg  by mouth        levothyroxine 112 MCG tablet    SYNTHROID/LEVOTHROID    90 tablet    Take 1 tablet (112 mcg) by mouth daily    Hypothyroidism, unspecified type       MIRALAX PO      Take by mouth daily as needed        NAPROXEN PO      Take 250 mg by mouth as needed for moderate pain Patient states she only take when needed.        nystatin 771016 UNIT/GM Powd    MYCOSTATIN    60 g    Apply to affected area nightly as needed for rash    Yeast dermatitis       omeprazole 40 MG capsule    priLOSEC     Take  by mouth daily. Before the same meal daily        propranolol 20 MG tablet    INDERAL    90 tablet    TAKE 1 TABLET (20 MG) BY MOUTH DAILY    HTN (hypertension)       TYLENOL PO      Take 500 mg by mouth

## 2018-06-20 NOTE — NURSING NOTE
"Chief Complaint   Patient presents with     RECHECK     shoulder and chest wall pain.       Initial /78 (BP Location: Right arm, Patient Position: Sitting, Cuff Size: Adult Regular)  Pulse 61  Temp 97  F (36.1  C) (Tympanic)  Resp 14  Wt 205 lb (93 kg)  SpO2 99%  BMI 38.73 kg/m2 Estimated body mass index is 38.73 kg/(m^2) as calculated from the following:    Height as of 5/22/18: 5' 1\" (1.549 m).    Weight as of this encounter: 205 lb (93 kg).  Medication Reconciliation: complete    Shell Lieberman LPN    "

## 2018-06-21 ASSESSMENT — ANXIETY QUESTIONNAIRES: GAD7 TOTAL SCORE: 6

## 2018-06-22 DIAGNOSIS — M62.838 MUSCLE SPASM: ICD-10-CM

## 2018-06-22 DIAGNOSIS — R52 PAIN: ICD-10-CM

## 2018-06-22 RX ORDER — CYCLOBENZAPRINE HCL 10 MG
TABLET ORAL
Qty: 90 TABLET | Refills: 1 | Status: SHIPPED | OUTPATIENT
Start: 2018-06-22 | End: 2018-11-08

## 2018-06-27 ENCOUNTER — TELEPHONE (OUTPATIENT)
Dept: FAMILY MEDICINE | Facility: OTHER | Age: 78
End: 2018-06-27

## 2018-06-27 NOTE — TELEPHONE ENCOUNTER
We did not work on this PA. It has been denied. Denial letter is in EPIC under the media tab. Whomever worked on this did not document in EPIC.

## 2018-06-27 NOTE — TELEPHONE ENCOUNTER
"Reason for call:  Medication     1. Medication Name? Flexaril  2. Is this request for a refill? No  3. What Pharmacy do you use? [facility] in [place]  4. Have you contacted your pharmacy? Yes    5. If yes, when?  (Please note that the turn-around-time for prescriptions is 72 business hours; I am sending your request at this time. SEND TO  [facility]  )  Description: pharmacy now charging for Flexaril.  Wants to discuss as has a \"plan\"  Was an appointment offered for this a call? No   Preferred method for responding to this messageTelephone Call  If we cannot reach you directly, may we leave a detailed response at the number you provided? Yes [phone]  Can this message wait until your PCP/Provider returns if not available today? N/A provider is in today  "

## 2018-07-18 DIAGNOSIS — K75.4 AUTOIMMUNE HEPATITIS (H): ICD-10-CM

## 2018-07-19 RX ORDER — AZATHIOPRINE 50 MG/1
TABLET ORAL
Qty: 60 TABLET | Refills: 2 | Status: SHIPPED | OUTPATIENT
Start: 2018-07-19 | End: 2018-10-23

## 2018-07-19 NOTE — TELEPHONE ENCOUNTER
AZATHIOPRINE 50MG TAB    Last Written Prescription Date:  4/13/2018  Last Fill Quantity: 60,   # refills: 2  Last Office Visit: 6/20/2018  Future Office visit:

## 2018-08-13 DIAGNOSIS — J30.2 SEASONAL ALLERGIC RHINITIS: ICD-10-CM

## 2018-08-14 RX ORDER — FLUTICASONE PROPIONATE 50 MCG
SPRAY, SUSPENSION (ML) NASAL
Qty: 16 G | Refills: 1 | Status: SHIPPED | OUTPATIENT
Start: 2018-08-14 | End: 2019-02-07

## 2018-08-17 ENCOUNTER — OFFICE VISIT (OUTPATIENT)
Dept: PODIATRY | Facility: OTHER | Age: 78
End: 2018-08-17
Attending: PODIATRIST
Payer: MEDICARE

## 2018-08-17 ENCOUNTER — HOSPITAL ENCOUNTER (OUTPATIENT)
Dept: GENERAL RADIOLOGY | Facility: OTHER | Age: 78
Discharge: HOME | End: 2018-08-17
Attending: PODIATRIST | Admitting: PODIATRIST
Payer: MEDICARE

## 2018-08-17 VITALS
HEART RATE: 69 BPM | OXYGEN SATURATION: 89 % | TEMPERATURE: 97.4 F | DIASTOLIC BLOOD PRESSURE: 80 MMHG | SYSTOLIC BLOOD PRESSURE: 132 MMHG

## 2018-08-17 DIAGNOSIS — M20.12 HALLUX VALGUS (ACQUIRED), LEFT FOOT: Primary | ICD-10-CM

## 2018-08-17 DIAGNOSIS — M20.12 HALLUX VALGUS (ACQUIRED), LEFT FOOT: ICD-10-CM

## 2018-08-17 DIAGNOSIS — M79.672 FOOT PAIN, LEFT: ICD-10-CM

## 2018-08-17 PROCEDURE — 73630 X-RAY EXAM OF FOOT: CPT | Mod: TC,LT

## 2018-08-17 PROCEDURE — 99202 OFFICE O/P NEW SF 15 MIN: CPT | Performed by: PODIATRIST

## 2018-08-17 ASSESSMENT — PAIN SCALES - GENERAL: PAINLEVEL: NO PAIN (0)

## 2018-08-17 NOTE — MR AVS SNAPSHOT
After Visit Summary   8/17/2018    Ileana Davis    MRN: [id number]           Patient Information     Date Of Birth          1940        Visit Information        Provider Department      8/17/2018 1:15 PM Gina Rodriguez DPM [facility] [place]        Today's Diagnoses     Hallux valgus (acquired), left foot    -  1    Foot pain, left           Follow-ups after your visit        Your next 10 appointments already scheduled     Oct 18, 2018 10:00 AM CDT   (Arrive by 9:45 AM)   Return Visit with Gina Rodriguez DPM   [facility] ([facility] [place] )    [address] [place] [address]  [place] [address] [phone]              Future tests that were ordered for you today     Open Standing Orders        Priority Remaining Interval Expires Ordered    XR Foot Left G/E 3 Views Routine 3/4  8/17/2019 8/17/2018            Who to contact     If you have questions or need follow up information about today's clinic visit or your schedule please contact [facility] directly at [phone].  Normal or non-critical lab and imaging results will be communicated to you by MyChart, letter or phone within 4 business days after the clinic has received the results. If you do not hear from us within 7 days, please contact the clinic through MyChart or phone. If you have a critical or abnormal lab result, we will notify you by phone as soon as possible.  Submit refill requests through [employer] or call your pharmacy and they will forward the refill request to us. Please allow 3 business days for your refill to be completed.          Additional Information About Your Visit        Care EveryWhere ID     This is your Care EveryWhere ID. This could be used by other organizations to access your [place] medical records  [id number]        Your Vitals Were     Pulse Temperature Pulse Oximetry             69 97.4  F (36.3  C) (Tympanic) 89%          Blood Pressure from Last  3 Encounters:   08/17/18 132/80   06/20/18 136/78   05/22/18 150/80    Weight from Last 3 Encounters:   06/20/18 205 lb (93 kg)   05/22/18 209 lb (94.8 kg)   04/26/18 208 lb (94.3 kg)               Primary Care Provider Office Phone # Fax #    Iris SANDY Becerril [phone] [address] [facility] [address]        Equal Access to Services     GISELLE RIDLEY : Hadii tyae ku hadasho Soomaali, waaxda luqadaha, qaybta kaalmada adeegyada, waxay jordanain hayzacn facundo gonzalez. So [facility] [phone].    ATENCIÓN: Si habla español, tiene a andujar disposición servicios gratuitos de asistencia lingüística. LlSelect Medical [facility], [place] [phone].    We comply with applicable federal civil rights laws and [place] laws. We do not discriminate on the basis of race, color, national origin, age, disability, sex, sexual orientation, or gender identity.            Thank you!     Thank you [facility]  for your care. Our goal is always to provide you with excellent care. Hearing back from our patients is one way we can continue to improve our services. Please take a few minutes to complete the written survey that you may receive in the mail after your visit with us. Thank you!             Your Updated Medication List - Protect others around you: Learn how to safely use, store and throw away your medicines at www.disposemymeds.org.          This list is accurate as of 8/17/18  4:13 PM.  Always use your most recent med list.                   Brand Name Dispense Instructions for use Diagnosis    amitriptyline 25 MG tablet    ELAVIL    90 tablet    TAKE ONE (1) TABLET BY MOUTH AT BEDTIME    Myalgia, Myositis       azaTHIOprine 50 MG tablet    IMURAN    60 tablet    TAKE TWO (2) TABLETS BY MOUTH ONCE DAILY    Autoimmune hepatitis (H)       cyclobenzaprine 10 MG tablet    FLEXERIL    90 tablet    TAKE 1 TABLET (10 MG) BY MOUTH 3 TIMES DAILY AS NEEDED FOR MUSCLE SPASMS    Muscle spasm, Pain        doxycycline 100 MG tablet    VIBRA-TABS     Take 100 mg by mouth        fluticasone 50 MCG/ACT spray    FLONASE    16 g    SPRAY 2 SPRAYS INTO BOTH NOSTRILS DAILY AS NEEDED    Seasonal allergic rhinitis       IBUPROFEN PO      Take 1,000 mg by mouth        levothyroxine 112 MCG tablet    SYNTHROID/LEVOTHROID    90 tablet    Take 1 tablet (112 mcg) by mouth daily    Hypothyroidism, unspecified type       MIRALAX PO      Take by mouth daily as needed        NAPROXEN PO      Take 250 mg by mouth as needed for moderate pain Patient states she only take when needed.        nystatin [id number] UNIT/GM Powd    MYCOSTATIN    60 g    Apply to affected area nightly as needed for rash    Yeast dermatitis       omeprazole 40 MG capsule    priLOSEC     Take  by mouth daily. Before the same meal daily        propranolol 20 MG tablet    INDERAL    90 tablet    TAKE 1 TABLET (20 MG) BY MOUTH DAILY    HTN (hypertension)       TYLENOL PO      Take 500 mg by mouth

## 2018-08-17 NOTE — NURSING NOTE
"Chief Complaint   Patient presents with     Musculoskeletal Problem       Initial /80 (BP Location: Left arm, Patient Position: Sitting, Cuff Size: Adult Regular)  Pulse 69  Temp 97.4  F (36.3  C) (Tympanic)  SpO2 (!) 89% Estimated body mass index is 38.73 kg/(m^2) as calculated from the following:    Height as of 5/22/18: 5' 1\" (1.549 m).    Weight as of 6/20/18: 205 lb (93 kg).  Medication Reconciliation: complete    Viki Waldron LPN  "

## 2018-08-17 NOTE — PROGRESS NOTES
Chief complaint: Patient presents with:  Musculoskeletal Problem        History of Present Illness: This 77 year old female with osteoporosis, Hepatitis C, degeneration of lumbosacral intervertebral discs (injections every 6 months), and a history of multiple foot surgeries is being seen for evaluation and suggestions of management of a LEFT foot bunion deformity. She had a RIGHT foot 1st MTPJ, Hammertoe correction digits 2-5, and two neuroma removals in 2014. She later saw the same ortho doctor from [facility] for the LEFT foot to fix more hammertoes and a neuroma. She was told to stretch the bunion manually into place but she didn't think it would work. She is here today to see what options she has for the bunion on the LEFT foot. The bunion itself is not painful, but the hallux overriding the 2nd and 3rd digits makes the 2nd digit very painful. No further pedal complaints at this time.      /80 (BP Location: Left arm, Patient Position: Sitting, Cuff Size: Adult Regular)  Pulse 69  Temp 97.4  F (36.3  C) (Tympanic)  SpO2 (!) 89%    Patient Active Problem List   Diagnosis     Hypothyroidism     Generalized anxiety disorder     Benign essential hypertension     GERD (gastroesophageal reflux disease)     Hepatitis     Contact dermatitis     Rosacea     Osteoarthritis     Fibromyalgia     Advanced care planning/counseling discussion     Open wound of knee, leg, and ankle     Degeneration of lumbar or lumbosacral intervertebral disc     Anemia     ACP (advance care planning)     Autoimmune hepatitis (H)     Morbid obesity (H)     Mild major depression (H)       Past Surgical History:   Procedure Laterality Date     APPENDECTOMY       BACK SURGERY  2015    lumbar epidural injection     CARDIAC SURGERY  10/2017    angioplasty     CHOLECYSTECTOMY       COLONOSCOPY  07/27/2017    [facility]     COLONOSCOPY - HIM SCAN  05/15/2001    Small internal hemorrhoids; otherwise normal;[facility]     COLONOSCOPY - HIM  SCAN  12/14/2006    Colonoscopy, [facility], SNARE     ENT SURGERY      tonsillectomy     ESOPHAGOGASTRODUODENOSCOPY  07/27/2017    [facility]     EYE SURGERY      bilateral cataract removal     GI SURGERY      anal fistula     GYN SURGERY  1985    [facility]     GYN SURGERY      cessarian x 2     GYN SURGERY      tubal sterilazation     ORTHOPEDIC SURGERY      right knee     ORTHOPEDIC SURGERY  1986    plantar fascia release     ORTHOPEDIC SURGERY      left knee     ORTHOPEDIC SURGERY      right trigger finger release     ORTHOPEDIC SURGERY  2013    Right great toe MTPJ fusion, adductor tenotomy,correction of hammer toe     ORTHOPEDIC SURGERY  [id number]    multiple procedures left forefoot       Current Outpatient Prescriptions   Medication     amitriptyline (ELAVIL) 25 MG tablet     azaTHIOprine (IMURAN) 50 MG tablet     cyclobenzaprine (FLEXERIL) 10 MG tablet     fluticasone (FLONASE) 50 MCG/ACT spray     IBUPROFEN PO     levothyroxine (SYNTHROID/LEVOTHROID) 112 MCG tablet     nystatin (MYCOSTATIN) 641629 UNIT/GM POWD     omeprazole (PRILOSEC) 40 MG capsule     Polyethylene Glycol 3350 (MIRALAX PO)     propranolol (INDERAL) 20 MG tablet     Acetaminophen (TYLENOL PO)     doxycycline (VIBRA-TABS) 100 MG tablet     NAPROXEN PO     No current facility-administered medications for this visit.           Allergies   Allergen Reactions     Fentanyl Other (See Comments)     Severe agitation when used during angiogram     Codeine Sulfate Other (See Comments)     hallucinations     Fentanyl And Related      Other reaction(s): Confusion     Morphine Other (See Comments)     Hallucinations with iv therapy     Tape [Adhesive Tape]      Tramadol Other (See Comments)     hallucination       ROS: 10 point ROS neg other than the symptoms noted above in the HPI.  EXAM  Constitutional: healthy, alert and no distress    Psychiatric: mentation appears normal and affect normal/bright    VASCULAR:  -Dorsalis pedis pulse +2/4 b/l  -Posterior  tibial pulse weakly palpable secondary to edema  -2+ pitting edema to b/l ankles and 1+ pitting edema to dorsum of foot b/l  NEURO:  -Light touch sensation intact to b/l plantar forefoot  DERM:  -Cicatrix to LEFT foot 2nd and 3rd digit and 1st interspace.  -Cicatrix to multiple locations of RIGHT forefoot  MSK:  -Severe hallux abducto valgus of LEFT, overridding 2nd and 3rd digits, semi-reducible enough to offload 2nd digit while holding hallux. NO crepitus with ROM.   -Flexion deformity of RIGHT hallux IPJ  -No motion to RIGHT 1st MTPJ (previous arthrodesis)  -LEFT 2nd and 3rd digit and RIGHT lesser digits with no ROM at PIPJ (previous fusions)    RADIOGRAPHS LEFT FOOT 2 VIEWS 02/10/2017 [facility] REPORT    Exam Accession# [id number]    XR FOOT LEFT 2 VIEWS    HISTORY: 76 years Female Left foot pain;     COMPARISON: 11/14/2014    TECHNIQUE:2 views left foot were obtained.      FINDINGS:There is osteopenia. There is hallux valgus with old healed resection along the medial aspect of the first metatarsal head. There are degenerative changes of the mid foot. There is no evidence of fracture or dislocation.      IMPRESSION: Osteopenia with degenerative changes of the midfoot. There is no significant change from the prior study.    RADIOGRAPHS RIGHT FOOT 2 VIEWS 02/10/2017 [facility] REPORT  This document is currently in Final Status    Exam Accession# [id number]    XR FOOT RIGHT 2 VIEWS    HISTORY: 76 years Female Right foot pain;     COMPARISON: 11/14/2014    TECHNIQUE:Right foot 2 views      FINDINGS:There is screw-plate arthrodesis of the first metatarsophalangeal articulation. This was present on the prior study. Again seen are healed surgical resections of the proximal second and third phalanges.    There is advanced osteoarthritic change of the first cuneiform, first metatarsal articulation. There is normal in size of fracture or dislocation.      IMPRESSION: No significant interval  change.    ============================================================    ASSESSMENT:  (M20.12) Hallux valgus (acquired), left foot  (primary encounter diagnosis)    (M79.672) Foot pain, left    PLAN:  -Patient evaluated and examined. Treatment options discussed with no educational barriers noted.  -Sent patient for LEFT foot WB radiographs 4 views (3 standard views and sesamoid axial view)  -Discussed conservative vs. Surgical options. Patient will attempt conservative options at this time and see if the pain resolves. If conservative treatment does not work, will discuss potential 1st MTPJ fusion vs. 2nd digit amputation.  -Instructed patient to go to [facility] and  visco gel toe tube for hammertoe, toe spacer, and +/- bunion splint. Patient will apply a silipose sleeve over the 2nd digit and attempt to separate the hallux and 2nd digit with or without a bunion splint to see if this manages the pain of the 2nd digit from the hallux.  -Briefly discussed post-op recovery for a 1st MTPJ fusion vs. 2nd digit amputation and risks and benefits of each. Will discuss at next appointment if conservative treatment fails.    RTC 2 months to evaluate if conservative treatment decreased bunion/hammertoe pain  Earlier f/u if patient wants to discuss surgical options earlier    Gina Rodriguez DPM

## 2018-10-23 DIAGNOSIS — K75.4 AUTOIMMUNE HEPATITIS (H): ICD-10-CM

## 2018-10-23 RX ORDER — AZATHIOPRINE 50 MG/1
TABLET ORAL
Qty: 60 TABLET | Refills: 5 | Status: SHIPPED | OUTPATIENT
Start: 2018-10-23 | End: 2019-04-29

## 2018-10-23 NOTE — TELEPHONE ENCOUNTER
Azathioprine 50mg      Last Written Prescription Date:  7/19/18  Last Fill Quantity: 60,   # refills: 2  Last Office Visit: 6/20/18

## 2018-11-08 DIAGNOSIS — R52 PAIN: ICD-10-CM

## 2018-11-08 DIAGNOSIS — M62.838 MUSCLE SPASM: ICD-10-CM

## 2018-11-08 RX ORDER — CYCLOBENZAPRINE HCL 10 MG
TABLET ORAL
Qty: 90 TABLET | Refills: 1 | Status: SHIPPED | OUTPATIENT
Start: 2018-11-08 | End: 2019-04-04

## 2018-11-08 NOTE — TELEPHONE ENCOUNTER
cyclobenzaprine (FLEXERIL) 10 MG tablet  Last Written Prescription Date:  6/22/18  Last Fill Quantity: 90,   # refills: 1  Last Office Visit: 6/20/18  Future Office visit:       Routing refill request to provider for review/approval because:  Drug not on the [facility], P or [facility] refill protocol or controlled substance

## 2018-12-10 ENCOUNTER — TRANSFERRED RECORDS (OUTPATIENT)
Dept: HEALTH INFORMATION MANAGEMENT | Facility: CLINIC | Age: 78
End: 2018-12-10

## 2019-01-09 DIAGNOSIS — E03.9 HYPOTHYROIDISM, UNSPECIFIED TYPE: ICD-10-CM

## 2019-01-09 RX ORDER — LEVOTHYROXINE SODIUM 112 UG/1
TABLET ORAL
Qty: 90 TABLET | Refills: 0 | Status: SHIPPED | OUTPATIENT
Start: 2019-01-09 | End: 2019-04-24

## 2019-01-09 NOTE — TELEPHONE ENCOUNTER
levothyroxine  Last Written Prescription Date: 10/15/18  Last Fill Quantity: 90 # of Refills: 0  Last Office Visit: 6/20/18

## 2019-02-07 DIAGNOSIS — J30.2 SEASONAL ALLERGIC RHINITIS: ICD-10-CM

## 2019-02-07 RX ORDER — FLUTICASONE PROPIONATE 50 MCG
SPRAY, SUSPENSION (ML) NASAL
Qty: 16 G | Refills: 1 | Status: SHIPPED | OUTPATIENT
Start: 2019-02-07 | End: 2020-08-19

## 2019-02-07 NOTE — TELEPHONE ENCOUNTER
fluticasone (FLONASE) 50 MCG/ACT spray  Last Written Prescription Date:  8/14/18  Last Fill Quantity: 16g,   # refills: 1  Last Office Visit: 6/20/18  Future Office visit:

## 2019-02-18 DIAGNOSIS — I10 HTN (HYPERTENSION): ICD-10-CM

## 2019-02-18 NOTE — TELEPHONE ENCOUNTER
propranolol (INDERAL) 20 MG tablet  Last Written Prescription Date:  5/16/18  Last Fill Quantity: 90,   # refills: 2  Last Office Visit: 6/20/18  Future Office visit:

## 2019-02-19 RX ORDER — PROPRANOLOL HYDROCHLORIDE 20 MG/1
20 TABLET ORAL DAILY
Qty: 90 TABLET | Refills: 0 | Status: SHIPPED | OUTPATIENT
Start: 2019-02-19 | End: 2019-04-29

## 2019-03-05 ENCOUNTER — TRANSFERRED RECORDS (OUTPATIENT)
Dept: HEALTH INFORMATION MANAGEMENT | Facility: CLINIC | Age: 79
End: 2019-03-05

## 2019-03-05 ENCOUNTER — TELEPHONE (OUTPATIENT)
Dept: FAMILY MEDICINE | Facility: OTHER | Age: 79
End: 2019-03-05

## 2019-03-05 NOTE — TELEPHONE ENCOUNTER
GENERIC ADULT  Ileana Davis is a 78 year old female who calls with severe back pain rates 10/10, pain located right side and posterior back pain, pt reports she believes this is related to her cirrhosis. Pt also reports swelling is present on right side. Pt reports these symptoms are new.  Writer attempted to call [facility] clinic and provider unable to reach.   Advised pt to ER.     RECOMMENDED DISPOSITION:  Go to Emergency Department or Urgent Care now pt reported her son can bring her in a couple hours, but she is unable to go immediately.     Will comply with recommendation: Yes    If further questions/concerns or if symptoms do not improve, worsen or new symptoms develop, call your PCP or [facility] as soon as possible.      Guideline used: Adult Telephone Protocols Office Version 3rd Edition - Scar Alvarez MD, FACEP      Laura Rosales, LUCA

## 2019-03-08 ENCOUNTER — OFFICE VISIT (OUTPATIENT)
Dept: FAMILY MEDICINE | Facility: OTHER | Age: 79
End: 2019-03-08
Attending: FAMILY MEDICINE
Payer: MEDICARE

## 2019-03-08 VITALS
BODY MASS INDEX: 37.76 KG/M2 | HEIGHT: 61 IN | DIASTOLIC BLOOD PRESSURE: 80 MMHG | WEIGHT: 200 LBS | OXYGEN SATURATION: 98 % | RESPIRATION RATE: 14 BRPM | SYSTOLIC BLOOD PRESSURE: 130 MMHG | TEMPERATURE: 97.7 F | HEART RATE: 70 BPM

## 2019-03-08 DIAGNOSIS — N28.89 KIDNEY MASS: Primary | ICD-10-CM

## 2019-03-08 DIAGNOSIS — M79.7 FIBROMYALGIA: ICD-10-CM

## 2019-03-08 DIAGNOSIS — M51.379 DEGENERATION OF LUMBAR OR LUMBOSACRAL INTERVERTEBRAL DISC: ICD-10-CM

## 2019-03-08 PROCEDURE — G0463 HOSPITAL OUTPT CLINIC VISIT: HCPCS

## 2019-03-08 PROCEDURE — 99214 OFFICE O/P EST MOD 30 MIN: CPT | Performed by: FAMILY MEDICINE

## 2019-03-08 RX ORDER — HYDROCODONE BITARTRATE AND ACETAMINOPHEN 5; 325 MG/1; MG/1
TABLET ORAL
COMMUNITY
Start: 2018-05-18 | End: 2020-02-05

## 2019-03-08 RX ORDER — OXYCODONE HYDROCHLORIDE 5 MG/1
TABLET ORAL
Refills: 0 | COMMUNITY
Start: 2019-03-05 | End: 2019-04-29

## 2019-03-08 ASSESSMENT — PAIN SCALES - GENERAL: PAINLEVEL: NO PAIN (0)

## 2019-03-08 ASSESSMENT — MIFFLIN-ST. JEOR: SCORE: 1319.57

## 2019-03-08 NOTE — PROGRESS NOTES
SUBJECTIVE:   Ileana Davis is a 78 year old female who presents to clinic today for the following health issues:      ED/UC Followup:    Facility:  [facility]  Date of visit: 3/05/19  Reason for visit: Severe flank pain  Current Status: Pain controlled by pain medication.  Patient does have upcoming appointment with Urology to discuss diagnostic options.       Patient would also like to try Voltaren gel for back pain      Problem list and histories reviewed & adjusted, as indicated.  Additional history: as documented    Patient Active Problem List   Diagnosis     Hypothyroidism     Generalized anxiety disorder     Benign essential hypertension     GERD (gastroesophageal reflux disease)     Hepatitis     Contact dermatitis     Rosacea     Osteoarthritis     Fibromyalgia     Advanced care planning/counseling discussion     Open wound of knee, leg, and ankle     Degeneration of lumbar or lumbosacral intervertebral disc     Anemia     ACP (advance care planning)     Autoimmune hepatitis (H)     Morbid obesity (H)     Mild major depression (H)     Past Surgical History:   Procedure Laterality Date     APPENDECTOMY       BACK SURGERY  2015    lumbar epidural injection     CARDIAC SURGERY  10/2017    angioplasty     CHOLECYSTECTOMY       COLONOSCOPY  07/27/2017    [facility]     COLONOSCOPY - HIM SCAN  05/15/2001    Small internal hemorrhoids; otherwise normal;[facility]     COLONOSCOPY - HIM SCAN  12/14/2006    Colonoscopy, [facility], SNARE     ENT SURGERY      tonsillectomy     ESOPHAGOGASTRODUODENOSCOPY  07/27/2017    [facility]     EYE SURGERY      bilateral cataract removal     GI SURGERY      anal fistula     GYN SURGERY  1985    [facility]     GYN SURGERY      cessarian x 2     GYN SURGERY      tubal sterilazation     ORTHOPEDIC SURGERY      right knee     ORTHOPEDIC SURGERY  1986    plantar fascia release     ORTHOPEDIC SURGERY      left knee     ORTHOPEDIC SURGERY      right trigger finger release      ORTHOPEDIC SURGERY  2013    Right great toe MTPJ fusion, adductor tenotomy,correction of hammer toe     ORTHOPEDIC SURGERY  [id number]    multiple procedures left forefoot       Social History     Tobacco Use     Smoking status: Never Smoker     Smokeless tobacco: Never Used   Substance Use Topics     Alcohol use: No     Family History   Problem Relation Age of Onset     Arthritis Mother         rheumatoid     Heart Disease Mother         CHF     Alcohol/Drug Father         alcoholism     Allergies Father      Thyroid Disease Other      Diabetes No family hx of      Asthma No family hx of          Current Outpatient Medications   Medication Sig Dispense Refill     Acetaminophen (TYLENOL PO) Take 500 mg by mouth       amitriptyline (ELAVIL) 25 MG tablet Take 0.5 tablets (12.5 mg) by mouth At Bedtime 30 tablet 0     azaTHIOprine (IMURAN) 50 MG tablet TAKE TWO (2) TABLETS BY MOUTH ONCE DAILY 60 tablet 5     cyclobenzaprine (FLEXERIL) 10 MG tablet TAKE 1 TABLET (10 MG) BY MOUTH 3 TIMES DAILY AS NEEDED FOR MUSCLE SPASMS 90 tablet 1     fluticasone (FLONASE) 50 MCG/ACT nasal spray SPRAY 2 SPRAYS INTO BOTH NOSTRILS DAILY AS NEEDED 16 g 1     HYDROcodone-acetaminophen (NORCO) 5-325 MG tablet        IBUPROFEN PO Take 1,000 mg by mouth       levothyroxine (SYNTHROID/LEVOTHROID) 112 MCG tablet TAKE 1 TABLET (112 MCG) BY MOUTH DAILY 90 tablet 0     NAPROXEN PO Take 250 mg by mouth as needed for moderate pain Patient states she only take when needed.       nystatin (MYCOSTATIN) 101833 UNIT/GM POWD Apply to affected area nightly as needed for rash 60 g 1     omeprazole (PRILOSEC) 40 MG capsule Take  by mouth daily. Before the same meal daily       oxyCODONE (ROXICODONE) 5 MG tablet TAKE 1 TAB BY MOUTH EVERY FOUR HOURS AS NEEDED FOR PAIN FOR UP TO 10 DAYS.  0     Polyethylene Glycol 3350 (MIRALAX PO) Take by mouth daily as needed        propranolol (INDERAL) 20 MG tablet Take 1 tablet (20 mg) by mouth daily 90 tablet 0      "diclofenac (VOLTAREN) 1 % topical gel Place 4 g onto the skin 4 times daily 100 g 1     Allergies   Allergen Reactions     Fentanyl Other (See Comments)     Severe agitation when used during angiogram     Codeine Sulfate Other (See Comments)     hallucinations     Fentanyl And Related      Other reaction(s): Confusion     Morphine Other (See Comments)     Hallucinations with iv therapy     Tape [Adhesive Tape]      Tramadol Other (See Comments)     hallucination       Reviewed and updated as needed this visit by clinical staff       Reviewed and updated as needed this visit by Provider         ROS:  Constitutional, HEENT, cardiovascular, pulmonary, gi and gu systems are negative, except as otherwise noted.    OBJECTIVE:     /80 (BP Location: Left arm, Patient Position: Sitting, Cuff Size: Adult Regular)   Pulse 70   Temp 97.7  F (36.5  C) (Tympanic)   Resp 14   Ht 1.549 m (5' 1\")   Wt 90.7 kg (200 lb)   SpO2 98%   BMI 37.79 kg/m    Body mass index is 37.79 kg/m .  GENERAL: healthy, alert and no distress  PSYCH: mentation appears normal, affect normal/bright    Diagnostic Test Results:  Scans reviewed    ASSESSMENT/PLAN:     1. Kidney mass  Keep upcoming appointments, watch for notes.    2. Degeneration of lumbar or lumbosacral intervertebral disc  Diclofenac gel prescribed, may be weaning off Elavil due to side effects.  Zoloft was also recommended, but use of Elavil was for pain, not for mood.    3. Fibromyalgia  As above.  - amitriptyline (ELAVIL) 25 MG tablet; Take 0.5 tablets (12.5 mg) by mouth At Bedtime  Dispense: 30 tablet; Refill: 0      Over 30 minutes are spent with the patient, over 20 minutes of which was in education and counseling regarding current conditions and treatment/therapy options/risks/benefits/etc, including recent scan findings, further work-up and planning, and requested medication adjustments.      Iris Becerril MD  [facility]    "

## 2019-03-08 NOTE — NURSING NOTE
"Chief Complaint   Patient presents with     ER F/U       Initial /80 (BP Location: Left arm, Patient Position: Sitting, Cuff Size: Adult Regular)   Pulse 70   Temp 97.7  F (36.5  C) (Tympanic)   Resp 14   Ht 1.549 m (5' 1\")   Wt 90.7 kg (200 lb)   SpO2 98%   BMI 37.79 kg/m   Estimated body mass index is 37.79 kg/m  as calculated from the following:    Height as of this encounter: 1.549 m (5' 1\").    Weight as of this encounter: 90.7 kg (200 lb).  Medication Reconciliation: complete    Shell Lieberman LPN    "

## 2019-03-11 ENCOUNTER — TELEPHONE (OUTPATIENT)
Dept: FAMILY MEDICINE | Facility: OTHER | Age: 79
End: 2019-03-11

## 2019-03-11 DIAGNOSIS — M51.379 DEGENERATION OF LUMBAR OR LUMBOSACRAL INTERVERTEBRAL DISC: ICD-10-CM

## 2019-04-04 DIAGNOSIS — M62.838 MUSCLE SPASM: ICD-10-CM

## 2019-04-04 DIAGNOSIS — R52 PAIN: ICD-10-CM

## 2019-04-04 RX ORDER — CYCLOBENZAPRINE HCL 10 MG
TABLET ORAL
Qty: 90 TABLET | Refills: 1 | Status: SHIPPED | OUTPATIENT
Start: 2019-04-04 | End: 2019-07-01

## 2019-04-19 DIAGNOSIS — M79.7 FIBROMYALGIA: ICD-10-CM

## 2019-04-23 NOTE — PROGRESS NOTES
[facility]  [address]  [place] [address]  [phone]  Dept: [phone]    PRE-OP EVALUATION:  Today's date: 2019    Ileana Davis (: 1940) presents for pre-operative evaluation assessment as requested by Dr. Mcelroy.  She requires evaluation and anesthesia risk assessment prior to undergoing surgery/procedure for treatment of kidney mass.    Proposed Surgery/ Procedure: Robotic assisted right radical nephrectomy  Date of Surgery/ Procedure: 19  Time of Surgery/ Procedure: [facility]  Hospital/Surgical Facility: [place], [place]  Primary Physician: Iris Becerril  Type of Anesthesia Anticipated: to be determined    Patient has a Health Care Directive or Living Will:  YES on file here    1. NO - Do you have a history of heart attack, stroke, stent, bypass or surgery on an artery in the head, neck, heart or legs?  2. NO - Do you ever have any pain or discomfort in your chest?  3. NO - Do you have a history of  Heart Failure?  4. NO - Are you troubled by shortness of breath when: walking on the level, up a slight hill or at night?  5. NO - Do you currently have a cold, bronchitis or other respiratory infection?  6. NO - Do you have a cough, shortness of breath or wheezing?  7. NO - Do you sometimes get pains in the calves of your legs when you walk?  8. NO - Do you or anyone in your family have previous history of blood clots?  9. NO - Do you or does anyone in your family have a serious bleeding problem such as prolonged bleeding following surgeries or cuts?  10. yes - Have you ever had problems with anemia or been told to take iron pills? History of anemia  11. YES - Have you had any abnormal blood loss such as black, tarry or bloody stools, or abnormal vaginal bleeding? Hemorrhoids  12. YES - Have you ever had a blood transfusion? Yes, with childbirht  13. YES - Have you or any of your relatives ever had problems with  anesthesia? Behavioral issues with pain medication  14. NO - Do you have sleep apnea, excessive snoring or daytime drowsiness?  15. NO - Do you have any prosthetic heart valves?  16. YES - Do you have prosthetic joints? Bilateral knees  17. NO - Is there any chance that you may be pregnant?      HPI:     HPI related to upcoming procedure: Kidney mass      HYPERTENSION - Patient has longstanding history of HTN , currently denies any symptoms referable to elevated blood pressure. Specifically denies chest pain, palpitations, dyspnea, orthopnea, PND or peripheral edema. Blood pressure readings have been in normal range. Current medication regimen is as listed below. Patient denies any side effects of medication.                                                                                                                                                                                          .  HYPOTHYROIDISM - Patient has a longstanding history of chronic Hypothyroidism. Patient has been doing well, noting no tremor, insomnia, hair loss or changes in skin texture. Continues to take medications as directed, without adverse reactions or side effects. Last TSH   Lab Results   Component Value Date    TSH 0.94 04/26/2018   .                                                                                                                                                                                                                        .    MEDICAL HISTORY:     Patient Active Problem List    Diagnosis Date Noted     Mild major depression (H) 04/26/2018     Priority: Medium     Morbid obesity (H) 08/28/2017     Priority: Medium     ACP (advance care planning) 06/02/2016     Priority: Medium     Advance Care Planning 6/2/2016: ACP Review of Chart / Resources Provided:  Reviewed chart for advance care plan.  Ileana Davis has been provided information and resources to begin or update their advance care plan.  Added by  Shelly Syed           Autoimmune hepatitis (H) 06/02/2016     Priority: Medium     Anemia 08/20/2015     Priority: Medium     Degeneration of lumbar or lumbosacral intervertebral disc 01/30/2015     Priority: Medium     Open wound of knee, leg, and ankle      Priority: Medium     Hypothyroidism 03/27/2013     Priority: Medium     Generalized anxiety disorder 03/27/2013     Priority: Medium     Benign essential hypertension 03/27/2013     Priority: Medium     GERD (gastroesophageal reflux disease) 03/27/2013     Priority: Medium     Hepatitis 03/27/2013     Priority: Medium     Contact dermatitis 03/27/2013     Priority: Medium     Rosacea 03/27/2013     Priority: Medium     Osteoarthritis 03/27/2013     Priority: Medium     Fibromyalgia 03/27/2013     Priority: Medium     Advanced care planning/counseling discussion 09/21/2012     Priority: Medium      Past Medical History:   Diagnosis Date     Anemia 8/20/2015     Autoimmune hepatitis (H) 6/2/2016     Benign paroxysmal positional vertigo 10/7/2010     Contact dermatitis and other eczema, due to unspecified cause 10/7/2010     Esophageal reflux 10/7/2010     Generalized anxiety disorder 10/7/2010     Generalized osteoarthrosis, involving multiple sites 10/7/2010     Hepatitis, unspecified 10/7/2010     Infected wound      Myalgia and myositis, unspecified 10/7/2010    fibromyalgia     Nonallopathic lesion of cervical region, not elsewhere classified 12/5/2001     Nonallopathic lesion of thoracic region, not elsewhere classified 3/31/2005     Open wound of knee, leg, and ankle      Rosacea 10/7/2010     Unspecified essential hypertension 10/7/2010     Unspecified hypothyroidism 10/7/2010     Past Surgical History:   Procedure Laterality Date     APPENDECTOMY       BACK SURGERY  2015    lumbar epidural injection     CARDIAC SURGERY  10/2017    angioplasty     CHOLECYSTECTOMY       COLONOSCOPY  07/27/2017    [facility]     COLONOSCOPY - HIM SCAN  05/15/2001     Small internal hemorrhoids; otherwise normal;[facility]     COLONOSCOPY - HIM SCAN  12/14/2006    Colonoscopy, [facility], SNARE     ENT SURGERY      tonsillectomy     ESOPHAGOGASTRODUODENOSCOPY  07/27/2017    [facility]     EYE SURGERY      bilateral cataract removal     GI SURGERY      anal fistula     GYN SURGERY  1985    [facility] BSO     GYN SURGERY      cessarian x 2     GYN SURGERY      tubal sterilazation     ORTHOPEDIC SURGERY      right knee     ORTHOPEDIC SURGERY  1986    plantar fascia release     ORTHOPEDIC SURGERY      left knee     ORTHOPEDIC SURGERY      right trigger finger release     ORTHOPEDIC SURGERY  2013    Right great toe MTPJ fusion, adductor tenotomy,correction of hammer toe     ORTHOPEDIC SURGERY  [id number]    multiple procedures left forefoot     Current Outpatient Medications   Medication Sig Dispense Refill     azaTHIOprine (IMURAN) 50 MG tablet Take 2 tablets (100 mg) by mouth daily 180 tablet 1     propranolol (INDERAL) 20 MG tablet Take 1 tablet (20 mg) by mouth daily 90 tablet 1     amitriptyline (ELAVIL) 25 MG tablet TAKE 1/2 TAB (12.5 MG) BY MOUTH AT BEDTIME 30 tablet 0     cyclobenzaprine (FLEXERIL) 10 MG tablet TAKE 1 TAB BY MOUTH THREE TIMES A DAY AS NEEDED FOR MUSCLE SPASMS 90 tablet 1     fluticasone (FLONASE) 50 MCG/ACT nasal spray SPRAY 2 SPRAYS INTO BOTH NOSTRILS DAILY AS NEEDED 16 g 1     HYDROcodone-acetaminophen (NORCO) 5-325 MG tablet        IBUPROFEN PO Take 1,000 mg by mouth       levothyroxine (SYNTHROID/LEVOTHROID) 112 MCG tablet TAKE 1 TABLET (112 MCG) BY MOUTH DAILY 90 tablet 0     nystatin (MYCOSTATIN) 936200 UNIT/GM POWD Apply to affected area nightly as needed for rash 60 g 1     omeprazole (PRILOSEC) 40 MG capsule Take  by mouth daily. Before the same meal daily       Polyethylene Glycol 3350 (MIRALAX PO) Take by mouth daily as needed        OTC products: None, except as noted above    Allergies   Allergen Reactions     Fentanyl Other (See Comments)     Severe agitation when  "used during angiogram     Codeine Sulfate Other (See Comments)     hallucinations     Fentanyl And Related      Other reaction(s): Confusion     Morphine Other (See Comments)     Hallucinations with iv therapy     Tape [Adhesive Tape]      Tramadol Other (See Comments)     hallucination      Latex Allergy: NO; patient is very sensitive to adhesives, use Kerlex or non-adhesive dressings when possible    Social History     Tobacco Use     Smoking status: Never Smoker     Smokeless tobacco: Never Used   Substance Use Topics     Alcohol use: No     History   Drug Use No       REVIEW OF SYSTEMS:   Constitutional, neuro, ENT, endocrine, pulmonary, cardiac, gastrointestinal, genitourinary, musculoskeletal, integument and psychiatric systems are negative, except as otherwise noted.    EXAM:   /88 (BP Location: Right arm, Patient Position: Chair, Cuff Size: Adult Large)   Pulse 68   Temp 96.7  F (35.9  C) (Tympanic)   Resp 16   Ht 1.549 m (5' 1\")   Wt 94.3 kg (208 lb)   SpO2 98%   BMI 39.30 kg/m      GENERAL APPEARANCE: healthy, alert and no distress     EYES: EOMI, PERRL     HENT: nose and mouth without ulcers or lesions     NECK: no adenopathy     RESP: lungs clear to auscultation - no rales, rhonchi or wheezes     CV: regular rates and rhythm, normal S1 S2, no S3 or S4 and no murmur, click or rub     ABDOMEN:  soft, nontender, no HSM or masses and bowel sounds normal     SKIN: no suspicious lesions or rashes     NEURO: Normal strength and tone, sensory exam grossly normal, mentation intact and speech normal     PSYCH: mentation appears normal. and affect normal/bright    DIAGNOSTICS:     EKG: appears normal, NSR, normal axis, normal intervals, no acute ST/T changes c/w ischemia, no LVH by voltage criteria, unchanged from previous tracings    Labs Resulted Today:   Results for orders placed or performed in visit on 04/29/19   CBC with platelets   Result Value Ref Range    WBC 5.7 4.0 - 11.0 10e9/L    RBC Count " 3.22 (L) 3.8 - 5.2 10e12/L    Hemoglobin 11.4 (L) 11.7 - 15.7 g/dL    Hematocrit 34.2 (L) 35.0 - 47.0 %     (H) 78 - 100 fl    MCH 35.4 (H) 26.5 - 33.0 pg    MCHC 33.3 31.5 - 36.5 g/dL    RDW 14.3 10.0 - 15.0 %    Platelet Count 205 150 - 450 10e9/L   Basic metabolic panel   Result Value Ref Range    Sodium 138 133 - 144 mmol/L    Potassium 3.8 3.4 - 5.3 mmol/L    Chloride 103 94 - 109 mmol/L    Carbon Dioxide 28 20 - 32 mmol/L    Anion Gap 7 3 - 14 mmol/L    Glucose 96 70 - 99 mg/dL    Urea Nitrogen 15 7 - 30 mg/dL    Creatinine 0.90 0.52 - 1.04 mg/dL    GFR Estimate 60 (L) >60 mL/min/[1.73_m2]    GFR Estimate If Black 70 >60 mL/min/[1.73_m2]    Calcium 8.7 8.5 - 10.1 mg/dL   *UA reflex to Microscopic and Culture - [facility]   Result Value Ref Range    Color Urine Yellow     Appearance Urine Slightly Cloudy     Glucose Urine Negative NEG^Negative mg/dL    Bilirubin Urine Negative NEG^Negative    Ketones Urine Negative NEG^Negative mg/dL    Specific Gravity Urine 1.010 1.003 - 1.035    Blood Urine Negative NEG^Negative    pH Urine 7.0 5.0 - 7.0 pH    Protein Albumin Urine Negative NEG^Negative mg/dL    Urobilinogen Urine 0.2 0.2 - 1.0 EU/dL    Nitrite Urine Positive (A) NEG^Negative    Leukocyte Esterase Urine Small (A) NEG^Negative    Source Midstream Urine    Urine Microscopic   Result Value Ref Range    WBC Urine 10-25 (A) OTO5^0 - 5 /HPF    RBC Urine O - 2 OTO2^O - 2 /HPF    Squamous Epithelial /LPF Urine Few FEW^Few /LPF    Bacteria Urine Moderate (A) NEG^Negative /HPF       Recent Labs   Lab Test 11/14/17  1203 07/31/17 02/28/17  1501 11/11/16  1514 04/07/16  0909   HGB 11.3*  --  11.5* 11.4* 12.1     --  182 213 191   INR  --  1.3*  --   --   --    NA  --   --   --  137 138   POTASSIUM  --  3.6  --  3.8 4.2   CR  --  0.92  --  0.82 0.76        IMPRESSION:   Reason for surgery/procedure: Renal mass  Diagnosis/reason for consult: Cardiopulmonary clearance    The proposed surgical procedure  is considered INTERMEDIATE risk.    REVISED CARDIAC RISK INDEX  The patient has the following serious cardiovascular risks for perioperative complications such as (MI, PE, VFib and 3  AV Block):  No serious cardiac risks  INTERPRETATION: 0 risks: Class I (very low risk - 0.4% complication rate)    The patient has the following additional risks for perioperative complications:  No identified additional risks      ICD-10-CM    1. Preop general physical exam Z01.818    2. Kidney mass N28.89    3. Benign essential hypertension I10 propranolol (INDERAL) 20 MG tablet   4. Hypothyroidism, unspecified type E03.9    5. Autoimmune hepatitis (H) K75.4 azaTHIOprine (IMURAN) 50 MG tablet       RECOMMENDATIONS:       Cardiovascular Risk  Performs 4 METs exercise without symptoms (Light housework (dusting, washing dishes) and Climb a flight of stairs) .   Patient is already on a Beta Blocker. Continue Betablocker therapy after surgery, using Beta blocker order set as necessary for NPO status.      --Patient is to take all scheduled medications on the day of surgery EXCEPT for modifications listed below.    Anticoagulant or Antiplatelet Medication Use  Hold all ASA/NSAIDs/Vitamins/Supplements 7-10 days prior to procedure         APPROVAL GIVEN to proceed with proposed procedure, without further diagnostic evaluation       Signed Electronically by: Iris Becerril MD    Copy of this evaluation report is provided to requesting physician.    [place] Preop Guidelines    Revised Cardiac Risk Index

## 2019-04-24 DIAGNOSIS — E03.9 HYPOTHYROIDISM, UNSPECIFIED TYPE: ICD-10-CM

## 2019-04-24 RX ORDER — LEVOTHYROXINE SODIUM 112 UG/1
TABLET ORAL
Qty: 90 TABLET | Refills: 0 | Status: SHIPPED | OUTPATIENT
Start: 2019-04-24 | End: 2019-07-01

## 2019-04-29 ENCOUNTER — OFFICE VISIT (OUTPATIENT)
Dept: FAMILY MEDICINE | Facility: OTHER | Age: 79
End: 2019-04-29
Attending: FAMILY MEDICINE
Payer: MEDICARE

## 2019-04-29 VITALS
SYSTOLIC BLOOD PRESSURE: 130 MMHG | RESPIRATION RATE: 16 BRPM | BODY MASS INDEX: 39.27 KG/M2 | TEMPERATURE: 96.7 F | HEART RATE: 68 BPM | HEIGHT: 61 IN | WEIGHT: 208 LBS | DIASTOLIC BLOOD PRESSURE: 88 MMHG | OXYGEN SATURATION: 98 %

## 2019-04-29 DIAGNOSIS — E03.9 HYPOTHYROIDISM, UNSPECIFIED TYPE: ICD-10-CM

## 2019-04-29 DIAGNOSIS — K75.4 AUTOIMMUNE HEPATITIS (H): ICD-10-CM

## 2019-04-29 DIAGNOSIS — I10 BENIGN ESSENTIAL HYPERTENSION: ICD-10-CM

## 2019-04-29 DIAGNOSIS — N28.89 KIDNEY MASS: ICD-10-CM

## 2019-04-29 DIAGNOSIS — Z01.818 PREOP GENERAL PHYSICAL EXAM: Primary | ICD-10-CM

## 2019-04-29 DIAGNOSIS — R82.90 NONSPECIFIC FINDING ON EXAMINATION OF URINE: ICD-10-CM

## 2019-04-29 LAB
ALBUMIN UR-MCNC: NEGATIVE MG/DL
ANION GAP SERPL CALCULATED.3IONS-SCNC: 7 MMOL/L (ref 3–14)
APPEARANCE UR: ABNORMAL
BACTERIA #/AREA URNS HPF: ABNORMAL /HPF
BILIRUB UR QL STRIP: NEGATIVE
BUN SERPL-MCNC: 15 MG/DL (ref 7–30)
CALCIUM SERPL-MCNC: 8.7 MG/DL (ref 8.5–10.1)
CHLORIDE SERPL-SCNC: 103 MMOL/L (ref 94–109)
CO2 SERPL-SCNC: 28 MMOL/L (ref 20–32)
COLOR UR AUTO: YELLOW
CREAT SERPL-MCNC: 0.9 MG/DL (ref 0.52–1.04)
ERYTHROCYTE [DISTWIDTH] IN BLOOD BY AUTOMATED COUNT: 14.3 % (ref 10–15)
GFR SERPL CREATININE-BSD FRML MDRD: 60 ML/MIN/{1.73_M2}
GLUCOSE SERPL-MCNC: 96 MG/DL (ref 70–99)
GLUCOSE UR STRIP-MCNC: NEGATIVE MG/DL
HCT VFR BLD AUTO: 34.2 % (ref 35–47)
HGB BLD-MCNC: 11.4 G/DL (ref 11.7–15.7)
HGB UR QL STRIP: NEGATIVE
KETONES UR STRIP-MCNC: NEGATIVE MG/DL
LEUKOCYTE ESTERASE UR QL STRIP: ABNORMAL
MCH RBC QN AUTO: 35.4 PG (ref 26.5–33)
MCHC RBC AUTO-ENTMCNC: 33.3 G/DL (ref 31.5–36.5)
MCV RBC AUTO: 106 FL (ref 78–100)
NITRATE UR QL: POSITIVE
NON-SQ EPI CELLS #/AREA URNS LPF: ABNORMAL /LPF
PH UR STRIP: 7 PH (ref 5–7)
PLATELET # BLD AUTO: 205 10E9/L (ref 150–450)
POTASSIUM SERPL-SCNC: 3.8 MMOL/L (ref 3.4–5.3)
RBC # BLD AUTO: 3.22 10E12/L (ref 3.8–5.2)
RBC #/AREA URNS AUTO: ABNORMAL /HPF
SODIUM SERPL-SCNC: 138 MMOL/L (ref 133–144)
SOURCE: ABNORMAL
SP GR UR STRIP: 1.01 (ref 1–1.03)
UROBILINOGEN UR STRIP-ACNC: 0.2 EU/DL (ref 0.2–1)
WBC # BLD AUTO: 5.7 10E9/L (ref 4–11)
WBC #/AREA URNS AUTO: ABNORMAL /HPF

## 2019-04-29 PROCEDURE — 81001 URINALYSIS AUTO W/SCOPE: CPT | Mod: ZL | Performed by: FAMILY MEDICINE

## 2019-04-29 PROCEDURE — 80048 BASIC METABOLIC PNL TOTAL CA: CPT | Mod: ZL | Performed by: FAMILY MEDICINE

## 2019-04-29 PROCEDURE — 87086 URINE CULTURE/COLONY COUNT: CPT | Mod: ZL | Performed by: FAMILY MEDICINE

## 2019-04-29 PROCEDURE — 36415 COLL VENOUS BLD VENIPUNCTURE: CPT | Mod: ZL | Performed by: FAMILY MEDICINE

## 2019-04-29 PROCEDURE — 85027 COMPLETE CBC AUTOMATED: CPT | Mod: ZL | Performed by: FAMILY MEDICINE

## 2019-04-29 PROCEDURE — 93010 ELECTROCARDIOGRAM REPORT: CPT | Performed by: INTERNAL MEDICINE

## 2019-04-29 PROCEDURE — G0463 HOSPITAL OUTPT CLINIC VISIT: HCPCS

## 2019-04-29 PROCEDURE — 99214 OFFICE O/P EST MOD 30 MIN: CPT | Mod: 25 | Performed by: FAMILY MEDICINE

## 2019-04-29 PROCEDURE — 93005 ELECTROCARDIOGRAM TRACING: CPT

## 2019-04-29 RX ORDER — PROPRANOLOL HYDROCHLORIDE 20 MG/1
20 TABLET ORAL DAILY
Qty: 90 TABLET | Refills: 1 | Status: SHIPPED | OUTPATIENT
Start: 2019-04-29 | End: 2019-11-04

## 2019-04-29 RX ORDER — AZATHIOPRINE 50 MG/1
100 TABLET ORAL DAILY
Qty: 180 TABLET | Refills: 1 | Status: SHIPPED | OUTPATIENT
Start: 2019-04-29 | End: 2019-10-22

## 2019-04-29 ASSESSMENT — PAIN SCALES - GENERAL: PAINLEVEL: NO PAIN (0)

## 2019-04-29 ASSESSMENT — MIFFLIN-ST. JEOR: SCORE: 1355.86

## 2019-04-29 NOTE — NURSING NOTE
"Chief Complaint   Patient presents with     Pre-Op Exam       Initial /88 (BP Location: Right arm, Patient Position: Chair, Cuff Size: Adult Large)   Pulse 68   Temp 96.7  F (35.9  C) (Tympanic)   Resp 16   Ht 1.549 m (5' 1\")   Wt 94.3 kg (208 lb)   SpO2 98%   BMI 39.30 kg/m   Estimated body mass index is 39.3 kg/m  as calculated from the following:    Height as of this encounter: 1.549 m (5' 1\").    Weight as of this encounter: 94.3 kg (208 lb).  Medication Reconciliation: complete    Pamela M. Lechevalier, LPN    "

## 2019-04-30 NOTE — PROGRESS NOTES
Faxed PreOp ECG and labs to Dr. Bourgeois @ [facility]  Ph. [phone] Fx. [phone]

## 2019-05-01 DIAGNOSIS — N39.0 URINARY TRACT INFECTION WITHOUT HEMATURIA, SITE UNSPECIFIED: Primary | ICD-10-CM

## 2019-05-01 LAB
BACTERIA SPEC CULT: ABNORMAL
SPECIMEN SOURCE: ABNORMAL

## 2019-05-01 RX ORDER — SULFAMETHOXAZOLE/TRIMETHOPRIM 800-160 MG
1 TABLET ORAL 2 TIMES DAILY
Qty: 6 TABLET | Refills: 0 | Status: SHIPPED | OUTPATIENT
Start: 2019-05-01 | End: 2019-09-12

## 2019-05-02 ENCOUNTER — TRANSFERRED RECORDS (OUTPATIENT)
Dept: HEALTH INFORMATION MANAGEMENT | Facility: CLINIC | Age: 79
End: 2019-05-02

## 2019-06-12 DIAGNOSIS — M79.7 FIBROMYALGIA: ICD-10-CM

## 2019-07-01 DIAGNOSIS — R52 PAIN: ICD-10-CM

## 2019-07-01 DIAGNOSIS — E03.9 HYPOTHYROIDISM, UNSPECIFIED TYPE: ICD-10-CM

## 2019-07-01 DIAGNOSIS — M62.838 MUSCLE SPASM: ICD-10-CM

## 2019-07-01 DIAGNOSIS — M79.7 FIBROMYALGIA: ICD-10-CM

## 2019-07-02 RX ORDER — CYCLOBENZAPRINE HCL 10 MG
TABLET ORAL
Qty: 90 TABLET | Refills: 1 | Status: SHIPPED | OUTPATIENT
Start: 2019-07-02 | End: 2020-01-16

## 2019-07-02 RX ORDER — LEVOTHYROXINE SODIUM 112 UG/1
TABLET ORAL
Qty: 30 TABLET | Refills: 0 | Status: SHIPPED | OUTPATIENT
Start: 2019-07-02 | End: 2019-08-13

## 2019-07-02 NOTE — TELEPHONE ENCOUNTER
cyclobenzaprine (FLEXERIL) 10 MG tablet  Last Written Prescription Date:  4/4/2019  Last Fill Quantity: 90,   # refills: 1  Last Office Visit: 3/19/2019  Future Office visit:       Routing refill request to provider for review/approval because:  Drug not on the [facility], [facility] or [facility] refill protocol or controlled substance

## 2019-08-09 ENCOUNTER — TELEPHONE (OUTPATIENT)
Dept: FAMILY MEDICINE | Facility: OTHER | Age: 79
End: 2019-08-09

## 2019-08-09 DIAGNOSIS — R82.79 ABNORMAL FINDINGS ON MICROBIOLOGICAL EXAMINATION OF URINE: Primary | ICD-10-CM

## 2019-08-09 DIAGNOSIS — N39.0 URINARY TRACT INFECTION WITHOUT HEMATURIA, SITE UNSPECIFIED: Primary | ICD-10-CM

## 2019-08-09 DIAGNOSIS — N39.0 URINARY TRACT INFECTION WITHOUT HEMATURIA, SITE UNSPECIFIED: ICD-10-CM

## 2019-08-09 LAB
ALBUMIN UR-MCNC: NEGATIVE MG/DL
APPEARANCE UR: CLEAR
BACTERIA #/AREA URNS HPF: ABNORMAL /HPF
BILIRUB UR QL STRIP: NEGATIVE
COLOR UR AUTO: YELLOW
GLUCOSE UR STRIP-MCNC: NEGATIVE MG/DL
HGB UR QL STRIP: ABNORMAL
KETONES UR STRIP-MCNC: NEGATIVE MG/DL
LEUKOCYTE ESTERASE UR QL STRIP: ABNORMAL
NITRATE UR QL: NEGATIVE
NON-SQ EPI CELLS #/AREA URNS LPF: ABNORMAL /LPF
PH UR STRIP: 6.5 PH (ref 5–7)
RBC #/AREA URNS AUTO: ABNORMAL /HPF
SOURCE: ABNORMAL
SP GR UR STRIP: <=1.005 (ref 1–1.03)
UROBILINOGEN UR STRIP-ACNC: 0.2 EU/DL (ref 0.2–1)
WBC #/AREA URNS AUTO: ABNORMAL /HPF

## 2019-08-09 PROCEDURE — 87086 URINE CULTURE/COLONY COUNT: CPT | Mod: ZL | Performed by: FAMILY MEDICINE

## 2019-08-09 PROCEDURE — 81001 URINALYSIS AUTO W/SCOPE: CPT | Mod: ZL | Performed by: FAMILY MEDICINE

## 2019-08-09 RX ORDER — SULFAMETHOXAZOLE/TRIMETHOPRIM 800-160 MG
1 TABLET ORAL 2 TIMES DAILY
Qty: 6 TABLET | Refills: 0 | Status: SHIPPED | OUTPATIENT
Start: 2019-08-09 | End: 2019-09-12

## 2019-08-09 RX ORDER — SULFAMETHOXAZOLE/TRIMETHOPRIM 800-160 MG
1 TABLET ORAL 2 TIMES DAILY
Qty: 6 TABLET | Refills: 0 | Status: SHIPPED | OUTPATIENT
Start: 2019-08-09 | End: 2019-08-09

## 2019-08-09 NOTE — TELEPHONE ENCOUNTER
"PCP [facility]     Patient calling requesting a UA for a bladder infection.  \" I have had multiple of these, I know this is what I have.  Dr. Becerril has treated me for these many times\"  Patient would prefer to not have appointment but would like to just come in and give a urine sample.  Please advise   "

## 2019-08-09 NOTE — TELEPHONE ENCOUNTER
Patient does not have mychart so not evisit possible.  Do you want an appointment or the RN pended the UA?

## 2019-08-12 LAB
BACTERIA SPEC CULT: ABNORMAL
Lab: ABNORMAL
SPECIMEN SOURCE: ABNORMAL

## 2019-08-13 DIAGNOSIS — I10 BENIGN ESSENTIAL HYPERTENSION: Primary | ICD-10-CM

## 2019-08-13 DIAGNOSIS — E03.9 HYPOTHYROIDISM, UNSPECIFIED TYPE: ICD-10-CM

## 2019-08-13 DIAGNOSIS — M79.7 FIBROMYALGIA: ICD-10-CM

## 2019-08-14 RX ORDER — LEVOTHYROXINE SODIUM 112 UG/1
TABLET ORAL
Qty: 30 TABLET | Refills: 0 | Status: SHIPPED | OUTPATIENT
Start: 2019-08-14 | End: 2019-09-16

## 2019-08-16 ENCOUNTER — TRANSFERRED RECORDS (OUTPATIENT)
Dept: HEALTH INFORMATION MANAGEMENT | Facility: CLINIC | Age: 79
End: 2019-08-16

## 2019-09-12 ENCOUNTER — OFFICE VISIT (OUTPATIENT)
Dept: FAMILY MEDICINE | Facility: OTHER | Age: 79
End: 2019-09-12
Attending: FAMILY MEDICINE
Payer: MEDICARE

## 2019-09-12 VITALS
BODY MASS INDEX: 37.38 KG/M2 | RESPIRATION RATE: 15 BRPM | HEIGHT: 61 IN | WEIGHT: 198 LBS | SYSTOLIC BLOOD PRESSURE: 124 MMHG | TEMPERATURE: 97.4 F | HEART RATE: 68 BPM | DIASTOLIC BLOOD PRESSURE: 80 MMHG | OXYGEN SATURATION: 99 %

## 2019-09-12 DIAGNOSIS — B37.2 YEAST DERMATITIS: ICD-10-CM

## 2019-09-12 DIAGNOSIS — F32.0 MILD MAJOR DEPRESSION (H): ICD-10-CM

## 2019-09-12 DIAGNOSIS — K75.4 AUTOIMMUNE HEPATITIS (H): ICD-10-CM

## 2019-09-12 DIAGNOSIS — I10 BENIGN ESSENTIAL HYPERTENSION: Primary | ICD-10-CM

## 2019-09-12 DIAGNOSIS — M79.7 FIBROMYALGIA: ICD-10-CM

## 2019-09-12 DIAGNOSIS — E03.9 HYPOTHYROIDISM, UNSPECIFIED TYPE: ICD-10-CM

## 2019-09-12 LAB
ALBUMIN SERPL-MCNC: 3.3 G/DL (ref 3.4–5)
ALP SERPL-CCNC: 82 U/L (ref 40–150)
ALT SERPL W P-5'-P-CCNC: 16 U/L (ref 0–50)
AST SERPL W P-5'-P-CCNC: 14 U/L (ref 0–45)
BILIRUB DIRECT SERPL-MCNC: 0.2 MG/DL (ref 0–0.2)
BILIRUB SERPL-MCNC: 0.5 MG/DL (ref 0.2–1.3)
CHOLEST SERPL-MCNC: 156 MG/DL
HDLC SERPL-MCNC: 51 MG/DL
LDLC SERPL CALC-MCNC: 80 MG/DL
NONHDLC SERPL-MCNC: 105 MG/DL
PROT SERPL-MCNC: 6.7 G/DL (ref 6.8–8.8)
T4 FREE SERPL-MCNC: 1.28 NG/DL (ref 0.76–1.46)
TRIGL SERPL-MCNC: 123 MG/DL
TSH SERPL DL<=0.005 MIU/L-ACNC: 0.13 MU/L (ref 0.4–4)

## 2019-09-12 PROCEDURE — 80061 LIPID PANEL: CPT | Mod: ZL | Performed by: FAMILY MEDICINE

## 2019-09-12 PROCEDURE — 80076 HEPATIC FUNCTION PANEL: CPT | Mod: ZL | Performed by: FAMILY MEDICINE

## 2019-09-12 PROCEDURE — 84443 ASSAY THYROID STIM HORMONE: CPT | Mod: ZL | Performed by: FAMILY MEDICINE

## 2019-09-12 PROCEDURE — 36415 COLL VENOUS BLD VENIPUNCTURE: CPT | Mod: ZL | Performed by: FAMILY MEDICINE

## 2019-09-12 PROCEDURE — 84439 ASSAY OF FREE THYROXINE: CPT | Mod: ZL | Performed by: FAMILY MEDICINE

## 2019-09-12 PROCEDURE — G0463 HOSPITAL OUTPT CLINIC VISIT: HCPCS

## 2019-09-12 PROCEDURE — 99214 OFFICE O/P EST MOD 30 MIN: CPT | Performed by: FAMILY MEDICINE

## 2019-09-12 RX ORDER — NYSTATIN 100000 U/G
CREAM TOPICAL 2 TIMES DAILY PRN
Qty: 30 G | Refills: 2 | Status: SHIPPED | OUTPATIENT
Start: 2019-09-12 | End: 2020-02-05

## 2019-09-12 ASSESSMENT — MIFFLIN-ST. JEOR: SCORE: 1305.5

## 2019-09-12 ASSESSMENT — PAIN SCALES - GENERAL: PAINLEVEL: NO PAIN (0)

## 2019-09-12 NOTE — PROGRESS NOTES
Camilo aDvis is a 79 year old female who presents to clinic today for the following health issues:    HPI   Hypertension Follow-up      Do you check your blood pressure regularly outside of the clinic? No     Are you following a low salt diet? No    Are your blood pressures ever more than 140 on the top number (systolic) OR more   than 90 on the bottom number (diastolic), for example 140/90? Unknown      Depression and Anxiety Follow-Up    How are you doing with your depression since your last visit? Worsened     How are you doing with your anxiety since your last visit?  No change    Are you having other symptoms that might be associated with depression or anxiety? No    Have you had a significant life event? OTHER: son moved in with her     Do you have any concerns with your use of alcohol or other drugs? No    Social History     Tobacco Use     Smoking status: Never Smoker     Smokeless tobacco: Never Used   Substance Use Topics     Alcohol use: No     Drug use: No     PHQ 4/26/2018 5/22/2018 6/20/2018   PHQ-9 Total Score 14 4 -   Q9: Thoughts of better off dead/self-harm past 2 weeks Not at all Not at all More than half the days   F/U: Thoughts of suicide or self-harm - - No   F/U: Safety concerns - - No     DESTINEE-7 SCORE 4/26/2018 5/22/2018 6/20/2018   Total Score 3 0 6       Suicide Assessment Five-step Evaluation and Treatment (SAFE-T)      Hypothyroidism Follow-up      Since last visit, patient describes the following symptoms: Weight stable, no hair loss, no skin changes, no constipation, no loose stools        How many servings of fruits and vegetables do you eat daily?  2-3    On average, how many sweetened beverages do you drink each day (soda, juice, sweet tea, etc)?   0-1    How many days per week do you miss taking your medication? 0      Patient would like to have her liver tests checked.  She states she hasn't been sleeping well, she started taking a full tablet of amitriptyline  again.  She also has recurrent yeast infections in her abdominal folds and in the groin, she states she has used a cream in the past and she would like a refill.      Patient Active Problem List   Diagnosis     Hypothyroidism     Generalized anxiety disorder     Benign essential hypertension     GERD (gastroesophageal reflux disease)     Hepatitis     Contact dermatitis     Rosacea     Osteoarthritis     Fibromyalgia     Advanced care planning/counseling discussion     Open wound of knee, leg, and ankle     Degeneration of lumbar or lumbosacral intervertebral disc     Anemia     ACP (advance care planning)     Autoimmune hepatitis (H)     Morbid obesity (H)     Mild major depression (H)     Past Surgical History:   Procedure Laterality Date     APPENDECTOMY       BACK SURGERY  2015    lumbar epidural injection     CARDIAC SURGERY  10/2017    angioplasty     CHOLECYSTECTOMY       COLONOSCOPY  07/27/2017    [facility]     COLONOSCOPY - HIM SCAN  05/15/2001    Small internal hemorrhoids; otherwise normal;[facility]     COLONOSCOPY - HIM SCAN  12/14/2006    Colonoscopy, [facility], SNARE     ENT SURGERY      tonsillectomy     ESOPHAGOGASTRODUODENOSCOPY  07/27/2017    [facility]     EYE SURGERY      bilateral cataract removal     GI SURGERY      anal fistula     GYN SURGERY  1985    [facility]SO     GYN SURGERY      cessarian x 2     GYN SURGERY      tubal sterilazation     ORTHOPEDIC SURGERY      right knee     ORTHOPEDIC SURGERY  1986    plantar fascia release     ORTHOPEDIC SURGERY      left knee     ORTHOPEDIC SURGERY      right trigger finger release     ORTHOPEDIC SURGERY  2013    Right great toe MTPJ fusion, adductor tenotomy,correction of hammer toe     ORTHOPEDIC SURGERY  [id number]    multiple procedures left forefoot       Social History     Tobacco Use     Smoking status: Never Smoker     Smokeless tobacco: Never Used   Substance Use Topics     Alcohol use: No     Family History   Problem Relation Age of Onset     Arthritis  Mother         rheumatoid     Heart Disease Mother         CHF     Alcohol/Drug Father         alcoholism     Allergies Father      Thyroid Disease Other      Diabetes No family hx of      Asthma No family hx of          Current Outpatient Medications   Medication Sig Dispense Refill     amitriptyline (ELAVIL) 25 MG tablet Take 1 tablet (25 mg) by mouth At Bedtime 30 tablet 5     azaTHIOprine (IMURAN) 50 MG tablet Take 2 tablets (100 mg) by mouth daily 180 tablet 1     cyclobenzaprine (FLEXERIL) 10 MG tablet TAKE 1 TAB BY MOUTH THREE TIMES A DAY AS NEEDED FOR MUSCLE SPASMS 90 tablet 1     fluticasone (FLONASE) 50 MCG/ACT nasal spray SPRAY 2 SPRAYS INTO BOTH NOSTRILS DAILY AS NEEDED 16 g 1     HYDROcodone-acetaminophen (NORCO) 5-325 MG tablet        IBUPROFEN PO Take 1,000 mg by mouth       levothyroxine (SYNTHROID/LEVOTHROID) 112 MCG tablet TAKE 1 TABLET BY MOUTH EVERY DAY 30 tablet 0     nystatin (MYCOSTATIN) 789825 UNIT/GM external cream Apply topically 2 times daily as needed for dry skin 30 g 2     nystatin (MYCOSTATIN) 548915 UNIT/GM POWD Apply to affected area nightly as needed for rash 60 g 1     omeprazole (PRILOSEC) 40 MG capsule Take  by mouth daily. Before the same meal daily       Polyethylene Glycol 3350 (MIRALAX PO) Take by mouth daily as needed        propranolol (INDERAL) 20 MG tablet Take 1 tablet (20 mg) by mouth daily 90 tablet 1     Allergies   Allergen Reactions     Fentanyl Other (See Comments)     Severe agitation when used during angiogram     Codeine Sulfate Other (See Comments)     hallucinations     Fentanyl And Related      Other reaction(s): Confusion     Morphine Other (See Comments)     Hallucinations with iv therapy     Tape [Adhesive Tape]      Tramadol Other (See Comments)     hallucination       Reviewed and updated as needed this visit by Provider  Tobacco  Allergies  Meds  Problems  Med Hx  Surg Hx  Fam Hx         Review of Systems   ROS COMP: Constitutional, HEENT,  "cardiovascular, pulmonary, gi and gu systems are negative, except as otherwise noted.      Objective    /80 (BP Location: Left arm, Patient Position: Sitting, Cuff Size: Adult Regular)   Pulse 68   Temp 97.4  F (36.3  C) (Tympanic)   Resp 15   Ht 1.549 m (5' 1\")   Wt 89.8 kg (198 lb)   SpO2 99%   BMI 37.41 kg/m    Body mass index is 37.41 kg/m .  Physical Exam   GENERAL: healthy, alert and no distress  RESP: lungs clear to auscultation - no rales, rhonchi or wheezes  CV: regular rates and rhythm, normal S1 S2, no S3 or S4 and no murmur, click or rub  PSYCH: mentation appears normal, affect normal/bright    Diagnostic Test Results:  Orders placed        Assessment & Plan     1. Benign essential hypertension  Labs updated, no medication changes recommended at this time.  Follow-up on annual basis.  - Lipid Profile    2. Mild major depression (H)  No changes to current medications, other than full tablet of amitriptyline listed below.    3. Hypothyroidism, unspecified type  Labs updated.  - TSH with free T4 reflex    4. Autoimmune hepatitis (H)  Labs updated.  - Hepatic panel (Albumin, ALT, AST, Bili, Alk Phos, TP)    5. Fibromyalgia  Will keep at a full tablet nightly, hopefully she will get better sleep and this will improve her mood as well.  Follow-up if no improvement noted.  - amitriptyline (ELAVIL) 25 MG tablet; Take 1 tablet (25 mg) by mouth At Bedtime  Dispense: 30 tablet; Refill: 5    6. Yeast dermatitis  Cream refilled.  - nystatin (MYCOSTATIN) 836796 UNIT/GM external cream; Apply topically 2 times daily as needed for dry skin  Dispense: 30 g; Refill: 2       BMI:   Estimated body mass index is 37.41 kg/m  as calculated from the following:    Height as of this encounter: 1.549 m (5' 1\").    Weight as of this encounter: 89.8 kg (198 lb).         Return in about 6 months (around 3/12/2020) for Medication review.    Iris Becerril MD  [facility]"

## 2019-09-12 NOTE — NURSING NOTE
"Chief Complaint   Patient presents with     Hypertension     Thyroid Problem     Depression       Initial /80 (BP Location: Left arm, Patient Position: Sitting, Cuff Size: Adult Regular)   Pulse 68   Temp 97.4  F (36.3  C) (Tympanic)   Resp 15   Ht 1.549 m (5' 1\")   Wt 89.8 kg (198 lb)   SpO2 99%   BMI 37.41 kg/m   Estimated body mass index is 37.41 kg/m  as calculated from the following:    Height as of this encounter: 1.549 m (5' 1\").    Weight as of this encounter: 89.8 kg (198 lb).  Medication Reconciliation: complete   Kaitlyn Longo LPN    "

## 2019-10-15 ENCOUNTER — APPOINTMENT (OUTPATIENT)
Dept: LAB | Facility: OTHER | Age: 79
End: 2019-10-15
Attending: FAMILY MEDICINE
Payer: MEDICARE

## 2019-10-15 ENCOUNTER — OFFICE VISIT (OUTPATIENT)
Dept: FAMILY MEDICINE | Facility: OTHER | Age: 79
End: 2019-10-15
Attending: FAMILY MEDICINE
Payer: MEDICARE

## 2019-10-15 ENCOUNTER — TELEPHONE (OUTPATIENT)
Dept: FAMILY MEDICINE | Facility: OTHER | Age: 79
End: 2019-10-15

## 2019-10-15 VITALS
HEART RATE: 70 BPM | SYSTOLIC BLOOD PRESSURE: 126 MMHG | OXYGEN SATURATION: 98 % | TEMPERATURE: 96.7 F | DIASTOLIC BLOOD PRESSURE: 80 MMHG | BODY MASS INDEX: 37.41 KG/M2 | RESPIRATION RATE: 16 BRPM | WEIGHT: 198 LBS

## 2019-10-15 DIAGNOSIS — R35.0 URINARY FREQUENCY: ICD-10-CM

## 2019-10-15 DIAGNOSIS — R82.79 ABNORMAL FINDINGS ON MICROBIOLOGICAL EXAMINATION OF URINE: ICD-10-CM

## 2019-10-15 DIAGNOSIS — N39.0 URINARY TRACT INFECTION WITHOUT HEMATURIA, SITE UNSPECIFIED: Primary | ICD-10-CM

## 2019-10-15 LAB
ALBUMIN UR-MCNC: ABNORMAL MG/DL
APPEARANCE UR: ABNORMAL
BACTERIA #/AREA URNS HPF: ABNORMAL /HPF
BILIRUB UR QL STRIP: NEGATIVE
COLOR UR AUTO: YELLOW
GLUCOSE UR STRIP-MCNC: NEGATIVE MG/DL
HGB UR QL STRIP: ABNORMAL
KETONES UR STRIP-MCNC: NEGATIVE MG/DL
LEUKOCYTE ESTERASE UR QL STRIP: ABNORMAL
NITRATE UR QL: NEGATIVE
NON-SQ EPI CELLS #/AREA URNS LPF: ABNORMAL /LPF
PH UR STRIP: 6.5 PH (ref 5–7)
RBC #/AREA URNS AUTO: ABNORMAL /HPF
SOURCE: ABNORMAL
SP GR UR STRIP: 1.01 (ref 1–1.03)
UROBILINOGEN UR STRIP-ACNC: 0.2 EU/DL (ref 0.2–1)
WBC #/AREA URNS AUTO: >100 /HPF

## 2019-10-15 PROCEDURE — 87086 URINE CULTURE/COLONY COUNT: CPT | Mod: ZL | Performed by: FAMILY MEDICINE

## 2019-10-15 PROCEDURE — 99213 OFFICE O/P EST LOW 20 MIN: CPT | Performed by: FAMILY MEDICINE

## 2019-10-15 PROCEDURE — 87186 SC STD MICRODIL/AGAR DIL: CPT | Mod: ZL | Performed by: FAMILY MEDICINE

## 2019-10-15 PROCEDURE — 81001 URINALYSIS AUTO W/SCOPE: CPT | Mod: ZL | Performed by: FAMILY MEDICINE

## 2019-10-15 PROCEDURE — G0463 HOSPITAL OUTPT CLINIC VISIT: HCPCS

## 2019-10-15 PROCEDURE — 87088 URINE BACTERIA CULTURE: CPT | Mod: ZL | Performed by: FAMILY MEDICINE

## 2019-10-15 RX ORDER — CIPROFLOXACIN 250 MG/1
250 TABLET, FILM COATED ORAL 2 TIMES DAILY
Qty: 14 TABLET | Refills: 0 | Status: SHIPPED | OUTPATIENT
Start: 2019-10-15 | End: 2020-02-05

## 2019-10-15 NOTE — NURSING NOTE
"Chief Complaint   Patient presents with     UTI       Initial /80 (BP Location: Right arm, Patient Position: Sitting, Cuff Size: Adult Regular)   Pulse 70   Temp 96.7  F (35.9  C) (Tympanic)   Resp 16   Wt 89.8 kg (198 lb)   SpO2 98%   BMI 37.41 kg/m   Estimated body mass index is 37.41 kg/m  as calculated from the following:    Height as of 9/12/19: 1.549 m (5' 1\").    Weight as of this encounter: 89.8 kg (198 lb).  Medication Reconciliation: complete  Myla Evans MA  "

## 2019-10-15 NOTE — TELEPHONE ENCOUNTER
Patient called for appointment and your schedule was full she has all the signs of bladder infection burning pain for 2 days, wondering if she can do a lab only to get it checked out

## 2019-10-15 NOTE — PROGRESS NOTES
Subjective     Ileeva Davis is a 79 year old female who presents to clinic today for the following health issues:    HPI   URINARY TRACT SYMPTOMS      Duration: 3-4 days    Description  frequency, incontinence, back pain and pressure    Intensity:  mild    Accompanying signs and symptoms:  Fever/chills: no   Flank pain YES- small amount  Nausea and vomiting: no   Vaginal symptoms: none  Abdominal/Pelvic Pain: YES- pressure    History  History of frequent UTI's: YES  History of kidney stones: no   Sexually Active: no   Possibility of pregnancy: No    Precipitating or alleviating factors: None    Therapies tried and outcome: cranberry juice and Tylenol   Outcome: no relief      Patient Active Problem List   Diagnosis     Hypothyroidism     Generalized anxiety disorder     Benign essential hypertension     GERD (gastroesophageal reflux disease)     Hepatitis     Contact dermatitis     Rosacea     Osteoarthritis     Fibromyalgia     Advanced care planning/counseling discussion     Open wound of knee, leg, and ankle     Degeneration of lumbar or lumbosacral intervertebral disc     Anemia     ACP (advance care planning)     Autoimmune hepatitis (H)     Morbid obesity (H)     Mild major depression (H)     Past Surgical History:   Procedure Laterality Date     APPENDECTOMY       BACK SURGERY  2015    lumbar epidural injection     CARDIAC SURGERY  10/2017    angioplasty     CHOLECYSTECTOMY       COLONOSCOPY  07/27/2017    [facility]     COLONOSCOPY - HIM SCAN  05/15/2001    Small internal hemorrhoids; otherwise normal;[facility]     COLONOSCOPY - HIM SCAN  12/14/2006    Colonoscopy, [facility]     ENT SURGERY      tonsillectomy     ESOPHAGOGASTRODUODENOSCOPY  07/27/2017    [facility]     EYE SURGERY      bilateral cataract removal     GI SURGERY      anal fistula     GYN SURGERY  1985    [facility]     GYN SURGERY      cessarian x 2     GYN SURGERY      tubal sterilazation     ORTHOPEDIC SURGERY      right knee     ORTHOPEDIC  SURGERY  1986    plantar fascia release     ORTHOPEDIC SURGERY      left knee     ORTHOPEDIC SURGERY      right trigger finger release     ORTHOPEDIC SURGERY  2013    Right great toe MTPJ fusion, adductor tenotomy,correction of hammer toe     ORTHOPEDIC SURGERY  [id number]    multiple procedures left forefoot       Social History     Tobacco Use     Smoking status: Never Smoker     Smokeless tobacco: Never Used   Substance Use Topics     Alcohol use: No     Family History   Problem Relation Age of Onset     Arthritis Mother         rheumatoid     Heart Disease Mother         CHF     Alcohol/Drug Father         alcoholism     Allergies Father      Thyroid Disease Other      Diabetes No family hx of      Asthma No family hx of          Current Outpatient Medications   Medication Sig Dispense Refill     amitriptyline (ELAVIL) 25 MG tablet Take 1 tablet (25 mg) by mouth At Bedtime 30 tablet 5     azaTHIOprine (IMURAN) 50 MG tablet Take 2 tablets (100 mg) by mouth daily 180 tablet 1     ciprofloxacin (CIPRO) 250 MG tablet Take 1 tablet (250 mg) by mouth 2 times daily for 7 days 14 tablet 0     cyclobenzaprine (FLEXERIL) 10 MG tablet TAKE 1 TAB BY MOUTH THREE TIMES A DAY AS NEEDED FOR MUSCLE SPASMS 90 tablet 1     fluticasone (FLONASE) 50 MCG/ACT nasal spray SPRAY 2 SPRAYS INTO BOTH NOSTRILS DAILY AS NEEDED 16 g 1     HYDROcodone-acetaminophen (NORCO) 5-325 MG tablet        IBUPROFEN PO Take 1,000 mg by mouth       levothyroxine (SYNTHROID/LEVOTHROID) 112 MCG tablet TAKE 1 TABLET BY MOUTH EVERY DAY 90 tablet 3     nystatin (MYCOSTATIN) 971740 UNIT/GM external cream Apply topically 2 times daily as needed for dry skin 30 g 2     nystatin (MYCOSTATIN) 158118 UNIT/GM external powder APPLY TO AFFECTED AREA NIGHTLY AS NEEDED FOR RASH 60 g 1     omeprazole (PRILOSEC) 40 MG capsule Take  by mouth daily. Before the same meal daily       Polyethylene Glycol 3350 (MIRALAX PO) Take by mouth daily as needed        propranolol (INDERAL)  20 MG tablet Take 1 tablet (20 mg) by mouth daily 90 tablet 1     Allergies   Allergen Reactions     Fentanyl Other (See Comments)     Severe agitation when used during angiogram     Codeine Sulfate Other (See Comments)     hallucinations     Fentanyl And Related      Other reaction(s): Confusion     Morphine Other (See Comments)     Hallucinations with iv therapy     Tape [Adhesive Tape]      Tramadol Other (See Comments)     hallucination         Reviewed and updated as needed this visit by Provider         Review of Systems   ROS COMP: Constitutional, HEENT, cardiovascular, pulmonary, gi and gu systems are negative, except as otherwise noted.      Objective    /80 (BP Location: Right arm, Patient Position: Sitting, Cuff Size: Adult Regular)   Pulse 70   Temp 96.7  F (35.9  C) (Tympanic)   Resp 16   Wt 89.8 kg (198 lb)   SpO2 98%   BMI 37.41 kg/m    Body mass index is 37.41 kg/m .  Physical Exam   GENERAL: healthy, alert and no distress  PSYCH: mentation appears normal, affect normal/bright    Diagnostic Test Results:  Results for orders placed or performed in visit on 10/15/19 (from the past 24 hour(s))   UA with Microscopic reflex to Culture - [facility]   Result Value Ref Range    Color Urine Yellow     Appearance Urine Cloudy     Glucose Urine Negative NEG^Negative mg/dL    Bilirubin Urine Negative NEG^Negative    Ketones Urine Negative NEG^Negative mg/dL    Specific Gravity Urine 1.010 1.003 - 1.035    pH Urine 6.5 5.0 - 7.0 pH    Protein Albumin Urine Trace (A) NEG^Negative mg/dL    Urobilinogen Urine 0.2 0.2 - 1.0 EU/dL    Nitrite Urine Negative NEG^Negative    Blood Urine Small (A) NEG^Negative    Leukocyte Esterase Urine Large (A) NEG^Negative    Source Midstream Urine     WBC Urine >100 (A) OTO5^0 - 5 /HPF    RBC Urine O - 2 OTO2^O - 2 /HPF    Squamous Epithelial /LPF Urine Few FEW^Few /LPF    Bacteria Urine Few (A) NEG^Negative /HPF           Assessment & Plan     1. Urinary tract  "infection without hematuria, site unspecified  Cipro prescribed, symptomatic cares recommended.  Follow-up if no improvement noted.  - ciprofloxacin (CIPRO) 250 MG tablet; Take 1 tablet (250 mg) by mouth 2 times daily for 7 days  Dispense: 14 tablet; Refill: 0    2. Urinary frequency  - UA with Microscopic reflex to Culture - [facility]    3. Abnormal findings on microbiological examination of urine  - Urine Culture Aerobic Bacterial     BMI:   Estimated body mass index is 37.41 kg/m  as calculated from the following:    Height as of 9/12/19: 1.549 m (5' 1\").    Weight as of this encounter: 89.8 kg (198 lb).         Return in about 5 months (around 3/15/2020) for Chronic Disease Management.    Iris Becerril MD  [facility]      "

## 2019-10-17 LAB
BACTERIA SPEC CULT: ABNORMAL
SPECIMEN SOURCE: ABNORMAL

## 2019-10-22 DIAGNOSIS — K75.4 AUTOIMMUNE HEPATITIS (H): ICD-10-CM

## 2019-10-24 RX ORDER — AZATHIOPRINE 50 MG/1
100 TABLET ORAL DAILY
Qty: 180 TABLET | Refills: 1 | Status: SHIPPED | OUTPATIENT
Start: 2019-10-24 | End: 2020-01-16

## 2019-10-24 NOTE — TELEPHONE ENCOUNTER
azaTHIOprine (IMURAN) 50 MG       Last Written Prescription Date:  4/29/2019  Last Fill Quantity: 180,   # refills: 1  Last Office Visit: 10/15/2019  Future Office visit:       Routing refill request to provider for review/approval because:  Drug not on the [facility], [facility] or [facility] refill protocol or controlled substance

## 2019-11-04 DIAGNOSIS — I10 BENIGN ESSENTIAL HYPERTENSION: ICD-10-CM

## 2019-11-05 RX ORDER — PROPRANOLOL HYDROCHLORIDE 20 MG/1
20 TABLET ORAL DAILY
Qty: 90 TABLET | Refills: 1 | Status: SHIPPED | OUTPATIENT
Start: 2019-11-05 | End: 2020-02-03

## 2019-12-16 ENCOUNTER — TELEPHONE (OUTPATIENT)
Dept: FAMILY MEDICINE | Facility: OTHER | Age: 79
End: 2019-12-16

## 2019-12-16 DIAGNOSIS — R30.0 DYSURIA: Primary | ICD-10-CM

## 2019-12-16 NOTE — TELEPHONE ENCOUNTER
Pt called, reports suspected UTI. C/o dysuria and frequency. No hematuria, no low back pain, no fever. Pt wondering if she can come in for lab only or if she needs to be seen. Please advise. Thank you!

## 2019-12-17 DIAGNOSIS — R30.0 DYSURIA: ICD-10-CM

## 2019-12-17 DIAGNOSIS — N39.0 URINARY TRACT INFECTION WITHOUT HEMATURIA, SITE UNSPECIFIED: Primary | ICD-10-CM

## 2019-12-17 DIAGNOSIS — R82.79 ABNORMAL FINDINGS ON MICROBIOLOGICAL EXAMINATION OF URINE: Primary | ICD-10-CM

## 2019-12-17 LAB
ALBUMIN UR-MCNC: NEGATIVE MG/DL
APPEARANCE UR: ABNORMAL
BACTERIA #/AREA URNS HPF: ABNORMAL /HPF
BILIRUB UR QL STRIP: NEGATIVE
COLOR UR AUTO: YELLOW
GLUCOSE UR STRIP-MCNC: NEGATIVE MG/DL
HGB UR QL STRIP: ABNORMAL
KETONES UR STRIP-MCNC: NEGATIVE MG/DL
LEUKOCYTE ESTERASE UR QL STRIP: ABNORMAL
NITRATE UR QL: POSITIVE
NON-SQ EPI CELLS #/AREA URNS LPF: ABNORMAL /LPF
PH UR STRIP: 7 PH (ref 5–7)
RBC #/AREA URNS AUTO: ABNORMAL /HPF
SOURCE: ABNORMAL
SP GR UR STRIP: 1.01 (ref 1–1.03)
UROBILINOGEN UR STRIP-ACNC: 1 EU/DL (ref 0.2–1)
WBC #/AREA URNS AUTO: ABNORMAL /HPF

## 2019-12-17 PROCEDURE — 81001 URINALYSIS AUTO W/SCOPE: CPT | Mod: ZL | Performed by: FAMILY MEDICINE

## 2019-12-17 PROCEDURE — 87086 URINE CULTURE/COLONY COUNT: CPT | Mod: ZL | Performed by: FAMILY MEDICINE

## 2019-12-17 PROCEDURE — 87186 SC STD MICRODIL/AGAR DIL: CPT | Mod: ZL | Performed by: FAMILY MEDICINE

## 2019-12-17 RX ORDER — SULFAMETHOXAZOLE/TRIMETHOPRIM 800-160 MG
1 TABLET ORAL 2 TIMES DAILY
Qty: 14 TABLET | Refills: 0 | Status: SHIPPED | OUTPATIENT
Start: 2019-12-17 | End: 2020-02-05

## 2019-12-19 LAB
BACTERIA SPEC CULT: ABNORMAL
Lab: ABNORMAL
SPECIMEN SOURCE: ABNORMAL

## 2020-01-14 DIAGNOSIS — R52 PAIN: ICD-10-CM

## 2020-01-14 DIAGNOSIS — K75.4 AUTOIMMUNE HEPATITIS (H): ICD-10-CM

## 2020-01-14 DIAGNOSIS — M62.838 MUSCLE SPASM: ICD-10-CM

## 2020-01-15 NOTE — TELEPHONE ENCOUNTER
azaTHIOprine (IMURAN) 50 MG tablet  Last visit date with prescribing provider: 9-  Last refill date: 10-  Quantity: 180, Refills: 1    cyclobenzaprine (FLEXERIL) 10 MG tablet  Last visit date with prescribing provider: 9-  Last refill date: 7-2-2019  Quantity: 90, Refills: 1    Shelly Syed LPN

## 2020-01-16 RX ORDER — AZATHIOPRINE 50 MG/1
100 TABLET ORAL DAILY
Qty: 180 TABLET | Refills: 1 | Status: SHIPPED | OUTPATIENT
Start: 2020-01-16 | End: 2020-04-15

## 2020-01-16 RX ORDER — CYCLOBENZAPRINE HCL 10 MG
TABLET ORAL
Qty: 90 TABLET | Refills: 1 | Status: SHIPPED | OUTPATIENT
Start: 2020-01-16 | End: 2020-04-06

## 2020-02-03 DIAGNOSIS — I10 BENIGN ESSENTIAL HYPERTENSION: ICD-10-CM

## 2020-02-03 RX ORDER — PROPRANOLOL HYDROCHLORIDE 20 MG/1
TABLET ORAL
Qty: 90 TABLET | Refills: 0 | Status: SHIPPED | OUTPATIENT
Start: 2020-02-03 | End: 2020-05-04

## 2020-02-05 ENCOUNTER — OFFICE VISIT (OUTPATIENT)
Dept: PODIATRY | Facility: OTHER | Age: 80
End: 2020-02-05
Attending: PODIATRIST
Payer: MEDICARE

## 2020-02-05 VITALS
RESPIRATION RATE: 16 BRPM | HEART RATE: 70 BPM | OXYGEN SATURATION: 99 % | SYSTOLIC BLOOD PRESSURE: 128 MMHG | TEMPERATURE: 97.9 F | DIASTOLIC BLOOD PRESSURE: 78 MMHG

## 2020-02-05 DIAGNOSIS — M79.672 FOOT PAIN, LEFT: ICD-10-CM

## 2020-02-05 DIAGNOSIS — K75.4 AUTOIMMUNE HEPATITIS (H): ICD-10-CM

## 2020-02-05 DIAGNOSIS — M20.12 HALLUX VALGUS (ACQUIRED), LEFT FOOT: Primary | ICD-10-CM

## 2020-02-05 DIAGNOSIS — M79.7 FIBROMYALGIA: ICD-10-CM

## 2020-02-05 PROCEDURE — G0463 HOSPITAL OUTPT CLINIC VISIT: HCPCS

## 2020-02-05 PROCEDURE — 99213 OFFICE O/P EST LOW 20 MIN: CPT | Performed by: PODIATRIST

## 2020-02-05 RX ORDER — ACETAMINOPHEN 500 MG
500 TABLET ORAL EVERY 8 HOURS PRN
Status: ON HOLD | COMMUNITY
End: 2022-04-19

## 2020-02-05 ASSESSMENT — PAIN SCALES - GENERAL: PAINLEVEL: WORST PAIN (10)

## 2020-02-05 NOTE — PROGRESS NOTES
Chief complaint: Patient presents with:  Foot Problems: Follow up from 08/2018. Left and right feet issues        History of Present Illness: This 79 year old female with osteoporosis, Hepatitis C, degeneration of lumbosacral intervertebral discs (injections every 6 months), and a history of multiple foot surgeries is being seen with her  for follow-up management of a LEFT foot bunion deformity. The bunion itself does not cause pain, but it is starting to overlap her 2nd and 3rd digit and it has become very painful when stepping on the 2nd and 3rd digits. She has tried toes sleeves and multiple padding devices, but she still has a lot of pain in the foot. She would like it surgically corrected as soon as possible because it is causing too much pain and it is difficult to walk with the pain.    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 She has a follow-up for the RIGHT sided pain near the location of where her kidney was removed, but the appointment isn't until April, 2020. She is trying to move this appointment closer and she is wondering if this would affect the surgery date. No further pedal complaints today.     Historically:    Patient had a RIGHT foot 1st MTPJ fusion, Hammertoe correction digits 2-5, and two neuroma removals in 2014. She later saw the same ortho doctor from [facility] for the LEFT foot to fix more hammertoes and a neuroma. She was told to stretch the bunion manually into place but she says it didn't do much so she stopped stretching it.      /78   Pulse 70   Temp 97.9  F (36.6  C) (Tympanic)   Resp 16   SpO2 99%     Patient Active Problem List   Diagnosis     Hypothyroidism     Generalized anxiety disorder     Benign essential hypertension     GERD  (gastroesophageal reflux disease)     Hepatitis     Contact dermatitis     Rosacea     Osteoarthritis     Fibromyalgia     Advanced care planning/counseling discussion     Open wound of knee, leg, and ankle     Degeneration of lumbar or lumbosacral intervertebral disc     Anemia     ACP (advance care planning)     Autoimmune hepatitis (H)     Morbid obesity (H)     Mild major depression (H)       Past Surgical History:   Procedure Laterality Date     APPENDECTOMY       BACK SURGERY  2015    lumbar epidural injection     CARDIAC SURGERY  10/2017    angioplasty     CHOLECYSTECTOMY       COLONOSCOPY  07/27/2017    [facility]     COLONOSCOPY - HIM SCAN  05/15/2001    Small internal hemorrhoids; otherwise normal;[facility]     COLONOSCOPY - HIM SCAN  12/14/2006    [facility]oscopy, [facility]     ENT SURGERY      tonsillectomy     ESOPHAGOGASTRODUODENOSCOPY  07/27/2017    [facility]     EYE SURGERY      bilateral cataract removal     GI SURGERY      anal fistula     GYN SURGERY  1985    [facility]     GYN SURGERY      cessarian x 2     GYN SURGERY      tubal sterilazation     ORTHOPEDIC SURGERY      right knee     ORTHOPEDIC SURGERY  1986    plantar fascia release     ORTHOPEDIC SURGERY      left knee     ORTHOPEDIC SURGERY      right trigger finger release     ORTHOPEDIC SURGERY  2013    Right great toe MTPJ fusion, adductor tenotomy,correction of hammer toe     ORTHOPEDIC SURGERY  [id number]    multiple procedures left forefoot       Current Outpatient Medications   Medication     acetaminophen (TYLENOL) 500 MG tablet     amitriptyline (ELAVIL) 25 MG tablet     azaTHIOprine (IMURAN) 50 MG tablet     cyclobenzaprine (FLEXERIL) 10 MG tablet     fluticasone (FLONASE) 50 MCG/ACT nasal spray     levothyroxine (SYNTHROID/LEVOTHROID) 112 MCG tablet     nystatin (MYCOSTATIN) 661389 UNIT/GM external powder     omeprazole (PRILOSEC) 40 MG capsule     Polyethylene Glycol 3350 (MIRALAX PO)     propranolol (INDERAL) 20 MG tablet     No current  facility-administered medications for this visit.           Allergies   Allergen Reactions     Fentanyl Other (See Comments)     Severe agitation when used during angiogram     Codeine Sulfate Other (See Comments)     hallucinations     Fentanyl And Related      Other reaction(s): Confusion     Morphine Other (See Comments)     Hallucinations with iv therapy     Tape [Adhesive Tape]      Tramadol Other (See Comments)     hallucination       ROS: 10 point ROS neg other than the symptoms noted above in the HPI.  EXAM  Constitutional: healthy, alert and no distress    Psychiatric: mentation appears normal and affect normal/bright    VASCULAR:  -Dorsalis pedis pulse +2/4 b/l  -Posterior tibial pulse weakly palpable secondary to edema  -2+ pitting edema to b/l ankles and 1+ pitting edema to dorsum of foot b/l  NEURO:  -Light touch sensation intact to b/l plantar forefoot  DERM:  -Cicatrix to LEFT foot 2nd and 3rd digit and 1st interspace.  -Cicatrix to multiple locations of RIGHT forefoot  MSK:  -Severe hallux abducto valgus of LEFT, overridding 2nd and 3rd digits, semi-reducible enough to offload 2nd digit while holding hallux. NO crepitus with ROM.   -Multiple medial and lateral deviations at the IPJs of the lesser digits  -Flexion deformity of RIGHT hallux IPJ  -No motion to RIGHT 1st MTPJ (previous arthrodesis)  -LEFT 2nd and 3rd digit and RIGHT lesser digits with no ROM at PIPJ (previous fusions)    RADIOGRAPHS LEFT FOOT 2 VIEWS 02/10/2017 [facility] HEALTH REPORT    Exam Accession# [id number]    XR FOOT LEFT 2 VIEWS    HISTORY: 76 years Female Left foot pain;     COMPARISON: 11/14/2014    TECHNIQUE:2 views left foot were obtained.      FINDINGS:There is osteopenia. There is hallux valgus with old healed resection along the medial aspect of the first metatarsal head. There are degenerative changes of the mid foot. There is no evidence of fracture or dislocation.      IMPRESSION: Osteopenia with degenerative changes of  the midfoot. There is no significant change from the prior study.    RADIOGRAPHS RIGHT FOOT 2 VIEWS 02/10/2017 [facility] REPORT  This document is currently in Final Status    Exam Accession# [id number]    XR FOOT RIGHT 2 VIEWS    HISTORY: 76 years Female Right foot pain;     COMPARISON: 11/14/2014    TECHNIQUE:Right foot 2 views      FINDINGS:There is screw-plate arthrodesis of the first metatarsophalangeal articulation. This was present on the prior study. Again seen are healed surgical resections of the proximal second and third phalanges.    There is advanced osteoarthritic change of the first cuneiform, first metatarsal articulation. There is normal in size of fracture or dislocation.      IMPRESSION: No significant interval change.    LEFT FOOT RADIOGRAPH 08/17/2028  IMPRESSION:   1. Fairly severe bunion deformity with lateral dislocation of medial  and lateral sesamoids.  2. Degenerative change in multiple interphalangeal joints with  postsurgical changes and PIP joints of second and third toes.  3. Degenerative change in the midfoot.     RADHAMES DUBON MD    ============================================================    ASSESSMENT:  (M20.12) Hallux valgus (acquired), left foot  (primary encounter diagnosis)    (M79.672) Foot pain, left    (K75.4) Autoimmune hepatitis (H)    (M79.7) Fibromyalgia      PLAN:  -Patient evaluated and examined. Treatment options discussed with no educational barriers noted.  -Reviewed radiographs from 2018. Patient would not be a good candidate for a fusion at this time. She has decreased bone quality and it would require a long post-op recovery with a higher risk of non-union or fixation failure. A Bethea osteotomy with a possible interpositional arthroplasty may be a viable option with a fast recovery (immediate post-op weightbearing) and less comorbidities with a MAC anesthesia vs. General anesthesia. The risk is increased loss of balance without a sturdy hallux to step  onto when walking, but the pain from the hallux over the digits would be corrected. Patient is in agreement with this option.  ---However, with patient's recent kidney history, she is to follow-up with her new back pain and be cleared for surgery prior to scheduling her for surgery.    -Patient in agreement with the above plan    RT 05/06/2020 to discuss LEFT foot surgery -- patient will call for an earlier appointment if she follows up for her renal concerns earlier and she is cleared for surgery      Gina Rodriguez DPM

## 2020-02-05 NOTE — NURSING NOTE
"Chief Complaint   Patient presents with     Foot Problems     Follow up from 08/2018. Left and right feet issues       Initial /78   Pulse 70   Temp 97.9  F (36.6  C) (Tympanic)   Resp 16   SpO2 99%  Estimated body mass index is 37.41 kg/m  as calculated from the following:    Height as of 9/12/19: 1.549 m (5' 1\").    Weight as of 10/15/19: 89.8 kg (198 lb).  Medication Reconciliation: complete  Jessica Black LPN\  "

## 2020-03-12 ENCOUNTER — OFFICE VISIT (OUTPATIENT)
Dept: FAMILY MEDICINE | Facility: OTHER | Age: 80
End: 2020-03-12
Attending: NURSE PRACTITIONER
Payer: MEDICARE

## 2020-03-12 ENCOUNTER — TELEPHONE (OUTPATIENT)
Dept: FAMILY MEDICINE | Facility: OTHER | Age: 80
End: 2020-03-12

## 2020-03-12 ENCOUNTER — APPOINTMENT (OUTPATIENT)
Dept: LAB | Facility: OTHER | Age: 80
End: 2020-03-12
Attending: NURSE PRACTITIONER
Payer: MEDICARE

## 2020-03-12 ENCOUNTER — NURSE TRIAGE (OUTPATIENT)
Dept: FAMILY MEDICINE | Facility: OTHER | Age: 80
End: 2020-03-12

## 2020-03-12 VITALS
SYSTOLIC BLOOD PRESSURE: 124 MMHG | OXYGEN SATURATION: 99 % | WEIGHT: 202 LBS | HEIGHT: 61 IN | HEART RATE: 68 BPM | RESPIRATION RATE: 20 BRPM | TEMPERATURE: 97.9 F | BODY MASS INDEX: 38.14 KG/M2 | DIASTOLIC BLOOD PRESSURE: 84 MMHG

## 2020-03-12 DIAGNOSIS — R82.79 URINE CULTURE POSITIVE: ICD-10-CM

## 2020-03-12 DIAGNOSIS — M79.7 FIBROMYALGIA: ICD-10-CM

## 2020-03-12 DIAGNOSIS — R82.79 ABNORMAL FINDINGS ON MICROBIOLOGICAL EXAMINATION OF URINE: ICD-10-CM

## 2020-03-12 DIAGNOSIS — N30.01 ACUTE CYSTITIS WITH HEMATURIA: ICD-10-CM

## 2020-03-12 DIAGNOSIS — M54.6 ACUTE BILATERAL THORACIC BACK PAIN: Primary | ICD-10-CM

## 2020-03-12 LAB
ALBUMIN UR-MCNC: NEGATIVE MG/DL
APPEARANCE UR: ABNORMAL
BACTERIA #/AREA URNS HPF: ABNORMAL /HPF
BASOPHILS # BLD AUTO: 0 10E9/L (ref 0–0.2)
BASOPHILS NFR BLD AUTO: 0.5 %
BILIRUB UR QL STRIP: NEGATIVE
COLOR UR AUTO: YELLOW
DIFFERENTIAL METHOD BLD: ABNORMAL
EOSINOPHIL # BLD AUTO: 0.2 10E9/L (ref 0–0.7)
EOSINOPHIL NFR BLD AUTO: 4.1 %
ERYTHROCYTE [DISTWIDTH] IN BLOOD BY AUTOMATED COUNT: 14.1 % (ref 10–15)
GLUCOSE UR STRIP-MCNC: NEGATIVE MG/DL
HCT VFR BLD AUTO: 27.8 % (ref 35–47)
HGB BLD-MCNC: 9.6 G/DL (ref 11.7–15.7)
HGB UR QL STRIP: ABNORMAL
KETONES UR STRIP-MCNC: NEGATIVE MG/DL
LEUKOCYTE ESTERASE UR QL STRIP: ABNORMAL
LYMPHOCYTES # BLD AUTO: 1 10E9/L (ref 0.8–5.3)
LYMPHOCYTES NFR BLD AUTO: 22.7 %
MCH RBC QN AUTO: 37.4 PG (ref 26.5–33)
MCHC RBC AUTO-ENTMCNC: 34.5 G/DL (ref 31.5–36.5)
MCV RBC AUTO: 108 FL (ref 78–100)
MONOCYTES # BLD AUTO: 0.3 10E9/L (ref 0–1.3)
MONOCYTES NFR BLD AUTO: 7.7 %
NEUTROPHILS # BLD AUTO: 2.9 10E9/L (ref 1.6–8.3)
NEUTROPHILS NFR BLD AUTO: 65 %
NITRATE UR QL: POSITIVE
PH UR STRIP: 7 PH (ref 5–7)
PLATELET # BLD AUTO: 194 10E9/L (ref 150–450)
RBC # BLD AUTO: 2.57 10E12/L (ref 3.8–5.2)
RBC #/AREA URNS AUTO: ABNORMAL /HPF
SOURCE: ABNORMAL
SP GR UR STRIP: 1.01 (ref 1–1.03)
UROBILINOGEN UR STRIP-ACNC: 0.2 EU/DL (ref 0.2–1)
WBC # BLD AUTO: 4.4 10E9/L (ref 4–11)
WBC #/AREA URNS AUTO: >100 /HPF

## 2020-03-12 PROCEDURE — 87086 URINE CULTURE/COLONY COUNT: CPT | Mod: ZL | Performed by: NURSE PRACTITIONER

## 2020-03-12 PROCEDURE — 87186 SC STD MICRODIL/AGAR DIL: CPT | Mod: ZL | Performed by: NURSE PRACTITIONER

## 2020-03-12 PROCEDURE — 85025 COMPLETE CBC W/AUTO DIFF WBC: CPT | Mod: ZL | Performed by: NURSE PRACTITIONER

## 2020-03-12 PROCEDURE — 81001 URINALYSIS AUTO W/SCOPE: CPT | Mod: ZL | Performed by: NURSE PRACTITIONER

## 2020-03-12 PROCEDURE — G0463 HOSPITAL OUTPT CLINIC VISIT: HCPCS | Mod: 25

## 2020-03-12 PROCEDURE — 87088 URINE BACTERIA CULTURE: CPT | Mod: ZL | Performed by: NURSE PRACTITIONER

## 2020-03-12 PROCEDURE — 96372 THER/PROPH/DIAG INJ SC/IM: CPT

## 2020-03-12 PROCEDURE — 36415 COLL VENOUS BLD VENIPUNCTURE: CPT | Mod: ZL | Performed by: NURSE PRACTITIONER

## 2020-03-12 PROCEDURE — 99213 OFFICE O/P EST LOW 20 MIN: CPT | Performed by: NURSE PRACTITIONER

## 2020-03-12 RX ORDER — CEFTRIAXONE SODIUM 1 G
1 VIAL (EA) INJECTION ONCE
Status: COMPLETED | OUTPATIENT
Start: 2020-03-12 | End: 2020-03-12

## 2020-03-12 RX ORDER — AMOXICILLIN 500 MG/1
500 CAPSULE ORAL 2 TIMES DAILY
Qty: 14 CAPSULE | Refills: 0 | Status: SHIPPED | OUTPATIENT
Start: 2020-03-12 | End: 2020-04-06

## 2020-03-12 RX ADMIN — CEFTRIAXONE SODIUM 1 G: 1 INJECTION, POWDER, FOR SOLUTION INTRAMUSCULAR; INTRAVENOUS at 17:07

## 2020-03-12 ASSESSMENT — MIFFLIN-ST. JEOR: SCORE: 1323.65

## 2020-03-12 ASSESSMENT — PAIN SCALES - GENERAL: PAINLEVEL: MODERATE PAIN (5)

## 2020-03-12 NOTE — PROGRESS NOTES
"Chief Complaint   Patient presents with     Urinary Problem       Initial /84 (BP Location: Right arm, Patient Position: Sitting, Cuff Size: Adult Large)   Pulse 68   Temp 97.9  F (36.6  C) (Tympanic)   Resp 20   Ht 1.549 m (5' 1\")   Wt 91.6 kg (202 lb)   SpO2 99%   BMI 38.17 kg/m   Estimated body mass index is 38.17 kg/m  as calculated from the following:    Height as of this encounter: 1.549 m (5' 1\").    Weight as of this encounter: 91.6 kg (202 lb).  Medication Reconciliation: complete  Shell Lieberman LPN    "

## 2020-03-12 NOTE — NURSING NOTE
Clinic Administered Medication Documentation      Injectable Medication Documentation    Patient was given Penicillin G Benzathine (Bicillin). Prior to medication administration, verified patients identity using patient s name and date of birth. Please see MAR and medication order for additional information. Patient instructed to remain in clinic for 15 minutes and report any adverse reaction to staff immediately .      Was entire vial of medication used? Yes  Vial/Syringe: Single dose vial  Expiration Date:  2/28/22  Was this medication supplied by the patient? No   Shell Lieberman LPN

## 2020-03-12 NOTE — TELEPHONE ENCOUNTER
Scheduled.    Reason for Disposition    Age > 50 years    Additional Information    Negative: Shock suspected (e.g., cold/pale/clammy skin, too weak to stand, low BP, rapid pulse)    Negative: Sounds like a life-threatening emergency to the triager    Negative: Followed a genital area injury    Negative: Taking antibiotic for urinary tract infection (UTI)    Negative: Pregnant    Negative: Postpartum < 1 month    Negative: [1] Unable to urinate (or only a few drops) > 4 hours AND [2] bladder feels very full (e.g., palpable bladder or strong urge to urinate)    Negative: Vomiting    Negative: Patient sounds very sick or weak to the triager    Negative: [1] SEVERE pain with urination  (e.g., excruciating) AND [2] not improved after 2 hours of pain medicine and Sitz bath    Negative: Fever > 100.5 F (38.1 C)    Negative: Side (flank) or lower back pain present    Negative: Diabetes mellitus or weak immune system (e.g., HIV positive, cancer chemotherapy, transplant patient)    Negative: Bedridden (e.g., nursing home patient, CVA, chronic illness, recovering from surgery)    Negative: Artificial heart valve or artificial joint    Negative: Blood in urine (red, pink, or tea-colored)    Negative: Possibility of pregnancy    Negative: Patient is worried about sexually transmitted disease (STD)    Negative: Unusual vaginal discharge (e.g., bad smelling, yellow, green, or foamy-white)    Protocols used: URINATION PAIN - FEMALE-A-

## 2020-03-12 NOTE — PROGRESS NOTES
Subjective     Ileeva Davis is a 79 year old female who presents to clinic today for the following health issues:    HPI   URINARY TRACT SYMPTOMS      Duration: 3-4 days     Description  frequency, urgency, hesitancy and lower to mid back pain bilaterally, fatigue    Intensity:  moderate    Accompanying signs and symptoms:  Fever/chills: no   Flank pain no   Nausea and vomiting: no   Vaginal symptoms: none  Abdominal/Pelvic Pain: YES- abdominal pressure: Patient points to bladder region.    History  History of frequent UTI's: YES  History of kidney stones: no   Sexually Active: no   Possibility of pregnancy: No    Precipitating or alleviating factors: None    Therapies tried and outcome: increase fluid intake   Outcome: ongoing      Patient Active Problem List   Diagnosis     Hypothyroidism     Generalized anxiety disorder     Benign essential hypertension     GERD (gastroesophageal reflux disease)     Hepatitis     Contact dermatitis     Rosacea     Osteoarthritis     Fibromyalgia     Advanced care planning/counseling discussion     Open wound of knee, leg, and ankle     Degeneration of lumbar or lumbosacral intervertebral disc     Anemia     ACP (advance care planning)     Autoimmune hepatitis (H)     Morbid obesity (H)     Mild major depression (H)     Past Surgical History:   Procedure Laterality Date     APPENDECTOMY       BACK SURGERY  2015    lumbar epidural injection     CARDIAC SURGERY  10/2017    angioplasty     CHOLECYSTECTOMY       COLONOSCOPY  07/27/2017    [facility]     COLONOSCOPY - HIM SCAN  05/15/2001    Small internal hemorrhoids; otherwise normal;[facility]     COLONOSCOPY - HIM SCAN  12/14/2006    Colonoscopy, [facility]     ENT SURGERY      tonsillectomy     ESOPHAGOGASTRODUODENOSCOPY  07/27/2017    [facility]     EYE SURGERY      bilateral cataract removal     GI SURGERY      anal fistula     GYN SURGERY  1985    [facility]     GYN SURGERY      cessarian x 2     GYN SURGERY      tubal  sterilazation     NEPHRECTOMY Right 05/02/2019     ORTHOPEDIC SURGERY      right knee     ORTHOPEDIC SURGERY  1986    plantar fascia release     ORTHOPEDIC SURGERY      left knee     ORTHOPEDIC SURGERY      right trigger finger release     ORTHOPEDIC SURGERY  2013    Right great toe MTPJ fusion, adductor tenotomy,correction of hammer toe     ORTHOPEDIC SURGERY  [id number]    multiple procedures left forefoot       Social History     Tobacco Use     Smoking status: Never Smoker     Smokeless tobacco: Never Used   Substance Use Topics     Alcohol use: No     Family History   Problem Relation Age of Onset     Arthritis Mother         rheumatoid     Heart Disease Mother         CHF     Alcohol/Drug Father         alcoholism     Allergies Father      Thyroid Disease Other      Diabetes No family hx of      Asthma No family hx of          Current Outpatient Medications   Medication Sig Dispense Refill     acetaminophen (TYLENOL) 500 MG tablet Take 500 mg by mouth every 8 hours as needed for mild pain       amoxicillin (AMOXIL) 500 MG capsule Take 1 capsule (500 mg) by mouth 2 times daily for 7 days 14 capsule 0     azaTHIOprine (IMURAN) 50 MG tablet TAKE 2 TABLETS (100 MG) BY MOUTH DAILY 180 tablet 1     cyclobenzaprine (FLEXERIL) 10 MG tablet TAKE 1 TAB BY MOUTH THREE TIMES A DAY AS NEEDED FOR MUSCLE SPASMS 90 tablet 1     fluticasone (FLONASE) 50 MCG/ACT nasal spray SPRAY 2 SPRAYS INTO BOTH NOSTRILS DAILY AS NEEDED 16 g 1     levothyroxine (SYNTHROID/LEVOTHROID) 112 MCG tablet TAKE 1 TABLET BY MOUTH EVERY DAY 90 tablet 3     nystatin (MYCOSTATIN) 404738 UNIT/GM external powder APPLY TO AFFECTED AREA NIGHTLY AS NEEDED FOR RASH 60 g 1     omeprazole (PRILOSEC) 40 MG capsule Take  by mouth daily. Before the same meal daily       Polyethylene Glycol 3350 (MIRALAX PO) Take by mouth daily as needed        propranolol (INDERAL) 20 MG tablet TAKE 1 TABLET (20 MG) BY MOUTH ONCE DAILY 90 tablet 0     amitriptyline (ELAVIL) 25 MG  "tablet TAKE 1 TABLET (25 MG) BY MOUTH AT BEDTIME 30 tablet 3     Allergies   Allergen Reactions     Fentanyl Other (See Comments)     Severe agitation when used during angiogram     Codeine Sulfate Other (See Comments)     hallucinations     Fentanyl And Related      Other reaction(s): Confusion     Morphine Other (See Comments)     Hallucinations with iv therapy     Tape [Adhesive Tape]      Tramadol Other (See Comments)     hallucination     Recent Labs   Lab Test 09/12/19  0912 04/29/19  1123 04/26/18  1049 07/31/17 11/11/16  1514   LDL 80  --   --   --   --    HDL 51  --   --   --   --    TRIG 123  --   --   --   --    ALT 16  --   --  7 13   CR  --  0.90  --  0.92 0.82   GFRESTIMATED  --  60*  --   --  68   GFRESTBLACK  --  70  --   --  82   POTASSIUM  --  3.8  --  3.6 3.8   TSH 0.13*  --  0.94  --   --       BP Readings from Last 3 Encounters:   03/12/20 124/84   02/05/20 128/78   10/15/19 126/80    Wt Readings from Last 3 Encounters:   03/12/20 91.6 kg (202 lb)   10/15/19 89.8 kg (198 lb)   09/12/19 89.8 kg (198 lb)            Reviewed and updated as needed this visit by Provider         Review of Systems   ROS COMP: CONSTITUTIONAL: NEGATIVE for fever, chills, change in weight  ENT/MOUTH: NEGATIVE for ear, mouth and throat problems  RESP: NEGATIVE for significant cough or SOB  CV: NEGATIVE for chest pain, palpitations or peripheral edema  : See HPI and also negative for and vaginal discharge or bleeding.  GI: NEGATIVE for constipation, nausea, or vomiting.  MUSCULOSKELETAL: See HPI  NEURO: NEGATIVE for weakness, dizziness, paresthesias, confusion  PSYCHIATRIC: NEGATIVE for changes in mood or affect      Objective    /84 (BP Location: Right arm, Patient Position: Sitting, Cuff Size: Adult Large)   Pulse 68   Temp 97.9  F (36.6  C) (Tympanic)   Resp 20   Ht 1.549 m (5' 1\")   Wt 91.6 kg (202 lb)   SpO2 99%   BMI 38.17 kg/m    Body mass index is 38.17 kg/m .     Physical Exam   GENERAL: healthy, " alert and no distress  EYES: Eyes grossly normal to inspection, PERRL and conjunctivae and sclerae normal  NECK: no adenopathy, no asymmetry, masses, or scars and thyroid normal to palpation  RESP: lungs clear to auscultation - no rales, rhonchi or wheezes  CV: regular rates and rhythm, normal S1 S2, no S3 or S4, no murmur, click or rub and radial pulses strong  ABDOMEN: soft, nontender and bowel sounds present  MS: extremities normal for age- no gross deformities noted  SKIN: no suspicious lesions or rashes  BACK: no CVA tenderness, no paralumbar tenderness  PSYCH: mentation appears normal, affect normal/bright    Results for orders placed or performed in visit on 03/12/20   *UA reflex to Microscopic and Culture - [facility]     Status: Abnormal    Specimen: Midstream Urine   Result Value Ref Range    Color Urine Yellow     Appearance Urine Cloudy     Glucose Urine Negative NEG^Negative mg/dL    Bilirubin Urine Negative NEG^Negative    Ketones Urine Negative NEG^Negative mg/dL    Specific Gravity Urine 1.010 1.003 - 1.035    Blood Urine Small (A) NEG^Negative    pH Urine 7.0 5.0 - 7.0 pH    Protein Albumin Urine Negative NEG^Negative mg/dL    Urobilinogen Urine 0.2 0.2 - 1.0 EU/dL    Nitrite Urine Positive (A) NEG^Negative    Leukocyte Esterase Urine Large (A) NEG^Negative    Source Midstream Urine    CBC with platelets and differential     Status: Abnormal   Result Value Ref Range    WBC 4.4 4.0 - 11.0 10e9/L    RBC Count 2.57 (L) 3.8 - 5.2 10e12/L    Hemoglobin 9.6 (L) 11.7 - 15.7 g/dL    Hematocrit 27.8 (L) 35.0 - 47.0 %     (H) 78 - 100 fl    MCH 37.4 (H) 26.5 - 33.0 pg    MCHC 34.5 31.5 - 36.5 g/dL    RDW 14.1 10.0 - 15.0 %    Platelet Count 194 150 - 450 10e9/L    % Neutrophils 65.0 %    % Lymphocytes 22.7 %    % Monocytes 7.7 %    % Eosinophils 4.1 %    % Basophils 0.5 %    Absolute Neutrophil 2.9 1.6 - 8.3 10e9/L    Absolute Lymphocytes 1.0 0.8 - 5.3 10e9/L    Absolute Monocytes 0.3 0.0 - 1.3  10e9/L    Absolute Eosinophils 0.2 0.0 - 0.7 10e9/L    Absolute Basophils 0.0 0.0 - 0.2 10e9/L    Diff Method Automated Method    Urine Microscopic     Status: Abnormal   Result Value Ref Range    WBC Urine >100 (A) OTO5^0 - 5 /HPF    RBC Urine 2-5 (A) OTO2^O - 2 /HPF    Bacteria Urine Moderate (A) NEG^Negative /HPF   Urine Culture Aerobic Bacterial     Status: Abnormal    Specimen: Midstream Urine   Result Value Ref Range    Specimen Description Midstream Urine     Special Requests PREPLATED     Culture Micro >100,000 colonies/mL  Escherichia coli   (A)        Susceptibility    Escherichia coli - MAIK     Amoxicillin/Clav <=2 Sensitive ug/mL     AMPICILLIN <=2 Sensitive ug/mL     CEFAZOLIN* <=4 Sensitive ug/mL      * Cefazolin AMIK breakpoints are for the treatment of uncomplicated urinary tract infections.  For the treatment of systemic infections, please contact the laboratory for additional testing.     CEFTAZIDIME <=1 Sensitive ug/mL     CEFTRIAXONE <=1 Sensitive ug/mL     CIPROFLOXACIN <=0.25 Sensitive ug/mL     GENTAMICIN <=1 Sensitive ug/mL     LEVOFLOXACIN <=0.12 Sensitive ug/mL     NITROFURANTOIN <=16 Sensitive ug/mL     TOBRAMYCIN <=1 Sensitive ug/mL     Trimethoprim/Sulfa <=1/19 Sensitive ug/mL     AMPICILLIN/SULBACTAM <=2 Sensitive ug/mL     Piperacillin/Tazo <=4 Sensitive ug/mL     CEFEPIME <=1 Sensitive ug/mL             Assessment & Plan      Case shared with her PCP, Dr. Jones in light of renal function. Due to presentation with positive UTI symptoms including back pain and fatigue, her history of other kidney being removed, and current UA results, I have had a lengthy  discussion with Ileana that we will treat this aggressively with both IM ceftriaxone and oral amoxicillin.     1. Back pain  - *UA reflex to Microscopic and Culture - [facility]  - CBC with platelets and differential  - Urine Microscopic    2. Urine culture positive    3. Abnormal findings on microbiological examination of  "urine  - Urine Culture Aerobic Bacterial    4. Acute cystitis with hematuria  - cefTRIAXone (ROCEPHIN) injection 1 g  - amoxicillin (AMOXIL) 500 MG capsule; Take 1 capsule (500 mg) by mouth 2 times daily for 7 days  Dispense: 14 capsule; Refill: 0       BMI:   Estimated body mass index is 38.17 kg/m  as calculated from the following:    Height as of this encounter: 1.549 m (5' 1\").    Weight as of this encounter: 91.6 kg (202 lb).   Counseling of weight not appropriate given age. However, discussion nutrition and hydration to promote wellness discussed briefly.       Return if symptoms worsen or fail to improve.    Dayan Hines, CNP  [facility]      "

## 2020-03-12 NOTE — PATIENT INSTRUCTIONS
Acute cystitis with hematuria  - cefTRIAXone (ROCEPHIN) injection 1 g  - amoxicillin (AMOXIL) 500 MG capsule; Take 1 capsule (500 mg) by mouth 2 times daily for 7 days  Dispense: 14 capsule; Refill: 0      Back pain  - *UA reflex to Microscopic and Culture - [facility]  - CBC with platelets and differential  - Urine Microscopic    Urine culture positive    Abnormal findings on microbiological examination of urine  - Urine Culture Aerobic Bacterial

## 2020-03-13 NOTE — RESULT ENCOUNTER NOTE
I did go ahead and treat with rocephin and amoxicillin as discussed. Please note CBC. With her complex hx, she may need f/up with you on this.

## 2020-03-14 LAB
BACTERIA SPEC CULT: ABNORMAL
Lab: ABNORMAL
SPECIMEN SOURCE: ABNORMAL

## 2020-04-06 ENCOUNTER — OFFICE VISIT (OUTPATIENT)
Dept: FAMILY MEDICINE | Facility: OTHER | Age: 80
End: 2020-04-06
Attending: FAMILY MEDICINE
Payer: MEDICARE

## 2020-04-06 ENCOUNTER — NURSE TRIAGE (OUTPATIENT)
Dept: FAMILY MEDICINE | Facility: OTHER | Age: 80
End: 2020-04-06

## 2020-04-06 VITALS
WEIGHT: 196 LBS | BODY MASS INDEX: 37.03 KG/M2 | TEMPERATURE: 97.3 F | HEART RATE: 80 BPM | RESPIRATION RATE: 20 BRPM | OXYGEN SATURATION: 98 % | SYSTOLIC BLOOD PRESSURE: 130 MMHG | DIASTOLIC BLOOD PRESSURE: 88 MMHG

## 2020-04-06 DIAGNOSIS — M62.838 MUSCLE SPASM: ICD-10-CM

## 2020-04-06 DIAGNOSIS — R52 PAIN: ICD-10-CM

## 2020-04-06 PROCEDURE — G0463 HOSPITAL OUTPT CLINIC VISIT: HCPCS

## 2020-04-06 PROCEDURE — 99213 OFFICE O/P EST LOW 20 MIN: CPT

## 2020-04-06 RX ORDER — CYCLOBENZAPRINE HCL 10 MG
10 TABLET ORAL 3 TIMES DAILY PRN
Qty: 90 TABLET | Refills: 1 | Status: SHIPPED | OUTPATIENT
Start: 2020-04-06 | End: 2020-06-04

## 2020-04-06 ASSESSMENT — PAIN SCALES - GENERAL: PAINLEVEL: WORST PAIN (10)

## 2020-04-06 NOTE — NURSING NOTE
"Chief Complaint   Patient presents with     Neck Pain       Initial /88 (BP Location: Right arm, Patient Position: Chair, Cuff Size: Adult Large)   Pulse 80   Temp 97.3  F (36.3  C) (Tympanic)   Resp 20   Wt 88.9 kg (196 lb)   SpO2 98%   BMI 37.03 kg/m   Estimated body mass index is 37.03 kg/m  as calculated from the following:    Height as of 3/12/20: 1.549 m (5' 1\").    Weight as of this encounter: 88.9 kg (196 lb).  Medication Reconciliation: complete  Hannah Aranda LPN    "

## 2020-04-06 NOTE — PROGRESS NOTES
Subjective     Ileana Davis is a 79 year old female who presents to clinic today for the following health issues:    HPI   Musculoskeletal problem/pain      Duration: 3 days    Description  Location: neck pain    Intensity:  10/10    Accompanying signs and symptoms: radiation of pain to head, also a little dizziness    History  Previous similar problem: no   Previous evaluation:  none    Precipitating or alleviating factors:  Trauma or overuse: no   Aggravating factors include: moving at all    Therapies tried and outcome: aleve, tylenol, ibuprofen, flexeril.   She awoke with this pain 2 days ago and has tried flexeril and ice which has helped relieve her pain. She has arthritis and has had injections of the spine for this. No rash, no fever. She had a sialadenitis in the past in 2015 but these symptoms are different. Pain is sharp and stabbing with movement. No pain with eating. She has had a temporal artery biopsy at [facility] in the past that was negative. Turning her head increases the pain      Patient Active Problem List   Diagnosis     Hypothyroidism     Generalized anxiety disorder     Benign essential hypertension     GERD (gastroesophageal reflux disease)     Hepatitis     Contact dermatitis     Rosacea     Osteoarthritis     Fibromyalgia     Advanced care planning/counseling discussion     Open wound of knee, leg, and ankle     Degeneration of lumbar or lumbosacral intervertebral disc     Anemia     ACP (advance care planning)     Autoimmune hepatitis (H)     Morbid obesity (H)     Mild major depression (H)     Past Surgical History:   Procedure Laterality Date     APPENDECTOMY       BACK SURGERY  2015    lumbar epidural injection     CARDIAC SURGERY  10/2017    angioplasty     CHOLECYSTECTOMY       COLONOSCOPY  07/27/2017    [facility]     COLONOSCOPY - HIM SCAN  05/15/2001    Small internal hemorrhoids; otherwise normal;[facility]     COLONOSCOPY - HIM SCAN  12/14/2006    [facility]on[facility], [facility]      ENT SURGERY      tonsillectomy     ESOPHAGOGASTRODUODENOSCOPY  07/27/2017    [facility]     EYE SURGERY      bilateral cataract removal     GI SURGERY      anal fistula     GYN SURGERY  1985    [facility]     GYN SURGERY      cessarian x 2     GYN SURGERY      tubal sterilazation     NEPHRECTOMY Right 05/02/2019     ORTHOPEDIC SURGERY      right knee     ORTHOPEDIC SURGERY  1986    plantar fascia release     ORTHOPEDIC SURGERY      left knee     ORTHOPEDIC SURGERY      right trigger finger release     ORTHOPEDIC SURGERY  2013    Right great toe MTPJ fusion, adductor tenotomy,correction of hammer toe     ORTHOPEDIC SURGERY  [id number]    multiple procedures left forefoot       Social History     Tobacco Use     Smoking status: Never Smoker     Smokeless tobacco: Never Used   Substance Use Topics     Alcohol use: No     Family History   Problem Relation Age of Onset     Arthritis Mother         rheumatoid     Heart Disease Mother         CHF     Alcohol/Drug Father         alcoholism     Allergies Father      Thyroid Disease Other      Diabetes No family hx of      Asthma No family hx of          Current Outpatient Medications   Medication Sig Dispense Refill     acetaminophen (TYLENOL) 500 MG tablet Take 500 mg by mouth every 8 hours as needed for mild pain       amitriptyline (ELAVIL) 25 MG tablet TAKE 1 TABLET (25 MG) BY MOUTH AT BEDTIME 30 tablet 3     azaTHIOprine (IMURAN) 50 MG tablet TAKE 2 TABLETS (100 MG) BY MOUTH DAILY 180 tablet 1     cyclobenzaprine (FLEXERIL) 10 MG tablet Take 1 tablet (10 mg) by mouth 3 times daily as needed for muscle spasms 90 tablet 1     fluticasone (FLONASE) 50 MCG/ACT nasal spray SPRAY 2 SPRAYS INTO BOTH NOSTRILS DAILY AS NEEDED 16 g 1     levothyroxine (SYNTHROID/LEVOTHROID) 112 MCG tablet TAKE 1 TABLET BY MOUTH EVERY DAY 90 tablet 3     nystatin (MYCOSTATIN) 733272 UNIT/GM external powder APPLY TO AFFECTED AREA NIGHTLY AS NEEDED FOR RASH 60 g 1     omeprazole (PRILOSEC) 40 MG capsule  Take  by mouth daily. Before the same meal daily       Polyethylene Glycol 3350 (MIRALAX PO) Take by mouth daily as needed        propranolol (INDERAL) 20 MG tablet TAKE 1 TABLET (20 MG) BY MOUTH ONCE DAILY 90 tablet 0     Allergies   Allergen Reactions     Fentanyl Other (See Comments)     Severe agitation when used during angiogram     Codeine Sulfate Other (See Comments)     hallucinations     Fentanyl And Related      Other reaction(s): Confusion     Morphine Other (See Comments)     Hallucinations with iv therapy     Tape [Adhesive Tape]      Tramadol Other (See Comments)     hallucination     BP Readings from Last 3 Encounters:   04/06/20 130/88   03/12/20 124/84   02/05/20 128/78    Wt Readings from Last 3 Encounters:   04/06/20 88.9 kg (196 lb)   03/12/20 91.6 kg (202 lb)   10/15/19 89.8 kg (198 lb)                      Reviewed and updated as needed this visit by Provider         Review of Systems   ROS COMP: Constitutional, HEENT, cardiovascular, pulmonary, gi and gu systems are negative, except as otherwise noted.      Objective    /88 (BP Location: Right arm, Patient Position: Chair, Cuff Size: Adult Large)   Pulse 80   Temp 97.3  F (36.3  C) (Tympanic)   Resp 20   Wt 88.9 kg (196 lb)   SpO2 98%   BMI 37.03 kg/m    Body mass index is 37.03 kg/m .  Physical Exam   GENERAL: healthy, alert and no distress  EYES: Eyes grossly normal to inspection, PERRL and conjunctivae and sclerae normal  HENT: ear canals and TM's normal, nose and mouth without ulcers or lesions  NECK: no adenopathy, no asymmetry, masses, or scars and thyroid normal to palpation, no masses of the parotid gland or tenderness of this area. No tenderness of the temporal artery   NECK: she is not able to turn her head to the left due to pain  MS: no gross musculoskeletal defects noted, no edema  NEURO: Normal strength and tone, mentation intact and speech normal  PSYCH: mentation appears normal, affect normal/bright         "    Assessment & Plan     1. Muscle spasm  Left neck. Flexeril is renewed for her to take on a regular basis. Continue icing. Doesn't appear to be sialadenitis, or temporal arteritis as pain increases with movement. She is asked to follow up by phone if pain isn't improving or she has concerns. We cannot refer for PT at this time due to Covid 19 pandemic   - cyclobenzaprine (FLEXERIL) 10 MG tablet; Take 1 tablet (10 mg) by mouth 3 times daily as needed for muscle spasms  Dispense: 90 tablet; Refill: 1    2. Pain  Renewed for her   - cyclobenzaprine (FLEXERIL) 10 MG tablet; Take 1 tablet (10 mg) by mouth 3 times daily as needed for muscle spasms  Dispense: 90 tablet; Refill: 1     BMI:   Estimated body mass index is 37.03 kg/m  as calculated from the following:    Height as of 3/12/20: 1.549 m (5' 1\").    Weight as of this encounter: 88.9 kg (196 lb).               Return if symptoms worsen or fail to improve.    Shelly Renee MD    [facility] [place]        "

## 2020-04-15 DIAGNOSIS — K75.4 AUTOIMMUNE HEPATITIS (H): ICD-10-CM

## 2020-04-15 RX ORDER — AZATHIOPRINE 50 MG/1
100 TABLET ORAL DAILY
Qty: 180 TABLET | Refills: 1 | Status: SHIPPED | OUTPATIENT
Start: 2020-04-15 | End: 2020-07-21

## 2020-04-15 NOTE — TELEPHONE ENCOUNTER
Problem: Patient Care Overview  Goal: Plan of Care Review  Outcome: Ongoing (interventions implemented as appropriate)  Care plan reviewed with patient.  Patient and daughter verbalized understanding.  Patient on continuous tele monitoring, first degree heart blocks with PVCs.  Patient on room air.  Heparin drip at 16 U/kg/hr.  Next ptt at 0900.  NPO diet.  Potassium and magnesium replaced this am.  Call lights within reach, bed alarm on, bed in lowest setting.  Staff will continue to monitor.       azaTHIOprine (IMURAN) 50 MG tablet  Last Written Prescription Date: 1/16/20  Last Fill Quantity: 180 # of Refills: 1  Last Office Visit: 3/12/20

## 2020-04-22 ENCOUNTER — OFFICE VISIT (OUTPATIENT)
Dept: FAMILY MEDICINE | Facility: OTHER | Age: 80
End: 2020-04-22
Attending: NURSE PRACTITIONER
Payer: MEDICARE

## 2020-04-22 VITALS
HEIGHT: 61 IN | OXYGEN SATURATION: 99 % | HEART RATE: 67 BPM | SYSTOLIC BLOOD PRESSURE: 136 MMHG | RESPIRATION RATE: 18 BRPM | TEMPERATURE: 97.5 F | DIASTOLIC BLOOD PRESSURE: 76 MMHG | BODY MASS INDEX: 37.76 KG/M2 | WEIGHT: 200 LBS

## 2020-04-22 DIAGNOSIS — R30.0 DYSURIA: Primary | ICD-10-CM

## 2020-04-22 DIAGNOSIS — R30.0 DYSURIA: ICD-10-CM

## 2020-04-22 DIAGNOSIS — R82.90 ABNORMAL URINE FINDINGS: Primary | ICD-10-CM

## 2020-04-22 LAB
ALBUMIN UR-MCNC: 30 MG/DL
APPEARANCE UR: ABNORMAL
BACTERIA #/AREA URNS HPF: ABNORMAL /HPF
BILIRUB UR QL STRIP: NEGATIVE
COLOR UR AUTO: YELLOW
GLUCOSE UR STRIP-MCNC: NEGATIVE MG/DL
HGB UR QL STRIP: ABNORMAL
KETONES UR STRIP-MCNC: NEGATIVE MG/DL
LEUKOCYTE ESTERASE UR QL STRIP: ABNORMAL
MUCOUS THREADS #/AREA URNS LPF: PRESENT /LPF
NITRATE UR QL: POSITIVE
PH UR STRIP: 6.5 PH (ref 4.7–8)
RBC #/AREA URNS AUTO: 18 /HPF (ref 0–2)
SOURCE: ABNORMAL
SP GR UR STRIP: 1.01 (ref 1–1.03)
UROBILINOGEN UR STRIP-MCNC: NORMAL MG/DL (ref 0–2)
WBC #/AREA URNS AUTO: >182 /HPF (ref 0–5)
WBC CLUMPS #/AREA URNS HPF: PRESENT /HPF

## 2020-04-22 PROCEDURE — 81001 URINALYSIS AUTO W/SCOPE: CPT | Mod: ZL | Performed by: NURSE PRACTITIONER

## 2020-04-22 PROCEDURE — 87086 URINE CULTURE/COLONY COUNT: CPT | Mod: ZL | Performed by: NURSE PRACTITIONER

## 2020-04-22 PROCEDURE — G0463 HOSPITAL OUTPT CLINIC VISIT: HCPCS

## 2020-04-22 PROCEDURE — 99213 OFFICE O/P EST LOW 20 MIN: CPT | Performed by: NURSE PRACTITIONER

## 2020-04-22 PROCEDURE — 87088 URINE BACTERIA CULTURE: CPT | Mod: ZL | Performed by: NURSE PRACTITIONER

## 2020-04-22 PROCEDURE — 87186 SC STD MICRODIL/AGAR DIL: CPT | Mod: ZL | Performed by: NURSE PRACTITIONER

## 2020-04-22 RX ORDER — CIPROFLOXACIN 250 MG/1
250 TABLET, FILM COATED ORAL 2 TIMES DAILY
Qty: 14 TABLET | Refills: 0 | Status: SHIPPED | OUTPATIENT
Start: 2020-04-22 | End: 2020-07-24

## 2020-04-22 ASSESSMENT — ENCOUNTER SYMPTOMS
FREQUENCY: 1
NAUSEA: 0
HEMATURIA: 0
CONSTIPATION: 1
DIFFICULTY URINATING: 0
BACK PAIN: 1
NEUROLOGICAL NEGATIVE: 1
DYSURIA: 0
VOMITING: 0

## 2020-04-22 ASSESSMENT — MIFFLIN-ST. JEOR: SCORE: 1314.57

## 2020-04-22 ASSESSMENT — PAIN SCALES - GENERAL: PAINLEVEL: NO PAIN (0)

## 2020-04-22 NOTE — NURSING NOTE
"Chief Complaint   Patient presents with     UTI       Initial /76 (BP Location: Right arm, Patient Position: Sitting, Cuff Size: Adult Regular)   Pulse 67   Temp 97.5  F (36.4  C) (Tympanic)   Resp 18   Ht 1.549 m (5' 1\")   Wt 90.7 kg (200 lb)   SpO2 99%   BMI 37.79 kg/m   Estimated body mass index is 37.79 kg/m  as calculated from the following:    Height as of this encounter: 1.549 m (5' 1\").    Weight as of this encounter: 90.7 kg (200 lb).  Medication Reconciliation: complete  Myla Evans MA  "

## 2020-04-22 NOTE — PROGRESS NOTES
Subjective     Ileeva Davis is a 79 year old female who presents to clinic today for the following health issues:    HPI   URINARY TRACT SYMPTOMS      Duration: 2 days    Description  frequency and back pain    Intensity:  mild    Accompanying signs and symptoms:  Fever/chills: no   Flank pain YES  Nausea and vomiting: no   Vaginal symptoms: discharge  Abdominal/Pelvic Pain: YES- pressure    History  History of frequent UTI's: YES  Has one kidney.    History of kidney stones: no   Sexually Active: no   Possibility of pregnancy: No    Precipitating or alleviating factors: None    Therapies tried and outcome: increase fluid intake   Outcome: no relief         Patient Active Problem List   Diagnosis     Hypothyroidism     Generalized anxiety disorder     Benign essential hypertension     GERD (gastroesophageal reflux disease)     Hepatitis     Contact dermatitis     Rosacea     Osteoarthritis     Fibromyalgia     Advanced care planning/counseling discussion     Open wound of knee, leg, and ankle     Degeneration of lumbar or lumbosacral intervertebral disc     Anemia     ACP (advance care planning)     Autoimmune hepatitis (H)     Morbid obesity (H)     Mild major depression (H)     Past Surgical History:   Procedure Laterality Date     APPENDECTOMY       BACK SURGERY  2015    lumbar epidural injection     CARDIAC SURGERY  10/2017    angioplasty     CHOLECYSTECTOMY       COLONOSCOPY  07/27/2017    [facility]     COLONOSCOPY - HIM SCAN  05/15/2001    Small internal hemorrhoids; otherwise normal;[facility]     COLONOSCOPY - HIM SCAN  12/14/2006    Colonoscopy, [facility]     ENT SURGERY      tonsillectomy     ESOPHAGOGASTRODUODENOSCOPY  07/27/2017    [facility]     EYE SURGERY      bilateral cataract removal     GI SURGERY      anal fistula     GYN SURGERY  1985    [facility]     GYN SURGERY      cessarian x 2     GYN SURGERY      tubal sterilazation     NEPHRECTOMY Right 05/02/2019     ORTHOPEDIC SURGERY      right knee      ORTHOPEDIC SURGERY  1986    plantar fascia release     ORTHOPEDIC SURGERY      left knee     ORTHOPEDIC SURGERY      right trigger finger release     ORTHOPEDIC SURGERY  2013    Right great toe MTPJ fusion, adductor tenotomy,correction of hammer toe     ORTHOPEDIC SURGERY  [id number]    multiple procedures left forefoot       Social History     Tobacco Use     Smoking status: Never Smoker     Smokeless tobacco: Never Used   Substance Use Topics     Alcohol use: No     Family History   Problem Relation Age of Onset     Arthritis Mother         rheumatoid     Heart Disease Mother         CHF     Alcohol/Drug Father         alcoholism     Allergies Father      Thyroid Disease Other      Diabetes No family hx of      Asthma No family hx of          Current Outpatient Medications   Medication Sig Dispense Refill     acetaminophen (TYLENOL) 500 MG tablet Take 500 mg by mouth every 8 hours as needed for mild pain       amitriptyline (ELAVIL) 25 MG tablet TAKE 1 TABLET (25 MG) BY MOUTH AT BEDTIME 30 tablet 3     azaTHIOprine (IMURAN) 50 MG tablet TAKE 2 TABLETS (100 MG) BY MOUTH DAILY 180 tablet 1     ciprofloxacin (CIPRO) 250 MG tablet Take 1 tablet (250 mg) by mouth 2 times daily 14 tablet 0     cyclobenzaprine (FLEXERIL) 10 MG tablet Take 1 tablet (10 mg) by mouth 3 times daily as needed for muscle spasms 90 tablet 1     fluticasone (FLONASE) 50 MCG/ACT nasal spray SPRAY 2 SPRAYS INTO BOTH NOSTRILS DAILY AS NEEDED 16 g 1     levothyroxine (SYNTHROID/LEVOTHROID) 112 MCG tablet TAKE 1 TABLET BY MOUTH EVERY DAY 90 tablet 3     nystatin (MYCOSTATIN) 435810 UNIT/GM external powder APPLY TO AFFECTED AREA NIGHTLY AS NEEDED FOR RASH 60 g 1     omeprazole (PRILOSEC) 40 MG capsule Take  by mouth daily. Before the same meal daily       Polyethylene Glycol 3350 (MIRALAX PO) Take by mouth daily as needed        propranolol (INDERAL) 20 MG tablet TAKE 1 TABLET (20 MG) BY MOUTH ONCE DAILY 90 tablet 0     Allergies   Allergen  "Reactions     Fentanyl Other (See Comments)     Severe agitation when used during angiogram     Codeine Sulfate Other (See Comments)     hallucinations     Fentanyl And Related      Other reaction(s): Confusion     Morphine Other (See Comments)     Hallucinations with iv therapy     Tape [Adhesive Tape]      Tramadol Other (See Comments)     hallucination         Reviewed and updated as needed this visit by Provider         Review of Systems   Gastrointestinal: Positive for constipation. Negative for nausea and vomiting.   Genitourinary: Positive for frequency and urgency. Negative for difficulty urinating, dysuria and hematuria.        Hx 1 kidney.  Hx renal insufficiency. Has pelvic pressure.  , back ache     Musculoskeletal: Positive for back pain.   Skin: Negative for rash.   Neurological: Negative.         Objective    There were no vitals taken for this visit. /76 (BP Location: Right arm, Patient Position: Sitting, Cuff Size: Adult Regular)   Pulse 67   Temp 97.5  F (36.4  C) (Tympanic)   Resp 18   Ht 1.549 m (5' 1\")   Wt 90.7 kg (200 lb)   SpO2 99%   BMI 37.79 kg/m      There is no height or weight on file to calculate BMI.  Physical Exam  Constitutional:       Appearance: Normal appearance.   Neck:      Musculoskeletal: Normal range of motion and neck supple.   Cardiovascular:      Rate and Rhythm: Normal rate and regular rhythm.      Heart sounds: Normal heart sounds. No murmur.   Pulmonary:      Effort: Pulmonary effort is normal.      Breath sounds: Normal breath sounds.   Abdominal:      Comments: Negative CVA tap.     Musculoskeletal: Normal range of motion.   Lymphadenopathy:      Cervical: No cervical adenopathy.   Skin:     General: Skin is warm and dry.   Neurological:      General: No focal deficit present.      Mental Status: She is alert and oriented to person, place, and time.   Psychiatric:         Mood and Affect: Mood normal.         Behavior: Behavior normal.          Results " for orders placed or performed in visit on 04/22/20   UA with Microscopic reflex to Culture     Status: Abnormal    Specimen: Midstream Urine   Result Value Ref Range    Color Urine Yellow     Appearance Urine Cloudy     Glucose Urine Negative NEG^Negative mg/dL    Bilirubin Urine Negative NEG^Negative    Ketones Urine Negative NEG^Negative mg/dL    Specific Gravity Urine 1.015 1.003 - 1.035    Blood Urine Small (A) NEG^Negative    pH Urine 6.5 4.7 - 8.0 pH    Protein Albumin Urine 30 (A) NEG^Negative mg/dL    Urobilinogen mg/dL Normal 0.0 - 2.0 mg/dL    Nitrite Urine Positive (A) NEG^Negative    Leukocyte Esterase Urine Large (A) NEG^Negative    Source Midstream Urine     WBC Urine >182 (H) 0 - 5 /HPF    RBC Urine 18 (H) 0 - 2 /HPF    WBC Clumps Present (A) NEG^Negative /HPF    Bacteria Urine Few (A) NEG^Negative /HPF    Mucous Urine Present (A) NEG^Negative /LPF       ASSESSMENT / PLAN:  (R30.0) Dysuria  (primary encounter diagnosis)  Comment:   Had Amoxicillin in March for UTI,  Discussed Creainine and GFR with Pharmacy. Given  Cipro 250mg  BID for 7 days.   Drink at least 6 glasses water a day.      2/20/20   Ref Range & Units  2mo ago    Sodium  134 - 143 mEq/L  137     Potassium  3.4 - 5.1 mEq/L  4.5     Chloride  99 - 110 mEq/L  106     Carbon Dioxide  19 - 29 mEq/L  22     Anion Gap  3.0 - 15.0 mEq/L  9.0     Blood Urea Nitrogen  5 - 24 mg/dL  24     Creatinine  0.40 - 1.00 mg/dL  1.37High       Glomerular Filtration Rate  >60 mL/min/1.73 m*2  37Low       Comment: Complications of CKD and risk of cardiovascular disease increase when GFR is below 60ml.min/1.73m2.  A persistently reduced GFR is a specific indication of Chronic Kidney Disease.  The eGFR calculation has not been validated in patients >70yrs.    Calcium  8.4 - 10.5 mg/dL  8.7     Glucose  70 - 99 mg/dL  69Low       Protein, Total  6.0 - 8.0 g/dL  7.0     Albumin  3.5 - 5.0 g/dL  3.6     Alkaline Phosphatase  40 - 150 IU/L  86     Aspartate  Aminotransferase  10 - 40 IU/L  22     Alanine Aminotransferase  6 - 31 IU/L  10     Bilirubin, Total  0.2 - 1.2 mg/dL  0.6     Resulting Agency            Plan: UA with Microscopic reflex to Culture,         ciprofloxacin (CIPRO) 250 MG tablet          Jeison Syed CNP

## 2020-04-22 NOTE — TELEPHONE ENCOUNTER
Patient calling and her ABX that was ordered for today went [facility] in [place] and they are not open.    She needs this to go to [facility] in [facility].    Pt call back [phone]

## 2020-04-23 RX ORDER — CIPROFLOXACIN 250 MG/1
250 TABLET, FILM COATED ORAL 2 TIMES DAILY
Qty: 14 TABLET | Refills: 0 | OUTPATIENT
Start: 2020-04-23

## 2020-04-24 LAB
BACTERIA SPEC CULT: ABNORMAL
SPECIMEN SOURCE: ABNORMAL

## 2020-06-04 DIAGNOSIS — M62.838 MUSCLE SPASM: ICD-10-CM

## 2020-06-04 DIAGNOSIS — R52 PAIN: ICD-10-CM

## 2020-06-04 DIAGNOSIS — M79.7 FIBROMYALGIA: ICD-10-CM

## 2020-06-04 RX ORDER — CYCLOBENZAPRINE HCL 10 MG
10 TABLET ORAL 3 TIMES DAILY PRN
Qty: 90 TABLET | Refills: 1 | Status: SHIPPED | OUTPATIENT
Start: 2020-06-04 | End: 2020-08-19

## 2020-06-04 NOTE — TELEPHONE ENCOUNTER
flexeril      Last Written Prescription Date:  4/6/20  Last Fill Quantity: 90,   # refills: 1  Last Office Visit: 4/22/20  Future Office visit:       Routing refill request to provider for review/approval because:  Drug not on the [facility], [facility] or [facility] refill protocol or controlled substance

## 2020-06-25 ENCOUNTER — TRANSFERRED RECORDS (OUTPATIENT)
Dept: HEALTH INFORMATION MANAGEMENT | Facility: CLINIC | Age: 80
End: 2020-06-25

## 2020-07-07 DIAGNOSIS — M79.7 FIBROMYALGIA: ICD-10-CM

## 2020-07-07 DIAGNOSIS — B37.2 YEAST DERMATITIS: ICD-10-CM

## 2020-07-10 RX ORDER — NYSTATIN 100000 [USP'U]/G
POWDER TOPICAL
Qty: 60 G | Refills: 1 | Status: SHIPPED | OUTPATIENT
Start: 2020-07-10 | End: 2023-06-23

## 2020-07-18 DIAGNOSIS — K75.4 AUTOIMMUNE HEPATITIS (H): ICD-10-CM

## 2020-07-21 RX ORDER — AZATHIOPRINE 50 MG/1
100 TABLET ORAL DAILY
Qty: 180 TABLET | Refills: 1 | Status: SHIPPED | OUTPATIENT
Start: 2020-07-21 | End: 2020-10-16

## 2020-07-24 ENCOUNTER — OFFICE VISIT (OUTPATIENT)
Dept: FAMILY MEDICINE | Facility: OTHER | Age: 80
End: 2020-07-24
Attending: NURSE PRACTITIONER
Payer: MEDICARE

## 2020-07-24 ENCOUNTER — APPOINTMENT (OUTPATIENT)
Dept: LAB | Facility: OTHER | Age: 80
End: 2020-07-24
Attending: NURSE PRACTITIONER
Payer: MEDICARE

## 2020-07-24 VITALS
BODY MASS INDEX: 38.14 KG/M2 | RESPIRATION RATE: 16 BRPM | SYSTOLIC BLOOD PRESSURE: 124 MMHG | HEIGHT: 61 IN | WEIGHT: 202 LBS | DIASTOLIC BLOOD PRESSURE: 74 MMHG | TEMPERATURE: 97.8 F | HEART RATE: 62 BPM | OXYGEN SATURATION: 100 %

## 2020-07-24 DIAGNOSIS — R35.0 URINARY FREQUENCY: Primary | ICD-10-CM

## 2020-07-24 DIAGNOSIS — N39.0 COMPLICATED UTI (URINARY TRACT INFECTION): ICD-10-CM

## 2020-07-24 LAB
ALBUMIN UR-MCNC: NEGATIVE MG/DL
APPEARANCE UR: ABNORMAL
BACTERIA #/AREA URNS HPF: ABNORMAL /HPF
BILIRUB UR QL STRIP: NEGATIVE
COLOR UR AUTO: YELLOW
DIFFERENTIAL METHOD BLD: ABNORMAL
ERYTHROCYTE [DISTWIDTH] IN BLOOD BY AUTOMATED COUNT: 14.1 % (ref 10–15)
GLUCOSE UR STRIP-MCNC: NEGATIVE MG/DL
HCT VFR BLD AUTO: 29.1 % (ref 35–47)
HGB BLD-MCNC: 9.8 G/DL (ref 11.7–15.7)
HGB UR QL STRIP: NEGATIVE
KETONES UR STRIP-MCNC: NEGATIVE MG/DL
LEUKOCYTE ESTERASE UR QL STRIP: ABNORMAL
MCH RBC QN AUTO: 37.3 PG (ref 26.5–33)
MCHC RBC AUTO-ENTMCNC: 33.7 G/DL (ref 31.5–36.5)
MCV RBC AUTO: 111 FL (ref 78–100)
NITRATE UR QL: POSITIVE
NON-SQ EPI CELLS #/AREA URNS LPF: ABNORMAL /LPF
PH UR STRIP: 7 PH (ref 5–7)
PLATELET # BLD AUTO: 185 10E9/L (ref 150–450)
RBC # BLD AUTO: 2.63 10E12/L (ref 3.8–5.2)
RBC #/AREA URNS AUTO: ABNORMAL /HPF
SOURCE: ABNORMAL
SP GR UR STRIP: 1.01 (ref 1–1.03)
UROBILINOGEN UR STRIP-ACNC: 0.2 EU/DL (ref 0.2–1)
WBC # BLD AUTO: 5 10E9/L (ref 4–11)
WBC #/AREA URNS AUTO: ABNORMAL /HPF

## 2020-07-24 PROCEDURE — G0463 HOSPITAL OUTPT CLINIC VISIT: HCPCS

## 2020-07-24 PROCEDURE — 87086 URINE CULTURE/COLONY COUNT: CPT | Mod: ZL | Performed by: NURSE PRACTITIONER

## 2020-07-24 PROCEDURE — 99214 OFFICE O/P EST MOD 30 MIN: CPT | Performed by: NURSE PRACTITIONER

## 2020-07-24 PROCEDURE — 81001 URINALYSIS AUTO W/SCOPE: CPT | Mod: ZL | Performed by: NURSE PRACTITIONER

## 2020-07-24 PROCEDURE — 85025 COMPLETE CBC W/AUTO DIFF WBC: CPT | Mod: ZL | Performed by: NURSE PRACTITIONER

## 2020-07-24 PROCEDURE — G0463 HOSPITAL OUTPT CLINIC VISIT: HCPCS | Mod: 25

## 2020-07-24 PROCEDURE — 36415 COLL VENOUS BLD VENIPUNCTURE: CPT | Mod: ZL | Performed by: NURSE PRACTITIONER

## 2020-07-24 PROCEDURE — 96372 THER/PROPH/DIAG INJ SC/IM: CPT

## 2020-07-24 RX ORDER — CEFTRIAXONE SODIUM 1 G
1 VIAL (EA) INJECTION ONCE
Status: COMPLETED | OUTPATIENT
Start: 2020-07-24 | End: 2020-07-24

## 2020-07-24 RX ORDER — CIPROFLOXACIN 250 MG/1
250 TABLET, FILM COATED ORAL 2 TIMES DAILY
Qty: 14 TABLET | Refills: 0 | Status: SHIPPED | OUTPATIENT
Start: 2020-07-24 | End: 2020-07-31

## 2020-07-24 RX ADMIN — CEFTRIAXONE SODIUM 1 G: 1 INJECTION, POWDER, FOR SOLUTION INTRAMUSCULAR; INTRAVENOUS at 12:25

## 2020-07-24 ASSESSMENT — PAIN SCALES - GENERAL: PAINLEVEL: MODERATE PAIN (4)

## 2020-07-24 ASSESSMENT — MIFFLIN-ST. JEOR: SCORE: 1323.65

## 2020-07-24 NOTE — NURSING NOTE
Clinic Administered Medication Documentation      Injectable Medication Documentation    Patient was given Ceftriaxone Sodium (Rocephin). Prior to medication administration, verified patients identity using patient s name and date of birth. Please see MAR and medication order for additional information. Patient instructed to stay in clinic after the injection but patient declined.      Was entire vial of medication used? Yes  Vial/Syringe: Single dose vial  Expiration Date:  3/31/22  Was this medication supplied by the patient? No

## 2020-07-24 NOTE — PATIENT INSTRUCTIONS
"  Patient Education     Bladder Infection, Female (Adult)    Urine is normally doesn't have any bacteria in it. But bacteria can get into the urinary tract from the skin around the rectum. Or they can travel in the blood from elsewhere in the body. Once they are in your urinary tract, they can cause infection in the urethra (urethritis), the bladder (cystitis), or the kidneys (pyelonephritis).  The most common place for an infection is in the bladder. This is called a bladder infection. This is one of the most common infections in women. Most bladder infections are easily treated. They are not serious unless the infection spreads to the kidney.  The phrases \"bladder infection,\" \"UTI,\" and \"cystitis\" are often used to describe the same thing. But they are not always the same. Cystitis is an inflammation of the bladder. The most common cause of cystitis is an infection.  Symptoms  The infection causes inflammation in the urethra and bladder. This causes many of the symptoms. The most common symptoms of a bladder infection are:    Pain or burning when urinating    Having to urinate more often than usual    Urgent need to urinate    Only a small amount of urine comes out    Blood in urine    Abdominal discomfort. This is usually in the lower abdomen above the pubic bone.    Cloudy urine    Strong- or bad-smelling urine    Unable to urinate (urinary retention)    Unable to hold urine in (urinary incontinence)    Fever    Loss of appetite    Confusion (in older adults)  Causes  Bladder infections are not contagious. You can't get one from someone else, from a toilet seat, or from sharing a bath.  The most common cause of bladder infections is bacteria from the bowels. The bacteria get onto the skin around the opening of the urethra. From there, they can get into the urine and travel up to the bladder, causing inflammation and infection. This usually happens because of:    Wiping improperly after urinating. Always wipe " from front to back.    Bowel incontinence    Pregnancy    Procedures such as having a catheter inserted    Older age    Not emptying your bladder. This can allow bacteria a chance to grow in your urine.    Dehydration    Constipation    Sex    Use of a diaphragm for birth control   Treatment  Bladder infections are diagnosed by a urine test. They are treated with antibiotics and usually clear up quickly without complications. Treatment helps prevent a more serious kidney infection.  Medicines  Medicines can help in the treatment of a bladder infection:    Take antibiotics until they are used up, even if you feel better. It is important to finish them to make sure the infection has cleared.    You can use acetaminophen or ibuprofen for pain, fever, or discomfort, unless another medicine was prescribed. If you have chronic liver or kidney disease, talk with your healthcare provider before using these medicines. Also talk with your provider if you've ever had a stomach ulcer or gastrointestinal bleeding, or are taking blood-thinner medicines.    If you are given phenazopydridine to reduce burning with urination, it will cause your urine to become a bright orange color. This can stain clothing.  Care and prevention  These self-care steps can help prevent future infections:    Drink plenty of fluids to prevent dehydration and flush out your bladder. Do this unless you must restrict fluids for other health reasons, or your doctor told you not to.    Proper cleaning after going to the bathroom is important. Wipe from front to back after using the toilet to prevent the spread of bacteria.    Urinate more often. Don't try to hold urine in for a long time.    Wear loose-fitting clothes and cotton underwear. Avoid tight-fitting pants.    Improve your diet and prevent constipation. Eat more fresh fruit and vegetables, and fiber, and less junk and fatty foods.    Avoid sex until your symptoms are gone.    Avoid caffeine,  alcohol, and spicy foods. These can irritate your bladder.    Urinate right after intercourse to flush out your bladder.    If you use birth control pills and have frequent bladder infections, discuss it with your doctor.  Follow-up care  Call your healthcare provider if all symptoms are not gone after 3 days of treatment. This is especially important if you have repeat infections.  If a culture was done, you will be told if your treatment needs to be changed. If directed, you can call to find out the results.  If X-rays were done, you will be told if the results will affect your treatment.  Call 911  Call 911 if any of the following occur:    Trouble breathing    Hard to wake up or confusion    Fainting or loss of consciousness    Rapid heart rate  When to seek medical advice  Call your healthcare provider right away if any of these occur:    Fever of 100.4 F (38.0 C) or higher, or as directed by your healthcare provider    Symptoms are not better by the third day of treatment    Back or belly (abdominal) pain that gets worse    Repeated vomiting, or unable to keep medicine down    Weakness or dizziness    Vaginal discharge    Pain, redness, or swelling in the outer vaginal area (labia)  Date Last Reviewed: 10/1/2016    4960-4913 [employer]. [address] [place] [address]. All rights reserved. This information is not intended as a substitute for professional medical care. Always follow your healthcare professional's instructions.           Patient Education     Treating Constipation    Constipation is a common and often uncomfortable problem. Constipation means you have bowel movements fewer than 3 times per week, or strain to pass hard, dry stool. It can last a short time. Or it can be a problem that never seems to go away. The good news is that it can often be treated and controlled.  Eat more fiber  One of the best ways to help treat constipation is to increase your fiber intake. You  can do this either through diet or by using fiber supplements. Fiber (in whole grains, fruits, and vegetables) adds bulk and absorbs water to soften the stool. This helps the stool pass through the colon more easily. When you increase your fiber intake, do it slowly to avoid side effects such as bloating. Also increase the amount of water that you drink. Eating more of the following foods can add fiber to your diet.    High-fiber cereals    Whole grains, bran, and brown rice    Vegetables such as carrots, broccoli, and greens    Fresh fruits (especially apples, pears, and dried fruits like raisins and apricots)    Nuts and legumes (especially beans such as lentils, kidney beans, and lima beans)  Get physically active  Exercise helps improve the working of your colon which helps ease constipation. Try to get some physical activity every day. If you haven t been active for a while, talk to your healthcare provider before starting again.  Laxatives  Your healthcare provider may suggest an over-the-counter product to help ease your constipation. He or she may suggest the use of bulk-forming agents or laxatives. The use of laxatives, if used as directed, is common and safe. Follow directions carefully when using them. See your healthcare provider for new-onset constipation, or long-term constipation, to rule out other causes such as medicines or thyroid disease.  Date Last Reviewed: 7/1/2016 2000-2019 [employer]. [address] [place] [address], [place] [address]. All rights reserved. This information is not intended as a substitute for professional medical care. Always follow your healthcare professional's instructions.

## 2020-07-24 NOTE — PROGRESS NOTES
Subjective     Ileeva Davis is a 79 year old female who presents to clinic today for the following health issues:    HPI   URINARY TRACT SYMPTOMS    Duration: 5-6 days    Description  frequency, odor and back pain    Intensity:  moderate    Accompanying signs and symptoms:  Fever/chills: no   Flank pain YES  Nausea and vomiting: no   Vaginal symptoms: none  Abdominal/Pelvic Pain: YES- pelvic    History  History of frequent UTI's: YES- Last in April.   History of kidney stones: no   Sexually Active: no   Possibility of pregnancy: No    Precipitating or alleviating factors: None    Therapies tried and outcome: increase fluid intake   Outcome: ongoing    No longer constipated. Has treated with daily miralax.      Has only one kidney. See surgical hx.       Patient Active Problem List   Diagnosis     Hypothyroidism     Generalized anxiety disorder     Benign essential hypertension     GERD (gastroesophageal reflux disease)     Hepatitis     Contact dermatitis     Rosacea     Osteoarthritis     Fibromyalgia     Advanced care planning/counseling discussion     Open wound of knee, leg, and ankle     Degeneration of lumbar or lumbosacral intervertebral disc     Anemia     ACP (advance care planning)     Autoimmune hepatitis (H)     Morbid obesity (H)     Mild major depression (H)     Past Surgical History:   Procedure Laterality Date     APPENDECTOMY       BACK SURGERY  2015    lumbar epidural injection     CARDIAC SURGERY  10/2017    angioplasty     CHOLECYSTECTOMY       COLONOSCOPY  07/27/2017    [facility]     COLONOSCOPY - HIM SCAN  05/15/2001    Small internal hemorrhoids; otherwise normal;[facility]     COLONOSCOPY - HIM SCAN  12/14/2006    Colonoscopy, [facility], [facility]     ENT SURGERY      tonsillectomy     ESOPHAGOGASTRODUODENOSCOPY  07/27/2017    [facility]     EYE SURGERY      bilateral cataract removal     GI SURGERY      anal fistula     GYN SURGERY  1985    [facility]     GYN SURGERY      cessarian x 2     GYN SURGERY       tubal sterilazation     NEPHRECTOMY Right 05/02/2019     ORTHOPEDIC SURGERY      right knee     ORTHOPEDIC SURGERY  1986    plantar fascia release     ORTHOPEDIC SURGERY      left knee     ORTHOPEDIC SURGERY      right trigger finger release     ORTHOPEDIC SURGERY  2013    Right great toe MTPJ fusion, adductor tenotomy,correction of hammer toe     ORTHOPEDIC SURGERY  [id number]    multiple procedures left forefoot       Social History     Tobacco Use     Smoking status: Never Smoker     Smokeless tobacco: Never Used   Substance Use Topics     Alcohol use: No     Family History   Problem Relation Age of Onset     Arthritis Mother         rheumatoid     Heart Disease Mother         CHF     Alcohol/Drug Father         alcoholism     Allergies Father      Thyroid Disease Other      Diabetes No family hx of      Asthma No family hx of          Current Outpatient Medications   Medication Sig Dispense Refill     acetaminophen (TYLENOL) 500 MG tablet Take 500 mg by mouth every 8 hours as needed for mild pain       amitriptyline (ELAVIL) 25 MG tablet TAKE 1 TABLET (25 MG) BY MOUTH AT BEDTIME 30 tablet 1     azaTHIOprine (IMURAN) 50 MG tablet TAKE 2 TABLETS (100 MG) BY MOUTH DAILY 180 tablet 1     ciprofloxacin (CIPRO) 250 MG tablet Take 1 tablet (250 mg) by mouth 2 times daily for 7 days 14 tablet 0     cyclobenzaprine (FLEXERIL) 10 MG tablet TAKE 1 TABLET (10 MG) BY MOUTH 3 TIMES DAILY AS NEEDED FOR MUSCLE SPASMS 90 tablet 1     fluticasone (FLONASE) 50 MCG/ACT nasal spray SPRAY 2 SPRAYS INTO BOTH NOSTRILS DAILY AS NEEDED 16 g 1     levothyroxine (SYNTHROID/LEVOTHROID) 112 MCG tablet TAKE 1 TABLET BY MOUTH EVERY DAY 90 tablet 3     omeprazole (PRILOSEC) 40 MG capsule Take  by mouth daily. Before the same meal daily       Polyethylene Glycol 3350 (MIRALAX PO) Take by mouth daily as needed        propranolol (INDERAL) 20 MG tablet TAKE 1 TABLET (20 MG) BY MOUTH ONCE DAILY 90 tablet 0     NYAMYC 969395 UNIT/GM external  "powder APPLY TO AFFECTED AREA NIGHTLY AS NEEDED FOR RASH (Patient not taking: Reported on 7/24/2020) 60 g 1     Allergies   Allergen Reactions     Fentanyl Other (See Comments)     Severe agitation when used during angiogram     Codeine Sulfate Other (See Comments)     hallucinations     Fentanyl And Related      Other reaction(s): Confusion     Morphine Other (See Comments)     Hallucinations with iv therapy     Tape [Adhesive Tape]      Tramadol Other (See Comments)     hallucination     Recent Labs   Lab Test 09/12/19  0912 04/29/19  1123 04/26/18  1049 07/31/17 11/11/16  1514   LDL 80  --   --   --   --    HDL 51  --   --   --   --    TRIG 123  --   --   --   --    ALT 16  --   --  7 13   CR  --  0.90  --  0.92 0.82   GFRESTIMATED  --  60*  --   --  68   GFRESTBLACK  --  70  --   --  82   POTASSIUM  --  3.8  --  3.6 3.8   TSH 0.13*  --  0.94  --   --       BP Readings from Last 3 Encounters:   07/24/20 124/74   04/22/20 136/76   04/06/20 130/88    Wt Readings from Last 3 Encounters:   07/24/20 91.6 kg (202 lb)   04/22/20 90.7 kg (200 lb)   04/06/20 88.9 kg (196 lb)           Reviewed and updated as needed this visit by Provider         Review of Systems   Constitutional, HEENT, cardiovascular, pulmonary, gi and gu systems are negative, except as otherwise noted.      Objective    /74 (BP Location: Right arm, Patient Position: Sitting, Cuff Size: Adult Large)   Pulse 62   Temp 97.8  F (36.6  C) (Tympanic)   Resp 16   Ht 1.549 m (5' 1\")   Wt 91.6 kg (202 lb)   SpO2 100%   BMI 38.17 kg/m    Body mass index is 38.17 kg/m .  Physical Exam   GENERAL: healthy, alert and no distress  EYES: Eyes grossly normal to inspection, PERRL and conjunctivae and sclerae normal  RESP: lungs clear to auscultation - no rales, rhonchi or wheezes  CV: regular rate and rhythm, normal S1 S2, no S3 or S4, no murmur, click or rub, no peripheral edema and peripheral pulses strong  ABDOMEN: soft, nontender and bowel sounds " normal. Exam limited by mobility and habitus.   SKIN: no suspicious lesions or rashes  BACK: no CVA tenderness    Results for orders placed or performed in visit on 07/24/20   *UA reflex to Microscopic and Culture - [facility]     Status: Abnormal    Specimen: Midstream Urine   Result Value Ref Range    Color Urine Yellow     Appearance Urine Slightly Cloudy     Glucose Urine Negative NEG^Negative mg/dL    Bilirubin Urine Negative NEG^Negative    Ketones Urine Negative NEG^Negative mg/dL    Specific Gravity Urine 1.010 1.003 - 1.035    Blood Urine Negative NEG^Negative    pH Urine 7.0 5.0 - 7.0 pH    Protein Albumin Urine Negative NEG^Negative mg/dL    Urobilinogen Urine 0.2 0.2 - 1.0 EU/dL    Nitrite Urine Positive (A) NEG^Negative    Leukocyte Esterase Urine Small (A) NEG^Negative    Source Midstream Urine    CBC with platelets and differential     Status: Abnormal   Result Value Ref Range    WBC 5.0 4.0 - 11.0 10e9/L    RBC Count 2.63 (L) 3.8 - 5.2 10e12/L    Hemoglobin 9.8 (L) 11.7 - 15.7 g/dL    Hematocrit 29.1 (L) 35.0 - 47.0 %     (H) 78 - 100 fl    MCH 37.3 (H) 26.5 - 33.0 pg    MCHC 33.7 31.5 - 36.5 g/dL    RDW 14.1 10.0 - 15.0 %    Platelet Count 185 150 - 450 10e9/L    Diff Method Automated Method    Urine Microscopic     Status: Abnormal   Result Value Ref Range    WBC Urine 5-10 (A) OTO5^0 - 5 /HPF    RBC Urine O - 2 OTO2^O - 2 /HPF    Squamous Epithelial /LPF Urine Few FEW^Few /LPF    Bacteria Urine Moderate (A) NEG^Negative /HPF   Urine Culture Aerobic Bacterial     Status: Abnormal    Specimen: Midstream Urine   Result Value Ref Range    Specimen Description Midstream Urine     Culture Micro (A)      50,000 to 100,000 colonies/mL  Mixed bacterial daisha  No further identification or sensitivity done            Assessment & Plan   1. Urinary frequency  - *UA reflex to Microscopic and Culture - [facility]  - CBC with platelets and differential  - Urine Microscopic  - Urine Culture  "Aerobic Bacterial    2. Complicated UTI (urinary tract infection)  - cefTRIAXone (ROCEPHIN) injection 1 g  - ciprofloxacin (CIPRO) 250 MG tablet; Take 1 tablet (250 mg) by mouth 2 times daily for 7 days  Dispense: 14 tablet; Refill: 0       BMI:   Estimated body mass index is 38.17 kg/m  as calculated from the following:    Height as of this encounter: 1.549 m (5' 1\").    Weight as of this encounter: 91.6 kg (202 lb).   Weight management plan: Discussed healthy diet and exercise guidelines-     No follow-ups on file.    Dayan Hines, [facility]  "

## 2020-07-24 NOTE — NURSING NOTE
"Chief Complaint   Patient presents with     Urinary Problem       Initial /74 (BP Location: Right arm, Patient Position: Sitting, Cuff Size: Adult Large)   Pulse 62   Temp 97.8  F (36.6  C) (Tympanic)   Resp 16   Ht 1.549 m (5' 1\")   Wt 91.6 kg (202 lb)   SpO2 100%   BMI 38.17 kg/m   Estimated body mass index is 38.17 kg/m  as calculated from the following:    Height as of this encounter: 1.549 m (5' 1\").    Weight as of this encounter: 91.6 kg (202 lb).  Medication Reconciliation: complete  Shell Lieberman LPN    "

## 2020-07-26 LAB
BACTERIA SPEC CULT: ABNORMAL
SPECIMEN SOURCE: ABNORMAL

## 2020-08-18 DIAGNOSIS — M79.7 FIBROMYALGIA: ICD-10-CM

## 2020-08-18 DIAGNOSIS — J30.2 SEASONAL ALLERGIC RHINITIS: ICD-10-CM

## 2020-08-18 DIAGNOSIS — J30.2 SEASONAL ALLERGIC RHINITIS, UNSPECIFIED TRIGGER: Primary | ICD-10-CM

## 2020-08-18 DIAGNOSIS — R52 PAIN: ICD-10-CM

## 2020-08-18 DIAGNOSIS — M62.838 MUSCLE SPASM: ICD-10-CM

## 2020-08-18 NOTE — TELEPHONE ENCOUNTER
amitriptyline      Last Written Prescription Date:  7/10/20  Last Fill Quantity: 30,   # refills: 1  Last Office Visit: 7/24/20  Future Office visit:    Next 5 appointments (look out 90 days)    Aug 28, 2020  2:30 PM CDT  (Arrive by 2:15 PM)  Return Visit with Gina Rodriguez [facility] ([facility] ) [address] [place] [id number]  [phone]           Routing refill request to provider for review/approval because:    cyclobenzaprine      Last Written Prescription Date:  6/4/20  Last Fill Quantity: 90,   # refills: 1  Last Office Visit: 7/24/20  Future Office visit:    Next 5 appointments (look out 90 days)    Aug 28, 2020  2:30 PM CDT  (Arrive by 2:15 PM)  Return Visit with Gina Rodriguez [facility] ([facility] ) [address] [place] [address]  [phone]           Routing refill request to provider for review/approval because:    fluticasone      Last Written Prescription Date:  2/7/19  Last Fill Quantity: 16,   # refills: 1  Last Office Visit: 7/24/20  Future Office visit:    Next 5 appointments (look out 90 days)    Aug 28, 2020  2:30 PM CDT  (Arrive by 2:15 PM)  Return Visit with Gina Rodriguez [facility] ([facility] ) [address] [place] [address]  [phone]           Routing refill request to provider for review/approval because:

## 2020-08-19 RX ORDER — FLUTICASONE PROPIONATE 50 MCG
SPRAY, SUSPENSION (ML) NASAL
Qty: 16 G | Refills: 1 | Status: SHIPPED | OUTPATIENT
Start: 2020-08-19 | End: 2022-03-15

## 2020-08-19 RX ORDER — CYCLOBENZAPRINE HCL 10 MG
10 TABLET ORAL 3 TIMES DAILY PRN
Qty: 90 TABLET | Refills: 1 | Status: SHIPPED | OUTPATIENT
Start: 2020-08-19 | End: 2021-02-25

## 2020-08-28 ENCOUNTER — OFFICE VISIT (OUTPATIENT)
Dept: PODIATRY | Facility: OTHER | Age: 80
End: 2020-08-28
Attending: PODIATRIST
Payer: MEDICARE

## 2020-08-28 VITALS
OXYGEN SATURATION: 95 % | SYSTOLIC BLOOD PRESSURE: 122 MMHG | HEART RATE: 72 BPM | BODY MASS INDEX: 39.38 KG/M2 | TEMPERATURE: 96.5 F | DIASTOLIC BLOOD PRESSURE: 80 MMHG | WEIGHT: 208.4 LBS

## 2020-08-28 DIAGNOSIS — M79.7 FIBROMYALGIA: ICD-10-CM

## 2020-08-28 DIAGNOSIS — K75.4 AUTOIMMUNE HEPATITIS (H): ICD-10-CM

## 2020-08-28 DIAGNOSIS — M20.12 HALLUX VALGUS (ACQUIRED), LEFT FOOT: Primary | ICD-10-CM

## 2020-08-28 DIAGNOSIS — M79.672 FOOT PAIN, LEFT: ICD-10-CM

## 2020-08-28 PROCEDURE — 99213 OFFICE O/P EST LOW 20 MIN: CPT | Mod: CS | Performed by: PODIATRIST

## 2020-08-28 PROCEDURE — G0463 HOSPITAL OUTPT CLINIC VISIT: HCPCS

## 2020-08-28 ASSESSMENT — PAIN SCALES - GENERAL: PAINLEVEL: SEVERE PAIN (6)

## 2020-08-28 NOTE — NURSING NOTE
"Chief Complaint   Patient presents with     bunions     discuss surgery        Initial /80 (BP Location: Right arm, Patient Position: Sitting, Cuff Size: Adult Large)   Pulse 72   Temp 96.5  F (35.8  C) (Tympanic)   Wt 94.5 kg (208 lb 6.4 oz)   SpO2 95%   BMI 39.38 kg/m   Estimated body mass index is 39.38 kg/m  as calculated from the following:    Height as of 7/24/20: 1.549 m (5' 1\").    Weight as of this encounter: 94.5 kg (208 lb 6.4 oz).  Medication Reconciliation: complete  Jacinda Meyers LPN    "

## 2020-08-28 NOTE — PROGRESS NOTES
Chief complaint: Patient presents with:  bunions: discuss surgery         History of Present Illness: This 79 year old female with osteoporosis, Hepatitis C, degeneration of lumbosacral intervertebral discs (injections every 6 months), and a history of multiple foot surgeries is being seen with her  for follow-up management of a LEFT foot bunion deformity. The bunion itself does not cause pain, but it is overlapping her 2nd and 3rd digit. This has worsened since her previous appointment in February, 2020, and she would like this fixed. She is requesting a surgical correction. She has tried wearing larger and wide shoes, she has padded the toes multiple ways and she presents with pieces of cotton balls between the digits -- the weight from the hallux on the 2nd digit is just to painful.    No further pedal complaints today.         Historically:    Patient had a RIGHT foot 1st MTPJ fusion, Hammertoe correction digits 2-5, and two neuroma removals in 2014. She later saw the same ortho doctor from [facility] for the LEFT foot to fix more hammertoes and a neuroma. She was told to stretch the bunion manually into place but she says it didn't do much so she stopped stretching it.      /80 (BP Location: Right arm, Patient Position: Sitting, Cuff Size: Adult Large)   Pulse 72   Temp 96.5  F (35.8  C) (Tympanic)   Wt 94.5 kg (208 lb 6.4 oz)   SpO2 95%   BMI 39.38 kg/m      Patient Active Problem List   Diagnosis     Hypothyroidism     Generalized anxiety disorder     Benign essential hypertension     GERD (gastroesophageal reflux disease)     Hepatitis     Contact dermatitis     Rosacea     Osteoarthritis     Fibromyalgia     Advanced care planning/counseling discussion     Open wound of knee, leg, and ankle     Degeneration of lumbar or lumbosacral intervertebral disc     Anemia     ACP (advance care planning)     Autoimmune hepatitis (H)     Morbid obesity (H)     Mild major depression (H)        Past Surgical History:   Procedure Laterality Date     APPENDECTOMY       BACK SURGERY  2015    lumbar epidural injection     CARDIAC SURGERY  10/2017    angioplasty     CHOLECYSTECTOMY       COLONOSCOPY  07/27/2017    [facility]     COLONOSCOPY - HIM SCAN  05/15/2001    Small internal hemorrhoids; otherwise normal;[facility]     COLONOSCOPY - HIM SCAN  12/14/2006    [facility]onoscopy, [facility]     ENT SURGERY      tonsillectomy     ESOPHAGOGASTRODUODENOSCOPY  07/27/2017    [facility]     EYE SURGERY      bilateral cataract removal     GI SURGERY      anal fistula     GYN SURGERY  1985    [facility]     GYN SURGERY      cessarian x 2     GYN SURGERY      tubal sterilazation     NEPHRECTOMY Right 05/02/2019     ORTHOPEDIC SURGERY      right knee     ORTHOPEDIC SURGERY  1986    plantar fascia release     ORTHOPEDIC SURGERY      left knee     ORTHOPEDIC SURGERY      right trigger finger release     ORTHOPEDIC SURGERY  2013    Right great toe MTPJ fusion, adductor tenotomy,correction of hammer toe     ORTHOPEDIC SURGERY  [id number]    multiple procedures left forefoot       Current Outpatient Medications   Medication     acetaminophen (TYLENOL) 500 MG tablet     amitriptyline (ELAVIL) 25 MG tablet     azaTHIOprine (IMURAN) 50 MG tablet     cyclobenzaprine (FLEXERIL) 10 MG tablet     fluticasone (FLONASE) 50 MCG/ACT nasal spray     levothyroxine (SYNTHROID/LEVOTHROID) 112 MCG tablet     NYAMYC [id number] UNIT/GM external powder     omeprazole (PRILOSEC) 40 MG capsule     Polyethylene Glycol 3350 (MIRALAX PO)     propranolol (INDERAL) 20 MG tablet     No current facility-administered medications for this visit.           Allergies   Allergen Reactions     Fentanyl Other (See Comments)     Severe agitation when used during angiogram     Codeine Sulfate Other (See Comments)     hallucinations     Fentanyl And Related      Other reaction(s): Confusion     Morphine Other (See Comments)     Hallucinations with iv therapy     Tape  [Adhesive Tape]      Tramadol Other (See Comments)     hallucination       ROS: 10 point ROS neg other than the symptoms noted above in the HPI.  EXAM  Constitutional: healthy, alert and no distress    Psychiatric: mentation appears normal and affect normal/bright    VASCULAR:  -Dorsalis pedis pulse +2/4 b/l  -Posterior tibial pulse weakly palpable secondary to edema  -1+ pitting edema to b/l ankles and dorsum of foot b/l  NEURO:  -Light touch sensation intact to b/l plantar forefoot  DERM:  -Cicatrix to LEFT foot 2nd and 3rd digit and 1st interspace.  -Cicatrix to multiple locations of RIGHT forefoot  MSK:  -Severe hallux abducto valgus of LEFT, overridding 2nd and 3rd digits, minimally reducible and unable to reduce enough to offload the 2nd digit  -Multiple medial and lateral deviations at the IPJs of the lesser digits  -Flexion deformity of RIGHT hallux IPJ  -No motion to RIGHT 1st MTPJ (previous arthrodesis)  -LEFT 2nd and 3rd digit and RIGHT lesser digits with no ROM at PIPJ (previous fusions)    RADIOGRAPHS LEFT FOOT 2 VIEWS 02/10/2017 [facility] REPORT    Exam Accession# [id number]    XR FOOT LEFT 2 VIEWS    HISTORY: 76 years Female Left foot pain;     COMPARISON: 11/14/2014    TECHNIQUE:2 views left foot were obtained.      FINDINGS:There is osteopenia. There is hallux valgus with old healed resection along the medial aspect of the first metatarsal head. There are degenerative changes of the mid foot. There is no evidence of fracture or dislocation.      IMPRESSION: Osteopenia with degenerative changes of the midfoot. There is no significant change from the prior study.    RADIOGRAPHS RIGHT FOOT 2 VIEWS 02/10/2017 [facility] REPORT  This document is currently in Final Status    Exam Accession# [id number]    XR FOOT RIGHT 2 VIEWS    HISTORY: 76 years Female Right foot pain;     COMPARISON: 11/14/2014    TECHNIQUE:Right foot 2 views      FINDINGS:There is screw-plate arthrodesis of the first  metatarsophalangeal articulation. This was present on the prior study. Again seen are healed surgical resections of the proximal second and third phalanges.    There is advanced osteoarthritic change of the first cuneiform, first metatarsal articulation. There is normal in size of fracture or dislocation.      IMPRESSION: No significant interval change.    LEFT FOOT RADIOGRAPH 08/17/2028  IMPRESSION:   1. Fairly severe bunion deformity with lateral dislocation of medial  and lateral sesamoids.  2. Degenerative change in multiple interphalangeal joints with  postsurgical changes and PIP joints of second and third toes.  3. Degenerative change in the midfoot.     RADHAMES DUBON MD        ============================================================    ASSESSMENT:  (M20.12) Hallux valgus (acquired), left foot  (primary encounter diagnosis)    (M79.672) Foot pain, left    (K75.4) Autoimmune hepatitis (H)    (M79.7) Fibromyalgia      PLAN:  -Patient evaluated and examined. Treatment options discussed with no educational barriers noted.  -Reviewed radiographs from 2018. Patient had a RIGHT 1st MTPJ fusion from several years ago (she thinks 2015ish). Discussed risks and benefits of this procedure for the LEFT foot. Patient only has pain from the hallux overriding the 2nd digit while the bunion does not cause her pain. She would be off her foot for 6+ weeks. She has a recent history of moderate-to-severe back pain which could also be worsened with offloading devices.   ---Discussed amputation of the 2nd digit risks and benefits (risks include but are not limited to the hallux valgus could worsen and then overlap the 3rd digit). Patient was in agreement to this procedure, but she fears the hallux will cause the same issue with the 3rd digit since it is already starting to overlap the 3rd digit.  ---Discussed Bethea osteotomy and risks and benefits (risks including but not limited to loss of balance, transfer lesions with  pressure being transferred to other areas of the foot, flail toe, there is not full correction of the hallux valgus, recurrence of hallux valgus). This has a shorter post-op recovery.    -Reviewed the above procedures in detail with the patient and showed her what is involved with the procedures on her x-rays as well as a diagram of a foot on a piece of paper what the procedure entails. After an in depth discussion of her surgical options, she has opted for a Bethea osteotomy.  --------------------------------------------  -Discussed conservative and surgical treatment options with patient. The patient has exhausted conservative treatment including wider shoe gear, splinting, padding and would like to proceed with surgery.  ---Discussed risks and benefits of a Bethea osteotomy with possible interpositional arthroplasty with a graft procedure. Benefits include decreased pain for a more functional lifestyle. Potential risks could include but are not limited to post-op infection (severe bone infections can lead to amputations and can be life threatening), continued foot pain after healing from the surgery, the potential need for a revision, flail toe, loss of balance while walking, recurrence of the hallux valgus, and transfer pain to other areas of the foot that develop increased pressure after removing the 1st MTPJ.    ---Patient will need an H&P by his PCP within 30 days of the surgery and a COVID test four days before surgery  ---Pain medication will be discussed with patient's PCP. Patient says she does well with dilaudid, but she may do well with hydrocodone or percocet. Patient was concerned about her kidneys and allergies, so Dr. Jones was sent a message on 08/28/2020. Medications will be ordered the day of surgery. Pain medication will not be refilled past two weeks post-surgery -- this will be discussed with patient the day of surgery.  ---Patient will be placed in a post-op shoe post  surgery.    -Surgery order not yet placed    Surgery: Bethea arthroplasty with possible interpositional arthroplasty using a graft  DOS: 09/21/2020  Reason: Hallux valgus of left foot, left foot pain      -Patient in agreement with the above plan      RTC one week post op for dressing change and radiographs  RTC two weeks post op for suture removal      Gina Rodriguez DPM

## 2020-08-28 NOTE — PATIENT INSTRUCTIONS
Thank you for allowing  and our Podiatry team to participate in your care. Please call our office at [phone] with scheduling questions or with any other questions or concerns.

## 2020-08-29 ENCOUNTER — TELEPHONE (OUTPATIENT)
Dept: FAMILY MEDICINE | Facility: OTHER | Age: 80
End: 2020-08-29

## 2020-08-29 NOTE — TELEPHONE ENCOUNTER
Received a PA from [employer] for Cyclobenzaprine HCI 10 mg tablets. Submitted on M. Waiting for a response.

## 2020-09-01 ENCOUNTER — TRANSFERRED RECORDS (OUTPATIENT)
Dept: HEALTH INFORMATION MANAGEMENT | Facility: CLINIC | Age: 80
End: 2020-09-01

## 2020-09-05 NOTE — TELEPHONE ENCOUNTER
Received an APPROVAL from [employer] for Cyclobenzaprine HCI 10 mg tablets. Effective 05/31/2020 to 11/27/2020. Forms scanned to [facility].

## 2020-09-07 DIAGNOSIS — M79.7 FIBROMYALGIA: ICD-10-CM

## 2020-09-07 DIAGNOSIS — E03.9 HYPOTHYROIDISM, UNSPECIFIED TYPE: ICD-10-CM

## 2020-09-08 ENCOUNTER — PREP FOR PROCEDURE (OUTPATIENT)
Dept: PODIATRY | Facility: OTHER | Age: 80
End: 2020-09-08

## 2020-09-08 DIAGNOSIS — Z20.822 COVID-19 RULED OUT: Primary | ICD-10-CM

## 2020-09-08 DIAGNOSIS — M20.12 HALLUX VALGUS OF LEFT FOOT: Primary | ICD-10-CM

## 2020-09-08 DIAGNOSIS — M79.672 LEFT FOOT PAIN: ICD-10-CM

## 2020-09-09 RX ORDER — LEVOTHYROXINE SODIUM 112 UG/1
TABLET ORAL
Qty: 90 TABLET | Refills: 3 | Status: SHIPPED | OUTPATIENT
Start: 2020-09-09 | End: 2020-09-15

## 2020-09-09 NOTE — TELEPHONE ENCOUNTER
Amitriptyline 25 mg      Last Written Prescription Date:  8/19/20  Last Fill Quantity: 30,   # refills: 1  Last Office Visit: 7/24/20  Future Office visit:    Next 5 appointments (look out 90 days)    Sep 15, 2020  9:30 AM CDT  (Arrive by 9:15 AM)  Pre-Op physical with Iris Becerril MD  [facility] ([facility] ) [address]  [place] [address]  [phone]   Sep 17, 2020 10:00 AM CDT  (Arrive by 9:45 AM)  SHORT with [facility]  [facility] [address] [place] [address]  [phone]           Routing refill request to provider for review/approval because:      Levothyroxine 112 mcg      Last Written Prescription Date:  9/16/19  Last Fill Quantity: 90,   # refills: 3  Last Office Visit: 7/24/20  Future Office visit:    Next 5 appointments (look out 90 days)    Sep 15, 2020  9:30 AM CDT  (Arrive by 9:15 AM)  Pre-Op physical with Iris Becerril MD  [facility] ([facility] ) [address]  [place] [address]  [phone]   Sep 17, 2020 10:00 AM CDT  (Arrive by 9:45 AM)  SHORT with [facility]OT [facility]  [facility] ([facility] ) [address] [place] [address]  [phone]           Routing refill request to provider for review/approval because:

## 2020-09-13 LAB
SARS-COV-2 RNA SPEC QL NAA+PROBE: NORMAL
SPECIMEN SOURCE: NORMAL

## 2020-09-14 ENCOUNTER — ANESTHESIA EVENT (OUTPATIENT)
Dept: SURGERY | Facility: HOSPITAL | Age: 80
End: 2020-09-14
Payer: MEDICARE

## 2020-09-14 DIAGNOSIS — Z01.818 PREOP EXAMINATION: Primary | ICD-10-CM

## 2020-09-14 NOTE — H&P (VIEW-ONLY)
[facility]  [address]  [place] [address]  [phone]  Dept: [phone]    PRE-OP EVALUATION:  Today's date: 9/15/2020    Ileana Davis (: 1940) presents for pre-operative evaluation assessment as requested by Dr. Rodriguez.  She requires evaluation and anesthesia risk assessment prior to undergoing surgery/procedure for treatment of left foot pain .    Proposed Surgery/ Procedure: Bethea Arthroplasty with possible interpositional arthoplasty using graft  Date of Surgery/ Procedure: 20  Time of Surgery/ Procedure: [facility]  Hospital/Surgical Facility: [facility]  Surgery Fax Number: Note does not need to be faxed, will be available electronically in Epic.  Primary Physician: Iris Becerril  Type of Anesthesia Anticipated: to be determined    Preoperative Questionnaire:   No - Have you ever had a heart attack or stroke?  No - Have you ever had surgery on your heart or blood vessels, such as a stent, coronary (heart) bypass, or surgery on an artery in the head, neck, heart, or legs?  No - Do you have chest pain when you are physically active?  No - Do you have a history of heart failure?  No - Do you currently have a cold, bronchitis, or symptoms of other respiratory (head and chest) infections?  No - Do you have a cough, shortness of breath, or wheezing?  No - Do you or anyone in your family have a history of blood clots?  No - Do you or anyone in your family have a serious bleeding problem, such as long-lasting bleeding after surgeries or cuts?  YES - HAVE YOU EVERY HAD ANEMIA OR BEEN TOLD TO TAKE IRON PILLS? A few times  No - Have you had any abnormal blood loss such as black, tarry or bloody stools, or abnormal vaginal bleeding?  YES - HAVE YOU EVER HAD A BLOOD TRANSFUSION? When she gave child birth  Yes - Are you willing to have a blood transfusion if it is medically needed before, during, or after your surgery?  No - Have you or  anyone in your family ever had problems with anesthesia (sedation for surgery)?  No - Do you have sleep apnea, excessive snoring, or daytime drowsiness?   No - Do you have any artifical heart valves or other implanted medical devices, such as a pacemaker, defibrillator, or continuous glucose monitor?  YES - DO YOU HAVE ANY ARTIFICIAL JOINTS? Both knees  No - Are you allergic to latex?  No - Is there any chance that you may be pregnant?    Patient has a Health Care Directive or Living Will:  YES on file per pt    HPI:     HPI related to upcoming procedure: Symptomatic bunion of left foot      DEPRESSION - Patient has a long history of Depression of moderate severity requiring medication for control with recent symptoms being stable..Current symptoms of depression include none.     HYPERTENSION - Patient has longstanding history of HTN , currently denies any symptoms referable to elevated blood pressure. Specifically denies chest pain, palpitations, dyspnea, orthopnea, PND or peripheral edema. Blood pressure readings have been in normal range. Current medication regimen is as listed below. Patient denies any side effects of medication.     HYPOTHYROIDISM - Patient has a longstanding history of chronic Hypothyroidism. Patient has been doing well, noting no tremor, insomnia, hair loss or changes in skin texture. Continues to take medications as directed, without adverse reactions or side effects. Last TSH   Lab Results   Component Value Date    TSH 0.18 (L) 09/15/2020   .        MEDICAL HISTORY:     Patient Active Problem List    Diagnosis Date Noted     Hallux valgus of left foot 09/08/2020     Priority: Medium     Added automatically from request for surgery [id number]       Left foot pain 09/08/2020     Priority: Medium     Added automatically from request for surgery [id number]       Mild major depression (H) 04/26/2018     Priority: Medium     Morbid obesity (H) 08/28/2017     Priority: Medium     ACP (advance care  planning) 06/02/2016     Priority: Medium     Advance Care Planning 6/2/2016: ACP Review of Chart / Resources Provided:  Reviewed chart for advance care plan.  Ileana Davis has been provided information and resources to begin or update their advance care plan.  Added by Shelly Syed           Autoimmune hepatitis (H) 06/02/2016     Priority: Medium     Anemia 08/20/2015     Priority: Medium     Degeneration of lumbar or lumbosacral intervertebral disc 01/30/2015     Priority: Medium     Open wound of knee, leg, and ankle      Priority: Medium     Hypothyroidism 03/27/2013     Priority: Medium     Generalized anxiety disorder 03/27/2013     Priority: Medium     Benign essential hypertension 03/27/2013     Priority: Medium     GERD (gastroesophageal reflux disease) 03/27/2013     Priority: Medium     Hepatitis 03/27/2013     Priority: Medium     Contact dermatitis 03/27/2013     Priority: Medium     Rosacea 03/27/2013     Priority: Medium     Osteoarthritis 03/27/2013     Priority: Medium     Fibromyalgia 03/27/2013     Priority: Medium     Advanced care planning/counseling discussion 09/21/2012     Priority: Medium      Past Medical History:   Diagnosis Date     Anemia 8/20/2015     Autoimmune hepatitis (H) 6/2/2016     Benign paroxysmal positional vertigo 10/7/2010     Contact dermatitis and other eczema, due to unspecified cause 10/7/2010     Esophageal reflux 10/7/2010     Generalized anxiety disorder 10/7/2010     Generalized osteoarthrosis, involving multiple sites 10/7/2010     Hepatitis, unspecified 10/7/2010     Infected wound      Myalgia and myositis, unspecified 10/7/2010    fibromyalgia     Nonallopathic lesion of cervical region, not elsewhere classified 12/5/2001     Nonallopathic lesion of thoracic region, not elsewhere classified 3/31/2005     Open wound of knee, leg, and ankle      Rosacea 10/7/2010     Unspecified essential hypertension 10/7/2010     Unspecified hypothyroidism 10/7/2010      Past Surgical History:   Procedure Laterality Date     APPENDECTOMY       BACK SURGERY  2015    lumbar epidural injection     CARDIAC SURGERY  10/2017    angioplasty     CHOLECYSTECTOMY       COLONOSCOPY  07/27/2017    [facility]     COLONOSCOPY - HIM SCAN  05/15/2001    Small internal hemorrhoids; otherwise normal;[facility], [facility]     COLONOSCOPY - HIM SCAN  12/14/2006    Colonoscopy, [facility], [facility]     ENT SURGERY      tonsillectomy     ESOPHAGOGASTRODUODENOSCOPY  07/27/2017    [facility]     EYE SURGERY      bilateral cataract removal     GI SURGERY      anal fistula     GYN SURGERY  1985    [facility]     GYN SURGERY      cessarian x 2     GYN SURGERY      tubal sterilazation     NEPHRECTOMY Right 05/02/2019     ORTHOPEDIC SURGERY      right knee     ORTHOPEDIC SURGERY  1986    plantar fascia release     ORTHOPEDIC SURGERY      left knee     ORTHOPEDIC SURGERY      right trigger finger release     ORTHOPEDIC SURGERY  2013    Right great toe MTPJ fusion, adductor tenotomy,correction of hammer toe     ORTHOPEDIC SURGERY  [id number]    multiple procedures left forefoot     Current Outpatient Medications   Medication Sig Dispense Refill     acetaminophen (TYLENOL) 500 MG tablet Take 500 mg by mouth every 8 hours as needed for mild pain       amitriptyline (ELAVIL) 25 MG tablet TAKE 1 TABLET (25 MG) BY MOUTH AT BEDTIME 30 tablet 1     azaTHIOprine (IMURAN) 50 MG tablet TAKE 2 TABLETS (100 MG) BY MOUTH DAILY 180 tablet 1     cyclobenzaprine (FLEXERIL) 10 MG tablet TAKE 1 TABLET (10 MG) BY MOUTH 3 TIMES DAILY AS NEEDED FOR MUSCLE SPASMS 90 tablet 1     fluticasone (FLONASE) 50 MCG/ACT nasal spray SPRAY 2 SPRAYS INTO BOTH NOSTRILS DAILY AS NEEDED 16 g 1     levothyroxine (SYNTHROID/LEVOTHROID) 112 MCG tablet TAKE 1 TABLET BY MOUTH EVERY DAY 90 tablet 3     NYAMYC 530591 UNIT/GM external powder APPLY TO AFFECTED AREA NIGHTLY AS NEEDED FOR RASH 60 g 1     omeprazole (PRILOSEC) 40 MG capsule Take  by mouth daily. Before the same meal  "daily       Polyethylene Glycol 3350 (MIRALAX PO) Take by mouth daily as needed        propranolol (INDERAL) 20 MG tablet TAKE 1 TABLET (20 MG) BY MOUTH ONCE DAILY 90 tablet 0     OTC products: None, except as noted above    Allergies   Allergen Reactions     Fentanyl Other (See Comments)     Severe agitation when used during angiogram     Codeine Sulfate Other (See Comments)     hallucinations     Fentanyl And Related      Other reaction(s): Confusion     Morphine Other (See Comments)     Hallucinations with iv therapy     Tape [Adhesive Tape]      Tramadol Other (See Comments)     hallucination      Latex Allergy: NO    Social History     Tobacco Use     Smoking status: Never Smoker     Smokeless tobacco: Never Used   Substance Use Topics     Alcohol use: No     History   Drug Use No       REVIEW OF SYSTEMS:   Constitutional, neuro, ENT, endocrine, pulmonary, cardiac, gastrointestinal, genitourinary, musculoskeletal, integument and psychiatric systems are negative, except as otherwise noted - patient does bring up at end of visit mild dysuria.    EXAM:   /78 (BP Location: Right arm, Patient Position: Sitting, Cuff Size: Adult Regular)   Pulse 68   Temp 96.6  F (35.9  C) (Tympanic)   Resp 16   Ht 1.549 m (5' 1\")   Wt 94.3 kg (208 lb)   SpO2 99%   BMI 39.30 kg/m      GENERAL APPEARANCE: healthy, alert and no distress     EYES: EOMI, PERRL     HENT: nose and mouth without ulcers or lesions     NECK: no adenopathy     RESP: lungs clear to auscultation - no rales, rhonchi or wheezes     CV: regular rates and rhythm, normal S1 S2, no S3 or S4 and no murmur, click or rub     SKIN: mild bruising noted     NEURO: Normal strength and tone, sensory exam grossly normal, mentation intact and speech normal     PSYCH: mentation appears normal. and affect normal/bright    DIAGNOSTICS:     EKG: appears normal, NSR, normal axis, normal intervals, no acute ST/T changes c/w ischemia, no LVH by voltage criteria, unchanged " from previous tracings    Labs Resulted Today:   Results for orders placed or performed in visit on 09/15/20   CBC with platelets     Status: Abnormal   Result Value Ref Range    WBC 6.3 4.0 - 11.0 10e9/L    RBC Count 2.76 (L) 3.8 - 5.2 10e12/L    Hemoglobin 10.4 (L) 11.7 - 15.7 g/dL    Hematocrit 30.2 (L) 35.0 - 47.0 %     (H) 78 - 100 fl    MCH 37.7 (H) 26.5 - 33.0 pg    MCHC 34.4 31.5 - 36.5 g/dL    RDW 14.2 10.0 - 15.0 %    Platelet Count 192 150 - 450 10e9/L   TSH with free T4 reflex     Status: Abnormal   Result Value Ref Range    TSH 0.18 (L) 0.40 - 4.00 mU/L   *UA reflex to Microscopic and Culture - [facility]     Status: Abnormal    Specimen: Midstream Urine   Result Value Ref Range    Color Urine Yellow     Appearance Urine Slightly Cloudy     Glucose Urine Negative NEG^Negative mg/dL    Bilirubin Urine Negative NEG^Negative    Ketones Urine Negative NEG^Negative mg/dL    Specific Gravity Urine 1.010 1.003 - 1.035    Blood Urine Negative NEG^Negative    pH Urine 7.0 5.0 - 7.0 pH    Protein Albumin Urine Negative NEG^Negative mg/dL    Urobilinogen Urine 0.2 0.2 - 1.0 EU/dL    Nitrite Urine Negative NEG^Negative    Leukocyte Esterase Urine Large (A) NEG^Negative    Source Midstream Urine    Urine Microscopic     Status: Abnormal   Result Value Ref Range    WBC Urine 25-50 (A) OTO5^0 - 5 /HPF    RBC Urine O - 2 OTO2^O - 2 /HPF    Squamous Epithelial /LPF Urine Few FEW^Few /LPF    Bacteria Urine Few (A) NEG^Negative /HPF   T4 free     Status: Abnormal   Result Value Ref Range    T4 Free 1.61 (H) 0.76 - 1.46 ng/dL       Recent Labs   Lab Test 07/24/20  1156 03/12/20  1554 04/29/19  1123  07/31/17 11/11/16  1514   HGB 9.8* 9.6* 11.4*   < >  --    < > 11.4*    194 205   < >  --    < > 213   INR  --   --   --   --  1.3*  --   --    NA  --   --  138  --   --   --  137   POTASSIUM  --   --  3.8  --  3.6  --  3.8   CR  --   --  0.90  --  0.92  --  0.82    < > = values in this interval not  displayed.        IMPRESSION:   Reason for surgery/procedure: Symptomatic bunion  Diagnosis/reason for consult: Cardiopulmonary clearance    The proposed surgical procedure is considered INTERMEDIATE risk.    REVISED CARDIAC RISK INDEX  The patient has the following serious cardiovascular risks for perioperative complications such as (MI, PE, VFib and 3  AV Block):  No serious cardiac risks  INTERPRETATION: 0 risks: Class I (very low risk - 0.4% complication rate)    The patient has the following additional risks for perioperative complications:  Poor pain tolerance - had behavioral issues with morphine and fentanyl      ICD-10-CM    1. Preop general physical exam  Z01.818 CBC with platelets     EKG 12-lead complete w/read - (Clinic Performed)     Urine Microscopic     T4 free   2. Hallux valgus of left foot  M20.12    3. Left foot pain  M79.672    4. Mild major depression (H)  F32.0    5. Hypothyroidism, unspecified type  E03.9 TSH with free T4 reflex   6. Benign essential hypertension  I10    7. Dysuria  R30.0 *UA reflex to Microscopic and Culture - [facility]   8. Abnormal findings on microbiological examination of urine  R82.79 Urine Culture Aerobic Bacterial       RECOMMENDATIONS:       Cardiovascular Risk  Patient is already on a Beta Blocker. Continue Betablocker therapy after surgery, using Beta blocker order set as necessary for NPO status.      --Patient is to take all scheduled medications on the day of surgery EXCEPT for modifications listed below.    Anticoagulant or Antiplatelet Medication Use  Hold all ASA/NSAIDs/Vitamins/Supplements 7-10 days prior to procedure         APPROVAL GIVEN to proceed with proposed procedure, without further diagnostic evaluation  Preoperative COVID testing already scheduled       Signed Electronically by: Iris Becerril MD    Copy of this evaluation report is provided to requesting physician.    [facility] Preop Guidelines    Revised Cardiac Risk Index

## 2020-09-14 NOTE — PATIENT INSTRUCTIONS

## 2020-09-14 NOTE — ANESTHESIA PREPROCEDURE EVALUATION
Anesthesia Pre-Procedure Evaluation    Patient: Ileana Davis   MRN: [id number] : 1940          Preoperative Diagnosis: Hallux valgus of left foot [M20.12]  Left foot pain [M79.672]    Procedure(s):  Bethea Arthroplasty with possible interpositional arthroplasty using graft.    Past Medical History:   Diagnosis Date     Anemia 2015     Autoimmune hepatitis (H) 2016     Benign paroxysmal positional vertigo 10/7/2010     Contact dermatitis and other eczema, due to unspecified cause 10/7/2010     Esophageal reflux 10/7/2010     Generalized anxiety disorder 10/7/2010     Generalized osteoarthrosis, involving multiple sites 10/7/2010     Hepatitis, unspecified 10/7/2010     Infected wound      Myalgia and myositis, unspecified 10/7/2010    fibromyalgia     Nonallopathic lesion of cervical region, not elsewhere classified 2001     Nonallopathic lesion of thoracic region, not elsewhere classified 3/31/2005     Open wound of knee, leg, and ankle      Rosacea 10/7/2010     Unspecified essential hypertension 10/7/2010     Unspecified hypothyroidism 10/7/2010     Past Surgical History:   Procedure Laterality Date     APPENDECTOMY       BACK SURGERY      lumbar epidural injection     CARDIAC SURGERY  10/2017    angioplasty     CHOLECYSTECTOMY       COLONOSCOPY  2017    [facility]     COLONOSCOPY - HIM SCAN  05/15/2001    Small internal hemorrhoids; otherwise normal;[facility]     COLONOSCOPY - HIM SCAN  2006    Colonoscopy, [facility]     ENT SURGERY      tonsillectomy     ESOPHAGOGASTRODUODENOSCOPY  2017    [facility]     EYE SURGERY      bilateral cataract removal     GI SURGERY      anal fistula     GYN SURGERY  1985    [facility]     GYN SURGERY      cessarian x 2     GYN SURGERY      tubal sterilazation     NEPHRECTOMY Right 2019     ORTHOPEDIC SURGERY      right knee     ORTHOPEDIC SURGERY  1986    plantar fascia release     ORTHOPEDIC SURGERY      left knee     ORTHOPEDIC  SURGERY      right trigger finger release     ORTHOPEDIC SURGERY  2013    Right great toe MTPJ fusion, adductor tenotomy,correction of hammer toe     ORTHOPEDIC SURGERY  [id number]    multiple procedures left forefoot       Anesthesia Evaluation     . Pt has had prior anesthetic. Type: MAC, General and Regional    No history of anesthetic complications          ROS/MED HX    ENT/Pulmonary:     (+)AKI risk factors hypertension, obese, , . .    Neurologic:  - neg neurologic ROS   (+)other neuro vertigo    Cardiovascular:     (+) hypertension----. : . . . :. . Previous cardiac testing date:results:date: results:ECG reviewed date:9/15/20 results:nsr date: results:          METS/Exercise Tolerance:  3 - Able to walk 1-2 blocks without stopping   Hematologic:     (+) Anemia, -      Musculoskeletal:   (+) arthritis,  other musculoskeletal- fibromyalgia      GI/Hepatic:     (+) GERD Asymptomatic on medication, hepatitis type Other, liver disease,       Renal/Genitourinary:     (+) Other Renal/ Genitourinary, one kidney      Endo:     (+) thyroid problem hypothyroidism, Obesity, .      Psychiatric:     (+) psychiatric history anxiety and depression      Infectious Disease:         Malignancy:      - no malignancy   Other:    (+) C-spine cleared: N/A, H/O Chronic Pain,                        Physical Exam  Normal systems: cardiovascular and pulmonary    Airway   Mallampati: III  TM distance: <3 FB  Neck ROM: full    Dental   (+) upper dentures and lower dentures    Cardiovascular   Rhythm and rate: regular and normal      Pulmonary    breath sounds clear to auscultation            Lab Results   Component Value Date    WBC 5.0 07/24/2020    HGB 9.8 (L) 07/24/2020    HCT 29.1 (L) 07/24/2020     07/24/2020     04/29/2019    POTASSIUM 3.8 04/29/2019    CHLORIDE 103 04/29/2019    CO2 28 04/29/2019    BUN 15 04/29/2019    CR 0.90 04/29/2019    GLC 96 04/29/2019    CASSI 8.7 04/29/2019    ALBUMIN 3.3 (L) 09/12/2019     "PROTTOTAL 6.7 (L) 09/12/2019    ALT 16 09/12/2019    AST 14 09/12/2019    ALKPHOS 82 09/12/2019    BILITOTAL 0.5 09/12/2019    INR 1.3 (H) 07/31/2017    TSH 0.13 (L) 09/12/2019    T4 1.28 09/12/2019       Preop Vitals  BP Readings from Last 3 Encounters:   08/28/20 122/80   07/24/20 124/74   04/22/20 136/76    Pulse Readings from Last 3 Encounters:   08/28/20 72   07/24/20 62   04/22/20 67      Resp Readings from Last 3 Encounters:   07/24/20 16   04/22/20 18   04/06/20 20    SpO2 Readings from Last 3 Encounters:   08/28/20 95%   07/24/20 100%   04/22/20 99%      Temp Readings from Last 1 Encounters:   08/28/20 96.5  F (35.8  C) (Tympanic)    Ht Readings from Last 1 Encounters:   07/24/20 1.549 m (5' 1\")      Wt Readings from Last 1 Encounters:   08/28/20 94.5 kg (208 lb 6.4 oz)    Estimated body mass index is 39.38 kg/m  as calculated from the following:    Height as of 7/24/20: 1.549 m (5' 1\").    Weight as of 8/28/20: 94.5 kg (208 lb 6.4 oz).       Anesthesia Plan      History & Physical Review  History and physical reviewed and following examination; no interval change.    ASA Status:  3 .    NPO Status:  > 8 hours    Plan for MAC with Intravenous and Propofol induction. Maintenance will be TIVA.  Reason for MAC:  Deep or markedly invasive procedure (G8) and Extreme anxiety (QS)  PONV prophylaxis:  Ondansetron (or other 5HT-3) and Dexamethasone or Solumedrol  Risks and benefits of MAC anesthetic discussed including dental damage, aspiration, loss of airway, conversion to general anesthetic, CV complications, MI, stroke, death. Pt wishes to proceed.    9/15/20        Postoperative Care  Postoperative pain management:  IV analgesics.      Consents  Anesthetic plan, risks, benefits and alternatives discussed with:  Patient..                 KEO Carvalho CRNA  "

## 2020-09-14 NOTE — PROGRESS NOTES
[facility]  [address]  [place] [address]  [phone]  Dept: [phone]    PRE-OP EVALUATION:  Today's date: 9/15/2020    Ileana Davis (: 1940) presents for pre-operative evaluation assessment as requested by Dr. Rodriguez.  She requires evaluation and anesthesia risk assessment prior to undergoing surgery/procedure for treatment of left foot pain .    Proposed Surgery/ Procedure: Bethea Arthroplasty with possible interpositional arthoplasty using graft  Date of Surgery/ Procedure: 20  Time of Surgery/ Procedure: [facility]  Hospital/Surgical Facility: [facility]  Surgery Fax Number: Note does not need to be faxed, will be available electronically in Epic.  Primary Physician: Iris Becerril  Type of Anesthesia Anticipated: to be determined    Preoperative Questionnaire:   No - Have you ever had a heart attack or stroke?  No - Have you ever had surgery on your heart or blood vessels, such as a stent, coronary (heart) bypass, or surgery on an artery in the head, neck, heart, or legs?  No - Do you have chest pain when you are physically active?  No - Do you have a history of heart failure?  No - Do you currently have a cold, bronchitis, or symptoms of other respiratory (head and chest) infections?  No - Do you have a cough, shortness of breath, or wheezing?  No - Do you or anyone in your family have a history of blood clots?  No - Do you or anyone in your family have a serious bleeding problem, such as long-lasting bleeding after surgeries or cuts?  YES - HAVE YOU EVERY HAD ANEMIA OR BEEN TOLD TO TAKE IRON PILLS? A few times  No - Have you had any abnormal blood loss such as black, tarry or bloody stools, or abnormal vaginal bleeding?  YES - HAVE YOU EVER HAD A BLOOD TRANSFUSION? When she gave child birth  Yes - Are you willing to have a blood transfusion if it is medically needed before, during, or after your surgery?  No - Have you or  anyone in your family ever had problems with anesthesia (sedation for surgery)?  No - Do you have sleep apnea, excessive snoring, or daytime drowsiness?   No - Do you have any artifical heart valves or other implanted medical devices, such as a pacemaker, defibrillator, or continuous glucose monitor?  YES - DO YOU HAVE ANY ARTIFICIAL JOINTS? Both knees  No - Are you allergic to latex?  No - Is there any chance that you may be pregnant?    Patient has a Health Care Directive or Living Will:  YES on file per pt    HPI:     HPI related to upcoming procedure: Symptomatic bunion of left foot      DEPRESSION - Patient has a long history of Depression of moderate severity requiring medication for control with recent symptoms being stable..Current symptoms of depression include none.     HYPERTENSION - Patient has longstanding history of HTN , currently denies any symptoms referable to elevated blood pressure. Specifically denies chest pain, palpitations, dyspnea, orthopnea, PND or peripheral edema. Blood pressure readings have been in normal range. Current medication regimen is as listed below. Patient denies any side effects of medication.     HYPOTHYROIDISM - Patient has a longstanding history of chronic Hypothyroidism. Patient has been doing well, noting no tremor, insomnia, hair loss or changes in skin texture. Continues to take medications as directed, without adverse reactions or side effects. Last TSH   Lab Results   Component Value Date    TSH 0.18 (L) 09/15/2020   .        MEDICAL HISTORY:     Patient Active Problem List    Diagnosis Date Noted     Hallux valgus of left foot 09/08/2020     Priority: Medium     Added automatically from request for surgery [id number]       Left foot pain 09/08/2020     Priority: Medium     Added automatically from request for surgery [id number]       Mild major depression (H) 04/26/2018     Priority: Medium     Morbid obesity (H) 08/28/2017     Priority: Medium     ACP (advance care  planning) 06/02/2016     Priority: Medium     Advance Care Planning 6/2/2016: ACP Review of Chart / Resources Provided:  Reviewed chart for advance care plan.  Ileana Davis has been provided information and resources to begin or update their advance care plan.  Added by Shelly Syed           Autoimmune hepatitis (H) 06/02/2016     Priority: Medium     Anemia 08/20/2015     Priority: Medium     Degeneration of lumbar or lumbosacral intervertebral disc 01/30/2015     Priority: Medium     Open wound of knee, leg, and ankle      Priority: Medium     Hypothyroidism 03/27/2013     Priority: Medium     Generalized anxiety disorder 03/27/2013     Priority: Medium     Benign essential hypertension 03/27/2013     Priority: Medium     GERD (gastroesophageal reflux disease) 03/27/2013     Priority: Medium     Hepatitis 03/27/2013     Priority: Medium     Contact dermatitis 03/27/2013     Priority: Medium     Rosacea 03/27/2013     Priority: Medium     Osteoarthritis 03/27/2013     Priority: Medium     Fibromyalgia 03/27/2013     Priority: Medium     Advanced care planning/counseling discussion 09/21/2012     Priority: Medium      Past Medical History:   Diagnosis Date     Anemia 8/20/2015     Autoimmune hepatitis (H) 6/2/2016     Benign paroxysmal positional vertigo 10/7/2010     Contact dermatitis and other eczema, due to unspecified cause 10/7/2010     Esophageal reflux 10/7/2010     Generalized anxiety disorder 10/7/2010     Generalized osteoarthrosis, involving multiple sites 10/7/2010     Hepatitis, unspecified 10/7/2010     Infected wound      Myalgia and myositis, unspecified 10/7/2010    fibromyalgia     Nonallopathic lesion of cervical region, not elsewhere classified 12/5/2001     Nonallopathic lesion of thoracic region, not elsewhere classified 3/31/2005     Open wound of knee, leg, and ankle      Rosacea 10/7/2010     Unspecified essential hypertension 10/7/2010     Unspecified hypothyroidism 10/7/2010      Past Surgical History:   Procedure Laterality Date     APPENDECTOMY       BACK SURGERY  2015    lumbar epidural injection     CARDIAC SURGERY  10/2017    angioplasty     CHOLECYSTECTOMY       COLONOSCOPY  07/27/2017    [facility]     COLONOSCOPY - HIM SCAN  05/15/2001    Small internal hemorrhoids; otherwise normal;[facility]     COLONOSCOPY - HIM SCAN  12/14/2006    Colonoscopy, [facility], [facility]     ENT SURGERY      tonsillectomy     ESOPHAGOGASTRODUODENOSCOPY  07/27/2017    [facility]     EYE SURGERY      bilateral cataract removal     GI SURGERY      anal fistula     GYN SURGERY  1985    [facility]     GYN SURGERY      cessarian x 2     GYN SURGERY      tubal sterilazation     NEPHRECTOMY Right 05/02/2019     ORTHOPEDIC SURGERY      right knee     ORTHOPEDIC SURGERY  1986    plantar fascia release     ORTHOPEDIC SURGERY      left knee     ORTHOPEDIC SURGERY      right trigger finger release     ORTHOPEDIC SURGERY  2013    Right great toe MTPJ fusion, adductor tenotomy,correction of hammer toe     ORTHOPEDIC SURGERY  [id number]    multiple procedures left forefoot     Current Outpatient Medications   Medication Sig Dispense Refill     acetaminophen (TYLENOL) 500 MG tablet Take 500 mg by mouth every 8 hours as needed for mild pain       amitriptyline (ELAVIL) 25 MG tablet TAKE 1 TABLET (25 MG) BY MOUTH AT BEDTIME 30 tablet 1     azaTHIOprine (IMURAN) 50 MG tablet TAKE 2 TABLETS (100 MG) BY MOUTH DAILY 180 tablet 1     cyclobenzaprine (FLEXERIL) 10 MG tablet TAKE 1 TABLET (10 MG) BY MOUTH 3 TIMES DAILY AS NEEDED FOR MUSCLE SPASMS 90 tablet 1     fluticasone (FLONASE) 50 MCG/ACT nasal spray SPRAY 2 SPRAYS INTO BOTH NOSTRILS DAILY AS NEEDED 16 g 1     levothyroxine (SYNTHROID/LEVOTHROID) 112 MCG tablet TAKE 1 TABLET BY MOUTH EVERY DAY 90 tablet 3     NYAMYC 887608 UNIT/GM external powder APPLY TO AFFECTED AREA NIGHTLY AS NEEDED FOR RASH 60 g 1     omeprazole (PRILOSEC) 40 MG capsule Take  by mouth daily. Before the same meal  "daily       Polyethylene Glycol 3350 (MIRALAX PO) Take by mouth daily as needed        propranolol (INDERAL) 20 MG tablet TAKE 1 TABLET (20 MG) BY MOUTH ONCE DAILY 90 tablet 0     OTC products: None, except as noted above    Allergies   Allergen Reactions     Fentanyl Other (See Comments)     Severe agitation when used during angiogram     Codeine Sulfate Other (See Comments)     hallucinations     Fentanyl And Related      Other reaction(s): Confusion     Morphine Other (See Comments)     Hallucinations with iv therapy     Tape [Adhesive Tape]      Tramadol Other (See Comments)     hallucination      Latex Allergy: NO    Social History     Tobacco Use     Smoking status: Never Smoker     Smokeless tobacco: Never Used   Substance Use Topics     Alcohol use: No     History   Drug Use No       REVIEW OF SYSTEMS:   Constitutional, neuro, ENT, endocrine, pulmonary, cardiac, gastrointestinal, genitourinary, musculoskeletal, integument and psychiatric systems are negative, except as otherwise noted - patient does bring up at end of visit mild dysuria.    EXAM:   /78 (BP Location: Right arm, Patient Position: Sitting, Cuff Size: Adult Regular)   Pulse 68   Temp 96.6  F (35.9  C) (Tympanic)   Resp 16   Ht 1.549 m (5' 1\")   Wt 94.3 kg (208 lb)   SpO2 99%   BMI 39.30 kg/m      GENERAL APPEARANCE: healthy, alert and no distress     EYES: EOMI, PERRL     HENT: nose and mouth without ulcers or lesions     NECK: no adenopathy     RESP: lungs clear to auscultation - no rales, rhonchi or wheezes     CV: regular rates and rhythm, normal S1 S2, no S3 or S4 and no murmur, click or rub     SKIN: mild bruising noted     NEURO: Normal strength and tone, sensory exam grossly normal, mentation intact and speech normal     PSYCH: mentation appears normal. and affect normal/bright    DIAGNOSTICS:     EKG: appears normal, NSR, normal axis, normal intervals, no acute ST/T changes c/w ischemia, no LVH by voltage criteria, unchanged " from previous tracings    Labs Resulted Today:   Results for orders placed or performed in visit on 09/15/20   CBC with platelets     Status: Abnormal   Result Value Ref Range    WBC 6.3 4.0 - 11.0 10e9/L    RBC Count 2.76 (L) 3.8 - 5.2 10e12/L    Hemoglobin 10.4 (L) 11.7 - 15.7 g/dL    Hematocrit 30.2 (L) 35.0 - 47.0 %     (H) 78 - 100 fl    MCH 37.7 (H) 26.5 - 33.0 pg    MCHC 34.4 31.5 - 36.5 g/dL    RDW 14.2 10.0 - 15.0 %    Platelet Count 192 150 - 450 10e9/L   TSH with free T4 reflex     Status: Abnormal   Result Value Ref Range    TSH 0.18 (L) 0.40 - 4.00 mU/L   *UA reflex to Microscopic and Culture - [place] [facility]     Status: Abnormal    Specimen: Midstream Urine   Result Value Ref Range    Color Urine Yellow     Appearance Urine Slightly Cloudy     Glucose Urine Negative NEG^Negative mg/dL    Bilirubin Urine Negative NEG^Negative    Ketones Urine Negative NEG^Negative mg/dL    Specific Gravity Urine 1.010 1.003 - 1.035    Blood Urine Negative NEG^Negative    pH Urine 7.0 5.0 - 7.0 pH    Protein Albumin Urine Negative NEG^Negative mg/dL    Urobilinogen Urine 0.2 0.2 - 1.0 EU/dL    Nitrite Urine Negative NEG^Negative    Leukocyte Esterase Urine Large (A) NEG^Negative    Source Midstream Urine    Urine Microscopic     Status: Abnormal   Result Value Ref Range    WBC Urine 25-50 (A) OTO5^0 - 5 /HPF    RBC Urine O - 2 OTO2^O - 2 /HPF    Squamous Epithelial /LPF Urine Few FEW^Few /LPF    Bacteria Urine Few (A) NEG^Negative /HPF   T4 free     Status: Abnormal   Result Value Ref Range    T4 Free 1.61 (H) 0.76 - 1.46 ng/dL       Recent Labs   Lab Test 07/24/20  1156 03/12/20  1554 04/29/19  1123  07/31/17 11/11/16  1514   HGB 9.8* 9.6* 11.4*   < >  --    < > 11.4*    194 205   < >  --    < > 213   INR  --   --   --   --  1.3*  --   --    NA  --   --  138  --   --   --  137   POTASSIUM  --   --  3.8  --  3.6  --  3.8   CR  --   --  0.90  --  0.92  --  0.82    < > = values in this interval not  displayed.        IMPRESSION:   Reason for surgery/procedure: Symptomatic bunion  Diagnosis/reason for consult: Cardiopulmonary clearance    The proposed surgical procedure is considered INTERMEDIATE risk.    REVISED CARDIAC RISK INDEX  The patient has the following serious cardiovascular risks for perioperative complications such as (MI, PE, VFib and 3  AV Block):  No serious cardiac risks  INTERPRETATION: 0 risks: Class I (very low risk - 0.4% complication rate)    The patient has the following additional risks for perioperative complications:  Poor pain tolerance - had behavioral issues with morphine and fentanyl      ICD-10-CM    1. Preop general physical exam  Z01.818 CBC with platelets     EKG 12-lead complete w/read - (Clinic Performed)     Urine Microscopic     T4 free   2. Hallux valgus of left foot  M20.12    3. Left foot pain  M79.672    4. Mild major depression (H)  F32.0    5. Hypothyroidism, unspecified type  E03.9 TSH with free T4 reflex   6. Benign essential hypertension  I10    7. Dysuria  R30.0 *UA reflex to Microscopic and Culture - [place] [facility]   8. Abnormal findings on microbiological examination of urine  R82.79 Urine Culture Aerobic Bacterial       RECOMMENDATIONS:       Cardiovascular Risk  Patient is already on a Beta Blocker. Continue Betablocker therapy after surgery, using Beta blocker order set as necessary for NPO status.      --Patient is to take all scheduled medications on the day of surgery EXCEPT for modifications listed below.    Anticoagulant or Antiplatelet Medication Use  Hold all ASA/NSAIDs/Vitamins/Supplements 7-10 days prior to procedure         APPROVAL GIVEN to proceed with proposed procedure, without further diagnostic evaluation  Preoperative COVID testing already scheduled       Signed Electronically by: Iris Becerril MD    Copy of this evaluation report is provided to requesting physician.    [facility] Preop Guidelines    Revised Cardiac Risk Index

## 2020-09-15 ENCOUNTER — OFFICE VISIT (OUTPATIENT)
Dept: FAMILY MEDICINE | Facility: OTHER | Age: 80
End: 2020-09-15
Attending: FAMILY MEDICINE
Payer: MEDICARE

## 2020-09-15 ENCOUNTER — TRANSFERRED RECORDS (OUTPATIENT)
Dept: HEALTH INFORMATION MANAGEMENT | Facility: CLINIC | Age: 80
End: 2020-09-15

## 2020-09-15 VITALS
DIASTOLIC BLOOD PRESSURE: 78 MMHG | SYSTOLIC BLOOD PRESSURE: 128 MMHG | BODY MASS INDEX: 39.27 KG/M2 | OXYGEN SATURATION: 99 % | HEART RATE: 68 BPM | TEMPERATURE: 96.6 F | WEIGHT: 208 LBS | HEIGHT: 61 IN | RESPIRATION RATE: 16 BRPM

## 2020-09-15 DIAGNOSIS — M79.672 LEFT FOOT PAIN: ICD-10-CM

## 2020-09-15 DIAGNOSIS — E03.9 HYPOTHYROIDISM, UNSPECIFIED TYPE: ICD-10-CM

## 2020-09-15 DIAGNOSIS — I10 BENIGN ESSENTIAL HYPERTENSION: ICD-10-CM

## 2020-09-15 DIAGNOSIS — R30.0 DYSURIA: ICD-10-CM

## 2020-09-15 DIAGNOSIS — Z01.818 PREOP GENERAL PHYSICAL EXAM: Primary | ICD-10-CM

## 2020-09-15 DIAGNOSIS — M20.12 HALLUX VALGUS OF LEFT FOOT: ICD-10-CM

## 2020-09-15 DIAGNOSIS — R82.79 ABNORMAL FINDINGS ON MICROBIOLOGICAL EXAMINATION OF URINE: ICD-10-CM

## 2020-09-15 DIAGNOSIS — F32.0 MILD MAJOR DEPRESSION (H): ICD-10-CM

## 2020-09-15 LAB
ALBUMIN UR-MCNC: NEGATIVE MG/DL
APPEARANCE UR: ABNORMAL
BACTERIA #/AREA URNS HPF: ABNORMAL /HPF
BILIRUB UR QL STRIP: NEGATIVE
COLOR UR AUTO: YELLOW
ERYTHROCYTE [DISTWIDTH] IN BLOOD BY AUTOMATED COUNT: 14.2 % (ref 10–15)
GLUCOSE UR STRIP-MCNC: NEGATIVE MG/DL
HCT VFR BLD AUTO: 30.2 % (ref 35–47)
HGB BLD-MCNC: 10.4 G/DL (ref 11.7–15.7)
HGB UR QL STRIP: NEGATIVE
KETONES UR STRIP-MCNC: NEGATIVE MG/DL
LEUKOCYTE ESTERASE UR QL STRIP: ABNORMAL
MCH RBC QN AUTO: 37.7 PG (ref 26.5–33)
MCHC RBC AUTO-ENTMCNC: 34.4 G/DL (ref 31.5–36.5)
MCV RBC AUTO: 109 FL (ref 78–100)
NITRATE UR QL: NEGATIVE
NON-SQ EPI CELLS #/AREA URNS LPF: ABNORMAL /LPF
PH UR STRIP: 7 PH (ref 5–7)
PLATELET # BLD AUTO: 192 10E9/L (ref 150–450)
RBC # BLD AUTO: 2.76 10E12/L (ref 3.8–5.2)
RBC #/AREA URNS AUTO: ABNORMAL /HPF
SOURCE: ABNORMAL
SP GR UR STRIP: 1.01 (ref 1–1.03)
T4 FREE SERPL-MCNC: 1.61 NG/DL (ref 0.76–1.46)
TSH SERPL DL<=0.005 MIU/L-ACNC: 0.18 MU/L (ref 0.4–4)
UROBILINOGEN UR STRIP-ACNC: 0.2 EU/DL (ref 0.2–1)
WBC # BLD AUTO: 6.3 10E9/L (ref 4–11)
WBC #/AREA URNS AUTO: ABNORMAL /HPF

## 2020-09-15 PROCEDURE — G0463 HOSPITAL OUTPT CLINIC VISIT: HCPCS

## 2020-09-15 PROCEDURE — 36415 COLL VENOUS BLD VENIPUNCTURE: CPT | Mod: ZL | Performed by: FAMILY MEDICINE

## 2020-09-15 PROCEDURE — 84439 ASSAY OF FREE THYROXINE: CPT | Mod: ZL | Performed by: FAMILY MEDICINE

## 2020-09-15 PROCEDURE — 87086 URINE CULTURE/COLONY COUNT: CPT | Mod: ZL | Performed by: FAMILY MEDICINE

## 2020-09-15 PROCEDURE — 84443 ASSAY THYROID STIM HORMONE: CPT | Mod: ZL | Performed by: FAMILY MEDICINE

## 2020-09-15 PROCEDURE — 87088 URINE BACTERIA CULTURE: CPT | Mod: ZL | Performed by: FAMILY MEDICINE

## 2020-09-15 PROCEDURE — 87186 SC STD MICRODIL/AGAR DIL: CPT | Mod: ZL | Performed by: FAMILY MEDICINE

## 2020-09-15 PROCEDURE — 93010 ELECTROCARDIOGRAM REPORT: CPT | Performed by: INTERNAL MEDICINE

## 2020-09-15 PROCEDURE — 85027 COMPLETE CBC AUTOMATED: CPT | Mod: ZL | Performed by: FAMILY MEDICINE

## 2020-09-15 PROCEDURE — 81001 URINALYSIS AUTO W/SCOPE: CPT | Mod: ZL | Performed by: FAMILY MEDICINE

## 2020-09-15 PROCEDURE — 93005 ELECTROCARDIOGRAM TRACING: CPT

## 2020-09-15 PROCEDURE — 99214 OFFICE O/P EST MOD 30 MIN: CPT | Mod: 25 | Performed by: FAMILY MEDICINE

## 2020-09-15 RX ORDER — LEVOTHYROXINE SODIUM 100 UG/1
112 TABLET ORAL DAILY
Qty: 90 TABLET | Refills: 0 | Status: SHIPPED | OUTPATIENT
Start: 2020-09-15 | End: 2020-12-14

## 2020-09-15 ASSESSMENT — ANXIETY QUESTIONNAIRES
3. WORRYING TOO MUCH ABOUT DIFFERENT THINGS: NOT AT ALL
2. NOT BEING ABLE TO STOP OR CONTROL WORRYING: NOT AT ALL
IF YOU CHECKED OFF ANY PROBLEMS ON THIS QUESTIONNAIRE, HOW DIFFICULT HAVE THESE PROBLEMS MADE IT FOR YOU TO DO YOUR WORK, TAKE CARE OF THINGS AT HOME, OR GET ALONG WITH OTHER PEOPLE: NOT DIFFICULT AT ALL
GAD7 TOTAL SCORE: 2
5. BEING SO RESTLESS THAT IT IS HARD TO SIT STILL: NOT AT ALL
6. BECOMING EASILY ANNOYED OR IRRITABLE: NOT AT ALL
1. FEELING NERVOUS, ANXIOUS, OR ON EDGE: SEVERAL DAYS
7. FEELING AFRAID AS IF SOMETHING AWFUL MIGHT HAPPEN: NOT AT ALL
4. TROUBLE RELAXING: SEVERAL DAYS

## 2020-09-15 ASSESSMENT — PAIN SCALES - GENERAL: PAINLEVEL: WORST PAIN (10)

## 2020-09-15 ASSESSMENT — PATIENT HEALTH QUESTIONNAIRE - PHQ9: SUM OF ALL RESPONSES TO PHQ QUESTIONS 1-9: 10

## 2020-09-15 ASSESSMENT — MIFFLIN-ST. JEOR: SCORE: 1345.86

## 2020-09-15 NOTE — NURSING NOTE
"Chief Complaint   Patient presents with     Pre-Op Exam       Initial /78 (BP Location: Right arm, Patient Position: Sitting, Cuff Size: Adult Regular)   Pulse 68   Temp 96.6  F (35.9  C) (Tympanic)   Resp 16   Ht 1.549 m (5' 1\")   Wt 94.3 kg (208 lb)   SpO2 99%   BMI 39.30 kg/m   Estimated body mass index is 39.3 kg/m  as calculated from the following:    Height as of this encounter: 1.549 m (5' 1\").    Weight as of this encounter: 94.3 kg (208 lb).  Medication Reconciliation: complete  Myla Evans MA  "

## 2020-09-16 ENCOUNTER — PREP FOR PROCEDURE (OUTPATIENT)
Dept: PODIATRY | Facility: OTHER | Age: 80
End: 2020-09-16

## 2020-09-16 DIAGNOSIS — M20.12 HALLUX VALGUS OF LEFT FOOT: Primary | ICD-10-CM

## 2020-09-16 DIAGNOSIS — M79.672 LEFT FOOT PAIN: ICD-10-CM

## 2020-09-16 ASSESSMENT — ANXIETY QUESTIONNAIRES: GAD7 TOTAL SCORE: 2

## 2020-09-17 ENCOUNTER — OFFICE VISIT (OUTPATIENT)
Dept: FAMILY MEDICINE | Facility: OTHER | Age: 80
End: 2020-09-17
Attending: FAMILY MEDICINE
Payer: MEDICARE

## 2020-09-17 DIAGNOSIS — M79.672 LEFT FOOT PAIN: ICD-10-CM

## 2020-09-17 DIAGNOSIS — M20.12 HALLUX VALGUS OF LEFT FOOT: ICD-10-CM

## 2020-09-17 DIAGNOSIS — Z01.818 PREOP EXAMINATION: ICD-10-CM

## 2020-09-17 LAB
BACTERIA SPEC CULT: ABNORMAL
SPECIMEN SOURCE: ABNORMAL

## 2020-09-17 PROCEDURE — 73630 X-RAY EXAM OF FOOT: CPT | Mod: TC,LT,FY

## 2020-09-17 PROCEDURE — U0003 INFECTIOUS AGENT DETECTION BY NUCLEIC ACID (DNA OR RNA); SEVERE ACUTE RESPIRATORY SYNDROME CORONAVIRUS 2 (SARS-COV-2) (CORONAVIRUS DISEASE [COVID-19]), AMPLIFIED PROBE TECHNIQUE, MAKING USE OF HIGH THROUGHPUT TECHNOLOGIES AS DESCRIBED BY CMS-2020-01-R: HCPCS | Mod: ZL | Performed by: PODIATRIST

## 2020-09-18 LAB
SARS-COV-2 RNA SPEC QL NAA+PROBE: NOT DETECTED
SPECIMEN SOURCE: NORMAL

## 2020-09-21 ENCOUNTER — ANESTHESIA (OUTPATIENT)
Dept: SURGERY | Facility: HOSPITAL | Age: 80
End: 2020-09-21
Payer: MEDICARE

## 2020-09-21 ENCOUNTER — HOSPITAL ENCOUNTER (OUTPATIENT)
Facility: HOSPITAL | Age: 80
Discharge: HOME | End: 2020-09-21
Attending: PODIATRIST | Admitting: PODIATRIST
Payer: MEDICARE

## 2020-09-21 ENCOUNTER — APPOINTMENT (OUTPATIENT)
Dept: GENERAL RADIOLOGY | Facility: HOSPITAL | Age: 80
End: 2020-09-21
Attending: PODIATRIST
Payer: MEDICARE

## 2020-09-21 VITALS
TEMPERATURE: 98.2 F | BODY MASS INDEX: 37.98 KG/M2 | SYSTOLIC BLOOD PRESSURE: 167 MMHG | DIASTOLIC BLOOD PRESSURE: 89 MMHG | OXYGEN SATURATION: 98 % | RESPIRATION RATE: 16 BRPM | WEIGHT: 201 LBS | HEART RATE: 61 BPM

## 2020-09-21 DIAGNOSIS — M79.672 LEFT FOOT PAIN: ICD-10-CM

## 2020-09-21 DIAGNOSIS — M20.12 HALLUX VALGUS OF LEFT FOOT: ICD-10-CM

## 2020-09-21 DIAGNOSIS — M20.12 HALLUX VALGUS OF LEFT FOOT: Primary | ICD-10-CM

## 2020-09-21 PROCEDURE — 40000305 ZZH STATISTIC PRE PROC ASSESS I: Performed by: PODIATRIST

## 2020-09-21 PROCEDURE — 28296 COR HLX VLGS DSTL MTAR OSTEO: CPT | Performed by: NURSE ANESTHETIST, CERTIFIED REGISTERED

## 2020-09-21 PROCEDURE — 25000125 ZZHC RX 250: Performed by: PODIATRIST

## 2020-09-21 PROCEDURE — 25000128 H RX IP 250 OP 636: Performed by: NURSE ANESTHETIST, CERTIFIED REGISTERED

## 2020-09-21 PROCEDURE — 27211024 ZZHC OR SUPPLY OTHER OPNP: Performed by: PODIATRIST

## 2020-09-21 PROCEDURE — 27110028 ZZH OR GENERAL SUPPLY NON-STERILE: Performed by: PODIATRIST

## 2020-09-21 PROCEDURE — 36000063 ZZH SURGERY LEVEL 4 EA 15 ADDTL MIN: Performed by: PODIATRIST

## 2020-09-21 PROCEDURE — 37000009 ZZH ANESTHESIA TECHNICAL FEE, EACH ADDTL 15 MIN: Performed by: PODIATRIST

## 2020-09-21 PROCEDURE — 25800030 ZZH RX IP 258 OP 636: Performed by: NURSE ANESTHETIST, CERTIFIED REGISTERED

## 2020-09-21 PROCEDURE — 27210794 ZZH OR GENERAL SUPPLY STERILE: Performed by: PODIATRIST

## 2020-09-21 PROCEDURE — 40000986 XR FOOT LT G/E 3 VW: Mod: TC,LT

## 2020-09-21 PROCEDURE — 28292 COR HLX VLGS RSC PRX PHLX BS: CPT | Mod: LT | Performed by: PODIATRIST

## 2020-09-21 PROCEDURE — C1713 ANCHOR/SCREW BN/BN,TIS/BN: HCPCS | Performed by: PODIATRIST

## 2020-09-21 PROCEDURE — 25000125 ZZHC RX 250: Performed by: NURSE ANESTHETIST, CERTIFIED REGISTERED

## 2020-09-21 PROCEDURE — 37000008 ZZH ANESTHESIA TECHNICAL FEE, 1ST 30 MIN: Performed by: PODIATRIST

## 2020-09-21 PROCEDURE — 36000093 ZZH SURGERY LEVEL 4 1ST 30 MIN: Performed by: PODIATRIST

## 2020-09-21 PROCEDURE — 71000027 ZZH RECOVERY PHASE 2 EACH 15 MINS: Performed by: PODIATRIST

## 2020-09-21 PROCEDURE — C1763 CONN TISS, NON-HUMAN: HCPCS | Performed by: PODIATRIST

## 2020-09-21 PROCEDURE — 99100 ANES PT EXTEME AGE<1 YR&>70: CPT | Performed by: NURSE ANESTHETIST, CERTIFIED REGISTERED

## 2020-09-21 PROCEDURE — C1769 GUIDE WIRE: HCPCS | Performed by: PODIATRIST

## 2020-09-21 DEVICE — IMPLANTABLE DEVICE: Type: IMPLANTABLE DEVICE | Site: FOOT | Status: FUNCTIONAL

## 2020-09-21 RX ORDER — LIDOCAINE 40 MG/G
CREAM TOPICAL
Status: DISCONTINUED | OUTPATIENT
Start: 2020-09-21 | End: 2020-09-21 | Stop reason: HOSPADM

## 2020-09-21 RX ORDER — ONDANSETRON 2 MG/ML
4 INJECTION INTRAMUSCULAR; INTRAVENOUS EVERY 30 MIN PRN
Status: DISCONTINUED | OUTPATIENT
Start: 2020-09-21 | End: 2020-09-21 | Stop reason: HOSPADM

## 2020-09-21 RX ORDER — PROPOFOL 10 MG/ML
INJECTION, EMULSION INTRAVENOUS CONTINUOUS PRN
Status: DISCONTINUED | OUTPATIENT
Start: 2020-09-21 | End: 2020-09-21

## 2020-09-21 RX ORDER — HYDROMORPHONE HYDROCHLORIDE 2 MG/1
2 TABLET ORAL EVERY 6 HOURS PRN
Qty: 12 TABLET | Refills: 0 | Status: SHIPPED | OUTPATIENT
Start: 2020-09-21 | End: 2020-09-24

## 2020-09-21 RX ORDER — NALOXONE HYDROCHLORIDE 0.4 MG/ML
.1-.4 INJECTION, SOLUTION INTRAMUSCULAR; INTRAVENOUS; SUBCUTANEOUS
Status: DISCONTINUED | OUTPATIENT
Start: 2020-09-21 | End: 2020-09-21 | Stop reason: HOSPADM

## 2020-09-21 RX ORDER — CEFAZOLIN SODIUM 1 G/3ML
INJECTION, POWDER, FOR SOLUTION INTRAMUSCULAR; INTRAVENOUS
Status: DISCONTINUED
Start: 2020-09-21 | End: 2020-09-21 | Stop reason: HOSPADM

## 2020-09-21 RX ORDER — SODIUM CHLORIDE, SODIUM LACTATE, POTASSIUM CHLORIDE, CALCIUM CHLORIDE 600; 310; 30; 20 MG/100ML; MG/100ML; MG/100ML; MG/100ML
INJECTION, SOLUTION INTRAVENOUS CONTINUOUS
Status: DISCONTINUED | OUTPATIENT
Start: 2020-09-21 | End: 2020-09-21 | Stop reason: HOSPADM

## 2020-09-21 RX ORDER — MEPERIDINE HYDROCHLORIDE 25 MG/ML
12.5 INJECTION INTRAMUSCULAR; INTRAVENOUS; SUBCUTANEOUS
Status: DISCONTINUED | OUTPATIENT
Start: 2020-09-21 | End: 2020-09-21 | Stop reason: HOSPADM

## 2020-09-21 RX ORDER — ALBUTEROL SULFATE 0.83 MG/ML
2.5 SOLUTION RESPIRATORY (INHALATION) EVERY 4 HOURS PRN
Status: DISCONTINUED | OUTPATIENT
Start: 2020-09-21 | End: 2020-09-21 | Stop reason: HOSPADM

## 2020-09-21 RX ORDER — BUPIVACAINE HYDROCHLORIDE 5 MG/ML
INJECTION, SOLUTION EPIDURAL; INTRACAUDAL
Status: DISCONTINUED
Start: 2020-09-21 | End: 2020-09-21 | Stop reason: HOSPADM

## 2020-09-21 RX ORDER — LABETALOL 20 MG/4 ML (5 MG/ML) INTRAVENOUS SYRINGE
10
Status: DISCONTINUED | OUTPATIENT
Start: 2020-09-21 | End: 2020-09-21 | Stop reason: HOSPADM

## 2020-09-21 RX ORDER — ONDANSETRON 4 MG/1
4 TABLET, ORALLY DISINTEGRATING ORAL EVERY 30 MIN PRN
Status: DISCONTINUED | OUTPATIENT
Start: 2020-09-21 | End: 2020-09-21 | Stop reason: HOSPADM

## 2020-09-21 RX ORDER — LIDOCAINE HYDROCHLORIDE 10 MG/ML
INJECTION, SOLUTION EPIDURAL; INFILTRATION; INTRACAUDAL; PERINEURAL
Status: DISCONTINUED
Start: 2020-09-21 | End: 2020-09-21 | Stop reason: HOSPADM

## 2020-09-21 RX ORDER — HYDROMORPHONE HYDROCHLORIDE 2 MG/1
2 TABLET ORAL EVERY 6 HOURS PRN
Qty: 12 TABLET | Refills: 0 | Status: SHIPPED | OUTPATIENT
Start: 2020-09-21 | End: 2020-09-21

## 2020-09-21 RX ORDER — PROPOFOL 10 MG/ML
INJECTION, EMULSION INTRAVENOUS PRN
Status: DISCONTINUED | OUTPATIENT
Start: 2020-09-21 | End: 2020-09-21

## 2020-09-21 RX ADMIN — PROPOFOL 20 MG: 10 INJECTION, EMULSION INTRAVENOUS at 12:44

## 2020-09-21 RX ADMIN — PROPOFOL 10 MG: 10 INJECTION, EMULSION INTRAVENOUS at 10:21

## 2020-09-21 RX ADMIN — PROPOFOL 10 MG: 10 INJECTION, EMULSION INTRAVENOUS at 10:44

## 2020-09-21 RX ADMIN — PROPOFOL 15 MG: 10 INJECTION, EMULSION INTRAVENOUS at 12:01

## 2020-09-21 RX ADMIN — PROPOFOL 10 MG: 10 INJECTION, EMULSION INTRAVENOUS at 10:23

## 2020-09-21 RX ADMIN — PROPOFOL 20 MG: 10 INJECTION, EMULSION INTRAVENOUS at 12:47

## 2020-09-21 RX ADMIN — SODIUM CHLORIDE, POTASSIUM CHLORIDE, SODIUM LACTATE AND CALCIUM CHLORIDE: 600; 310; 30; 20 INJECTION, SOLUTION INTRAVENOUS at 11:25

## 2020-09-21 RX ADMIN — MIDAZOLAM 1 MG: 1 INJECTION INTRAMUSCULAR; INTRAVENOUS at 09:21

## 2020-09-21 RX ADMIN — PROPOFOL 10 MG: 10 INJECTION, EMULSION INTRAVENOUS at 12:39

## 2020-09-21 RX ADMIN — PROPOFOL 20 MG: 10 INJECTION, EMULSION INTRAVENOUS at 10:41

## 2020-09-21 RX ADMIN — PROPOFOL 20 MG: 10 INJECTION, EMULSION INTRAVENOUS at 10:57

## 2020-09-21 RX ADMIN — PROPOFOL 20 MG: 10 INJECTION, EMULSION INTRAVENOUS at 10:43

## 2020-09-21 RX ADMIN — PROPOFOL 20 MG: 10 INJECTION, EMULSION INTRAVENOUS at 12:41

## 2020-09-21 RX ADMIN — SODIUM CHLORIDE, POTASSIUM CHLORIDE, SODIUM LACTATE AND CALCIUM CHLORIDE: 600; 310; 30; 20 INJECTION, SOLUTION INTRAVENOUS at 08:55

## 2020-09-21 RX ADMIN — PROPOFOL 10 MG: 10 INJECTION, EMULSION INTRAVENOUS at 10:22

## 2020-09-21 RX ADMIN — PROPOFOL 20 MG: 10 INJECTION, EMULSION INTRAVENOUS at 10:42

## 2020-09-21 RX ADMIN — PROPOFOL 35 MCG/KG/MIN: 10 INJECTION, EMULSION INTRAVENOUS at 10:21

## 2020-09-21 RX ADMIN — HYDROMORPHONE HYDROCHLORIDE 0.2 MG: 1 INJECTION, SOLUTION INTRAMUSCULAR; INTRAVENOUS; SUBCUTANEOUS at 10:21

## 2020-09-21 RX ADMIN — HYDROMORPHONE HYDROCHLORIDE 0.2 MG: 1 INJECTION, SOLUTION INTRAMUSCULAR; INTRAVENOUS; SUBCUTANEOUS at 10:47

## 2020-09-21 NOTE — OR NURSING
Patient and responsible adult given discharge instructions with no questions regarding instructions. Les score 20/20. Pain level 0/10.  Discharged from unit via wheelchair. Patient discharged to home.

## 2020-09-21 NOTE — ANESTHESIA CARE TRANSFER NOTE
Patient: Ileeta I Wellander    Procedure(s):  Left  Bethea Arthroplasty with interpositional arthroplasty using graft.    Diagnosis: Hallux valgus of left foot [M20.12]  Left foot pain [M79.672]  Diagnosis Additional Information: No value filed.    Anesthesia Type:   MAC     Note:  Airway :Nasal Cannula  Patient transferred to:Phase II  Handoff Report: Identifed the Patient, Identified the Reponsible Provider, Reviewed the pertinent medical history, Discussed the surgical course, Reviewed Intra-OP anesthesia mangement and issues during anesthesia, Set expectations for post-procedure period and Allowed opportunity for questions and acknowledgement of understanding      Vitals: (Last set prior to Anesthesia Care Transfer)    CRNA VITALS  9/21/2020 1221 - 9/21/2020 1253      9/21/2020             Resp Rate (observed):  (!) 1    Resp Rate (set):  8    EKG:  Sinus rhythm                Electronically Signed By: KEO Almendarez CRNA  September 21, 2020  12:53 PM

## 2020-09-21 NOTE — OP NOTE
[facility] Brief Operative Note    Pre-operative diagnosis: Hallux valgus of left foot [M20.12]  Left foot pain [M79.672]   Post-operative diagnosis Hallux valgus left foot, Left foot pain   Procedure: Procedure(s):  Left  Bethea Arthroplasty with interpositional arthroplasty using graft.   Surgeon: Gina Rodriguez DPM       Estimated blood loss: Less than 50 ml    Specimens: None   Findings: Same as post-op diagnosis         Bethea with interpositional arthroplasty with ArthoFlex Graft    PROCEDURE IN DETAIL:  Under mild sedation, the patient was brought into the OR and placed on the operating room table in a supine position.  A pneumatic ankle tourniquet was placed about the patient's right ankle.  Following IV sedation, local anesthesia was obtained about the entire mid foot utilizing a Mattson block technique with 20  mL of a 1:1 ratio of 0.5% Marcaine plain and 1% lidocaine plain.  The foot was then scrubbed, prepped and draped in the usual aseptic manner.  An Esmarch bandage was utilized to exsanguinate the patient's left foot and the tourniquet was inflated to 250 mmHg.      Attention was directed to the left dorsal medial aspect of the first metatarsal phalangeal joint of the left foot where a 5 cm linear, longitudinal incision was made medial and parallel to the tendon of the extensor hallucis longus and involved the contour of the deformity.  The incision was deepened through the subcutaneous tissues using sharp and blunt dissection.  Care was taken to identify and retract all vital neural and vascular structures.  All bleeders were ligated and cauterized as necessary.  At this time, a linear capsulotomy was performed over the dorsal medial aspect of the first metatarsal phalangeal joint.  The periosteal and capsular structures were then carefully dissected free of their osseous attachments and reflected medially and laterally, thus exposing the head of the first metatarsal at the operative site.       At this time, the patient had too much venous backflow and the surgery site was difficult to see. The pneumatic ankle tourniquet was then deflated after a total of 9 minutes and a prompt hyperemic response was noted to all digits of the left foot. The venous backflow subsided and the remainder of the case was performed without a tourniquet.    Next, utilizing an oscillating bone saw, the dorsal medial prominence was resected and passed from the operative field.  Attention was then directed to the first interspace via the original skin incision where the tendon of the extensor hallucis brevis was identified and transected.  The dissection was continued deep where the conjoined tendon of the adductor hallucis muscle was identified and transected at its attachment to the base of the proximal phalanx.  At this time, the lateral contracture present on the hallux was noted to be mildly reduced but still significant .      The soft tissues were then released from the medial, lateral and plantar aspect of the base of the proximal phalanx and the head of the first metatarsal. The 1st MTPJ was extremely tight and there was not enough room to fit a McGlamry elevator into the joint space.    Utilizing an oscillating bone saw, approximately 2-3mm of bone was removed from the base of the proximal phalanx in a straight plantar to dorsal direction and perpendicular to the shaft of the phalanx.     Next, a guide wire was advanced down the metatarsal shaft from the head of the metatarsal and a size 20 reamer was used that covered the metatarsal head. The reamer was advanced until the metatarsal cartilage and sclerotic bone were fully removed (about 2-3mm in depth).     There was now adequate space in the 1st MTPJ with an improved ROM.    Next, a McGlamry elevator was used to free the remaining plantar structures of the 1st metatarsal head including the sesamoid complex. The McGlamry elevator was help beneath the metatarsal head  "and dorsal to the sesamoids for the next several steps.     Next, two guidewires were placed in the first metatarsal from dorsal to plantar approximately 1.0 to 1.5cm proximal to the metatarsal head with care taken to advance the wires just proximal to the sesamoid apparatus and the wires were aimed in a mild dorsal distal to plantar proximal direction to ensure the sesamoid apparatus would be left intact. A 2.5mm drill bit was advanced over the guidewire until it contacted the McGlamry elevator.     At this time, the ArthroFlex graft was opened and suture tape was secured to one end of the graft. The SutureLasso suture passer was passed through the lateral hole in the metatarsal and grabbed the suture tape from the lateral aspect of the graft and the tail was pulled dorsally through the lateral hole of the first metatarsal. The exact same technique was used with the medial hole. The graft had the \"shiny\" side facing the metatarsal head with the \"dull\" side facing the outside / facing the 1st MTPJ / facing the remaining base of the proximal phalanx. The suture tails were pulled tightly so the graft was fully secured dorsal to the sesamoids and as tight against the plantar metatarsal surface as possible. With tension on the Suture Tape, a 3x8mm Tenodesis screw was advanced into the medial and the lateral holes. However, the lateral hold tenodesis screw would not secure into the bone and easily pulled in and out of the bone due to the soft bone consistency of the patient's osteoporotic bone. The lateral screw was left intact and the two dorsal suture tapes were tied with four surgeon knots over the metatarsal. The knot was not prominent over the bone.     The graft was then advanced dorsally around the metatarsal head and the SutureTape tapes from the dorsal holes were passed through the graft on the dorsal surface of the bone with a needle. The excess graft was trimmed with care to cut the graft shorter than the " "proximal drill hole that would be created proximal to the suture holes with the surgeon knot.    Next, a drill hole was advanced from dorsal to plantar in the central first metatarsal approximately 1cm proximal to the suture tunnels. The suture tapetails were passed through the eyelet of the BioComposite Push Lock anchor and a 2.9mm BioComposite PushLock was advanced into the central bone tunnel. The excess suture tails were then cut flush with the bone using a 15 blade. The overhanging graft on the medial aspect of the metatarsal head was sutures over the metatarsal head. The lateral overhanging graft was secured to the lateral capsule with 3-0 Vicryl.    At this time, range of motion at the 1st MTPJ was noted to be excellent, but there was still a mild contracture of the hallux due to the extensor hallucis longus tendon, so a Z-lengthening of the tendon was performed.     The toe now sat in a more rectus position, was not overlying the 2nd digit, and had good range of motion although the tight skin on the lateral aspect of the toe still held the toe in a mild valgus position.    The capsule of the 1st ray was closed with 3-0 Vicryl. The subcuticular tissues were then reapproximated using 4-0 Vicryl and the skin was reapproximated using 4-0 Prolene using a vertical mattress and horizontal mattress suture technique.      An additional 10 mL of a 1:1 ratio of 0.5% Marcaine plain and 1% lidocaine plain were injected for post-op pain relief.    The operative site was then dressed with Xeroform gauze and a dry sterile dressing consisting of 4 x 4s, cast padding, Kerlix, and a double long 4\" ACE wrap.  A short leg CAM boot was applied to the foot.    The patient tolerated the procedure and anesthesia well and was transferred to the recovery room with vital signs stable and vascular status intact to all toes of the leftfoot.  Following a period of postoperative monitoring, the patient was discharged home with written and " oral postoperative instructions. The patient has been instructed to remained 100% non-weightbearing with a CAM boot and crutches. The patient has Dilauded 2mg for pain (patient has tolerated this well in the past for other orthopedic procedures) and is to call the podiatry clinic with any concerns. The patient is to leave the sterile dressing clean, dry and intact, and is scheduled to present for the first post-operative appointment with podiatry on 09/28/2020 at 10:30 a.m.

## 2020-09-21 NOTE — BRIEF OP NOTE
[facility]    Brief Operative Note    Pre-operative diagnosis: Hallux valgus of left foot [M20.12]  Left foot pain [M79.672]  Post-operative diagnosis Hallux valgus left foot, Left foot pain    Procedure: Procedure(s):  Left  Bethea Arthroplasty with interpositional arthroplasty using graft.  Surgeon: Surgeon(s) and Role:     * Gina Rodriguez DPM - Primary  Anesthesia: Combined MAC with Local   Estimated blood loss: Less than 50 ml  Drains: None  Specimens: * No specimens in log *  Findings:   Same as post-op diagnosis.  Complications: None.  Implants:   Implant Name Type Inv. Item Serial No.  Lot No. LRB No. Used Action   forefoot internal brace implant system PEEK    AR[employer]REX [id number] Left 1 Implanted   [facility] [employer] [facility]     [id number] Left 1 Implanted   SUTURE ANCHOR-BIOCOMPOSITE PUSHLOCK SHORT 2.9MM X 12.5MM  Suture Gem-BioComposite PushLock Short 2.9mm x 12.5mm  ARTHREX [id number] Left 1 Implanted

## 2020-09-21 NOTE — ANESTHESIA POSTPROCEDURE EVALUATION
Patient: Ileeta I Wellander    Procedure(s):  Left  Bethea Arthroplasty with interpositional arthroplasty using graft.    Diagnosis:Hallux valgus of left foot [M20.12]  Left foot pain [M79.672]  Diagnosis Additional Information: No value filed.    Anesthesia Type:  MAC    Note:  Anesthesia Post Evaluation    Patient location during evaluation: Bedside  Patient participation: Able to fully participate in evaluation  Level of consciousness: awake  Pain management: adequate  Airway patency: patent  Cardiovascular status: acceptable  Respiratory status: acceptable  Hydration status: acceptable  PONV: none     Anesthetic complications: None          Last vitals:  Vitals:    09/21/20 1355 09/21/20 1400 09/21/20 1415   BP: 165/87 166/87 167/89   Pulse: 63 61    Resp: 16 16 16   Temp:      SpO2: 96% 98% 98%         Electronically Signed By: KEO Babb CRNA  September 21, 2020  2:58 PM

## 2020-09-24 ENCOUNTER — TELEPHONE (OUTPATIENT)
Dept: PODIATRY | Facility: OTHER | Age: 80
End: 2020-09-24

## 2020-09-24 NOTE — TELEPHONE ENCOUNTER
Returned patient's call, she was wondering if she can take the boot off for a little bit to rest it. Informed patient she can remove the boot for a little bit but must have on when getting up.

## 2020-09-25 ENCOUNTER — DOCUMENTATION ONLY (OUTPATIENT)
Dept: OTHER | Facility: CLINIC | Age: 80
End: 2020-09-25

## 2020-09-28 ENCOUNTER — ANCILLARY PROCEDURE (OUTPATIENT)
Dept: GENERAL RADIOLOGY | Facility: OTHER | Age: 80
End: 2020-09-28
Attending: PODIATRIST
Payer: MEDICARE

## 2020-09-28 ENCOUNTER — OFFICE VISIT (OUTPATIENT)
Dept: PODIATRY | Facility: OTHER | Age: 80
End: 2020-09-28
Attending: PODIATRIST
Payer: MEDICARE

## 2020-09-28 VITALS
OXYGEN SATURATION: 98 % | TEMPERATURE: 96.6 F | DIASTOLIC BLOOD PRESSURE: 80 MMHG | WEIGHT: 201 LBS | BODY MASS INDEX: 37.98 KG/M2 | HEART RATE: 67 BPM | SYSTOLIC BLOOD PRESSURE: 128 MMHG

## 2020-09-28 DIAGNOSIS — K75.4 AUTOIMMUNE HEPATITIS (H): ICD-10-CM

## 2020-09-28 DIAGNOSIS — M79.672 LEFT FOOT PAIN: ICD-10-CM

## 2020-09-28 DIAGNOSIS — M79.7 FIBROMYALGIA: ICD-10-CM

## 2020-09-28 DIAGNOSIS — Z98.890 STATUS POST LEFT FOOT SURGERY: Primary | ICD-10-CM

## 2020-09-28 DIAGNOSIS — G89.18 POST-OP PAIN: ICD-10-CM

## 2020-09-28 DIAGNOSIS — M20.12 HALLUX VALGUS OF LEFT FOOT: ICD-10-CM

## 2020-09-28 PROCEDURE — 99024 POSTOP FOLLOW-UP VISIT: CPT | Performed by: PODIATRIST

## 2020-09-28 PROCEDURE — 73630 X-RAY EXAM OF FOOT: CPT | Mod: TC,LT

## 2020-09-28 PROCEDURE — G0463 HOSPITAL OUTPT CLINIC VISIT: HCPCS

## 2020-09-28 ASSESSMENT — PAIN SCALES - GENERAL: PAINLEVEL: MODERATE PAIN (4)

## 2020-09-28 NOTE — PROGRESS NOTES
Chief complaint: Patient presents with:  Surgical Followup        History of Present Illness: This 80 year old female with osteoporosis, Hepatitis C, degeneration of lumbosacral intervertebral discs (injections every 6 months), and a history of multiple foot surgeries is being seen with her  for post-op management of a LEFT foot Bethea bunionectomy with interpositional graft in the 1st MTPJ (DOS 09/21/2020).    The procedure site was painful for a few days after surgery. She would have the most pain after she had elevated her foot for a period of time and then suddenly placed her foot on the ground. She has been consistently wearing a CAM boot and using a walker while walking. Her pain is much more controlled than it was last week. She has kept her dressing C/D/I.    No further pedal complaints today.         Historically:    Patient had a RIGHT foot 1st MTPJ fusion, Hammertoe correction digits 2-5, and two neuroma removals in 2014. She later saw the same ortho doctor from [facility] for the LEFT foot to fix more hammertoes and a neuroma. She was told to stretch the bunion manually into place but she says it didn't do much so she stopped stretching it.      /80 (BP Location: Left arm, Patient Position: Sitting, Cuff Size: Adult Large)   Pulse 67   Temp 96.6  F (35.9  C) (Tympanic)   Wt 91.2 kg (201 lb)   SpO2 98%   BMI 37.98 kg/m      Patient Active Problem List   Diagnosis     Hypothyroidism     Generalized anxiety disorder     Benign essential hypertension     GERD (gastroesophageal reflux disease)     Hepatitis     Contact dermatitis     Rosacea     Osteoarthritis     Fibromyalgia     Open wound of knee, leg, and ankle     Degeneration of lumbar or lumbosacral intervertebral disc     Anemia     Autoimmune hepatitis (H)     Morbid obesity (H)     Mild major depression (H)     Hallux valgus of left foot     Left foot pain       Past Surgical History:   Procedure Laterality Date      APPENDECTOMY       ARTHROPLASTY TOE(S) Left 9/21/2020    Procedure: Left  Bethea Arthroplasty with interpositional arthroplasty using graft.;  Surgeon: Gina Rodriguez DPM;  Location: [facility] OR     BACK SURGERY  2015    lumbar epidural injection     CARDIAC SURGERY  10/2017    angioplasty     CHOLECYSTECTOMY       COLONOSCOPY  07/27/2017    [facility]     COLONOSCOPY - HIM SCAN  05/15/2001    Small internal hemorrhoids; otherwise normal;[facility]     COLONOSCOPY - HIM SCAN  12/14/2006    Colonoscopy, [facility]     ENT SURGERY      tonsillectomy     ESOPHAGOGASTRODUODENOSCOPY  07/27/2017    [facility]     EYE SURGERY      bilateral cataract removal     GI SURGERY      anal fistula     GYN SURGERY  1985    [facility]SO     GYN SURGERY      cessarian x 2     GYN SURGERY      tubal sterilazation     NEPHRECTOMY Right 05/02/2019     ORTHOPEDIC SURGERY      right knee     ORTHOPEDIC SURGERY  1986    plantar fascia release     ORTHOPEDIC SURGERY      left knee     ORTHOPEDIC SURGERY      right trigger finger release     ORTHOPEDIC SURGERY  2013    Right great toe MTPJ fusion, adductor tenotomy,correction of hammer toe     ORTHOPEDIC SURGERY  [id number]    multiple procedures left forefoot       Current Outpatient Medications   Medication     acetaminophen (TYLENOL) 500 MG tablet     amitriptyline (ELAVIL) 25 MG tablet     azaTHIOprine (IMURAN) 50 MG tablet     cyclobenzaprine (FLEXERIL) 10 MG tablet     fluticasone (FLONASE) 50 MCG/ACT nasal spray     levothyroxine (SYNTHROID/LEVOTHROID) 100 MCG tablet     NYAMYC 272827 UNIT/GM external powder     omeprazole (PRILOSEC) 40 MG capsule     Polyethylene Glycol 3350 (MIRALAX PO)     propranolol (INDERAL) 20 MG tablet     No current facility-administered medications for this visit.           Allergies   Allergen Reactions     Fentanyl Other (See Comments)     Severe agitation when used during angiogram     Codeine Sulfate Other (See Comments)     hallucinations     Fentanyl And Related       Other reaction(s): Confusion     Morphine Other (See Comments)     Hallucinations with iv therapy     Tape [Adhesive Tape]      Tramadol Other (See Comments)     hallucination       ROS: 10 point ROS neg other than the symptoms noted above in the HPI.  EXAM  Constitutional: healthy, alert and no distress    Psychiatric: mentation appears normal and affect normal/bright    LEFT FOOT FOCUSED    VASCULAR:  -Dorsalis pedis pulse +2/4   -Posterior tibial pulse weakly palpable secondary to edema  -Moderate non-pitting edema to ankles and dorsum of foot   NEURO:  -Light touch sensation intact to b/l plantar forefoot  DERM:  -Skin along incision site on LEFT dorsal 1st ray is well approximated with no dehiscence.  ---Sutures intact.  ---Mild erythema (blancheable)  ---Moderate martin-incision edema and moderate forefoot ecchymois to lateral dorsal foot and lesser digits--par with post-operative course.   ---No dark or ascending erythema, no moderate calor, no malodor, no purulence, no other SOI.    MSK:  -Mild lateral deviation of LEFT hallux with no overlapping of the hallux over the 2nd or 3rd digit  -Multiple medial and lateral deviations at the IPJs of the lesser digits  -LEFT 2nd and 3rd digit with no ROM at PIPJ (previous fusions)      LEFT FOOT RADIOGRAPH 08/17/2028  IMPRESSION:   1. Fairly severe bunion deformity with lateral dislocation of medial  and lateral sesamoids.  2. Degenerative change in multiple interphalangeal joints with  postsurgical changes and PIP joints of second and third toes.  3. Degenerative change in the midfoot.     RADHAMES DUBON MD    LEFT FOOT RADIOGRAPH 09/28/2020    IMPRESSION: No change from previous examination on September 21, 2020      TIBURCIO SANDERS MD    ============================================================    ASSESSMENT:  (Z98.890) Status post left foot surgery  (primary encounter diagnosis)    (G89.18) Post-op pain    (K75.4) Autoimmune hepatitis (H)    (M79.7)  "Fibromyalgia      PLAN:  -Patient evaluated and examined. Treatment options discussed with no educational barriers noted.    Post op: Bethea arthroplasty with possible interpositional arthroplasty using a graft  DOS: 09/21/2020  Reason: Hallux valgus of left foot, left foot pain    -Reviewed radiographs from surgery 09/21/2020) with patient.   ---One week post-op radiographs ordered today for 09/28/2020 -- patient has good alignment of the hallux with minimal changes from surgery. She will be called with the results.  -Patient's post op pain is much more controlled to last week. She is only taking half a tablet of her Dilaudid and she will call if her pain worsens or if she continues to have night pain.  -Incision site was C/D/I with no SOI (No severe erythema, no ascending erythema, no calor, no purulence, no malodor, no other SOI). She has good correction of the toe with the hallux close to a rectus position with no overlapping of the 2nd and 3rd digit.  -Changed dressed with Xeroform, gauze, gauze lightly placed to interdigital spaces (care not to strangulate digits) and a gauze folded twice and placed in the first interspace as a spacer. Wrapped with gauze, Kerlix and double long 4\" ACE wrap.  ---Patient to change dressing once around Friday (10/02/2020) or Saturday (10/03/2020), but check incision earlier with any SOI (increased pain or concerns with incisions site and previously reviewed if she has F/C/N/V/chest pain/SOB/wheezing or anything abnormal, that she should go straight to the ED).    -Patient is very worried about having her sutures removed in one week since she had a bad experience with her sutures being removed from a previous surgery at another location. Prolene sutures were used this time instead of Nylon which may help with ease of suture removal. If she still has pain, she may choose to have a local anesthetic to numb the incision site. Reviewed these options with her and she will determine " this at her next appointment based on pain.  -Patient and her  are in agreement with the above plan      RTC one week post op for suture removal      Gina Rodriguez DPM

## 2020-09-28 NOTE — PATIENT INSTRUCTIONS
-Change dressing once before your next appointment (around Friday, 10/02/2020, or Saturday, 10/03/2020).    -Place a small strip of Xeroform (yellow gauze) over the incision site  -Cover Xeroform with gauze  -Fold a gauze and place between the big toe and second toe as a spacer  -Wrap foot gently with Kerlix and an ACE bandage (ACE bandage up to the level of the knee)

## 2020-09-28 NOTE — NURSING NOTE
"Chief Complaint   Patient presents with     Surgical Followup       Initial /80 (BP Location: Left arm, Patient Position: Sitting, Cuff Size: Adult Large)   Pulse 67   Temp 96.6  F (35.9  C) (Tympanic)   Wt 91.2 kg (201 lb)   SpO2 98%   BMI 37.98 kg/m   Estimated body mass index is 37.98 kg/m  as calculated from the following:    Height as of 9/15/20: 1.549 m (5' 1\").    Weight as of this encounter: 91.2 kg (201 lb).  Medication Reconciliation: complete  Jacinda Meyers LPN    "

## 2020-09-29 DIAGNOSIS — G89.18 POST-OP PAIN: Primary | ICD-10-CM

## 2020-09-29 RX ORDER — HYDROMORPHONE HYDROCHLORIDE 2 MG/1
1 TABLET ORAL EVERY 6 HOURS PRN
Qty: 8 TABLET | Refills: 0 | Status: SHIPPED | OUTPATIENT
Start: 2020-09-29 | End: 2020-10-02

## 2020-09-29 NOTE — PROGRESS NOTES
Patient is still having some pain. She is taking 1/2 a tablet of Dilaudid as needed. Will do one more refill of her medications with eight tablets.

## 2020-10-07 ENCOUNTER — OFFICE VISIT (OUTPATIENT)
Dept: PODIATRY | Facility: OTHER | Age: 80
End: 2020-10-07
Attending: PODIATRIST
Payer: MEDICARE

## 2020-10-07 VITALS
HEART RATE: 66 BPM | HEIGHT: 61 IN | TEMPERATURE: 98 F | OXYGEN SATURATION: 100 % | BODY MASS INDEX: 37.95 KG/M2 | DIASTOLIC BLOOD PRESSURE: 74 MMHG | SYSTOLIC BLOOD PRESSURE: 124 MMHG | WEIGHT: 201 LBS | RESPIRATION RATE: 16 BRPM

## 2020-10-07 DIAGNOSIS — Z98.890 STATUS POST LEFT FOOT SURGERY: Primary | ICD-10-CM

## 2020-10-07 DIAGNOSIS — K75.4 AUTOIMMUNE HEPATITIS (H): ICD-10-CM

## 2020-10-07 DIAGNOSIS — M79.7 FIBROMYALGIA: ICD-10-CM

## 2020-10-07 PROCEDURE — 99024 POSTOP FOLLOW-UP VISIT: CPT | Performed by: PODIATRIST

## 2020-10-07 PROCEDURE — G0463 HOSPITAL OUTPT CLINIC VISIT: HCPCS

## 2020-10-07 RX ORDER — HYDROMORPHONE HYDROCHLORIDE 2 MG/1
1 TABLET ORAL EVERY 6 HOURS PRN
COMMUNITY
End: 2020-10-21

## 2020-10-07 ASSESSMENT — PAIN SCALES - GENERAL: PAINLEVEL: MODERATE PAIN (4)

## 2020-10-07 ASSESSMENT — MIFFLIN-ST. JEOR: SCORE: 1314.11

## 2020-10-07 NOTE — PROGRESS NOTES
"Chief complaint: Patient presents with:  Surgical Followup: left foot bunionectomy 9- having some pain if moving around more than she should        History of Present Illness: This 80 year old female with osteoporosis, Hepatitis C, degeneration of lumbosacral intervertebral discs (injections every 6 months), and a history of multiple foot surgeries is being seen with her  for post-op management of a LEFT foot Bethea bunionectomy with interpositional graft in the 1st MTPJ (DOS 09/21/2020).    She has changed the dressing once with a Xeroform, gauze and Kerlix and ACE wrap dressing. Her  applied the ACE bandage to her leg this past weekend and it was very painful, but she loosened the ACE bandage and the pain resolved. She still has some edema to the dorsal foot and she still has some foot pain, but overall she says she is doing well.    She has been consistently wearing a CAM boot and using a walker while walking. She has not let her foot get wet in the shower or tub.    No further pedal complaints today.         Historically:    Patient had a RIGHT foot 1st MTPJ fusion, Hammertoe correction digits 2-5, and two neuroma removals in 2014. She later saw the same ortho doctor from [facility] for the LEFT foot to fix more hammertoes and a neuroma. She was told to stretch the bunion manually into place but she says it didn't do much so she stopped stretching it.      /74 (BP Location: Left arm, Cuff Size: Adult Regular)   Pulse 66   Temp 98  F (36.7  C) (Tympanic)   Resp 16   Ht 1.549 m (5' 1\")   Wt 91.2 kg (201 lb)   SpO2 100%   BMI 37.98 kg/m      Patient Active Problem List   Diagnosis     Hypothyroidism     Generalized anxiety disorder     Benign essential hypertension     GERD (gastroesophageal reflux disease)     Hepatitis     Contact dermatitis     Rosacea     Osteoarthritis     Fibromyalgia     Open wound of knee, leg, and ankle     Degeneration of lumbar or lumbosacral " intervertebral disc     Anemia     Autoimmune hepatitis (H)     Morbid obesity (H)     Mild major depression (H)     Hallux valgus of left foot     Left foot pain       Past Surgical History:   Procedure Laterality Date     APPENDECTOMY       ARTHROPLASTY TOE(S) Left 9/21/2020    Procedure: Left  Bethea Arthroplasty with interpositional arthroplasty using graft.;  Surgeon: Gina Rodriguez DPM;  Location: [facility]     BACK SURGERY  2015    lumbar epidural injection     CARDIAC SURGERY  10/2017    angioplasty     CHOLECYSTECTOMY       COLONOSCOPY  07/27/2017    [facility]     COLONOSCOPY - HIM SCAN  05/15/2001    Small internal hemorrhoids; otherwise normal;[facility]     COLONOSCOPY - HIM SCAN  12/14/2006    Colonoscopy, [facility], [facility]     ENT SURGERY      tonsillectomy     ESOPHAGOGASTRODUODENOSCOPY  07/27/2017    [facility]     EYE SURGERY      bilateral cataract removal     GI SURGERY      anal fistula     GYN SURGERY  1985    [facility]     GYN SURGERY      cessarian x 2     GYN SURGERY      tubal sterilazation     NEPHRECTOMY Right 05/02/2019     ORTHOPEDIC SURGERY      right knee     ORTHOPEDIC SURGERY  1986    plantar fascia release     ORTHOPEDIC SURGERY      left knee     ORTHOPEDIC SURGERY      right trigger finger release     ORTHOPEDIC SURGERY  2013    Right great toe MTPJ fusion, adductor tenotomy,correction of hammer toe     ORTHOPEDIC SURGERY  [id number]    multiple procedures left forefoot       Current Outpatient Medications   Medication     acetaminophen (TYLENOL) 500 MG tablet     amitriptyline (ELAVIL) 25 MG tablet     azaTHIOprine (IMURAN) 50 MG tablet     cyclobenzaprine (FLEXERIL) 10 MG tablet     fluticasone (FLONASE) 50 MCG/ACT nasal spray     HYDROmorphone (DILAUDID) 2 MG tablet     levothyroxine (SYNTHROID/LEVOTHROID) 100 MCG tablet     NYAMYC [id number] UNIT/GM external powder     omeprazole (PRILOSEC) 40 MG capsule     Polyethylene Glycol 3350 (MIRALAX PO)     propranolol (INDERAL) 20 MG tablet     No  current facility-administered medications for this visit.           Allergies   Allergen Reactions     Fentanyl Other (See Comments)     Severe agitation when used during angiogram     Codeine Sulfate Other (See Comments)     hallucinations     Fentanyl And Related      Other reaction(s): Confusion     Morphine Other (See Comments)     Hallucinations with iv therapy     Tape [Adhesive Tape]      Tramadol Other (See Comments)     hallucination       ROS: 10 point ROS neg other than the symptoms noted above in the HPI.  EXAM  Constitutional: healthy, alert and no distress    Psychiatric: mentation appears normal and affect normal/bright    LEFT FOOT FOCUSED    VASCULAR:  -Dorsalis pedis pulse +2/4   -Posterior tibial pulse weakly palpable secondary to edema  -Moderate non-pitting edema to ankles and dorsum of foot   NEURO:  -Light touch sensation intact to b/l plantar forefoot  DERM:  -Skin along incision site on LEFT dorsal 1st ray is well approximated with no dehiscence.  ---Sutures intact  ---Mild-to-minimal erythema (blancheable)  ---Mild-to-Moderate martin-incision edema   ---Mild ecchymosis to proximal digits -- greatly improved and par with post-operative course.   ---No dark or ascending erythema, no moderate calor, no malodor, no purulence, no other SOI.    MSK:  -Mild lateral deviation of LEFT hallux with no overlapping of the hallux over the 2nd or 3rd digit  -Multiple medial and lateral deviations at the IPJs of the lesser digits  -LEFT 2nd and 3rd digit with no ROM at PIPJ (previous fusions)      LEFT FOOT RADIOGRAPH 08/17/2028  IMPRESSION:   1. Fairly severe bunion deformity with lateral dislocation of medial  and lateral sesamoids.  2. Degenerative change in multiple interphalangeal joints with  postsurgical changes and PIP joints of second and third toes.  3. Degenerative change in the midfoot.     RADHAMES DUBON MD    LEFT FOOT RADIOGRAPH 09/28/2020    IMPRESSION: No change from previous examination  on September 21, 2020      TIBURCIO SANDERS MD    ============================================================    ASSESSMENT:  (Z98.890) Status post left foot surgery  (primary encounter diagnosis)    (K75.4) Autoimmune hepatitis (H)    (M79.7) Fibromyalgia      PLAN:  -Patient evaluated and examined. Treatment options discussed with no educational barriers noted.    Post op: Bethea arthroplasty with possible interpositional arthroplasty using a graft  DOS: 09/21/2020  Reason: Hallux valgus of left foot, left foot pain    -Removed sutures   ---Left two sutures intact to the central incision site where the skin edges appeared to have some tension.  ---Patient had minimal pain with suture removal  ---Applied tincture to the martin-incision site(s)  ---Applied steri-strips over the incision site(s)  ---Patient is to leave the steri-strips in place until they fall off on their own  ---Patient to wait 48 hours prior to getting the incision site wet in the shower  ---Patient instructed to continue to monitor the incision sites for SOI (redness, swelling, pain, purulence, fever, chills, nausea, vomiting) and return to podiatry or the Emergency Department immediately if there are any noticeable SOI.  -Offloading: Continue wearing CAM boot until final two sutures are removed    -Dressing: Applied dry gauze and tape over incision site. Folded gauze three times and placed as a spacer in the first interspace for scar tissue to adhere in a more rectus position of the hallux. Patient to continue with this spacer daily and change the dressing with dry gauze every few days.  -Keep dressing clean and dry until the final two sutures are removed    -Patient and her  are in agreement with the above plan      RTC two weeks for removal of final two sutures and for final post-op radiograph if she is healed with minimal pain  ---Will transition patient to a regular shoe if she is fully healed at follow-up appointment      Gina RENEE  ANTONETTE Rodriguez

## 2020-10-07 NOTE — NURSING NOTE
"Chief Complaint   Patient presents with     Surgical Followup     left foot bunionectomy 9- having some pain if moving around more than she should       Initial /74 (BP Location: Left arm, Cuff Size: Adult Regular)   Pulse 66   Temp 98  F (36.7  C) (Tympanic)   Resp 16   Ht 1.549 m (5' 1\")   Wt 91.2 kg (201 lb)   SpO2 100%   BMI 37.98 kg/m   Estimated body mass index is 37.98 kg/m  as calculated from the following:    Height as of this encounter: 1.549 m (5' 1\").    Weight as of this encounter: 91.2 kg (201 lb).  Medication Reconciliation: complete  Shraddha Reeves LPN  "

## 2020-10-16 DIAGNOSIS — K75.4 AUTOIMMUNE HEPATITIS (H): ICD-10-CM

## 2020-10-16 DIAGNOSIS — M79.7 FIBROMYALGIA: ICD-10-CM

## 2020-10-16 RX ORDER — AZATHIOPRINE 50 MG/1
100 TABLET ORAL DAILY
Qty: 180 TABLET | Refills: 1 | Status: SHIPPED | OUTPATIENT
Start: 2020-10-16 | End: 2021-07-09

## 2020-10-16 NOTE — TELEPHONE ENCOUNTER
imuran     Last Written Prescription Date: 7/21/20  Last Fill Quantity: 180,   # refills: 1  Last Office Visit: 9/15/20  Future Office visit:    Next 5 appointments (look out 90 days)    Oct 21, 2020  8:15 AM  Return Visit with Gina Rodriguez DPM  [facility] ([facility] ) [address] [place] [id number] [phone]           Routing refill request to provider for review/approval because:

## 2020-10-21 ENCOUNTER — ANCILLARY PROCEDURE (OUTPATIENT)
Dept: GENERAL RADIOLOGY | Facility: OTHER | Age: 80
End: 2020-10-21
Attending: PODIATRIST
Payer: MEDICARE

## 2020-10-21 ENCOUNTER — OFFICE VISIT (OUTPATIENT)
Dept: PODIATRY | Facility: OTHER | Age: 80
End: 2020-10-21
Attending: PODIATRIST
Payer: MEDICARE

## 2020-10-21 VITALS
WEIGHT: 201 LBS | BODY MASS INDEX: 37.95 KG/M2 | TEMPERATURE: 96.7 F | DIASTOLIC BLOOD PRESSURE: 80 MMHG | OXYGEN SATURATION: 99 % | SYSTOLIC BLOOD PRESSURE: 128 MMHG | HEART RATE: 64 BPM | HEIGHT: 61 IN | RESPIRATION RATE: 18 BRPM

## 2020-10-21 DIAGNOSIS — M79.672 LEFT FOOT PAIN: ICD-10-CM

## 2020-10-21 DIAGNOSIS — G57.62 LESION OF LEFT PLANTAR NERVE: Primary | ICD-10-CM

## 2020-10-21 DIAGNOSIS — Z98.890 STATUS POST LEFT FOOT SURGERY: ICD-10-CM

## 2020-10-21 DIAGNOSIS — M79.7 FIBROMYALGIA: ICD-10-CM

## 2020-10-21 DIAGNOSIS — K75.4 AUTOIMMUNE HEPATITIS (H): ICD-10-CM

## 2020-10-21 PROCEDURE — 99024 POSTOP FOLLOW-UP VISIT: CPT | Mod: 25 | Performed by: PODIATRIST

## 2020-10-21 PROCEDURE — G0463 HOSPITAL OUTPT CLINIC VISIT: HCPCS | Mod: 25

## 2020-10-21 PROCEDURE — 20600 DRAIN/INJ JOINT/BURSA W/O US: CPT | Mod: LT | Performed by: PODIATRIST

## 2020-10-21 PROCEDURE — 73630 X-RAY EXAM OF FOOT: CPT | Mod: TC,LT,FY

## 2020-10-21 RX ORDER — HYDROCORTISONE 2.5 %
CREAM (GRAM) TOPICAL
COMMUNITY
Start: 2019-02-28

## 2020-10-21 RX ORDER — KETOROLAC TROMETHAMINE 30 MG/ML
30 INJECTION, SOLUTION INTRAMUSCULAR; INTRAVENOUS ONCE
Status: COMPLETED | OUTPATIENT
Start: 2020-10-21 | End: 2020-10-21

## 2020-10-21 RX ADMIN — KETOROLAC TROMETHAMINE 30 MG: 60 INJECTION, SOLUTION INTRAMUSCULAR at 11:28

## 2020-10-21 ASSESSMENT — PAIN SCALES - GENERAL: PAINLEVEL: MILD PAIN (3)

## 2020-10-21 ASSESSMENT — MIFFLIN-ST. JEOR: SCORE: 1314.11

## 2020-10-21 NOTE — PROGRESS NOTES
"Chief complaint: Patient presents with:  Surgical Followup: left foot surgery 9-      New concern in addition to post-op evaluation    History of Present Illness: This 80 year old female with osteoporosis, Hepatitis C, degeneration of lumbosacral intervertebral discs (injections every 6 months), and a history of multiple foot surgeries is being seen with her  for post-op management of a LEFT foot Bethea bunionectomy with interpositional graft in the 1st MTPJ (DOS 09/21/2020).    She has been walking with a boot at home. She is hoping to get into a regular shoe soon. She has a walker she uses at home but she has been able to walk well without the walker as well.    She had pain in her big toe last night, but in general she says she does not have pain on most days.    Secondly, he LEFT neuroma pain is flaring up. She normally has this injected once a year (most recently around August, 2020) by Dr. Enciso. She is wondering how she can treat this.      No further pedal complaints today.         Historically:    Patient had a RIGHT foot 1st MTPJ fusion, Hammertoe correction digits 2-5, and two neuroma removals in 2014. She later saw the same ortho doctor from [facility] for the LEFT foot to fix more hammertoes and a neuroma. She was told to stretch the bunion manually into place but she says it didn't do much so she stopped stretching it.      /80   Pulse 64   Temp 96.7  F (35.9  C) (Tympanic)   Resp 18   Ht 1.549 m (5' 1\")   Wt 91.2 kg (201 lb)   SpO2 99%   BMI 37.98 kg/m      Patient Active Problem List   Diagnosis     Hypothyroidism     Generalized anxiety disorder     Benign essential hypertension     GERD (gastroesophageal reflux disease)     Hepatitis     Contact dermatitis     Rosacea     Osteoarthritis     Fibromyalgia     Open wound of knee, leg, and ankle     Degeneration of lumbar or lumbosacral intervertebral disc     Anemia     Autoimmune hepatitis (H)     Morbid " obesity (H)     Mild major depression (H)     Hallux valgus of left foot     Left foot pain       Past Surgical History:   Procedure Laterality Date     APPENDECTOMY       ARTHROPLASTY TOE(S) Left 9/21/2020    Procedure: Left  Bethea Arthroplasty with interpositional arthroplasty using graft.;  Surgeon: Gina Rodriguez DPM;  Location: [facility]     BACK SURGERY  2015    lumbar epidural injection     CARDIAC SURGERY  10/2017    angioplasty     CHOLECYSTECTOMY       COLONOSCOPY  07/27/2017    [facility]     COLONOSCOPY - HIM SCAN  05/15/2001    Small internal hemorrhoids; otherwise normal;[facility]     COLONOSCOPY - HIM SCAN  12/14/2006    Colonoscopy, REMV LESN, SNARE     ENT SURGERY      tonsillectomy     ESOPHAGOGASTRODUODENOSCOPY  07/27/2017    [facility]     EYE SURGERY      bilateral cataract removal     GI SURGERY      anal fistula     GYN SURGERY  1985    NICHOLAS BSO     GYN SURGERY      cessarian x 2     GYN SURGERY      tubal sterilazation     NEPHRECTOMY Right 05/02/2019     ORTHOPEDIC SURGERY      right knee     ORTHOPEDIC SURGERY  1986    plantar fascia release     ORTHOPEDIC SURGERY      left knee     ORTHOPEDIC SURGERY      right trigger finger release     ORTHOPEDIC SURGERY  2013    Right great toe MTPJ fusion, adductor tenotomy,correction of hammer toe     ORTHOPEDIC SURGERY  [id number]    multiple procedures left forefoot       Current Outpatient Medications   Medication     acetaminophen (TYLENOL) 500 MG tablet     amitriptyline (ELAVIL) 25 MG tablet     azaTHIOprine (IMURAN) 50 MG tablet     cyclobenzaprine (FLEXERIL) 10 MG tablet     fluticasone (FLONASE) 50 MCG/ACT nasal spray     hydrocortisone 2.5 % cream     levothyroxine (SYNTHROID/LEVOTHROID) 100 MCG tablet     NYAMYC 409299 UNIT/GM external powder     omeprazole (PRILOSEC) 40 MG capsule     Polyethylene Glycol 3350 (MIRALAX PO)     propranolol (INDERAL) 20 MG tablet     No current facility-administered medications for this visit.           Allergies    Allergen Reactions     Fentanyl Other (See Comments)     Severe agitation when used during angiogram     Codeine Sulfate Other (See Comments)     hallucinations     Fentanyl And Related      Other reaction(s): Confusion     Morphine Other (See Comments)     Hallucinations with iv therapy     Tape [Adhesive Tape]      Tramadol Other (See Comments)     hallucination       ROS: 10 point ROS neg other than the symptoms noted above in the HPI.  EXAM  Constitutional: healthy, alert and no distress    Psychiatric: mentation appears normal and affect normal/bright    LEFT FOOT FOCUSED    VASCULAR:  -Dorsalis pedis pulse +2/4   -Posterior tibial pulse weakly palpable secondary to edema  -Moderate non-pitting edema to ankles and dorsum of foot   NEURO:  -Light touch sensation intact to b/l plantar forefoot  DERM:  -Skin along incision site on LEFT dorsal 1st ray is well approximated with no dehiscence.  ---skin intact with no open wounds  ---Minimal erythema (blancheable)  ---Mild-to-Moderate martin-incision edema   ---No remaining ecchymosis  ---No dark or ascending erythema, no moderate calor, no malodor, no purulence, no other SOI.    MSK:  -Pain on palpation to LEFT 3rd interspace between metatarsal heads 3 and 4  -Mild lateral deviation of LEFT hallux with no overlapping of the hallux over the 2nd or 3rd digit and a flexible deformity -- able to easily manually the hallux medially  -Multiple medial and lateral deviations at the IPJs of the lesser digits  -LEFT 2nd and 3rd digit with no ROM at PIPJ (previous fusions)      LEFT FOOT RADIOGRAPH 08/17/2028  IMPRESSION:   1. Fairly severe bunion deformity with lateral dislocation of medial  and lateral sesamoids.  2. Degenerative change in multiple interphalangeal joints with  postsurgical changes and PIP joints of second and third toes.  3. Degenerative change in the midfoot.     RADHAMES DUBON MD    LEFT FOOT RADIOGRAPH 09/28/2020    IMPRESSION: No change from previous  examination on September 21, 2020      TIBURCIO SANDERS MD    LEFT FOOT RADIOGRAPH 10/21/2020                                                       IMPRESSION: Stable postoperative changes of the first MTP joint.       GINO UMANZOR MD    ============================================================    ASSESSMENT:  (G57.62) Lesion of left plantar nerve  (primary encounter diagnosis)    (M79.672) Left foot pain    (Z98.890) Status post left foot surgery  (primary encounter diagnosis)    (K75.4) Autoimmune hepatitis (H)    (M79.7) Fibromyalgia      PLAN:  -Patient evaluated and examined. Treatment options discussed with no educational barriers noted.  -Discussed neuroma pain and treatment options:  ---Etiology may be due to a recent increase in forefoot edema secondary to the surgery. She recently had a steroid injection in August, 2020, so it is too soon to do another steroid injection. Offered an anti-inflammatory injection to this area which may greatly reduce her pain or it may not  Help a lot. She is in agreement with trying this today so she has less upon upon starting to wear regular shoes.    -Injection x 1 to the 3rd interspace to the LEFT foot: Obtained written and verbal consent from patient for a steroid injection into the 3rd intermetatarsal head interspace of the Left foot. Reviewed risks and benefits of the injection. Informed patient that the injection may decrease pain long-term, short-term, or not at all. Patient in agreement with an injection today in an effort to slow or reverse the chronic inflammatory process and pain in the foot.   ---Patient signed informed consent and a time-out was performed and there was verification of correct patient, procedure and laterality.  ---Prepped the injection site with an alcohol swab.  ---Injected a total of 2 mL of 1:1 of 30mg Ketorolac and 2% Lidocaine plain. Patient tolerated the injection well.      --------------------------------------------    Post  op: Bethea arthroplasty with possible interpositional arthroplasty using a graft  DOS: 09/21/2020  Reason: Hallux valgus of left foot, left foot pain    -Removed the remaining two sutures   ---Skin is intact  ---Patient may shower and apply lotion to foot  ---Keep a spacer in the first interspace for a couple months to prevent scaring of the toe in a lateral direction. She had a moderate bunion deformity before surgery so the toe will be prone to deviate in that direction.    -Final post-op radiographs ordered on 10/21/2020 -- improved alignment of hallux with lateral deviation at 1st MTPJ. Patient encouraged to continue with spacer in 1st MTPJ, but toe is not rigidly pressing on the 2nd digit.    -Offered physical therapy for gait training but patient thinks she is recovering well. She will call for PT if she has difficulty walking.     -Patient and her  are in agreement with the above plan      RTC as needed    Gina Rodriguez DPM

## 2020-10-21 NOTE — NURSING NOTE
"Chief Complaint   Patient presents with     Surgical Followup     left foot surgery 9-       Initial /80   Pulse 64   Temp 96.7  F (35.9  C) (Tympanic)   Resp 18   Ht 1.549 m (5' 1\")   Wt 91.2 kg (201 lb)   SpO2 99%   BMI 37.98 kg/m   Estimated body mass index is 37.98 kg/m  as calculated from the following:    Height as of this encounter: 1.549 m (5' 1\").    Weight as of this encounter: 91.2 kg (201 lb).  Medication Reconciliation: complete  Shraddha Reeves LPN  "

## 2020-10-21 NOTE — PROGRESS NOTES
Clinic Administered Medication Documentation      Injectable Medication Documentation    Patient was given Ketorolac Tromethamine (Toradol). Prior to medication administration, verified patients identity using patient s name and date of birth. Please see MAR and medication order for additional information. Patient instructed to report any adverse reaction to staff immediately .      Was entire vial of medication used? Yes  Vial/Syringe: Single dose vial  Expiration Date:  04/2022  Was this medication supplied by the patient? No

## 2020-10-27 DIAGNOSIS — I10 BENIGN ESSENTIAL HYPERTENSION: ICD-10-CM

## 2020-10-29 RX ORDER — PROPRANOLOL HYDROCHLORIDE 20 MG/1
TABLET ORAL
Qty: 90 TABLET | Refills: 1 | Status: SHIPPED | OUTPATIENT
Start: 2020-10-29 | End: 2021-04-06

## 2020-10-29 NOTE — TELEPHONE ENCOUNTER
Propranolol 20 mg      Last Written Prescription Date:  5-4-2020  Last Fill Quantity: 90,   # refills: 0  Last Office Visit: 9-

## 2020-11-10 ENCOUNTER — TRANSFERRED RECORDS (OUTPATIENT)
Dept: HEALTH INFORMATION MANAGEMENT | Facility: CLINIC | Age: 80
End: 2020-11-10

## 2020-11-12 NOTE — PROGRESS NOTES
Subjective     Ileana Davis is a 80 year old female who presents to clinic today for the following health issues:    HPI         ED/UC Followup:    Facility:  [facility]  Date of visit: 11/10/20  Reason for visit: fall down stairs  Current Status: still having pain, incision site still hurts    ER notes reviewed.  Patient's appointment was scheduled for suture removal, but sutures have only been in place for 6 days.  Patient states only about 1/2 of the laceration was able to be repaired.  Her family member has been helping with dressing changes twice daily to her knee and elbow.  Patient states the open portion of her knee laceration is what hurts the most.  Patient does have a history of poor wound healing.       Patient Active Problem List   Diagnosis     Hypothyroidism     Generalized anxiety disorder     Benign essential hypertension     GERD (gastroesophageal reflux disease)     Hepatitis     Contact dermatitis     Rosacea     Osteoarthritis     Fibromyalgia     Open wound of knee, leg, and ankle     Degeneration of lumbar or lumbosacral intervertebral disc     Anemia     Autoimmune hepatitis (H)     Morbid obesity (H)     Mild major depression (H)     Hallux valgus of left foot     Left foot pain     Ametropia     Decreased vision of right eye     Dry eye syndrome of both eyes     PCO (posterior capsular opacification), left     Ptosis of both eyelids     Past Surgical History:   Procedure Laterality Date     APPENDECTOMY       ARTHROPLASTY TOE(S) Left 9/21/2020    Procedure: Left  Bethea Arthroplasty with interpositional arthroplasty using graft.;  Surgeon: Gina Rodriguez DPM;  Location: [facility]     BACK SURGERY  2015    lumbar epidural injection     CARDIAC SURGERY  10/2017    angioplasty     CHOLECYSTECTOMY       COLONOSCOPY  07/27/2017    [facility]     COLONOSCOPY - HIM SCAN  05/15/2001    Small internal hemorrhoids; otherwise normal;[facility]     COLONOSCOPY - HIM SCAN  12/14/2006     Colonoscopy, REMV LESN, SNARE     ENT SURGERY      tonsillectomy     ESOPHAGOGASTRODUODENOSCOPY  07/27/2017    [facility]     EYE SURGERY      bilateral cataract removal     GI SURGERY      anal fistula     GYN SURGERY  1985    NICHOLAS BSO     GYN SURGERY      cessarian x 2     GYN SURGERY      tubal sterilazation     NEPHRECTOMY Right 05/02/2019     ORTHOPEDIC SURGERY      right knee     ORTHOPEDIC SURGERY  1986    plantar fascia release     ORTHOPEDIC SURGERY      left knee     ORTHOPEDIC SURGERY      right trigger finger release     ORTHOPEDIC SURGERY  2013    Right great toe MTPJ fusion, adductor tenotomy,correction of hammer toe     ORTHOPEDIC SURGERY  [id number]    multiple procedures left forefoot       Social History     Tobacco Use     Smoking status: Never Smoker     Smokeless tobacco: Never Used   Substance Use Topics     Alcohol use: No     Family History   Problem Relation Age of Onset     Arthritis Mother         rheumatoid     Heart Disease Mother         CHF     Alcohol/Drug Father         alcoholism     Allergies Father      Thyroid Disease Other      Diabetes No family hx of      Asthma No family hx of            Current Outpatient Medications   Medication Sig Dispense Refill     acetaminophen (TYLENOL) 500 MG tablet Take 500 mg by mouth every 8 hours as needed for mild pain       amitriptyline (ELAVIL) 25 MG tablet TAKE 1 TABLET (25 MG) BY MOUTH AT BEDTIME 30 tablet 1     azaTHIOprine (IMURAN) 50 MG tablet TAKE 2 TABLETS (100 MG) BY MOUTH DAILY 180 tablet 1     cephALEXin (KEFLEX) 500 MG capsule Take 500 mg by mouth       cyclobenzaprine (FLEXERIL) 10 MG tablet TAKE 1 TABLET (10 MG) BY MOUTH 3 TIMES DAILY AS NEEDED FOR MUSCLE SPASMS 90 tablet 1     fluticasone (FLONASE) 50 MCG/ACT nasal spray SPRAY 2 SPRAYS INTO BOTH NOSTRILS DAILY AS NEEDED 16 g 1     hydrocortisone 2.5 % cream APPLY TWICE TIMES DAILY TO ACTIVE ECZEMA ON THE HANDS, FOLLOW WITH MOISTURIZING CREAM.       levothyroxine  "(SYNTHROID/LEVOTHROID) 100 MCG tablet Take 1 tablet (100 mcg) by mouth daily 90 tablet 0     NYAMYC 154542 UNIT/GM external powder APPLY TO AFFECTED AREA NIGHTLY AS NEEDED FOR RASH 60 g 1     omeprazole (PRILOSEC) 40 MG capsule Take  by mouth daily. Before the same meal daily       oxyCODONE (ROXICODONE) 5 MG tablet Take 5 mg by mouth       Polyethylene Glycol 3350 (MIRALAX PO) Take by mouth daily as needed        propranolol (INDERAL) 20 MG tablet TAKE 1 TABLET (20 MG) BY MOUTH ONCE DAILY 90 tablet 1     Allergies   Allergen Reactions     Fentanyl Other (See Comments)     Severe agitation when used during angiogram     Codeine Sulfate Other (See Comments)     hallucinations     Fentanyl And Related      Other reaction(s): Confusion     Morphine Other (See Comments)     Hallucinations with iv therapy     Tape [Adhesive Tape]      Tramadol Other (See Comments)     hallucination       Family History, Social History, Tobacco Use are all reviewed and updated      Review of Systems   Constitutional, HEENT, cardiovascular, pulmonary, gi and gu systems are negative, except as otherwise noted.      Objective    /78 (BP Location: Right arm, Patient Position: Sitting, Cuff Size: Adult Regular)   Pulse 69   Temp 97.9  F (36.6  C) (Tympanic)   Resp 16   Ht 1.549 m (5' 1\")   Wt 98.5 kg (217 lb 1.6 oz)   SpO2 100%   BMI 41.02 kg/m    Body mass index is 41.02 kg/m .  Physical Exam   GENERAL: healthy, alert and no distress  SKIN: bandages are removed from the left elbow and the left knee (ace wrap, gauze wrap, and nonstick padding to knee laceration); wound are clean without signs of infection, elbow wound is healing rather nicely, knee wound still have significant area that is open and bleeding slightly; lower leg is swollen, moderate indents where Ace stops along lower shin  PSYCH: mentation appears normal, affect normal/bright    Clean nonstick dressing is applied to knee laceration, sutures are not removed.  Gauze " "wrap is used to secure dressing, double length 4\" Ace wrap is wrapped from toes to knee.  Family member is taught how to wrap Ace in order to prevent pressure areas and uneven pressure        Assessment & Plan     1. Open wound of left knee, subsequent encounter  Will refer to Wound Care with open area of wound and history of poor wound healing.  Continue current dressing changes for now, patient's family member is shown how to apply Ace wrap.  Will have Wound Care help with timing of suture removal.  - WOUND CARE REFERRAL ([place])    2. Acute pain of left knee  As above.    3. Abrasion of left elbow, subsequent encounter  No changes to current dressings unless directed by Wound Care.    4. Fall down stairs, subsequent encounter    5. Morbid obesity (H)         Over 30 minutes are spent with the patient and her family member, over 20 minutes of which was in education and counseling regarding current conditions and treatment/therapy options/risks/benefits/etc, including review of ER notes and studies, redressing wound, discussion of Wound Care referral, discussion of suture removal, and future planning.      No follow-ups on file.    Iris Becerril MD  [facility]    "

## 2020-11-16 ENCOUNTER — OFFICE VISIT (OUTPATIENT)
Dept: FAMILY MEDICINE | Facility: OTHER | Age: 80
End: 2020-11-16
Attending: FAMILY MEDICINE
Payer: MEDICARE

## 2020-11-16 VITALS
DIASTOLIC BLOOD PRESSURE: 78 MMHG | HEART RATE: 69 BPM | OXYGEN SATURATION: 100 % | WEIGHT: 217.1 LBS | SYSTOLIC BLOOD PRESSURE: 132 MMHG | TEMPERATURE: 97.9 F | BODY MASS INDEX: 40.99 KG/M2 | HEIGHT: 61 IN | RESPIRATION RATE: 16 BRPM

## 2020-11-16 DIAGNOSIS — E66.01 MORBID OBESITY (H): ICD-10-CM
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
## 2020-11-16 DIAGNOSIS — S50.312D ABRASION OF LEFT ELBOW, SUBSEQUENT ENCOUNTER: ICD-10-CM

## 2020-11-16 DIAGNOSIS — M25.562 ACUTE PAIN OF LEFT KNEE: ICD-10-CM

## 2020-11-16 DIAGNOSIS — S81.002D OPEN WOUND OF LEFT KNEE, SUBSEQUENT ENCOUNTER: Primary | ICD-10-CM

## 2020-11-16 PROBLEM — H54.61 DECREASED VISION OF RIGHT EYE: Status: ACTIVE | Noted: 2020-09-01

## 2020-11-16 PROBLEM — H02.403 PTOSIS OF BOTH EYELIDS: Status: ACTIVE | Noted: 2020-09-01

## 2020-11-16 PROBLEM — H52.7 AMETROPIA: Status: ACTIVE | Noted: 2020-09-01

## 2020-11-16 PROBLEM — H26.492 PCO (POSTERIOR CAPSULAR OPACIFICATION), LEFT: Status: ACTIVE | Noted: 2020-09-01

## 2020-11-16 PROBLEM — H04.123 DRY EYE SYNDROME OF BOTH EYES: Status: ACTIVE | Noted: 2020-09-01

## 2020-11-16 PROCEDURE — G0463 HOSPITAL OUTPT CLINIC VISIT: HCPCS

## 2020-11-16 PROCEDURE — 99214 OFFICE O/P EST MOD 30 MIN: CPT | Performed by: FAMILY MEDICINE

## 2020-11-16 RX ORDER — OXYCODONE HYDROCHLORIDE 5 MG/1
5 TABLET ORAL
COMMUNITY
Start: 2020-11-10 | End: 2020-12-01

## 2020-11-16 RX ORDER — CEPHALEXIN 500 MG/1
500 CAPSULE ORAL
COMMUNITY
Start: 2020-11-10 | End: 2020-11-20

## 2020-11-16 ASSESSMENT — MIFFLIN-ST. JEOR: SCORE: 1387.14

## 2020-11-16 ASSESSMENT — PAIN SCALES - GENERAL: PAINLEVEL: WORST PAIN (10)

## 2020-11-16 NOTE — NURSING NOTE
"Chief Complaint   Patient presents with     ER F/U       Initial /78 (BP Location: Right arm, Patient Position: Sitting, Cuff Size: Adult Regular)   Pulse 69   Temp 97.9  F (36.6  C) (Tympanic)   Resp 16   Ht 1.549 m (5' 1\")   Wt 98.5 kg (217 lb 1.6 oz)   SpO2 100%   BMI 41.02 kg/m   Estimated body mass index is 41.02 kg/m  as calculated from the following:    Height as of this encounter: 1.549 m (5' 1\").    Weight as of this encounter: 98.5 kg (217 lb 1.6 oz).  Medication Reconciliation: complete  Myla Evans MA  "

## 2020-11-16 NOTE — LETTER
My Depression Action Plan  Name: Ileana Davis   Date of Birth 1940  Date: 11/16/2020    My doctor: Iris Becerril   My clinic: [facility]  [address]  [place] [address]  [phone]          GREEN    ZONE   Good Control    What it looks like:     Things are going generally well. You have normal ups and downs. You may even feel depressed from time to time, but bad moods usually last less than a day.   What you need to do:  1. Continue to care for yourself (see self care plan)  2. Check your depression survival kit and update it as needed  3. Follow your physician s recommendations including any medication.  4. Do not stop taking medication unless you consult with your physician first.           YELLOW         ZONE Getting Worse    What it looks like:     Depression is starting to interfere with your life.     It may be hard to get out of bed; you may be starting to isolate yourself from others.    Symptoms of depression are starting to last most all day and this has happened for several days.     You may have suicidal thoughts but they are not constant.   What you need to do:     1. Call your care team. Your response to treatment will improve if you keep your care team informed of your progress. Yellow periods are signs an adjustment may need to be made.     2. Continue your self-care.  Just get dressed and ready for the day.  Don't give yourself time to talk yourself out of it.    3. Talk to someone in your support network.    4. Open up your Depression Self-Care Plan/Wellness Kit.           RED    ZONE Medical Alert - Get Help    What it looks like:     Depression is seriously interfering with your life.     You may experience these or other symptoms: You can t get out of bed most days, can t work or engage in other necessary activities, you have trouble taking care of basic hygiene, or basic responsibilities, thoughts of suicide or death that  will not go away, self-injurious behavior.     What you need to do:  1. Call your care team and request a same-day appointment. If they are not available (weekends or after hours) call your local crisis line, emergency room or 911.            Depression Self-Care Plan / Wellness Kit    Self-Care for Depression  Here s the deal. Your body and mind are really not as separate as most people think.  What you do and think affects how you feel and how you feel influences what you do and think. This means if you do things that people who feel good do, it will help you feel better.  Sometimes this is all it takes.  There is also a place for medication and therapy depending on how severe your depression is, so be sure to consult with your medical provider and/ or Behavioral Health Consultant if your symptoms are worsening or not improving.     In order to better manage my stress, I will:    Exercise  Get some form of exercise, every day. This will help reduce pain and release endorphins, the  feel good  chemicals in your brain. This is almost as good as taking antidepressants!  This is not the same as joining a gym and then never going! (they count on that by the way ) It can be as simple as just going for a walk or doing some gardening, anything that will get you moving.      Hygiene   Maintain good hygiene (get out of bed in the morning, make your bed, brush your teeth, take a shower, and get dressed like you were going to work, even if you are unemployed).  If your clothes don't fit try to get ones that do.    Diet  Strive to eat foods that are good for me, drink plenty of water, and avoid excessive sugar, caffeine, alcohol, and other mood-altering substances.  Some foods that are helpful in depression are: complex carbohydrates, B vitamins, flaxseed, fish or fish oil, fresh fruits and vegetables.    Psychotherapy  Agree to participate in Individual Therapy (if recommended).    Medication  If prescribed medications, I  agree to take them.  Missing doses can result in serious side effects.  I understand that drinking alcohol, or other illicit drug use, may cause potential side effects.  I will not stop my medication abruptly without first discussing it with my provider.    Staying Connected With Others  Stay in touch with my friends, family members, and my primary care provider/team.    Use your imagination  Be creative.  We all have a creative side; it doesn t matter if it s oil painting, sand castles, or mud pies! This will also kick up the endorphins.    Witness Beauty  (AKA stop and smell the roses) Take a look outside, even in mid-winter. Notice colors, textures. Watch the squirrels and birds.     Service to others  Be of service to others.  There is always someone else in need.  By helping others we can  get out of ourselves  and remember the really important things.  This also provides opportunities for practicing all the other parts of the program.    Humor  Laugh and be silly!  Adjust your TV habits for less news and crime-drama and more comedy.    Control your stress  Try breathing deep, massage therapy, biofeedback, and meditation. Find time to relax each day.     Crisis Text Line  http://www.crisistextline.org    The Crisis Text Line serves anyone, in any type of crisis, providing access to free, 24/7 support and information via the medium people already use and trust:    Here's how it works:  1.  Text 096-926 from anywhere in the [place], anytime, about any type of crisis.  2.  A live, trained Crisis Counselor receives the text and responds quickly.  3.  The volunteer Crisis Counselor will help you move from a 'hot moment to a cool moment'.    My support system    Clinic Contact:  Phone number:    Contact 1:  Phone number:    Contact 2:  Phone number:    [employer]angel[employer]/:  Phone number:    Therapist:  Phone number:    Local crisis center:    Phone number:    Other community support:  Phone number:

## 2020-11-19 ENCOUNTER — OFFICE VISIT (OUTPATIENT)
Dept: WOUND CARE | Facility: OTHER | Age: 80
End: 2020-11-19
Attending: FAMILY MEDICINE
Payer: MEDICARE

## 2020-11-19 VITALS
TEMPERATURE: 98.6 F | HEART RATE: 68 BPM | OXYGEN SATURATION: 98 % | DIASTOLIC BLOOD PRESSURE: 80 MMHG | HEIGHT: 61 IN | SYSTOLIC BLOOD PRESSURE: 144 MMHG | BODY MASS INDEX: 40.97 KG/M2 | WEIGHT: 217 LBS

## 2020-11-19 DIAGNOSIS — S50.312D ABRASION OF LEFT ELBOW, SUBSEQUENT ENCOUNTER: ICD-10-CM

## 2020-11-19 DIAGNOSIS — S81.002D OPEN WOUND OF LEFT KNEE, SUBSEQUENT ENCOUNTER: ICD-10-CM

## 2020-11-19 DIAGNOSIS — Z91.81 PERSONAL HISTORY OF FALL: Primary | ICD-10-CM

## 2020-11-19 PROCEDURE — G0463 HOSPITAL OUTPT CLINIC VISIT: HCPCS

## 2020-11-19 PROCEDURE — 99213 OFFICE O/P EST LOW 20 MIN: CPT | Performed by: NURSE PRACTITIONER

## 2020-11-19 ASSESSMENT — PAIN SCALES - GENERAL: PAINLEVEL: WORST PAIN (10)

## 2020-11-19 ASSESSMENT — MIFFLIN-ST. JEOR: SCORE: 1386.69

## 2020-11-19 NOTE — PROGRESS NOTES
WOUND CLINIC NOTE  11/19/2020    Patient:Ileana Davis    Reason for Visit: Seen at the request of Dr. Iris Jones for left knee open wound and left elbow abrasion.  Patient was seen in the ER for the fall that caused the wounds on 11/10/2020.  She is currently treating the wounds with antibiotic ointment.  There is pain to the wound sites. She is wearing an ace wrap for compression of the left lower extremity and for edema control.     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       Current Medications:  Current Outpatient Medications   Medication Sig Dispense Refill     acetaminophen (TYLENOL) 500 MG tablet Take 500 mg by mouth every 8 hours as needed for mild pain       amitriptyline (ELAVIL) 25 MG tablet TAKE 1 TABLET (25 MG) BY MOUTH AT BEDTIME 30 tablet 1     azaTHIOprine (IMURAN) 50 MG tablet TAKE 2 TABLETS (100 MG) BY MOUTH DAILY 180 tablet 1     cephALEXin (KEFLEX) 500 MG capsule Take 500 mg by mouth       cyclobenzaprine (FLEXERIL) 10 MG tablet TAKE 1 TABLET (10 MG) BY MOUTH 3 TIMES DAILY AS NEEDED FOR MUSCLE SPASMS 90 tablet 1     fluticasone (FLONASE) 50 MCG/ACT nasal spray SPRAY 2 SPRAYS INTO BOTH NOSTRILS DAILY AS NEEDED 16 g 1     hydrocortisone 2.5 % cream APPLY TWICE TIMES DAILY TO ACTIVE ECZEMA ON THE HANDS, FOLLOW WITH MOISTURIZING CREAM.       levothyroxine (SYNTHROID/LEVOTHROID) 100 MCG tablet Take 1 tablet (100 mcg) by mouth daily 90 tablet 0     NYAMYC 329104 UNIT/GM external powder APPLY TO AFFECTED AREA NIGHTLY AS NEEDED FOR RASH 60 g 1     omeprazole (PRILOSEC) 40 MG capsule Take  by mouth daily. Before the same meal daily       oxyCODONE (ROXICODONE) 5 MG tablet Take 5 mg by mouth       Polyethylene Glycol 3350 (MIRALAX PO) Take by mouth daily as needed        propranolol (INDERAL) 20 MG tablet TAKE 1 TABLET (20 MG) BY MOUTH ONCE DAILY 90 tablet 1       Allergies:  Allergies   Allergen Reactions     Fentanyl Other (See Comments)     Severe agitation when  "used during angiogram     Codeine Sulfate Other (See Comments)     hallucinations     Fentanyl And Related      Other reaction(s): Confusion     Morphine Other (See Comments)     Hallucinations with iv therapy     Tape [Adhesive Tape]      Tramadol Other (See Comments)     hallucination       ROS:  Pertinent items are noted in HPI.    PHYSICAL EXAM:     Vital signs: BP (!) 144/80 (BP Location: Right arm, Patient Position: Sitting, Cuff Size: Adult Regular)   Pulse 68   Temp 98.6  F (37  C) (Tympanic)   Ht 1.549 m (5' 1\")   Wt 98.4 kg (217 lb)   SpO2 98%   BMI 41.00 kg/m     BMI: Body mass index is 41 kg/m .   General: Normal, healthy, cooperative, in no acute distress, alert   Skin: left knee--sutures are intact.  Centrally to the left knee incision there is a 2 cm x 7.5 cm x 0.1 cm granular open wound.  The wound is without gross signs/symptoms of infection at this time.  To the left elbow there is a 0.5 cm x 2.0 cm x 0.1 cm wound noted that is granular without signs/symptoms of infection.     Lungs: respirations are non-labored   Abdominal: non-distended   Extremities: +2/4 edema to lower extremity notes   Neurological: without deficit    ASSESSMENT:  80 year old female here for left knee wound and left elbow abrasion, history of fall.     PLAN: No sutures were removed from the left knee wound at this time.  Will treat this wound with mepilex ag pads changed every other day or more often as needed.  Will treat left elbow with mepilex ag pads changed every 7 days or as needed.  Patient is to continue the ace wrap for compression of the left leg.  Patient is to follow up Tuesday for a recheck.  She is to be seen immediately with signs/symptoms of infection which were discussed and verbalized as understood by the patient.       "

## 2020-11-19 NOTE — NURSING NOTE
"Chief Complaint   Patient presents with     WOUND CARE     Oen left knee wound       Initial BP (!) 144/80 (BP Location: Right arm, Patient Position: Sitting, Cuff Size: Adult Regular)   Pulse 68   Temp 98.6  F (37  C) (Tympanic)   Ht 1.549 m (5' 1\")   Wt 98.4 kg (217 lb)   SpO2 98%   BMI 41.00 kg/m   Estimated body mass index is 41 kg/m  as calculated from the following:    Height as of this encounter: 1.549 m (5' 1\").    Weight as of this encounter: 98.4 kg (217 lb).  Medication Reconciliation: complete  Laura Enriquez MA    "

## 2020-11-24 ENCOUNTER — OFFICE VISIT (OUTPATIENT)
Dept: SURGERY | Facility: OTHER | Age: 80
End: 2020-11-24
Attending: NURSE PRACTITIONER
Payer: MEDICARE

## 2020-11-24 VITALS
HEIGHT: 61 IN | OXYGEN SATURATION: 97 % | BODY MASS INDEX: 40.97 KG/M2 | HEART RATE: 68 BPM | DIASTOLIC BLOOD PRESSURE: 76 MMHG | WEIGHT: 217 LBS | SYSTOLIC BLOOD PRESSURE: 134 MMHG | TEMPERATURE: 97 F

## 2020-11-24 DIAGNOSIS — Z91.81 PERSONAL HISTORY OF FALL: ICD-10-CM

## 2020-11-24 DIAGNOSIS — R23.4 SCAB: ICD-10-CM

## 2020-11-24 DIAGNOSIS — T14.8XXA OPEN WOUND: Primary | ICD-10-CM

## 2020-11-24 PROCEDURE — 99212 OFFICE O/P EST SF 10 MIN: CPT | Performed by: NURSE PRACTITIONER

## 2020-11-24 PROCEDURE — G0463 HOSPITAL OUTPT CLINIC VISIT: HCPCS

## 2020-11-24 ASSESSMENT — MIFFLIN-ST. JEOR: SCORE: 1386.69

## 2020-11-24 ASSESSMENT — PAIN SCALES - GENERAL: PAINLEVEL: MODERATE PAIN (4)

## 2020-11-24 NOTE — PATIENT INSTRUCTIONS
Thank you for allowing Dr. Joseph and our surgical team to participate in your care.  If you have a scheduling or an appointment question please contact our Health Unit Coordinator at her direct line [phone].   ALL nursing questions or concerns can be directed to your surgical nurse at: [phone]

## 2020-11-24 NOTE — PROGRESS NOTES
WOUND CLINIC NOTE  11/24/2020    Patient:Ileana Davis    Reason for Visit: Seen for follow up of left knee open wound and left elbow abrasion.  Patient was seen in the ER for the fall that caused the wounds on 11/10/2020.  She is currently treating the wounds with mepilex ag pads which she is tolerating.  She is wearing an ace wrap for compression of the left lower extremity and for edema control.     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       Current Medications:  Current Outpatient Medications   Medication Sig Dispense Refill     acetaminophen (TYLENOL) 500 MG tablet Take 500 mg by mouth every 8 hours as needed for mild pain       amitriptyline (ELAVIL) 25 MG tablet TAKE 1 TABLET (25 MG) BY MOUTH AT BEDTIME 30 tablet 1     azaTHIOprine (IMURAN) 50 MG tablet TAKE 2 TABLETS (100 MG) BY MOUTH DAILY 180 tablet 1     cyclobenzaprine (FLEXERIL) 10 MG tablet TAKE 1 TABLET (10 MG) BY MOUTH 3 TIMES DAILY AS NEEDED FOR MUSCLE SPASMS 90 tablet 1     fluticasone (FLONASE) 50 MCG/ACT nasal spray SPRAY 2 SPRAYS INTO BOTH NOSTRILS DAILY AS NEEDED 16 g 1     hydrocortisone 2.5 % cream APPLY TWICE TIMES DAILY TO ACTIVE ECZEMA ON THE HANDS, FOLLOW WITH MOISTURIZING CREAM.       levothyroxine (SYNTHROID/LEVOTHROID) 100 MCG tablet Take 1 tablet (100 mcg) by mouth daily 90 tablet 0     NYAMYC 528582 UNIT/GM external powder APPLY TO AFFECTED AREA NIGHTLY AS NEEDED FOR RASH 60 g 1     omeprazole (PRILOSEC) 40 MG capsule Take  by mouth daily. Before the same meal daily       oxyCODONE (ROXICODONE) 5 MG tablet Take 5 mg by mouth       Polyethylene Glycol 3350 (MIRALAX PO) Take by mouth daily as needed        propranolol (INDERAL) 20 MG tablet TAKE 1 TABLET (20 MG) BY MOUTH ONCE DAILY 90 tablet 1       Allergies:  Allergies   Allergen Reactions     Fentanyl Other (See Comments)     Severe agitation when used during angiogram     Codeine Sulfate Other (See Comments)     hallucinations     Fentanyl And  "Related      Other reaction(s): Confusion     Morphine Other (See Comments)     Hallucinations with iv therapy     Tape [Adhesive Tape]      Tramadol Other (See Comments)     hallucination       ROS:  Pertinent items are noted in HPI.    PHYSICAL EXAM:     Vital signs: /76 (Cuff Size: Adult Regular)   Pulse 68   Temp 97  F (36.1  C) (Tympanic)   Ht 1.549 m (5' 1\")   Wt 98.4 kg (217 lb)   SpO2 97%   BMI 41.00 kg/m     BMI: Body mass index is 41 kg/m .   General: Normal, healthy, cooperative, in no acute distress, alert   Skin: left knee--sutures are intact.  Centrally to the left knee incision there are two open granular wounds.  The wounds are healing and are without gross signs/symptoms of infection at this time.  To the left elbow there is scabbing but no open wounds.     Lungs: respirations are non-labored   Abdominal: non-distended   Extremities: +2/4 edema to lower extremity notes   Neurological: without deficit    ASSESSMENT:  80 year old female here for left knee wound and left elbow abrasion now scabbed, history of fall.     PLAN: Some but not all of the sutures were removed from the left knee wound at this time.  Will continue to treat the wounds to the knee with mepilex ag pads changed every other 3 days or more often as needed. Patient is to continue the ace wrap for compression of the left leg.  Patient is to follow up next week for a recheck.  She is to be seen immediately with signs/symptoms of infection which were discussed and verbalized as understood by the patient.       "

## 2020-11-24 NOTE — NURSING NOTE
"Chief Complaint   Patient presents with     WOUND CARE     left knee and left elbow wound       Initial /76 (Cuff Size: Adult Regular)   Pulse 68   Temp 97  F (36.1  C) (Tympanic)   Ht 1.549 m (5' 1\")   Wt 98.4 kg (217 lb)   SpO2 97%   BMI 41.00 kg/m   Estimated body mass index is 41 kg/m  as calculated from the following:    Height as of this encounter: 1.549 m (5' 1\").    Weight as of this encounter: 98.4 kg (217 lb).  Medication Reconciliation: complete  Scarlet Luong LPN    "

## 2020-11-30 ENCOUNTER — TELEPHONE (OUTPATIENT)
Dept: FAMILY MEDICINE | Facility: OTHER | Age: 80
End: 2020-11-30

## 2020-11-30 NOTE — TELEPHONE ENCOUNTER
1:03 PM    Reason for Call: Phone Call    Description: pt having restless legs and wants to know if something can be subscribed/ please advise    Was an appointment offered for this call? No  If yes : Appointment type              Date    Preferred method for responding to this message: Telephone Call  What is your phone number ? [phone]    If we cannot reach you directly, may we leave a detailed response at the number you provided? Yes    Can this message wait until your PCP/provider returns, if available today? YES, No, provider is in    Zahra Jenkins

## 2020-12-01 ENCOUNTER — OFFICE VISIT (OUTPATIENT)
Dept: SURGERY | Facility: OTHER | Age: 80
End: 2020-12-01
Attending: NURSE PRACTITIONER
Payer: MEDICARE

## 2020-12-01 VITALS
HEART RATE: 68 BPM | DIASTOLIC BLOOD PRESSURE: 90 MMHG | WEIGHT: 217 LBS | OXYGEN SATURATION: 98 % | SYSTOLIC BLOOD PRESSURE: 122 MMHG | BODY MASS INDEX: 40.97 KG/M2 | HEIGHT: 61 IN | TEMPERATURE: 97.8 F

## 2020-12-01 DIAGNOSIS — T14.8XXA OPEN WOUND: ICD-10-CM

## 2020-12-01 DIAGNOSIS — Z91.81 PERSONAL HISTORY OF FALL: Primary | ICD-10-CM

## 2020-12-01 PROCEDURE — 99213 OFFICE O/P EST LOW 20 MIN: CPT | Performed by: NURSE PRACTITIONER

## 2020-12-01 PROCEDURE — G0463 HOSPITAL OUTPT CLINIC VISIT: HCPCS

## 2020-12-01 ASSESSMENT — PAIN SCALES - GENERAL: PAINLEVEL: MODERATE PAIN (4)

## 2020-12-01 ASSESSMENT — MIFFLIN-ST. JEOR: SCORE: 1386.69

## 2020-12-01 NOTE — PATIENT INSTRUCTIONS

## 2020-12-01 NOTE — PROGRESS NOTES
"Ileana Davis is a 80 year old female who is being evaluated via a billable telephone visit.      The patient has been notified of following:     \"This telephone visit will be conducted via a call between you and your physician/provider. We have found that certain health care needs can be provided without the need for a physical exam.  This service lets us provide the care you need with a short phone conversation.  If a prescription is necessary we can send it directly to your pharmacy.  If lab work is needed we can place an order for that and you can then stop by our lab to have the test done at a later time.    Telephone visits are billed at different rates depending on your insurance coverage. During this emergency period, for some insurers they may be billed the same as an in-person visit.  Please reach out to your insurance provider with any questions.    If during the course of the call the physician/provider feels a telephone visit is not appropriate, you will not be charged for this service.\"    Patient has given verbal consent for Telephone visit?  Yes    What phone number would you like to be contacted at? [phone]    How would you like to obtain your AVS? Mail a copy    Subjective     Ileana Davis is a 80 year old female who presents via phone visit today for the following health issues:    HPI     Concern - Possible restless legs  Onset: couple weeks  Description: feel the calf of the leg is \"jolting and just flip around\" feels everything tightens up.  Intensity: moderate  Progression of Symptoms:  worsening  Accompanying Signs & Symptoms: hard to sleep. Legs feel tihgt and also lazy  Previous history of similar problem: none  Precipitating factors:        Worsened by: none  Alleviating factors:        Improved by: none  Therapies tried and outcome: cut back on caffiene            Review of Systems   Constitutional, HEENT, cardiovascular, pulmonary, gi and gu systems are negative, except " as otherwise noted.       Objective          Vitals:  No vitals were obtained today due to virtual visit.    PSYCH: Alert and oriented times 3; coherent speech, normal   rate and volume, able to articulate logical thoughts, able   to abstract reason, no tangential thoughts, no hallucinations   or delusions  Her affect is normal and pleasant  RESP: No cough, no audible wheezing, able to talk in full sentences  Remainder of exam unable to be completed due to telephone visits          Assessment/Plan:    Assessment & Plan     1. Abnormal leg movement  Will check labs as listed.  Will also try Mirapex to help with symptoms.  Will pursue further work-up if any labs are abnormal, otherwise, follow-up as directed.  - CBC with platelets; Future  - Ferritin; Future  - Basic metabolic panel; Future  - Magnesium; Future  - pramipexole (MIRAPEX) 0.125 MG tablet; Take 1 tablet (0.125 mg) by mouth At Bedtime  Dispense: 30 tablet; Refill: 1    2. Anemia, unspecified type   - Ferritin; Future            Return if symptoms worsen or fail to improve.    Iris Becerril MD  [facility] [place] [facility]    Phone call duration:  9 minutes

## 2020-12-01 NOTE — PROGRESS NOTES
WOUND CLINIC NOTE  12/1/2020    Patient:Ileana Davis    Reason for Visit: Seen for follow up of left knee open wound.  Patient was seen in the ER for the fall that caused the wounds on 11/10/2020.  She is currently treating the wounds with mepilex ag pads which she is tolerating.  She is wearing an ace wrap for compression of the left lower extremity and for edema control.     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  The patient was last seen in the clinic on 11/24/2020.      Current Medications:  Current Outpatient Medications   Medication Sig Dispense Refill     acetaminophen (TYLENOL) 500 MG tablet Take 500 mg by mouth every 8 hours as needed for mild pain       azaTHIOprine (IMURAN) 50 MG tablet TAKE 2 TABLETS (100 MG) BY MOUTH DAILY 180 tablet 1     cyclobenzaprine (FLEXERIL) 10 MG tablet TAKE 1 TABLET (10 MG) BY MOUTH 3 TIMES DAILY AS NEEDED FOR MUSCLE SPASMS 90 tablet 1     fluticasone (FLONASE) 50 MCG/ACT nasal spray SPRAY 2 SPRAYS INTO BOTH NOSTRILS DAILY AS NEEDED 16 g 1     hydrocortisone 2.5 % cream APPLY TWICE TIMES DAILY TO ACTIVE ECZEMA ON THE HANDS, FOLLOW WITH MOISTURIZING CREAM.       levothyroxine (SYNTHROID/LEVOTHROID) 100 MCG tablet Take 1 tablet (100 mcg) by mouth daily 90 tablet 0     melatonin 1 MG TABS tablet Take by mouth nightly as needed for sleep Pt. Unsure of mg, but she does take 3 tablets.       NYAMYC 532841 UNIT/GM external powder APPLY TO AFFECTED AREA NIGHTLY AS NEEDED FOR RASH 60 g 1     omeprazole (PRILOSEC) 40 MG capsule Take  by mouth daily. Before the same meal daily       Polyethylene Glycol 3350 (MIRALAX PO) Take by mouth daily as needed        propranolol (INDERAL) 20 MG tablet TAKE 1 TABLET (20 MG) BY MOUTH ONCE DAILY 90 tablet 1     amitriptyline (ELAVIL) 25 MG tablet TAKE 1 TABLET (25 MG) BY MOUTH AT BEDTIME (Patient not taking: Reported on 12/1/2020) 30 tablet 1       Allergies:  Allergies   Allergen Reactions     Fentanyl Other (See  "Comments)     Severe agitation when used during angiogram     Codeine Sulfate Other (See Comments)     hallucinations     Fentanyl And Related      Other reaction(s): Confusion     Morphine Other (See Comments)     Hallucinations with iv therapy     Tape [Adhesive Tape]      Tramadol Other (See Comments)     hallucination       PHYSICAL EXAM:     Vital signs: BP (!) 122/90 (BP Location: Right arm, Patient Position: Chair, Cuff Size: Adult Large)   Pulse 68   Temp 97.8  F (36.6  C) (Tympanic)   Ht 1.549 m (5' 1\")   Wt 98.4 kg (217 lb)   SpO2 98%   BMI 41.00 kg/m     BMI: Body mass index is 41 kg/m .   General: Normal, healthy, cooperative, in no acute distress, alert   Skin: left knee--sutures are intact.  Centrally to the left knee incision there are two open granular wounds.  The wounds are healing and are without gross signs/symptoms of infection at this time.       Lungs: respirations are non-labored   Abdominal: non-distended   Extremities: +2/4 edema to lower extremity notes   Neurological: without deficit    ASSESSMENT:  80 year old female here for left knee wound, history of fall.     PLAN: All remaining sutures were removed from the left knee wound at this time.  Will continue to treat the wounds to the knee with mepilex ag pads changed every other 3 days or more often as needed. Patient is to continue the ace wrap for compression of the left leg.  Patient is to follow up next week for a recheck.  She is to be seen immediately with signs/symptoms of infection which were discussed and verbalized as understood by the patient.       "

## 2020-12-01 NOTE — NURSING NOTE
"Chief Complaint   Patient presents with     RECHECK     knee and elbow       Initial BP (!) 122/90 (BP Location: Right arm, Patient Position: Chair, Cuff Size: Adult Large)   Pulse 68   Temp 97.8  F (36.6  C) (Tympanic)   Ht 1.549 m (5' 1\")   Wt 98.4 kg (217 lb)   SpO2 98%   BMI 41.00 kg/m   Estimated body mass index is 41 kg/m  as calculated from the following:    Height as of this encounter: 1.549 m (5' 1\").    Weight as of this encounter: 98.4 kg (217 lb).  Medication Reconciliation: complete  Judy Steiner LPN  "

## 2020-12-03 ENCOUNTER — VIRTUAL VISIT (OUTPATIENT)
Dept: FAMILY MEDICINE | Facility: OTHER | Age: 80
End: 2020-12-03
Attending: FAMILY MEDICINE
Payer: MEDICARE

## 2020-12-03 DIAGNOSIS — G25.9 ABNORMAL LEG MOVEMENT: Primary | ICD-10-CM

## 2020-12-03 DIAGNOSIS — D64.9 ANEMIA, UNSPECIFIED TYPE: ICD-10-CM

## 2020-12-03 PROCEDURE — 99441 PR PHYSICIAN TELEPHONE EVALUATION 5-10 MIN: CPT | Mod: 95 | Performed by: FAMILY MEDICINE

## 2020-12-03 RX ORDER — PRAMIPEXOLE DIHYDROCHLORIDE 0.12 MG/1
0.12 TABLET ORAL AT BEDTIME
Qty: 30 TABLET | Refills: 1 | Status: SHIPPED | OUTPATIENT
Start: 2020-12-03 | End: 2021-01-19

## 2020-12-03 ASSESSMENT — PAIN SCALES - GENERAL: PAINLEVEL: NO PAIN (0)

## 2020-12-03 ASSESSMENT — ANXIETY QUESTIONNAIRES
6. BECOMING EASILY ANNOYED OR IRRITABLE: NOT AT ALL
2. NOT BEING ABLE TO STOP OR CONTROL WORRYING: SEVERAL DAYS
IF YOU CHECKED OFF ANY PROBLEMS ON THIS QUESTIONNAIRE, HOW DIFFICULT HAVE THESE PROBLEMS MADE IT FOR YOU TO DO YOUR WORK, TAKE CARE OF THINGS AT HOME, OR GET ALONG WITH OTHER PEOPLE: NOT DIFFICULT AT ALL
3. WORRYING TOO MUCH ABOUT DIFFERENT THINGS: MORE THAN HALF THE DAYS
7. FEELING AFRAID AS IF SOMETHING AWFUL MIGHT HAPPEN: NOT AT ALL
GAD7 TOTAL SCORE: 5
1. FEELING NERVOUS, ANXIOUS, OR ON EDGE: SEVERAL DAYS
5. BEING SO RESTLESS THAT IT IS HARD TO SIT STILL: SEVERAL DAYS

## 2020-12-03 ASSESSMENT — PATIENT HEALTH QUESTIONNAIRE - PHQ9
5. POOR APPETITE OR OVEREATING: NOT AT ALL
SUM OF ALL RESPONSES TO PHQ QUESTIONS 1-9: 11

## 2020-12-03 NOTE — NURSING NOTE
"Chief Complaint   Patient presents with     Musculoskeletal Problem       Initial There were no vitals taken for this visit. Estimated body mass index is 41 kg/m  as calculated from the following:    Height as of 12/1/20: 1.549 m (5' 1\").    Weight as of 12/1/20: 98.4 kg (217 lb).  Medication Reconciliation: complete  Tanya Tilley MA  "

## 2020-12-04 DIAGNOSIS — G25.9 ABNORMAL LEG MOVEMENT: ICD-10-CM

## 2020-12-04 DIAGNOSIS — D64.9 ANEMIA, UNSPECIFIED TYPE: ICD-10-CM

## 2020-12-04 DIAGNOSIS — E03.9 HYPOTHYROIDISM, UNSPECIFIED TYPE: ICD-10-CM

## 2020-12-04 LAB
ANION GAP SERPL CALCULATED.3IONS-SCNC: 6 MMOL/L (ref 3–14)
BUN SERPL-MCNC: 21 MG/DL (ref 7–30)
CALCIUM SERPL-MCNC: 8.8 MG/DL (ref 8.5–10.1)
CHLORIDE SERPL-SCNC: 106 MMOL/L (ref 94–109)
CO2 SERPL-SCNC: 26 MMOL/L (ref 20–32)
CREAT SERPL-MCNC: 1.3 MG/DL (ref 0.52–1.04)
ERYTHROCYTE [DISTWIDTH] IN BLOOD BY AUTOMATED COUNT: 14 % (ref 10–15)
FERRITIN SERPL-MCNC: 28 NG/ML (ref 8–252)
GFR SERPL CREATININE-BSD FRML MDRD: 38 ML/MIN/{1.73_M2}
GLUCOSE SERPL-MCNC: 59 MG/DL (ref 70–99)
HCT VFR BLD AUTO: 26.9 % (ref 35–47)
HGB BLD-MCNC: 9 G/DL (ref 11.7–15.7)
MAGNESIUM SERPL-MCNC: 2.3 MG/DL (ref 1.6–2.3)
MCH RBC QN AUTO: 36.3 PG (ref 26.5–33)
MCHC RBC AUTO-ENTMCNC: 33.5 G/DL (ref 31.5–36.5)
MCV RBC AUTO: 109 FL (ref 78–100)
PLATELET # BLD AUTO: 251 10E9/L (ref 150–450)
POTASSIUM SERPL-SCNC: 3.9 MMOL/L (ref 3.4–5.3)
RBC # BLD AUTO: 2.48 10E12/L (ref 3.8–5.2)
SODIUM SERPL-SCNC: 138 MMOL/L (ref 133–144)
TSH SERPL DL<=0.005 MIU/L-ACNC: 2.29 MU/L (ref 0.4–4)
WBC # BLD AUTO: 4.7 10E9/L (ref 4–11)

## 2020-12-04 PROCEDURE — 85027 COMPLETE CBC AUTOMATED: CPT | Mod: ZL | Performed by: FAMILY MEDICINE

## 2020-12-04 PROCEDURE — 84443 ASSAY THYROID STIM HORMONE: CPT | Mod: ZL | Performed by: FAMILY MEDICINE

## 2020-12-04 PROCEDURE — 83735 ASSAY OF MAGNESIUM: CPT | Mod: ZL | Performed by: FAMILY MEDICINE

## 2020-12-04 PROCEDURE — 36415 COLL VENOUS BLD VENIPUNCTURE: CPT | Mod: ZL | Performed by: FAMILY MEDICINE

## 2020-12-04 PROCEDURE — 80048 BASIC METABOLIC PNL TOTAL CA: CPT | Mod: ZL | Performed by: FAMILY MEDICINE

## 2020-12-04 PROCEDURE — 82728 ASSAY OF FERRITIN: CPT | Mod: ZL | Performed by: FAMILY MEDICINE

## 2020-12-04 ASSESSMENT — ANXIETY QUESTIONNAIRES: GAD7 TOTAL SCORE: 5

## 2020-12-08 ENCOUNTER — OFFICE VISIT (OUTPATIENT)
Dept: FAMILY MEDICINE | Facility: OTHER | Age: 80
End: 2020-12-08
Attending: FAMILY MEDICINE
Payer: MEDICARE

## 2020-12-08 VITALS
HEIGHT: 61 IN | TEMPERATURE: 97 F | HEART RATE: 64 BPM | OXYGEN SATURATION: 98 % | BODY MASS INDEX: 40.97 KG/M2 | WEIGHT: 217 LBS | SYSTOLIC BLOOD PRESSURE: 116 MMHG | DIASTOLIC BLOOD PRESSURE: 84 MMHG

## 2020-12-08 DIAGNOSIS — D64.9 ANEMIA, UNSPECIFIED TYPE: ICD-10-CM

## 2020-12-08 DIAGNOSIS — Z12.11 ENCOUNTER FOR SCREENING FOR MALIGNANT NEOPLASM OF COLON: ICD-10-CM

## 2020-12-08 DIAGNOSIS — R35.0 INCREASED FREQUENCY OF URINATION: ICD-10-CM

## 2020-12-08 DIAGNOSIS — R53.1 WEAKNESS: ICD-10-CM

## 2020-12-08 DIAGNOSIS — R53.83 FATIGUE, UNSPECIFIED TYPE: ICD-10-CM

## 2020-12-08 DIAGNOSIS — N39.0 URINARY TRACT INFECTION WITHOUT HEMATURIA, SITE UNSPECIFIED: Primary | ICD-10-CM

## 2020-12-08 LAB
ALBUMIN UR-MCNC: NEGATIVE MG/DL
APPEARANCE UR: ABNORMAL
BACTERIA #/AREA URNS HPF: ABNORMAL /HPF
BILIRUB UR QL STRIP: NEGATIVE
COLOR UR AUTO: YELLOW
GLUCOSE UR STRIP-MCNC: NEGATIVE MG/DL
HGB UR QL STRIP: ABNORMAL
KETONES UR STRIP-MCNC: NEGATIVE MG/DL
LEUKOCYTE ESTERASE UR QL STRIP: ABNORMAL
NITRATE UR QL: POSITIVE
NON-SQ EPI CELLS #/AREA URNS LPF: ABNORMAL /LPF
PH UR STRIP: 6.5 PH (ref 5–7)
RBC #/AREA URNS AUTO: ABNORMAL /HPF
SOURCE: ABNORMAL
SP GR UR STRIP: <=1.005 (ref 1–1.03)
UROBILINOGEN UR STRIP-ACNC: 0.2 EU/DL (ref 0.2–1)
WBC #/AREA URNS AUTO: ABNORMAL /HPF

## 2020-12-08 PROCEDURE — 81001 URINALYSIS AUTO W/SCOPE: CPT | Mod: ZL | Performed by: FAMILY MEDICINE

## 2020-12-08 PROCEDURE — 87088 URINE BACTERIA CULTURE: CPT | Mod: ZL | Performed by: FAMILY MEDICINE

## 2020-12-08 PROCEDURE — 87086 URINE CULTURE/COLONY COUNT: CPT | Mod: ZL | Performed by: FAMILY MEDICINE

## 2020-12-08 PROCEDURE — 87186 SC STD MICRODIL/AGAR DIL: CPT | Mod: ZL | Performed by: FAMILY MEDICINE

## 2020-12-08 PROCEDURE — U0003 INFECTIOUS AGENT DETECTION BY NUCLEIC ACID (DNA OR RNA); SEVERE ACUTE RESPIRATORY SYNDROME CORONAVIRUS 2 (SARS-COV-2) (CORONAVIRUS DISEASE [COVID-19]), AMPLIFIED PROBE TECHNIQUE, MAKING USE OF HIGH THROUGHPUT TECHNOLOGIES AS DESCRIBED BY CMS-2020-01-R: HCPCS | Mod: ZL | Performed by: FAMILY MEDICINE

## 2020-12-08 PROCEDURE — G0463 HOSPITAL OUTPT CLINIC VISIT: HCPCS

## 2020-12-08 PROCEDURE — 99213 OFFICE O/P EST LOW 20 MIN: CPT | Performed by: FAMILY MEDICINE

## 2020-12-08 RX ORDER — AMOXICILLIN 500 MG/1
500 CAPSULE ORAL 2 TIMES DAILY
Qty: 14 CAPSULE | Refills: 0 | Status: SHIPPED | OUTPATIENT
Start: 2020-12-08 | End: 2020-12-15

## 2020-12-08 ASSESSMENT — MIFFLIN-ST. JEOR: SCORE: 1386.69

## 2020-12-08 ASSESSMENT — PAIN SCALES - GENERAL: PAINLEVEL: NO PAIN (0)

## 2020-12-08 NOTE — PROGRESS NOTES
Subjective     Ileana Davis is a 80 year old female who presents to clinic today for the following health issues:    HPI         Acute Illness  Acute illness concerns: weak, nausea, cough  Onset/Duration: couple weeks  Symptoms:  Fever: no  Chills/Sweats: no  Headache (location?): YES  Sinus Pressure: no  Conjunctivitis:  no  Ear Pain: no  Rhinorrhea: no  Congestion: no  Sore Throat: no  Cough: YES  Wheeze: YES  Decreased Appetite: no  Nausea: YES  Vomiting: no  Diarrhea: YES  Dysuria/Freq.: YES  Dysuria or Hematuria: YES  Fatigue/Achiness: YES  Sick/Strep Exposure: no  Therapies tried and outcome: None  Patient is concerned about UTI.    Patient did recently have labs completed for work-up of RLS.  She states the medication was not well tolerated so she stopped it (Mirapex).  In her work-up, her hemoglobin was lower than normal and her ferritin was quite low.  She denies any signs of blood loss, she did have a colonoscopy a few years ago.      Patient Active Problem List   Diagnosis     Hypothyroidism     Generalized anxiety disorder     Benign essential hypertension     GERD (gastroesophageal reflux disease)     Hepatitis     Contact dermatitis     Rosacea     Osteoarthritis     Fibromyalgia     Open wound of knee, leg, and ankle     Degeneration of lumbar or lumbosacral intervertebral disc     Anemia     Autoimmune hepatitis (H)     Morbid obesity (H)     Mild major depression (H)     Hallux valgus of left foot     Left foot pain     Ametropia     Decreased vision of right eye     Dry eye syndrome of both eyes     PCO (posterior capsular opacification), left     Ptosis of both eyelids     Past Surgical History:   Procedure Laterality Date     APPENDECTOMY       ARTHROPLASTY TOE(S) Left 9/21/2020    Procedure: Left  Bethea Arthroplasty with interpositional arthroplasty using graft.;  Surgeon: Gina Rodriguez DPM;  Location: [facility]     BACK SURGERY  2015    lumbar epidural injection     CARDIAC SURGERY   10/2017    angioplasty     CHOLECYSTECTOMY       COLONOSCOPY  07/27/2017    [facility]     COLONOSCOPY - HIM SCAN  05/15/2001    Small internal hemorrhoids; otherwise normal;[facility]     COLONOSCOPY - HIM SCAN  12/14/2006    Colonoscopy, [facility]     ENT SURGERY      tonsillectomy     ESOPHAGOGASTRODUODENOSCOPY  07/27/2017    [facility]     EYE SURGERY      bilateral cataract removal     GI SURGERY      anal fistula     GYN SURGERY  1985    [facility]     GYN SURGERY      cessarian x 2     GYN SURGERY      tubal sterilazation     NEPHRECTOMY Right 05/02/2019     ORTHOPEDIC SURGERY      right knee     ORTHOPEDIC SURGERY  1986    plantar fascia release     ORTHOPEDIC SURGERY      left knee     ORTHOPEDIC SURGERY      right trigger finger release     ORTHOPEDIC SURGERY  2013    Right great toe MTPJ fusion, adductor tenotomy,correction of hammer toe     ORTHOPEDIC SURGERY  [id number]    multiple procedures left forefoot       Social History     Tobacco Use     Smoking status: Never Smoker     Smokeless tobacco: Never Used   Substance Use Topics     Alcohol use: No     Family History   Problem Relation Age of Onset     Arthritis Mother         rheumatoid     Heart Disease Mother         CHF     Alcohol/Drug Father         alcoholism     Allergies Father      Thyroid Disease Other      Diabetes No family hx of      Asthma No family hx of            Current Outpatient Medications   Medication Sig Dispense Refill     acetaminophen (TYLENOL) 500 MG tablet Take 500 mg by mouth every 8 hours as needed for mild pain       amoxicillin (AMOXIL) 500 MG capsule Take 1 capsule (500 mg) by mouth 2 times daily for 7 days 14 capsule 0     azaTHIOprine (IMURAN) 50 MG tablet TAKE 2 TABLETS (100 MG) BY MOUTH DAILY 180 tablet 1     cyclobenzaprine (FLEXERIL) 10 MG tablet TAKE 1 TABLET (10 MG) BY MOUTH 3 TIMES DAILY AS NEEDED FOR MUSCLE SPASMS 90 tablet 1     fluticasone (FLONASE) 50 MCG/ACT nasal spray SPRAY 2 SPRAYS INTO BOTH NOSTRILS DAILY AS  "NEEDED 16 g 1     hydrocortisone 2.5 % cream APPLY TWICE TIMES DAILY TO ACTIVE ECZEMA ON THE HANDS, FOLLOW WITH MOISTURIZING CREAM.       levothyroxine (SYNTHROID/LEVOTHROID) 100 MCG tablet Take 1 tablet (100 mcg) by mouth daily 90 tablet 0     melatonin 1 MG TABS tablet Take by mouth nightly as needed for sleep Pt. Unsure of mg, but she does take 3 tablets.       NYAMYC 081867 UNIT/GM external powder APPLY TO AFFECTED AREA NIGHTLY AS NEEDED FOR RASH 60 g 1     omeprazole (PRILOSEC) 40 MG capsule Take  by mouth daily. Before the same meal daily       Polyethylene Glycol 3350 (MIRALAX PO) Take by mouth daily as needed        pramipexole (MIRAPEX) 0.125 MG tablet Take 1 tablet (0.125 mg) by mouth At Bedtime 30 tablet 1     propranolol (INDERAL) 20 MG tablet TAKE 1 TABLET (20 MG) BY MOUTH ONCE DAILY 90 tablet 1     Allergies   Allergen Reactions     Fentanyl Other (See Comments)     Severe agitation when used during angiogram     Codeine Sulfate Other (See Comments)     hallucinations     Fentanyl And Related      Other reaction(s): Confusion     Morphine Other (See Comments)     Hallucinations with iv therapy     Tape [Adhesive Tape]      Tramadol Other (See Comments)     hallucination       Family History, Social History, Tobacco Use are all reviewed and updated      Review of Systems   Constitutional, HEENT, cardiovascular, pulmonary, gi and gu systems are negative, except as otherwise noted.      Objective    /84 (BP Location: Right arm, Patient Position: Chair, Cuff Size: Adult Regular)   Pulse 64   Temp 97  F (36.1  C)   Ht 1.549 m (5' 1\")   Wt 98.4 kg (217 lb)   SpO2 98%   BMI 41.00 kg/m    Body mass index is 41 kg/m .  Physical Exam   GENERAL: healthy, alert and no distress  PSYCH: mentation appears normal, affect normal/bright    Results for orders placed or performed in visit on 12/08/20   *UA reflex to Microscopic and Culture - [facility]     Status: Abnormal    Specimen: Midstream Urine "   Result Value Ref Range    Color Urine Yellow     Appearance Urine Slightly Cloudy     Glucose Urine Negative NEG^Negative mg/dL    Bilirubin Urine Negative NEG^Negative    Ketones Urine Negative NEG^Negative mg/dL    Specific Gravity Urine <=1.005 1.003 - 1.035    Blood Urine Trace (A) NEG^Negative    pH Urine 6.5 5.0 - 7.0 pH    Protein Albumin Urine Negative NEG^Negative mg/dL    Urobilinogen Urine 0.2 0.2 - 1.0 EU/dL    Nitrite Urine Positive (A) NEG^Negative    Leukocyte Esterase Urine Large (A) NEG^Negative    Source Midstream Urine    Urine Microscopic     Status: Abnormal   Result Value Ref Range    WBC Urine 10-25 (A) OTO5^0 - 5 /HPF    RBC Urine O - 2 OTO2^O - 2 /HPF    Squamous Epithelial /LPF Urine Few FEW^Few /LPF    Bacteria Urine Moderate (A) NEG^Negative /HPF   Symptomatic COVID-19 Virus (Coronavirus) by PCR     Status: None    Specimen: Nasopharyngeal   Result Value Ref Range    COVID-19 Virus PCR to [facility] - Source Nasopharyngeal     COVID-19 Virus PCR to [facility] - Result Not Detected    Urine Culture Aerobic Bacterial     Status: Abnormal    Specimen: Midstream Urine   Result Value Ref Range    Specimen Description Midstream Urine     Special Requests PREPLATED     Culture Micro >100,000 colonies/mL  Escherichia coli   (A)        Susceptibility    Escherichia coli - MAIK     Amoxicillin/Clav 4 Sensitive ug/mL     AMPICILLIN 4 Sensitive ug/mL     CEFAZOLIN* <=4 Sensitive ug/mL      * Cefazolin MAIK breakpoints are for the treatment of uncomplicated urinary tract infections.  For the treatment of systemic infections, please contact the laboratory for additional testing.     CEFTAZIDIME <=1 Sensitive ug/mL     CEFTRIAXONE <=1 Sensitive ug/mL     CIPROFLOXACIN <=0.25 Sensitive ug/mL     GENTAMICIN <=1 Sensitive ug/mL     LEVOFLOXACIN <=0.12 Sensitive ug/mL     NITROFURANTOIN <=16 Sensitive ug/mL     TOBRAMYCIN <=1 Sensitive ug/mL     Trimethoprim/Sulfa <=1/19 Sensitive ug/mL     AMPICILLIN/SULBACTAM  <=2 Sensitive ug/mL     Piperacillin/Tazo <=4 Sensitive ug/mL     CEFEPIME <=1 Sensitive ug/mL           Assessment & Plan     1. Urinary tract infection without hematuria, site unspecified  Will cover infection with amoxicillin.  Follow-up if no improvement noted.  - amoxicillin (AMOXIL) 500 MG capsule; Take 1 capsule (500 mg) by mouth 2 times daily for 7 days  Dispense: 14 capsule; Refill: 0    2. Increased frequency of urination  - *UA reflex to Microscopic and Culture - [facility]/[place]  - Urine Culture Aerobic Bacterial  - Urine Microscopic    3. Anemia, unspecified type  Will check stool for blood.  - Immunos occult blood; Future    4. Encounter for screening for malignant neoplasm of colon   - Immunos occult blood; Future    5. Fatigue, unspecified type  Will check COVID testing with nonspecific symptoms.  - Symptomatic COVID-19 Virus (Coronavirus) by PCR; Future  - Symptomatic COVID-19 Virus (Coronavirus) by PCR    6. Weakness  - Symptomatic COVID-19 Virus (Coronavirus) by PCR; Future  - Symptomatic COVID-19 Virus (Coronavirus) by PCR          Over 25 minutes are spent with the patient, over 15 minutes of which was in education and counseling regarding current conditions and treatment/therapy options/risks/benefits/etc, including work-up recommended, work-up of anemia, medication options, and future planning.      Return if symptoms worsen or fail to improve.    Iris Becerril MD  [facility] - [facility]

## 2020-12-10 DIAGNOSIS — D64.9 ANEMIA, UNSPECIFIED TYPE: ICD-10-CM

## 2020-12-10 DIAGNOSIS — Z12.11 ENCOUNTER FOR SCREENING FOR MALIGNANT NEOPLASM OF COLON: ICD-10-CM

## 2020-12-10 LAB
BACTERIA SPEC CULT: ABNORMAL
HEMOCCULT SP1 STL QL: NEGATIVE
Lab: ABNORMAL
SARS-COV-2 RNA SPEC QL NAA+PROBE: NOT DETECTED
SPECIMEN SOURCE: ABNORMAL
SPECIMEN SOURCE: NORMAL

## 2020-12-10 PROCEDURE — 82274 ASSAY TEST FOR BLOOD FECAL: CPT | Mod: ZL | Performed by: FAMILY MEDICINE

## 2020-12-12 DIAGNOSIS — E03.9 HYPOTHYROIDISM, UNSPECIFIED TYPE: ICD-10-CM

## 2020-12-14 RX ORDER — LEVOTHYROXINE SODIUM 100 UG/1
100 TABLET ORAL DAILY
Qty: 90 TABLET | Refills: 1 | Status: SHIPPED | OUTPATIENT
Start: 2020-12-14 | End: 2021-02-25

## 2020-12-14 NOTE — TELEPHONE ENCOUNTER
8:46 AM    Reason for Call: Phone Call    Description: Pt called about restless legs. Please call pt back      Was an appointment offered for this call? No  If yes : Appointment type              Date    Preferred method for responding to this message: Telephone Call  What is your phone number ? [phone]    If we cannot reach you directly, may we leave a detailed response at the number you provided? Yes    Can this message wait until your PCP/provider returns, if available today? YES    Amber Pardo

## 2020-12-14 NOTE — TELEPHONE ENCOUNTER
Levothyroxine 100 mcg  Last Written Prescription Date:  9/15/20  Last Fill Quantity: 90,   # refills: 0  Last Office Visit: 12/8/20  Future Office visit:    Next 5 appointments (look out 90 days)    Dec 17, 2020 10:00 AM  (Arrive by 9:45 AM)  Return Visit with Cathy Vences NP  [facility] ([facility] ) [address] [place] [address] [phone]           Routing refill request to provider for review/approval because:

## 2020-12-15 ENCOUNTER — TELEPHONE (OUTPATIENT)
Dept: FAMILY MEDICINE | Facility: OTHER | Age: 80
End: 2020-12-15

## 2020-12-15 NOTE — TELEPHONE ENCOUNTER
Returned call to pt.  She states that she has not started the Iron yet.  Encouraged pt to do so.  Pt states that she cannot take her legs any longer.  Any other recommendations?  Pt states that she called yesterday.  Asked pt who she spoke with, pt was unsure.  No telephone call or messages received.

## 2020-12-15 NOTE — TELEPHONE ENCOUNTER
Patient called again regarding her restless legs.  She also started taking amitriptyline again.  She would like a call back, states she called yesterday but didn't get a return call.  [phone]

## 2020-12-17 ENCOUNTER — OFFICE VISIT (OUTPATIENT)
Dept: WOUND CARE | Facility: OTHER | Age: 80
End: 2020-12-17
Attending: NURSE PRACTITIONER
Payer: MEDICARE

## 2020-12-17 ENCOUNTER — TRANSFERRED RECORDS (OUTPATIENT)
Dept: HEALTH INFORMATION MANAGEMENT | Facility: CLINIC | Age: 80
End: 2020-12-17

## 2020-12-17 VITALS
WEIGHT: 217 LBS | TEMPERATURE: 98.1 F | BODY MASS INDEX: 41 KG/M2 | OXYGEN SATURATION: 99 % | HEART RATE: 66 BPM | SYSTOLIC BLOOD PRESSURE: 116 MMHG | DIASTOLIC BLOOD PRESSURE: 80 MMHG

## 2020-12-17 DIAGNOSIS — Z91.81 PERSONAL HISTORY OF FALL: ICD-10-CM

## 2020-12-17 DIAGNOSIS — R23.4 SCAB: Primary | ICD-10-CM

## 2020-12-17 PROCEDURE — 99213 OFFICE O/P EST LOW 20 MIN: CPT | Performed by: NURSE PRACTITIONER

## 2020-12-17 PROCEDURE — G0463 HOSPITAL OUTPT CLINIC VISIT: HCPCS | Mod: 25

## 2020-12-17 PROCEDURE — G0463 HOSPITAL OUTPT CLINIC VISIT: HCPCS

## 2020-12-17 ASSESSMENT — PAIN SCALES - GENERAL: PAINLEVEL: NO PAIN (0)

## 2020-12-17 NOTE — PROGRESS NOTES
WOUND CLINIC NOTE  12/17/2020    Patient:Ileana Davis    Reason for Visit: Seen for follow up of left knee open wound.  Patient was seen in the ER for the fall that caused the wounds on 11/10/2020.  She is currently treating the wounds with mepilex ag pads which she is tolerating.  She is wearing an ace wrap for compression of the left lower extremity and for edema control.     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  The patient was last seen in the clinic on 11/24/2020.      Current Medications:  Current Outpatient Medications   Medication Sig Dispense Refill     acetaminophen (TYLENOL) 500 MG tablet Take 500 mg by mouth every 8 hours as needed for mild pain       azaTHIOprine (IMURAN) 50 MG tablet TAKE 2 TABLETS (100 MG) BY MOUTH DAILY 180 tablet 1     cyclobenzaprine (FLEXERIL) 10 MG tablet TAKE 1 TABLET (10 MG) BY MOUTH 3 TIMES DAILY AS NEEDED FOR MUSCLE SPASMS 90 tablet 1     fluticasone (FLONASE) 50 MCG/ACT nasal spray SPRAY 2 SPRAYS INTO BOTH NOSTRILS DAILY AS NEEDED 16 g 1     hydrocortisone 2.5 % cream APPLY TWICE TIMES DAILY TO ACTIVE ECZEMA ON THE HANDS, FOLLOW WITH MOISTURIZING CREAM.       levothyroxine (SYNTHROID/LEVOTHROID) 100 MCG tablet Take 1 tablet (100 mcg) by mouth daily 90 tablet 1     melatonin 1 MG TABS tablet Take by mouth nightly as needed for sleep Pt. Unsure of mg, but she does take 3 tablets.       NYAMYC 855599 UNIT/GM external powder APPLY TO AFFECTED AREA NIGHTLY AS NEEDED FOR RASH 60 g 1     omeprazole (PRILOSEC) 40 MG capsule Take  by mouth daily. Before the same meal daily       Polyethylene Glycol 3350 (MIRALAX PO) Take by mouth daily as needed        pramipexole (MIRAPEX) 0.125 MG tablet Take 1 tablet (0.125 mg) by mouth At Bedtime 30 tablet 1     propranolol (INDERAL) 20 MG tablet TAKE 1 TABLET (20 MG) BY MOUTH ONCE DAILY 90 tablet 1       Allergies:  Allergies   Allergen Reactions     Fentanyl Other (See Comments)     Severe agitation when used  during angiogram     Codeine Sulfate Other (See Comments)     hallucinations     Fentanyl And Related      Other reaction(s): Confusion     Morphine Other (See Comments)     Hallucinations with iv therapy     Tape [Adhesive Tape]      Tramadol Other (See Comments)     hallucination       PHYSICAL EXAM:     Vital signs: /80   Pulse 66   Temp 98.1  F (36.7  C) (Tympanic)   Wt 98.4 kg (217 lb)   SpO2 99%   BMI 41.00 kg/m     BMI: Body mass index is 41 kg/m .   General: Normal, healthy, cooperative, in no acute distress, alert   Skin: left knee--wound is scabbed at this time   Lungs: respirations are non-labored   Abdominal: non-distended   Extremities: +2/4 edema to lower extremity notes   Neurological: without deficit    ASSESSMENT:  80 year old female here for left knee wound, scabbed; history of fall.     PLAN: To protect the scab, I would recommend daily betadine swabbing of the scab covered with a dry gauze changed daily until the scabbing is resolved.  The patient should continue ace wraps to the leg for edema control.  Patient can follow up as needed at this time.

## 2020-12-30 ENCOUNTER — TRANSFERRED RECORDS (OUTPATIENT)
Dept: HEALTH INFORMATION MANAGEMENT | Facility: CLINIC | Age: 80
End: 2020-12-30

## 2021-01-18 ENCOUNTER — TELEPHONE (OUTPATIENT)
Dept: FAMILY MEDICINE | Facility: OTHER | Age: 81
End: 2021-01-18

## 2021-01-18 NOTE — TELEPHONE ENCOUNTER
1:09 PM    Reason for Call: OVERBOOK    Patient is having the following symptoms: Bladder infection is getting worse she can't hold her bladder and needs to use depends. The first medication didn't help back in December 2020. Ileana wants to see Dr Becerril.    The patient is requesting an appointment for Bladder infection with [facility].    Was an appointment offered for this call? No  If yes : Appointment type              Date    Preferred method for responding to this message: Telephone Call  What is your phone number ? [phone]    If we cannot reach you directly, may we leave a detailed response at the number you provided? Yes    Can this message wait until your PCP/provider returns, if unavailable today? No,     Viola Love

## 2021-01-19 ENCOUNTER — OFFICE VISIT (OUTPATIENT)
Dept: FAMILY MEDICINE | Facility: OTHER | Age: 81
End: 2021-01-19
Attending: FAMILY MEDICINE
Payer: MEDICARE

## 2021-01-19 VITALS
DIASTOLIC BLOOD PRESSURE: 76 MMHG | BODY MASS INDEX: 39.8 KG/M2 | TEMPERATURE: 96.2 F | WEIGHT: 210.8 LBS | RESPIRATION RATE: 16 BRPM | HEART RATE: 60 BPM | OXYGEN SATURATION: 100 % | HEIGHT: 61 IN | SYSTOLIC BLOOD PRESSURE: 138 MMHG

## 2021-01-19 DIAGNOSIS — N30.01 ACUTE CYSTITIS WITH HEMATURIA: Primary | ICD-10-CM

## 2021-01-19 DIAGNOSIS — R35.0 URINARY FREQUENCY: ICD-10-CM

## 2021-01-19 DIAGNOSIS — R82.79 ABNORMAL FINDINGS ON MICROBIOLOGICAL EXAMINATION OF URINE: ICD-10-CM

## 2021-01-19 DIAGNOSIS — D64.9 ANEMIA, UNSPECIFIED TYPE: ICD-10-CM

## 2021-01-19 LAB
ALBUMIN UR-MCNC: 30 MG/DL
APPEARANCE UR: ABNORMAL
BACTERIA #/AREA URNS HPF: ABNORMAL /HPF
BILIRUB UR QL STRIP: NEGATIVE
COLOR UR AUTO: YELLOW
ERYTHROCYTE [DISTWIDTH] IN BLOOD BY AUTOMATED COUNT: 14.9 % (ref 10–15)
FERRITIN SERPL-MCNC: 69 NG/ML (ref 8–252)
GLUCOSE UR STRIP-MCNC: NEGATIVE MG/DL
HCT VFR BLD AUTO: 30 % (ref 35–47)
HGB BLD-MCNC: 10 G/DL (ref 11.7–15.7)
HGB UR QL STRIP: ABNORMAL
KETONES UR STRIP-MCNC: ABNORMAL MG/DL
LEUKOCYTE ESTERASE UR QL STRIP: ABNORMAL
MCH RBC QN AUTO: 35.7 PG (ref 26.5–33)
MCHC RBC AUTO-ENTMCNC: 33.3 G/DL (ref 31.5–36.5)
MCV RBC AUTO: 107 FL (ref 78–100)
NITRATE UR QL: POSITIVE
NON-SQ EPI CELLS #/AREA URNS LPF: ABNORMAL /LPF
PH UR STRIP: 6.5 PH (ref 5–7)
PLATELET # BLD AUTO: 179 10E9/L (ref 150–450)
RBC # BLD AUTO: 2.8 10E12/L (ref 3.8–5.2)
RBC #/AREA URNS AUTO: ABNORMAL /HPF
SOURCE: ABNORMAL
SP GR UR STRIP: 1.01 (ref 1–1.03)
UROBILINOGEN UR STRIP-ACNC: 0.2 EU/DL (ref 0.2–1)
WBC # BLD AUTO: 2.5 10E9/L (ref 4–11)
WBC #/AREA URNS AUTO: >100 /HPF

## 2021-01-19 PROCEDURE — 82728 ASSAY OF FERRITIN: CPT | Mod: ZL | Performed by: FAMILY MEDICINE

## 2021-01-19 PROCEDURE — 36415 COLL VENOUS BLD VENIPUNCTURE: CPT | Mod: ZL | Performed by: FAMILY MEDICINE

## 2021-01-19 PROCEDURE — 87186 SC STD MICRODIL/AGAR DIL: CPT | Mod: ZL | Performed by: FAMILY MEDICINE

## 2021-01-19 PROCEDURE — 99214 OFFICE O/P EST MOD 30 MIN: CPT | Performed by: FAMILY MEDICINE

## 2021-01-19 PROCEDURE — 87088 URINE BACTERIA CULTURE: CPT | Mod: ZL | Performed by: FAMILY MEDICINE

## 2021-01-19 PROCEDURE — G0463 HOSPITAL OUTPT CLINIC VISIT: HCPCS

## 2021-01-19 PROCEDURE — 81001 URINALYSIS AUTO W/SCOPE: CPT | Mod: ZL | Performed by: FAMILY MEDICINE

## 2021-01-19 PROCEDURE — 85027 COMPLETE CBC AUTOMATED: CPT | Mod: ZL | Performed by: FAMILY MEDICINE

## 2021-01-19 PROCEDURE — 87086 URINE CULTURE/COLONY COUNT: CPT | Mod: ZL | Performed by: FAMILY MEDICINE

## 2021-01-19 RX ORDER — FERROUS SULFATE 325(65) MG
325 TABLET ORAL
COMMUNITY
End: 2024-01-15

## 2021-01-19 RX ORDER — CIPROFLOXACIN 250 MG/1
250 TABLET, FILM COATED ORAL 2 TIMES DAILY
Qty: 14 TABLET | Refills: 0 | Status: SHIPPED | OUTPATIENT
Start: 2021-01-19 | End: 2021-01-26

## 2021-01-19 ASSESSMENT — MIFFLIN-ST. JEOR: SCORE: 1358.56

## 2021-01-19 ASSESSMENT — PAIN SCALES - GENERAL: PAINLEVEL: MILD PAIN (2)

## 2021-01-19 NOTE — PROGRESS NOTES
"  Assessment & Plan     1. Acute cystitis with hematuria  Cipro sent, symptomatic cares recommended.  Follow-up if no improvement noted.  - ciprofloxacin (CIPRO) 250 MG tablet; Take 1 tablet (250 mg) by mouth 2 times daily for 7 days  Dispense: 14 tablet; Refill: 0    2. Urinary frequency  - *UA reflex to Microscopic and Culture - [facility]/[place]  - Urine Microscopic    3. Abnormal findings on microbiological examination of urine  - Urine Culture Aerobic Bacterial    4. Anemia, unspecified type  Continue iron supplement  - CBC with platelets  - Ferritin         BMI:   Estimated body mass index is 39.83 kg/m  as calculated from the following:    Height as of this encounter: 1.549 m (5' 1\").    Weight as of this encounter: 95.6 kg (210 lb 12.8 oz).         Return if symptoms worsen or fail to improve.    Iris Becerril MD  [facility] - [facility]      Camilo Tejeda is a 80 year old who presents to clinic today for the following health issues     HPI       Genitourinary - Female  Onset/Duration: Since the beginning of Jan.  Description:   Painful urination (Dysuria): YES           Frequency: YES  Blood in urine (Hematuria): no  Delay in urine (Hesitency): YES  Intensity: mild  Progression of Symptoms:  worsening  Accompanying Signs & Symptoms:  Fever/chills: no  Flank pain: YES  Nausea and vomiting: no  Vaginal symptoms: none  Abdominal/Pelvic Pain: YES  History:   History of frequent UTI s: YES  History of kidney stones: no  Sexually Active: no  Possibility of pregnancy: No  Precipitating or alleviating factors: None  Therapies tried and outcome: Cranberry juice prn (contraindicated in Coumadin patients) and Increase fluid intake        Review of Systems   Constitutional, HEENT, cardiovascular, pulmonary, gi and gu systems are negative, except as otherwise noted.      Objective    /76 (BP Location: Right arm, Patient Position: Sitting, Cuff Size: Adult Regular)   Pulse 60   Temp 96.2  F " "(35.7  C) (Tympanic)   Resp 16   Ht 1.549 m (5' 1\")   Wt 95.6 kg (210 lb 12.8 oz)   SpO2 100%   BMI 39.83 kg/m    Body mass index is 39.83 kg/m .  Physical Exam   GENERAL: alert and no distress  PSYCH: mentation appears normal, affect normal/bright    Results for orders placed or performed in visit on 01/19/21 (from the past 24 hour(s))   *UA reflex to Microscopic and Culture - [facility]    Specimen: Midstream Urine   Result Value Ref Range    Color Urine Yellow     Appearance Urine Cloudy     Glucose Urine Negative NEG^Negative mg/dL    Bilirubin Urine Negative NEG^Negative    Ketones Urine Trace (A) NEG^Negative mg/dL    Specific Gravity Urine 1.015 1.003 - 1.035    Blood Urine Large (A) NEG^Negative    pH Urine 6.5 5.0 - 7.0 pH    Protein Albumin Urine 30 (A) NEG^Negative mg/dL    Urobilinogen Urine 0.2 0.2 - 1.0 EU/dL    Nitrite Urine Positive (A) NEG^Negative    Leukocyte Esterase Urine Large (A) NEG^Negative    Source Midstream Urine    Urine Microscopic   Result Value Ref Range    WBC Urine >100 (A) OTO5^0 - 5 /HPF    RBC Urine 5-10 (A) OTO2^O - 2 /HPF    Squamous Epithelial /LPF Urine Few FEW^Few /LPF    Bacteria Urine Moderate (A) NEG^Negative /HPF   CBC with platelets   Result Value Ref Range    WBC 2.5 (L) 4.0 - 11.0 10e9/L    RBC Count 2.80 (L) 3.8 - 5.2 10e12/L    Hemoglobin 10.0 (L) 11.7 - 15.7 g/dL    Hematocrit 30.0 (L) 35.0 - 47.0 %     (H) 78 - 100 fl    MCH 35.7 (H) 26.5 - 33.0 pg    MCHC 33.3 31.5 - 36.5 g/dL    RDW 14.9 10.0 - 15.0 %    Platelet Count 179 150 - 450 10e9/L   Ferritin   Result Value Ref Range    Ferritin 69 8 - 252 ng/mL             "

## 2021-01-19 NOTE — NURSING NOTE
"Chief Complaint   Patient presents with     UTI       Initial /76 (BP Location: Right arm, Patient Position: Sitting, Cuff Size: Adult Regular)   Pulse 60   Temp 96.2  F (35.7  C) (Tympanic)   Resp 16   Ht 1.549 m (5' 1\")   Wt 95.6 kg (210 lb 12.8 oz)   SpO2 100%   BMI 39.83 kg/m   Estimated body mass index is 39.83 kg/m  as calculated from the following:    Height as of this encounter: 1.549 m (5' 1\").    Weight as of this encounter: 95.6 kg (210 lb 12.8 oz).  Medication Reconciliation: complete  Myla Evans MA  "

## 2021-01-21 LAB
BACTERIA SPEC CULT: ABNORMAL
SPECIMEN SOURCE: ABNORMAL

## 2021-02-25 DIAGNOSIS — B37.2 YEAST DERMATITIS: ICD-10-CM

## 2021-02-25 DIAGNOSIS — M62.838 MUSCLE SPASM: ICD-10-CM

## 2021-02-25 DIAGNOSIS — E03.9 HYPOTHYROIDISM, UNSPECIFIED TYPE: ICD-10-CM

## 2021-02-25 DIAGNOSIS — R52 PAIN: ICD-10-CM

## 2021-02-25 RX ORDER — NYSTATIN 100000 U/G
CREAM TOPICAL 2 TIMES DAILY PRN
Qty: 30 G | Refills: 2 | Status: SHIPPED | OUTPATIENT
Start: 2021-02-25 | End: 2022-07-11

## 2021-02-25 RX ORDER — LEVOTHYROXINE SODIUM 100 UG/1
TABLET ORAL
Qty: 90 TABLET | Refills: 1 | Status: SHIPPED | OUTPATIENT
Start: 2021-02-25 | End: 2021-06-04

## 2021-02-25 RX ORDER — CYCLOBENZAPRINE HCL 10 MG
10 TABLET ORAL 3 TIMES DAILY PRN
Qty: 90 TABLET | Refills: 1 | Status: SHIPPED | OUTPATIENT
Start: 2021-02-25 | End: 2022-06-09

## 2021-02-25 NOTE — TELEPHONE ENCOUNTER
NYSTATIN CREAM      Last Written Prescription Date:  UNKNOWN  Last Fill Quantity: , 30GRAM  # refills: 2  Last Office Visit: 1-  Future Office visit:       Routing refill request to provider for review/approval because:  Drug not active on patient's medication list          SYNTHROID      Last Written Prescription Date:  12-  Last Fill Quantity: 90,   # refills: 1  Last Office Visit: 1-  Future Office visit:       Routing refill request to provider for review/approval because:          FLEXERIL      Last Written Prescription Date:  8-  Last Fill Quantity: 90,   # refills: 1  Last Office Visit: 1-  Future Office visit:       Routing refill request to provider for review/approval because:  Drug not on the [facility], [facility] or [facility] refill protocol or controlled substance

## 2021-03-22 ENCOUNTER — TRANSFERRED RECORDS (OUTPATIENT)
Dept: HEALTH INFORMATION MANAGEMENT | Facility: CLINIC | Age: 81
End: 2021-03-22

## 2021-03-25 ENCOUNTER — OFFICE VISIT (OUTPATIENT)
Dept: FAMILY MEDICINE | Facility: OTHER | Age: 81
End: 2021-03-25
Attending: FAMILY MEDICINE
Payer: MEDICARE

## 2021-03-25 VITALS
WEIGHT: 210 LBS | HEART RATE: 68 BPM | DIASTOLIC BLOOD PRESSURE: 72 MMHG | TEMPERATURE: 97.8 F | SYSTOLIC BLOOD PRESSURE: 130 MMHG | HEIGHT: 61 IN | BODY MASS INDEX: 39.65 KG/M2 | RESPIRATION RATE: 16 BRPM | OXYGEN SATURATION: 97 %

## 2021-03-25 DIAGNOSIS — R35.0 URINARY FREQUENCY: ICD-10-CM

## 2021-03-25 DIAGNOSIS — N39.0 URINARY TRACT INFECTION WITHOUT HEMATURIA, SITE UNSPECIFIED: Primary | ICD-10-CM

## 2021-03-25 DIAGNOSIS — R82.90 ABNORMAL URINE FINDINGS: ICD-10-CM

## 2021-03-25 PROBLEM — H50.00 ESODEVIATION: Status: ACTIVE | Noted: 2020-12-17

## 2021-03-25 LAB
ALBUMIN UR-MCNC: NEGATIVE MG/DL
APPEARANCE UR: ABNORMAL
BACTERIA #/AREA URNS HPF: ABNORMAL /HPF
BILIRUB UR QL STRIP: NEGATIVE
COLOR UR AUTO: YELLOW
GLUCOSE UR STRIP-MCNC: NEGATIVE MG/DL
HGB UR QL STRIP: ABNORMAL
KETONES UR STRIP-MCNC: NEGATIVE MG/DL
LEUKOCYTE ESTERASE UR QL STRIP: ABNORMAL
NITRATE UR QL: POSITIVE
NON-SQ EPI CELLS #/AREA URNS LPF: ABNORMAL /LPF
PH UR STRIP: 6 PH (ref 5–7)
RBC #/AREA URNS AUTO: ABNORMAL /HPF
SOURCE: ABNORMAL
SP GR UR STRIP: 1.01 (ref 1–1.03)
UROBILINOGEN UR STRIP-ACNC: 0.2 EU/DL (ref 0.2–1)
WBC #/AREA URNS AUTO: ABNORMAL /HPF

## 2021-03-25 PROCEDURE — 81001 URINALYSIS AUTO W/SCOPE: CPT | Mod: ZL | Performed by: FAMILY MEDICINE

## 2021-03-25 PROCEDURE — 87088 URINE BACTERIA CULTURE: CPT | Mod: ZL | Performed by: FAMILY MEDICINE

## 2021-03-25 PROCEDURE — G0463 HOSPITAL OUTPT CLINIC VISIT: HCPCS

## 2021-03-25 PROCEDURE — 99214 OFFICE O/P EST MOD 30 MIN: CPT | Performed by: FAMILY MEDICINE

## 2021-03-25 PROCEDURE — 87186 SC STD MICRODIL/AGAR DIL: CPT | Mod: ZL | Performed by: FAMILY MEDICINE

## 2021-03-25 PROCEDURE — 87086 URINE CULTURE/COLONY COUNT: CPT | Mod: ZL | Performed by: FAMILY MEDICINE

## 2021-03-25 RX ORDER — SULFAMETHOXAZOLE/TRIMETHOPRIM 800-160 MG
1 TABLET ORAL 2 TIMES DAILY
Qty: 14 TABLET | Refills: 0 | Status: SHIPPED | OUTPATIENT
Start: 2021-03-25 | End: 2021-04-01

## 2021-03-25 ASSESSMENT — MIFFLIN-ST. JEOR: SCORE: 1354.93

## 2021-03-25 ASSESSMENT — PAIN SCALES - GENERAL: PAINLEVEL: NO PAIN (1)

## 2021-03-25 NOTE — PROGRESS NOTES
"    Assessment & Plan     1. Urinary tract infection without hematuria, site unspecified  Bactrim sent to pharmacy (patient is worried about possible tendon issues with cipro).  Follow-up if no improvement noted.  - sulfamethoxazole-trimethoprim (BACTRIM DS) 800-160 MG tablet; Take 1 tablet by mouth 2 times daily for 7 days  Dispense: 14 tablet; Refill: 0    2. Urinary frequency  - *UA reflex to Microscopic and Culture - [facility]  - Urine Microscopic    3. Abnormal urine findings  - Urine Culture Aerobic Bacterial       BMI:   Estimated body mass index is 39.68 kg/m  as calculated from the following:    Height as of this encounter: 1.549 m (5' 1\").    Weight as of this encounter: 95.3 kg (210 lb).     Return if symptoms worsen or fail to improve.    Iris Becerril MD  [facility]        Camilo Tejeda is a 80 year old who presents for the following health issues     HPI     Genitourinary - Female-incontinence   Onset/Duration: last wed  Description:   Painful urination (Dysuria): no           Frequency: no  Blood in urine (Hematuria): YES  Delay in urine (Hesitency): no  Intensity: mild, moderate  Progression of Symptoms:  worsening  Accompanying Signs & Symptoms:  Fever/chills: no  Flank pain: no  Nausea and vomiting: no  Vaginal symptoms: none  Abdominal/Pelvic Pain: no  History:   History of frequent UTI s: YES  History of kidney stones: no  Sexually Active: no  Possibility of pregnancy: No  Precipitating or alleviating factors: None  Therapies tried and outcome:  cranberry pill       Review of Systems   Constitutional, HEENT, cardiovascular, pulmonary, gi and gu systems are negative, except as otherwise noted.      Objective    /72 (BP Location: Right arm, Patient Position: Chair, Cuff Size: Adult Regular)   Pulse 68   Temp 97.8  F (36.6  C) (Tympanic)   Resp 16   Ht 1.549 m (5' 1\")   Wt 95.3 kg (210 lb)   SpO2 97%   BMI 39.68 kg/m    Body mass index is 39.68 " kg/m .  Physical Exam   GENERAL: healthy, alert and no distress  PSYCH: mentation appears normal, affect normal/bright    Results for orders placed or performed in visit on 03/25/21 (from the past 24 hour(s))   *UA reflex to Microscopic and Culture - [facility]/[place]    Specimen: Midstream Urine   Result Value Ref Range    Color Urine Yellow     Appearance Urine Cloudy     Glucose Urine Negative NEG^Negative mg/dL    Bilirubin Urine Negative NEG^Negative    Ketones Urine Negative NEG^Negative mg/dL    Specific Gravity Urine 1.015 1.003 - 1.035    Blood Urine Trace (A) NEG^Negative    pH Urine 6.0 5.0 - 7.0 pH    Protein Albumin Urine Negative NEG^Negative mg/dL    Urobilinogen Urine 0.2 0.2 - 1.0 EU/dL    Nitrite Urine Positive (A) NEG^Negative    Leukocyte Esterase Urine Moderate (A) NEG^Negative    Source Midstream Urine    Urine Culture Aerobic Bacterial    Specimen: Midstream Urine   Result Value Ref Range    Specimen Description Midstream Urine     Culture Micro (A)      >100,000 colonies/mL  Lactose fermenting gram negative rods  Identification and susceptibility to follow.     Urine Microscopic   Result Value Ref Range    WBC Urine  (A) OTO5^0 - 5 /HPF    RBC Urine 2-5 (A) OTO2^O - 2 /HPF    Squamous Epithelial /LPF Urine Few FEW^Few /LPF    Bacteria Urine Moderate (A) NEG^Negative /HPF

## 2021-03-25 NOTE — NURSING NOTE
"Chief Complaint   Patient presents with     Incontinence       Initial /72 (BP Location: Right arm, Patient Position: Chair, Cuff Size: Adult Regular)   Pulse 68   Temp 97.8  F (36.6  C) (Tympanic)   Resp 16   Ht 1.549 m (5' 1\")   Wt 95.3 kg (210 lb)   SpO2 97%   BMI 39.68 kg/m   Estimated body mass index is 39.68 kg/m  as calculated from the following:    Height as of this encounter: 1.549 m (5' 1\").    Weight as of this encounter: 95.3 kg (210 lb).  Medication Reconciliation: complete  Shonna Oliver LPN    "

## 2021-03-27 LAB
BACTERIA SPEC CULT: ABNORMAL
SPECIMEN SOURCE: ABNORMAL

## 2021-04-06 ENCOUNTER — TRANSFERRED RECORDS (OUTPATIENT)
Dept: HEALTH INFORMATION MANAGEMENT | Facility: CLINIC | Age: 81
End: 2021-04-06

## 2021-04-06 DIAGNOSIS — I10 BENIGN ESSENTIAL HYPERTENSION: ICD-10-CM

## 2021-04-06 RX ORDER — PROPRANOLOL HYDROCHLORIDE 20 MG/1
TABLET ORAL
Qty: 90 TABLET | Refills: 1 | Status: SHIPPED | OUTPATIENT
Start: 2021-04-06 | End: 2021-05-14 | Stop reason: DRUGHIGH

## 2021-04-06 NOTE — TELEPHONE ENCOUNTER
propranolol      Last Written Prescription Date:  10/29/2020  Last Fill Quantity: 90,   # refills: 1  Last Office Visit: 03/25/2021  Future Office visit:

## 2021-04-26 ENCOUNTER — TELEPHONE (OUTPATIENT)
Dept: FAMILY MEDICINE | Facility: OTHER | Age: 81
End: 2021-04-26

## 2021-05-03 ENCOUNTER — NURSE TRIAGE (OUTPATIENT)
Dept: FAMILY MEDICINE | Facility: OTHER | Age: 81
End: 2021-05-03

## 2021-05-03 ENCOUNTER — OFFICE VISIT (OUTPATIENT)
Dept: FAMILY MEDICINE | Facility: OTHER | Age: 81
End: 2021-05-03
Attending: NURSE PRACTITIONER
Payer: MEDICARE

## 2021-05-03 ENCOUNTER — ANCILLARY PROCEDURE (OUTPATIENT)
Dept: GENERAL RADIOLOGY | Facility: OTHER | Age: 81
End: 2021-05-03
Attending: NURSE PRACTITIONER
Payer: MEDICARE

## 2021-05-03 VITALS
HEART RATE: 69 BPM | TEMPERATURE: 98.1 F | BODY MASS INDEX: 38.51 KG/M2 | OXYGEN SATURATION: 96 % | DIASTOLIC BLOOD PRESSURE: 82 MMHG | SYSTOLIC BLOOD PRESSURE: 126 MMHG | WEIGHT: 204 LBS | HEIGHT: 61 IN

## 2021-05-03 DIAGNOSIS — S89.92XA KNEE INJURY, LEFT, INITIAL ENCOUNTER: ICD-10-CM
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
## 2021-05-03 DIAGNOSIS — S51.011A SKIN TEAR OF ELBOW WITHOUT COMPLICATION, RIGHT, INITIAL ENCOUNTER: Primary | ICD-10-CM

## 2021-05-03 DIAGNOSIS — S81.819A SKIN TEAR OF LOWER LEG WITHOUT COMPLICATION, INITIAL ENCOUNTER: ICD-10-CM

## 2021-05-03 PROCEDURE — 73562 X-RAY EXAM OF KNEE 3: CPT | Mod: TC,LT,FY

## 2021-05-03 PROCEDURE — G0463 HOSPITAL OUTPT CLINIC VISIT: HCPCS

## 2021-05-03 PROCEDURE — 99214 OFFICE O/P EST MOD 30 MIN: CPT | Performed by: NURSE PRACTITIONER

## 2021-05-03 RX ORDER — CEPHALEXIN 500 MG/1
500 CAPSULE ORAL 4 TIMES DAILY
Qty: 40 CAPSULE | Refills: 0 | Status: SHIPPED | OUTPATIENT
Start: 2021-05-03 | End: 2021-05-14

## 2021-05-03 RX ORDER — MULTIPLE VITAMINS W/ MINERALS TAB 9MG-400MCG
1 TAB ORAL DAILY
Status: ON HOLD | COMMUNITY
End: 2022-04-19

## 2021-05-03 ASSESSMENT — MIFFLIN-ST. JEOR: SCORE: 1327.72

## 2021-05-03 ASSESSMENT — PAIN SCALES - GENERAL: PAINLEVEL: NO PAIN (1)

## 2021-05-03 NOTE — PROGRESS NOTES
"    Assessment & Plan   Problem List Items Addressed This Visit     None      Visit Diagnoses     Skin tear of elbow without complication, right, initial encounter    -  Primary    Relevant Medications    cephALEXin (KEFLEX) 500 MG capsule    Skin tear of lower leg without complication, initial encounter        Relevant Medications    cephALEXin (KEFLEX) 500 MG capsule    Fall, initial encounter        Relevant Orders    XR Knee Left 3 Views (Clinic Performed) (Completed)    Knee injury, left, initial encounter        Relevant Medications    cephALEXin (KEFLEX) 500 MG capsule    Other Relevant Orders    XR Knee Left 3 Views (Clinic Performed) (Completed)           Review of the result(s) of each unique test -xray of knee  Ordering of each unique test- Xray of knee.   Prescription drug management       No follow-ups on file.    Dayan Hines, CNP  [facility] - [place] Tejeda is a 80 year old who presents for the following health issues  accompanied by her spouse:    HPI   Concern - fall  Onset: 5/1/21 with no loss of consciousness. She tripped over an object on the ground.   Description: injury to right elbow and shin, left knee  Intensity: moderate  Progression of Symptoms:  same  Accompanying Signs & Symptoms: pain, skin tears   Previous history of similar problem: hx of falls   Precipitating factors:        Worsened by: none  Alleviating factors:        Improved by: none  Therapies tried and outcome: wrapped areas with meplex and ace wrap      Review of Systems   Constitutional, HEENT, cardiovascular, pulmonary, gi and gu systems are negative, except as otherwise noted.      Objective    /82 (BP Location: Right arm, Patient Position: Chair, Cuff Size: Adult Regular)   Pulse 69   Temp 98.1  F (36.7  C) (Tympanic)   Ht 1.549 m (5' 1\")   Wt 92.5 kg (204 lb)   SpO2 96%   BMI 38.55 kg/m    Body mass index is 38.55 kg/m .     Physical Exam   GENERAL: healthy, alert and no " distress  EYES: Eyes grossly normal to inspection, PERRL and conjunctivae and sclerae normal  HENT:Normocephalic with no bruising or lesions noted. Ear canals and TM's normal, nose and mouth without ulcers or lesions  NECK: no adenopathy, no asymmetry, masses, or scars and thyroid normal to palpation  RESP: lungs clear to auscultation - no rales, rhonchi or wheezes  CV: regular rate and rhythm, normal S1 S2, no S3 or S4, no murmur, click or rub,  and peripheral pulses strong  MS:   In general, patient is ambulatory with a cane. She does not seem to favor one side or the other. She is not able to get up onto the exam table without great difficulty due to age and general decreased comfort/mobility with this.  There is no tenderness or visible abnormality of the vertebrae, pelvis, or hips.  Left leg: Left leg is wrapped in ace-type rape. This was removed. Skin tear over the left shin. This was cleaned with sterile water and dried. Tegaderm applied by myself and wrapped with elastic bandage by nursing staff.  No evidence of infection note. Lower left leg is edematous 2+ pitting edema. Mild tenderness of the knee at this visit with full ROM intact.    Right upper extremity. No tenderness of neck, shoulder, upper arm. There is no visible deformity of skin noted to these areas. There is a large skin tear to the right forearm covered with an elastic bandage and mepilex. It was unclear if the area was clean and I was unable to visualize the area without removing the mepilex, which was adhered to the skin. I soaked the area with warm water to loosen this. This was painful for Ileeta so we went very slowly and removed section by section. Once removed, the tissue below appears to be fairly clean with only very small amounts of what appeared to be dirt noted. I did cleanse with sterile water moistened gauze and dried the area. There some mild erythema about 4 cm distal to the skin tear itself. It is difficult to tell if this is  true erythema or ecchymosis at this point. It is not hot. It is tender.  Vaseline impregnated gauze applied and covered with 2 gauze pads and wrapped in kerlix and then secured with both tape (to kerlix only) and covered in an elastic sleeve.   NEURO: Normal strength and tone, mentation intact and speech normal.  No focal deficits noted.    Results for orders placed or performed in visit on 05/03/21   XR Knee Left 3 Views (Clinic Performed)     Status: None    Narrative    PROCEDURE: XR KNEE LEFT 3 VIEWS 5/3/2021 2:25 PM    HISTORY: Fall, initial encounter; Knee injury, left, initial encounter    COMPARISONS: None.    TECHNIQUE: 3 views.    FINDINGS: No acute fracture or dislocation is seen. There is a left  knee arthroplasty.    There are soft tissue calcifications anteriorly. These may be  vascular.         Impression    IMPRESSION: Left knee arthroplasty without acute abnormality.    RADHAMES DUBON MD

## 2021-05-03 NOTE — NURSING NOTE
"Chief Complaint   Patient presents with     Fall       Initial /82 (BP Location: Right arm, Patient Position: Chair, Cuff Size: Adult Regular)   Pulse 69   Temp 98.1  F (36.7  C) (Tympanic)   Ht 1.549 m (5' 1\")   Wt 92.5 kg (204 lb)   SpO2 96%   BMI 38.55 kg/m   Estimated body mass index is 38.55 kg/m  as calculated from the following:    Height as of this encounter: 1.549 m (5' 1\").    Weight as of this encounter: 92.5 kg (204 lb).  Medication Reconciliation: complete  Shonna Oliver LPN    "

## 2021-05-03 NOTE — TELEPHONE ENCOUNTER
Call from patient's son reporting patient experienced a fall on 5/1/21. Sustained bruising on right elbow, left knee, right shin and nose. See protocol for details.     Patient has not experienced any neurological changes per Son. Alert and Oriented. No visual changes. No nausea or vomiting.     See protocol for details.     appt scheduled to be evaluated:    Next 5 appointments (look out 90 days)    May 03, 2021  1:00 PM  (Arrive by 12:45 PM)  SHORT with Dayan Hines CNP  [facility] ([facility] ) [address] [place] [address]  [phone]          Additional Information    Negative: Major bleeding (actively dripping or spurting) that can't be stopped    Negative: Sounds like a life-threatening emergency to the triager    Negative: Wound looks infected    Negative: Arm pain from overuse (e.g., sports, lifting, physical work)    Negative: Arm pain not from an injury    Negative: Shoulder injury is main concern    Negative: Elbow injury is main concern    Negative: Hand or wrist injury is main concern    Negative: Bullet wound, stabbed by knife, or other serious penetrating wound    Negative: Looks like a broken bone or dislocated joint (crooked or deformed)    Negative: Can't move injured arm at all    Negative: Bleeding won't stop after 10 minutes of direct pressure (using correct technique)    Negative: Skin is split open or gaping (or length > 1/2 inch or 12 mm)    Negative: Dirt in the wound and not removed after 15 minutes of scrubbing    Negative: Sounds like a serious injury to the triager    Negative: SEVERE pain    Negative: Can't move injured arm normally (bend or straighten completely)    Negative: Large swelling or bruise and size > palm of person's hand    Negative: No prior tetanus shots (or is not fully vaccinated) and any wound (e.g., cut or scrape)    High-risk adult (e.g., age > 60, osteoporosis, chronic steroid  "use)    Answer Assessment - Initial Assessment Questions  1. MECHANISM: \"How did the injury happen?\"      Fall on 5/1/21    2. ONSET: \"When did the injury happen?\" (Minutes or hours ago)       5/1/21    3. LOCATION: \"Where is the injury located?\"       Right elbow, left knee, right knee and small bruising on nose    4. APPEARANCE of INJURY: \"What does the injury look like?\"       Bruise and swelling at elbow area, right shin bruising and swelling, left knee with bruising and swelling     5. SEVERITY: \"Can you use the arm normally?\"       Patient is able to use right arm and bilateral legs normally    6. SWELLING or BRUISING: \"is there any swelling or bruising?\" If so, ask: \"How large is it? (e.g., inches, centimeters)       Yes, right elbow, left knee and right shin    7. PAIN: \"Is there pain?\" If so, ask: \"How bad is the pain?\"    (Scale 1-10; or mild, moderate, severe)      Yes. Mild pain at  3 on 1-10 pain scale    8. TETANUS: For any breaks in the skin, ask: \"When was the last tetanus booster?\"      Unknown     9. OTHER SYMPTOMS: \"Do you have any other symptoms?\"  (e.g., numbness in hand)      No     10. PREGNANCY: \"Is there any chance you are pregnant?\" \"When was your last menstrual period?\"        NO    Protocols used: ARM INJURY-A-[place]      "

## 2021-05-04 NOTE — PROGRESS NOTES
"    Assessment & Plan     1. Skin tear of elbow without complication, right, initial encounter  Daily dressing change instructions given, complete antibiotics as directed.  Wound Care referral ordered.  Follow-up as needed.  - WOUND CARE REFERRAL ([place])    2. Skin tear of lower leg without complication, initial encounter  - WOUND CARE REFERRAL ([place])    3. Fall, subsequent encounter  - WOUND CARE REFERRAL ([place])    Over 30 minutes are spent with the patient and her son in chart review, visit, dressing change, instructions for Wound Care, and in documentation.     BMI:   Estimated body mass index is 38.55 kg/m  as calculated from the following:    Height as of this encounter: 1.549 m (5' 1\").    Weight as of this encounter: 92.5 kg (204 lb).     Return if symptoms worsen or fail to improve.    Iris Becerril MD  [facility]        Camilo Tejeda is a 80 year old who presents for the following health issues     HPI     Concern - wound checks  Onset: 5/1/21  Description: injured during fall  Intensity: severe   Progression of Symptoms:  Worsened   Accompanying Signs & Symptoms: skin tears to knee and to arm   Previous history of similar problem: none  Precipitating factors:        Worsened by: anything   Alleviating factors:        Improved by: none  Therapies tried and outcome: seen on 5/3/21 by Dayan Hines prescribed abx- still in a lot of pain      Patient states she hasn't changed dressings.  She states they tried to get her into Wound Care, but were unable to get an appointment this week.  Dressings were placed, and she was not given instructions to change them.  She has had to rewrap the ACE bandage as it falls down.      Review of Systems   Constitutional, HEENT, cardiovascular, pulmonary, gi and gu systems are negative, except as otherwise noted.      Objective    BP (!) 144/94 (BP Location: Left arm, Patient Position: Chair, Cuff Size: Adult Large)   Pulse 62   Temp " "97.7  F (36.5  C) (Tympanic)   Ht 1.549 m (5' 1\")   Wt 92.5 kg (204 lb)   SpO2 99%   BMI 38.55 kg/m    Body mass index is 38.55 kg/m .  Physical Exam   GENERAL: healthy, alert and no distress  SKIN: tegaderm is in place along wound of left knee, skin is still open (fresh blood present); ACE and kerlex are removed from arm, dried blood is present on Kerlex, dressing is stiff - nonstick gauze is removed, but impregnated gauze is dried to wound, area is soaked in saline and dressing was removed, along with superficial layer of very friable skin, wound edges are clean, no signs of infection  PSYCH: mentation appears normal, affect normal/bright    Wound on right forearm is cleansed gently with saline, vasoline gauze is applied, followed by impregnated gauze, nonstick gauze, Kerlex and then new ACE wrap.  Tegaderm is removed from wound on knee, only very superficial layer of skin is irritated, nonstick gauze and new Tegaderm applied.  Supplies with detailed instructions for daily dressing changes to arm wound are given to patient and her son (supplies are numbered in order of application, instructions are repeated multiple times)          "

## 2021-05-06 ENCOUNTER — OFFICE VISIT (OUTPATIENT)
Dept: FAMILY MEDICINE | Facility: OTHER | Age: 81
End: 2021-05-06
Attending: FAMILY MEDICINE
Payer: MEDICARE

## 2021-05-06 VITALS
HEIGHT: 61 IN | SYSTOLIC BLOOD PRESSURE: 144 MMHG | BODY MASS INDEX: 38.51 KG/M2 | OXYGEN SATURATION: 99 % | WEIGHT: 204 LBS | TEMPERATURE: 97.7 F | HEART RATE: 62 BPM | DIASTOLIC BLOOD PRESSURE: 94 MMHG

## 2021-05-06 DIAGNOSIS — S51.011A SKIN TEAR OF ELBOW WITHOUT COMPLICATION, RIGHT, INITIAL ENCOUNTER: Primary | ICD-10-CM
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
## 2021-05-06 DIAGNOSIS — S81.819A SKIN TEAR OF LOWER LEG WITHOUT COMPLICATION, INITIAL ENCOUNTER: ICD-10-CM

## 2021-05-06 PROCEDURE — 99214 OFFICE O/P EST MOD 30 MIN: CPT | Performed by: FAMILY MEDICINE

## 2021-05-06 PROCEDURE — G0463 HOSPITAL OUTPT CLINIC VISIT: HCPCS

## 2021-05-06 ASSESSMENT — PAIN SCALES - GENERAL: PAINLEVEL: WORST PAIN (10)

## 2021-05-06 ASSESSMENT — ANXIETY QUESTIONNAIRES
GAD7 TOTAL SCORE: 4
7. FEELING AFRAID AS IF SOMETHING AWFUL MIGHT HAPPEN: NOT AT ALL
IF YOU CHECKED OFF ANY PROBLEMS ON THIS QUESTIONNAIRE, HOW DIFFICULT HAVE THESE PROBLEMS MADE IT FOR YOU TO DO YOUR WORK, TAKE CARE OF THINGS AT HOME, OR GET ALONG WITH OTHER PEOPLE: NOT DIFFICULT AT ALL
4. TROUBLE RELAXING: SEVERAL DAYS
6. BECOMING EASILY ANNOYED OR IRRITABLE: NOT AT ALL
1. FEELING NERVOUS, ANXIOUS, OR ON EDGE: NEARLY EVERY DAY
5. BEING SO RESTLESS THAT IT IS HARD TO SIT STILL: NOT AT ALL
3. WORRYING TOO MUCH ABOUT DIFFERENT THINGS: NOT AT ALL
2. NOT BEING ABLE TO STOP OR CONTROL WORRYING: NOT AT ALL

## 2021-05-06 ASSESSMENT — MIFFLIN-ST. JEOR: SCORE: 1327.72

## 2021-05-06 ASSESSMENT — PATIENT HEALTH QUESTIONNAIRE - PHQ9: SUM OF ALL RESPONSES TO PHQ QUESTIONS 1-9: 16

## 2021-05-06 NOTE — NURSING NOTE
"Chief Complaint   Patient presents with     Wound Check       Initial BP (!) 144/94 (BP Location: Left arm, Patient Position: Chair, Cuff Size: Adult Large)   Pulse 62   Temp 97.7  F (36.5  C) (Tympanic)   Ht 1.549 m (5' 1\")   Wt 92.5 kg (204 lb)   SpO2 99%   BMI 38.55 kg/m   Estimated body mass index is 38.55 kg/m  as calculated from the following:    Height as of this encounter: 1.549 m (5' 1\").    Weight as of this encounter: 92.5 kg (204 lb).  Medication Reconciliation: complete  Shonna Oliver LPN      "

## 2021-05-07 ENCOUNTER — TELEPHONE (OUTPATIENT)
Dept: FAMILY MEDICINE | Facility: OTHER | Age: 81
End: 2021-05-07

## 2021-05-07 DIAGNOSIS — S51.011A SKIN TEAR OF ELBOW WITHOUT COMPLICATION, RIGHT, INITIAL ENCOUNTER: Primary | ICD-10-CM

## 2021-05-07 DIAGNOSIS — S81.819A SKIN TEAR OF LOWER LEG WITHOUT COMPLICATION, INITIAL ENCOUNTER: ICD-10-CM

## 2021-05-07 ASSESSMENT — ANXIETY QUESTIONNAIRES: GAD7 TOTAL SCORE: 4

## 2021-05-07 NOTE — TELEPHONE ENCOUNTER
Pt called stated she can't get into to see wound care until next week, 5/13/21, pt reports she needs supplies ordered for daily dressing changes until pt sees wound care.    Routing to [facility]. Pt requesting that the clinic supply her with all dressing change supplies, writer did request patient give medical supply company. Pt stated she didn't have one.     Pt call back number: [phone]    Possibly  [employer]

## 2021-05-10 ENCOUNTER — ALLIED HEALTH/NURSE VISIT (OUTPATIENT)
Dept: FAMILY MEDICINE | Facility: OTHER | Age: 81
End: 2021-05-10
Attending: FAMILY MEDICINE
Payer: MEDICARE

## 2021-05-10 DIAGNOSIS — S51.011A SKIN TEAR OF ELBOW WITHOUT COMPLICATION, RIGHT, INITIAL ENCOUNTER: Primary | ICD-10-CM

## 2021-05-10 NOTE — PROGRESS NOTES
Patient presented today with son for a dressing change to right forearm. Per patients son they have been changing dressing twice daily due to difficulty removing dressing after 24 hours.     Old dressing has been removed, Vaseline strips were absent, mesh Vaseline non-adherent present with Telfa placed over and wrapped with Kerlex and an ace bandage. Telfa and Vaseline dressing did have a scant amount of blood tinged fluid. Wound appeared moist, clean with no signs of infection noted. Wound was measured 10 cm in length by 2.5 cm in width. No depth to wound 0 mm.     Wound was redressed with vaseline strip dressings cut and placed vertically across wound in the 3 segments, mesh vaseline nonadherent place over strips, Telfa nonadherent than was place over mesh. Dressings wrapped with Kerlex and affixed with paper tape. Arm was than wrapped with ace bandage from wrist to above the elbow in a figure 8 fashion. Than affixed with velcro and paper tape. Patient will change dressing in 24 hours, if drying occurs will use saline to release dressing from wound to avoid injury to underlying tissue.    Patient did express some discomfort with the removal and application of dressings. Patient admittedly has a low pain tolerance, but was over all pleased with the dressing change.    Will notify PCP of dressing change completion and re-instruction of dressing application. Patient will follow up on thursday with wound care. Will call sooner if any question or issues arise in regard to wound care and management.      Shonna Oliver LPN

## 2021-05-13 ENCOUNTER — HOSPITAL ENCOUNTER (EMERGENCY)
Facility: HOSPITAL | Age: 81
Discharge: HOME | End: 2021-05-13
Attending: EMERGENCY MEDICINE | Admitting: EMERGENCY MEDICINE
Payer: MEDICARE

## 2021-05-13 ENCOUNTER — OFFICE VISIT (OUTPATIENT)
Dept: WOUND CARE | Facility: OTHER | Age: 81
End: 2021-05-13
Attending: FAMILY MEDICINE
Payer: MEDICARE

## 2021-05-13 VITALS
SYSTOLIC BLOOD PRESSURE: 166 MMHG | DIASTOLIC BLOOD PRESSURE: 100 MMHG | OXYGEN SATURATION: 98 % | TEMPERATURE: 97.5 F | HEART RATE: 64 BPM

## 2021-05-13 VITALS
SYSTOLIC BLOOD PRESSURE: 185 MMHG | OXYGEN SATURATION: 100 % | HEART RATE: 63 BPM | DIASTOLIC BLOOD PRESSURE: 88 MMHG | TEMPERATURE: 98 F | RESPIRATION RATE: 26 BRPM

## 2021-05-13 DIAGNOSIS — I10 HYPERTENSION, UNSPECIFIED TYPE: ICD-10-CM

## 2021-05-13 DIAGNOSIS — T14.8XXA HEMATOMA OF SKIN: Primary | ICD-10-CM

## 2021-05-13 DIAGNOSIS — T07.XXXA MULTIPLE OPEN WOUNDS: ICD-10-CM

## 2021-05-13 LAB
ANION GAP SERPL CALCULATED.3IONS-SCNC: 7 MMOL/L (ref 3–14)
BASOPHILS # BLD AUTO: 0 10E9/L (ref 0–0.2)
BASOPHILS NFR BLD AUTO: 0.4 %
BUN SERPL-MCNC: 20 MG/DL (ref 7–30)
CALCIUM SERPL-MCNC: 8.7 MG/DL (ref 8.5–10.1)
CHLORIDE SERPL-SCNC: 101 MMOL/L (ref 94–109)
CO2 SERPL-SCNC: 23 MMOL/L (ref 20–32)
CREAT SERPL-MCNC: 1.07 MG/DL (ref 0.52–1.04)
DIFFERENTIAL METHOD BLD: ABNORMAL
EOSINOPHIL # BLD AUTO: 0.2 10E9/L (ref 0–0.7)
EOSINOPHIL NFR BLD AUTO: 2.7 %
ERYTHROCYTE [DISTWIDTH] IN BLOOD BY AUTOMATED COUNT: 14 % (ref 10–15)
GFR SERPL CREATININE-BSD FRML MDRD: 48 ML/MIN/{1.73_M2}
GLUCOSE SERPL-MCNC: 100 MG/DL (ref 70–99)
HCT VFR BLD AUTO: 36.5 % (ref 35–47)
HGB BLD-MCNC: 12.8 G/DL (ref 11.7–15.7)
IMM GRANULOCYTES # BLD: 0.1 10E9/L (ref 0–0.4)
IMM GRANULOCYTES NFR BLD: 0.6 %
LYMPHOCYTES # BLD AUTO: 1 10E9/L (ref 0.8–5.3)
LYMPHOCYTES NFR BLD AUTO: 12 %
MCH RBC QN AUTO: 36.8 PG (ref 26.5–33)
MCHC RBC AUTO-ENTMCNC: 35.1 G/DL (ref 31.5–36.5)
MCV RBC AUTO: 105 FL (ref 78–100)
MONOCYTES # BLD AUTO: 0.7 10E9/L (ref 0–1.3)
MONOCYTES NFR BLD AUTO: 7.9 %
NEUTROPHILS # BLD AUTO: 6.3 10E9/L (ref 1.6–8.3)
NEUTROPHILS NFR BLD AUTO: 76.4 %
NRBC # BLD AUTO: 0 10*3/UL
NRBC BLD AUTO-RTO: 0 /100
PLATELET # BLD AUTO: 185 10E9/L (ref 150–450)
POTASSIUM SERPL-SCNC: 4.1 MMOL/L (ref 3.4–5.3)
RBC # BLD AUTO: 3.48 10E12/L (ref 3.8–5.2)
SODIUM SERPL-SCNC: 131 MMOL/L (ref 133–144)
WBC # BLD AUTO: 8.3 10E9/L (ref 4–11)

## 2021-05-13 PROCEDURE — 36415 COLL VENOUS BLD VENIPUNCTURE: CPT | Performed by: EMERGENCY MEDICINE

## 2021-05-13 PROCEDURE — 99213 OFFICE O/P EST LOW 20 MIN: CPT | Performed by: NURSE PRACTITIONER

## 2021-05-13 PROCEDURE — 99283 EMERGENCY DEPT VISIT LOW MDM: CPT

## 2021-05-13 PROCEDURE — 85025 COMPLETE CBC W/AUTO DIFF WBC: CPT | Performed by: EMERGENCY MEDICINE

## 2021-05-13 PROCEDURE — 80048 BASIC METABOLIC PNL TOTAL CA: CPT | Performed by: EMERGENCY MEDICINE

## 2021-05-13 PROCEDURE — 99283 EMERGENCY DEPT VISIT LOW MDM: CPT | Performed by: EMERGENCY MEDICINE

## 2021-05-13 ASSESSMENT — ENCOUNTER SYMPTOMS
VOMITING: 0
LIGHT-HEADEDNESS: 0
FATIGUE: 0
FEVER: 0
ABDOMINAL PAIN: 0
NAUSEA: 0
DYSURIA: 0
PHOTOPHOBIA: 0
SINUS PRESSURE: 0
SORE THROAT: 0
DIZZINESS: 0
SHORTNESS OF BREATH: 0
BACK PAIN: 0
ACTIVITY CHANGE: 0
CHEST TIGHTNESS: 0

## 2021-05-13 ASSESSMENT — PAIN SCALES - GENERAL: PAINLEVEL: SEVERE PAIN (7)

## 2021-05-13 ASSESSMENT — PATIENT HEALTH QUESTIONNAIRE - PHQ9: SUM OF ALL RESPONSES TO PHQ QUESTIONS 1-9: 19

## 2021-05-13 NOTE — NURSING NOTE
"Chief Complaint   Patient presents with     WOUND CARE       Initial BP (!) 166/100 (BP Location: Left arm, Patient Position: Sitting, Cuff Size: Adult Regular)   Pulse 64   Temp 97.5  F (36.4  C) (Tympanic)   SpO2 98%  Estimated body mass index is 38.55 kg/m  as calculated from the following:    Height as of 5/6/21: 1.549 m (5' 1\").    Weight as of 5/6/21: 92.5 kg (204 lb).  Medication Reconciliation: complete  Viola Abbott  "

## 2021-05-13 NOTE — ED PROVIDER NOTES
"  History     Chief Complaint   Patient presents with     Hypertension     HPI  Ileana Davis is a 80 year old female who apparently had an elevated blood pressure while in wound care.  She was told to come to the emergency department.  The patient states she feels fine.  She denies headache or dizziness.  She denies any visual disturbance.  She is not been having pain in her chest and she is not been having exertional shortness of breath.  She relates that she checked her blood pressure at home yesterday and it was elevated in the \"180\" range yesterday.  She said no nausea or vomiting.  She has had no numbness or tingling.  She has had no change in her bowel or bladder habits.    Allergies:  Allergies   Allergen Reactions     Fentanyl Other (See Comments)     Severe agitation when used during angiogram     Codeine Sulfate Other (See Comments)     hallucinations     Fentanyl And Related      Other reaction(s): Confusion     Morphine Other (See Comments)     Hallucinations with iv therapy     Tape [Adhesive Tape]      Tramadol Other (See Comments)     hallucination       Problem List:    Patient Active Problem List    Diagnosis Date Noted     Esodeviation 12/17/2020     Priority: Medium     Hallux valgus of left foot 09/08/2020     Priority: Medium     Added automatically from request for surgery [id number]       Left foot pain 09/08/2020     Priority: Medium     Added automatically from request for surgery [id number]       Ametropia 09/01/2020     Priority: Medium     Decreased vision of right eye 09/01/2020     Priority: Medium     Dry eye syndrome of both eyes 09/01/2020     Priority: Medium     PCO (posterior capsular opacification), left 09/01/2020     Priority: Medium     Ptosis of both eyelids 09/01/2020     Priority: Medium     suspect lev deh RT greater than LT       Mild major depression (H) 04/26/2018     Priority: Medium     Morbid obesity (H) 08/28/2017     Priority: Medium     Autoimmune hepatitis (H) " 06/02/2016     Priority: Medium     Anemia 08/20/2015     Priority: Medium     Degeneration of lumbar or lumbosacral intervertebral disc 01/30/2015     Priority: Medium     Open wound of knee, leg, and ankle      Priority: Medium     Hypothyroidism 03/27/2013     Priority: Medium     Generalized anxiety disorder 03/27/2013     Priority: Medium     Benign essential hypertension 03/27/2013     Priority: Medium     GERD (gastroesophageal reflux disease) 03/27/2013     Priority: Medium     Hepatitis 03/27/2013     Priority: Medium     Contact dermatitis 03/27/2013     Priority: Medium     Rosacea 03/27/2013     Priority: Medium     Osteoarthritis 03/27/2013     Priority: Medium     Fibromyalgia 03/27/2013     Priority: Medium        Past Medical History:    Past Medical History:   Diagnosis Date     Anemia 8/20/2015     Autoimmune hepatitis (H) 6/2/2016     Benign paroxysmal positional vertigo 10/7/2010     Contact dermatitis and other eczema, due to unspecified cause 10/7/2010     Esophageal reflux 10/7/2010     Generalized anxiety disorder 10/7/2010     Generalized osteoarthrosis, involving multiple sites 10/7/2010     Hepatitis, unspecified 10/7/2010     Infected wound      Myalgia and myositis, unspecified 10/7/2010     Nonallopathic lesion of cervical region, not elsewhere classified 12/5/2001     Nonallopathic lesion of thoracic region, not elsewhere classified 3/31/2005     Open wound of knee, leg, and ankle      Rosacea 10/7/2010     Unspecified essential hypertension 10/7/2010     Unspecified hypothyroidism 10/7/2010       Past Surgical History:    Past Surgical History:   Procedure Laterality Date     APPENDECTOMY       ARTHROPLASTY TOE(S) Left 9/21/2020    Procedure: Left  Bethea Arthroplasty with interpositional arthroplasty using graft.;  Surgeon: Gina Rodriguez DPM;  Location: [facility] OR     BACK SURGERY  2015    lumbar epidural injection     CARDIAC SURGERY  10/2017    angioplasty     CHOLECYSTECTOMY        COLONOSCOPY  07/27/2017    [facility]     COLONOSCOPY - HIM SCAN  05/15/2001    Small internal hemorrhoids; otherwise normal;[facility]     COLONOSCOPY - HIM SCAN  12/14/2006    Colonoscopy, [facility], [facility]     ENT SURGERY      tonsillectomy     ESOPHAGOGASTRODUODENOSCOPY  07/27/2017    [facility]     EYE SURGERY      bilateral cataract removal     GI SURGERY      anal fistula     GYN SURGERY  1985    [facility]     GYN SURGERY      cessarian x 2     GYN SURGERY      tubal sterilazation     NEPHRECTOMY Right 05/02/2019     ORTHOPEDIC SURGERY      right knee     ORTHOPEDIC SURGERY  1986    plantar fascia release     ORTHOPEDIC SURGERY      left knee     ORTHOPEDIC SURGERY      right trigger finger release     ORTHOPEDIC SURGERY  2013    Right great toe MTPJ fusion, adductor tenotomy,correction of hammer toe     ORTHOPEDIC SURGERY  [id number]    multiple procedures left forefoot       Family History:    Family History   Problem Relation Age of Onset     Arthritis Mother         rheumatoid     Heart Disease Mother         CHF     Alcohol/Drug Father         alcoholism     Allergies Father      Thyroid Disease Other      Diabetes No family hx of      Asthma No family hx of        Social History:  Marital Status:   [5]  Social History     Tobacco Use     Smoking status: Never Smoker     Smokeless tobacco: Never Used   Substance Use Topics     Alcohol use: No     Drug use: No        Medications:    acetaminophen (TYLENOL) 500 MG tablet  azaTHIOprine (IMURAN) 50 MG tablet  cephALEXin (KEFLEX) 500 MG capsule  Cranberry-Cholecalciferol (SUPER CRANBERRY/VITAMIN D3) 4200-500 MG-UNIT CAPS  cyclobenzaprine (FLEXERIL) 10 MG tablet  ferrous sulfate (FEROSUL) 325 (65 Fe) MG tablet  levothyroxine (SYNTHROID/LEVOTHROID) 100 MCG tablet  melatonin 1 MG TABS tablet  multivitamin w/minerals (MULTI-VITAMIN) tablet  omeprazole (PRILOSEC) 40 MG capsule  Polyethylene Glycol 3350 (MIRALAX PO)  propranolol (INDERAL) 20 MG tablet  fluticasone  (FLONASE) 50 MCG/ACT nasal spray  hydrocortisone 2.5 % cream  NYAMYC 920914 UNIT/GM external powder  nystatin (MYCOSTATIN) 351257 UNIT/GM external cream          Review of Systems   Constitutional: Negative for activity change, fatigue and fever.   HENT: Negative for congestion, sinus pressure and sore throat.    Eyes: Negative for photophobia and visual disturbance.   Respiratory: Negative for chest tightness and shortness of breath.    Cardiovascular: Negative for leg swelling.   Gastrointestinal: Negative for abdominal pain, nausea and vomiting.   Genitourinary: Negative for dysuria.   Musculoskeletal: Negative for back pain.   Neurological: Negative for dizziness and light-headedness.   All other systems reviewed and found unremarkable    Physical Exam   BP: (!) 203/114  Pulse: 72  Temp: 98  F (36.7  C)  Resp: 16  SpO2: 98 %      Physical Exam is an 80-year-old female who is awake alert oriented to person place and time very pleasant and cooperative with my exam does not appear in any distress HEENT normocephalic extraocular muscles intact pupils equally round and reactive to light oropharynx is clear neck is supple his range of motion without pain no evidence of nuchal irritation lungs are clear bilaterally heart maintains a regular rate and rhythm S1 and S2 sounds are appreciated abdomen is soft and nontender no mass no organomegaly rebound extremities if range of motion 5/5 strength no edema patient has a dressed wound on her left knee.  She also has a hematoma that is mildly swollen had about the size of a quarter on her pretibial area of her right lower extremity.  Neurologic exam no focal cranial nerve deficit dermatologic exam no diffuse skin rashes or lesions    ED Course        Remained very stable throughout her stay in our department.  Recheck blood pressure at 1637 was 185/88.  I had discussed findings with the patient.  I related to her that I would like her to go to her family practitioner's office  tomorrow for blood pressure recheck and discussion about whether or not she needs her antihypertensive medications readjusted.  She will be discharged                Results for orders placed or performed during the hospital encounter of 05/13/21 (from the past 24 hour(s))   Basic metabolic panel   Result Value Ref Range    Sodium 131 (L) 133 - 144 mmol/L    Potassium 4.1 3.4 - 5.3 mmol/L    Chloride 101 94 - 109 mmol/L    Carbon Dioxide 23 20 - 32 mmol/L    Anion Gap 7 3 - 14 mmol/L    Glucose 100 (H) 70 - 99 mg/dL    Urea Nitrogen 20 7 - 30 mg/dL    Creatinine 1.07 (H) 0.52 - 1.04 mg/dL    GFR Estimate 48 (L) >60 mL/min/[1.73_m2]    GFR Estimate If Black 56 (L) >60 mL/min/[1.73_m2]    Calcium 8.7 8.5 - 10.1 mg/dL   CBC with platelets differential   Result Value Ref Range    WBC 8.3 4.0 - 11.0 10e9/L    RBC Count 3.48 (L) 3.8 - 5.2 10e12/L    Hemoglobin 12.8 11.7 - 15.7 g/dL    Hematocrit 36.5 35.0 - 47.0 %     (H) 78 - 100 fl    MCH 36.8 (H) 26.5 - 33.0 pg    MCHC 35.1 31.5 - 36.5 g/dL    RDW 14.0 10.0 - 15.0 %    Platelet Count 185 150 - 450 10e9/L    Diff Method Automated Method     % Neutrophils 76.4 %    % Lymphocytes 12.0 %    % Monocytes 7.9 %    % Eosinophils 2.7 %    % Basophils 0.4 %    % Immature Granulocytes 0.6 %    Nucleated RBCs 0 0 /100    Absolute Neutrophil 6.3 1.6 - 8.3 10e9/L    Absolute Lymphocytes 1.0 0.8 - 5.3 10e9/L    Absolute Monocytes 0.7 0.0 - 1.3 10e9/L    Absolute Eosinophils 0.2 0.0 - 0.7 10e9/L    Absolute Basophils 0.0 0.0 - 0.2 10e9/L    Abs Immature Granulocytes 0.1 0 - 0.4 10e9/L    Absolute Nucleated RBC 0.0        Medications - No data to display    Assessments & Plan (with Medical Decision Making)     I have reviewed the nursing notes.    I have reviewed the findings, diagnosis, plan and need for follow up with the patient.  We will discharge the patient and advised her to follow-up with her primary care provider tomorrow    New Prescriptions    No medications on file        Final diagnoses:   Hypertension, unspecified type       5/13/2021   [facility] EM[facility]     Adalberto Kirkpatrick,   05/13/21 0647

## 2021-05-13 NOTE — ED NOTES
Slight dizziness. Was at wound care today and told them her BP was high yesterday and they took a BP that was high. Encouraged to be seen in ER. Denies chest pain, shortness of breath, visual disturbances, headache.

## 2021-05-13 NOTE — PROGRESS NOTES
CLINIC NOTE - Wound Care  5/13/2021    Patient:Ileana Davis    Reason for Visit: Multiple wounds    This is a 80 year old female here in consultation from Dr. Jones for multiple wounds sustained in a fall.  She states she feel over poles outside onto concrete sustaining the falls.  She is accompanied to this appointment by her son who is assisting in the patient's wound care. The patient's right arm wound is painful when touched.     In addition, the patient notes she has had intermittently headaches, dizziness, and hypertension. She states her blood pressure yesterday was in the 200s/100s.     I have seen the patient in the past for wound care of a knee wound--please see those notes.     Current Medications:  Current Outpatient Medications   Medication Sig Dispense Refill     acetaminophen (TYLENOL) 500 MG tablet Take 500 mg by mouth every 8 hours as needed for mild pain       azaTHIOprine (IMURAN) 50 MG tablet TAKE 2 TABLETS (100 MG) BY MOUTH DAILY 180 tablet 1     cephALEXin (KEFLEX) 500 MG capsule Take 1 capsule (500 mg) by mouth 4 times daily for 10 days 40 capsule 0     Cranberry-Cholecalciferol (SUPER CRANBERRY/VITAMIN D3) 4200-500 MG-UNIT CAPS        cyclobenzaprine (FLEXERIL) 10 MG tablet TAKE 1 TABLET (10 MG) BY MOUTH 3 TIMES DAILY AS NEEDED FOR MUSCLE SPASMS 90 tablet 1     ferrous sulfate (FEROSUL) 325 (65 Fe) MG tablet Take 325 mg by mouth daily (with breakfast)       fluticasone (FLONASE) 50 MCG/ACT nasal spray SPRAY 2 SPRAYS INTO BOTH NOSTRILS DAILY AS NEEDED 16 g 1     hydrocortisone 2.5 % cream APPLY TWICE TIMES DAILY TO ACTIVE ECZEMA ON THE HANDS, FOLLOW WITH MOISTURIZING CREAM.       levothyroxine (SYNTHROID/LEVOTHROID) 100 MCG tablet TAKE 1 TABLET BY MOUTH ONCE DAILY 90 tablet 1     melatonin 1 MG TABS tablet Take by mouth nightly as needed for sleep        multivitamin w/minerals (MULTI-VITAMIN) tablet Take 1 tablet by mouth daily       NYAMYC 644139 UNIT/GM external powder APPLY TO  AFFECTED AREA NIGHTLY AS NEEDED FOR RASH 60 g 1     nystatin (MYCOSTATIN) 310286 UNIT/GM external cream APPLY TOPICALLY 2 TIMES DAILY AS NEEDED FOR DRY SKIN 30 g 2     omeprazole (PRILOSEC) 40 MG capsule Take  by mouth daily. Before the same meal daily       Polyethylene Glycol 3350 (MIRALAX PO) Take by mouth daily as needed        propranolol (INDERAL) 20 MG tablet TAKE 1 TABLET (20 MG) BY MOUTH ONCE DAILY 90 tablet 1       Allergies:  Allergies   Allergen Reactions     Fentanyl Other (See Comments)     Severe agitation when used during angiogram     Codeine Sulfate Other (See Comments)     hallucinations     Fentanyl And Related      Other reaction(s): Confusion     Morphine Other (See Comments)     Hallucinations with iv therapy     Tape [Adhesive Tape]      Tramadol Other (See Comments)     hallucination       PHYSICAL EXAM:   Vital signs: BP (!) 166/100 (BP Location: Left arm, Patient Position: Sitting, Cuff Size: Adult Regular)   Pulse 64   Temp 97.5  F (36.4  C) (Tympanic)   SpO2 98%    BMI: There is no height or weight on file to calculate BMI.   General: Normal, healthy, cooperative, in no acute distress, alert   Skin: A right lower leg hematoma which measures 2 cm x 2 cm is noted   Lungs:  Non-labored respirations are noted   Abdominal: non-distended   Extremities: To the left knee there is a 1 cm x 1.5 cm wound noted which is largely scabbed.   To the right lateral arm right just below the elbow is a 8 cm x 4 cm x 0.1 cm wound noted. The wound is granular without signs/symptoms of infection.    Neurological: without deficit    ASSESSMENT:  80 year old female with multiple open wounds; hematoma    PLAN:   Will treat right arm wound with mepilex ag border pads changed every 3 days or more often as needed due to saturation.  Will treat left knee wound with a bandage changed daily.  Patient to monitor right leg hematoma.  Patient is to be seen immediately with signs/symptoms of infection noted to the  wounds/hematoma which were verbalized as understood by the patient and her son.     Patient's blood pressure at this appointment was noted in the 180s/100s even with rechecking.  Again patient verbalizes she has been having frequent falls, dizziness, and headaches.  Patient was accompanied by clinic nursing staff to urgent care for evaluation of her HTN and symptoms immediately after this appointment.     FOLLOW UP: 1 week.  Sooner with problems/concerns.

## 2021-05-13 NOTE — ED NOTES
Patient given discharge instructions to follow up with PCP tomorrow for BP recheck and evaluate increase in BP medications.     Patient verbalized understanding. Patient condition improved upon discharge.

## 2021-05-14 ENCOUNTER — OFFICE VISIT (OUTPATIENT)
Dept: FAMILY MEDICINE | Facility: OTHER | Age: 81
End: 2021-05-14
Attending: NURSE PRACTITIONER
Payer: MEDICARE

## 2021-05-14 ENCOUNTER — TELEPHONE (OUTPATIENT)
Dept: FAMILY MEDICINE | Facility: OTHER | Age: 81
End: 2021-05-14

## 2021-05-14 VITALS
WEIGHT: 204.6 LBS | HEIGHT: 61 IN | SYSTOLIC BLOOD PRESSURE: 188 MMHG | HEART RATE: 60 BPM | BODY MASS INDEX: 38.63 KG/M2 | OXYGEN SATURATION: 98 % | DIASTOLIC BLOOD PRESSURE: 124 MMHG | TEMPERATURE: 97.3 F

## 2021-05-14 DIAGNOSIS — I10 BENIGN ESSENTIAL HYPERTENSION: Primary | ICD-10-CM

## 2021-05-14 DIAGNOSIS — K75.4 AUTOIMMUNE HEPATITIS (H): ICD-10-CM

## 2021-05-14 DIAGNOSIS — Z90.5 S/P NEPHRECTOMY: ICD-10-CM

## 2021-05-14 DIAGNOSIS — R39.15 URGENCY OF URINATION: ICD-10-CM

## 2021-05-14 LAB
ALBUMIN SERPL-MCNC: 3.5 G/DL (ref 3.4–5)
ALBUMIN UR-MCNC: NEGATIVE MG/DL
ALP SERPL-CCNC: 80 U/L (ref 40–150)
ALT SERPL W P-5'-P-CCNC: 14 U/L (ref 0–50)
ANION GAP SERPL CALCULATED.3IONS-SCNC: 10 MMOL/L (ref 3–14)
APPEARANCE UR: CLEAR
AST SERPL W P-5'-P-CCNC: 31 U/L (ref 0–45)
BILIRUB SERPL-MCNC: 0.8 MG/DL (ref 0.2–1.3)
BILIRUB UR QL STRIP: NEGATIVE
BUN SERPL-MCNC: 18 MG/DL (ref 7–30)
CALCIUM SERPL-MCNC: 8.9 MG/DL (ref 8.5–10.1)
CHLORIDE SERPL-SCNC: 103 MMOL/L (ref 94–109)
CO2 SERPL-SCNC: 19 MMOL/L (ref 20–32)
COLOR UR AUTO: YELLOW
CREAT SERPL-MCNC: 1.06 MG/DL (ref 0.52–1.04)
GFR SERPL CREATININE-BSD FRML MDRD: 49 ML/MIN/{1.73_M2}
GLUCOSE SERPL-MCNC: 96 MG/DL (ref 70–99)
GLUCOSE UR STRIP-MCNC: NEGATIVE MG/DL
HGB UR QL STRIP: NEGATIVE
KETONES UR STRIP-MCNC: NEGATIVE MG/DL
LEUKOCYTE ESTERASE UR QL STRIP: ABNORMAL
NITRATE UR QL: NEGATIVE
NON-SQ EPI CELLS #/AREA URNS LPF: ABNORMAL /LPF
PH UR STRIP: 7.5 PH (ref 5–7)
POTASSIUM SERPL-SCNC: 5 MMOL/L (ref 3.4–5.3)
PROT SERPL-MCNC: 6.9 G/DL (ref 6.8–8.8)
RBC #/AREA URNS AUTO: ABNORMAL /HPF
SODIUM SERPL-SCNC: 132 MMOL/L (ref 133–144)
SOURCE: ABNORMAL
SP GR UR STRIP: 1.01 (ref 1–1.03)
TSH SERPL DL<=0.005 MIU/L-ACNC: 3.25 MU/L (ref 0.4–4)
UROBILINOGEN UR STRIP-ACNC: 0.2 EU/DL (ref 0.2–1)
WBC #/AREA URNS AUTO: ABNORMAL /HPF

## 2021-05-14 PROCEDURE — G0463 HOSPITAL OUTPT CLINIC VISIT: HCPCS

## 2021-05-14 PROCEDURE — 80053 COMPREHEN METABOLIC PANEL: CPT | Mod: ZL | Performed by: NURSE PRACTITIONER

## 2021-05-14 PROCEDURE — 36416 COLLJ CAPILLARY BLOOD SPEC: CPT | Mod: ZL | Performed by: NURSE PRACTITIONER

## 2021-05-14 PROCEDURE — 81001 URINALYSIS AUTO W/SCOPE: CPT | Mod: ZL | Performed by: NURSE PRACTITIONER

## 2021-05-14 PROCEDURE — 87086 URINE CULTURE/COLONY COUNT: CPT | Mod: ZL | Performed by: NURSE PRACTITIONER

## 2021-05-14 PROCEDURE — 99214 OFFICE O/P EST MOD 30 MIN: CPT | Performed by: NURSE PRACTITIONER

## 2021-05-14 PROCEDURE — 84443 ASSAY THYROID STIM HORMONE: CPT | Mod: ZL | Performed by: NURSE PRACTITIONER

## 2021-05-14 RX ORDER — PROPRANOLOL HYDROCHLORIDE 10 MG/1
10 TABLET ORAL 3 TIMES DAILY
Status: CANCELLED | OUTPATIENT
Start: 2021-05-14

## 2021-05-14 RX ORDER — LOSARTAN POTASSIUM 25 MG/1
25 TABLET ORAL DAILY
Qty: 90 TABLET | Refills: 0 | Status: SHIPPED | OUTPATIENT
Start: 2021-05-14 | End: 2021-05-14

## 2021-05-14 RX ORDER — LOSARTAN POTASSIUM 25 MG/1
25 TABLET ORAL DAILY
Qty: 90 TABLET | Refills: 0 | Status: SHIPPED | OUTPATIENT
Start: 2021-05-14 | End: 2021-10-28

## 2021-05-14 RX ORDER — PROPRANOLOL HYDROCHLORIDE 10 MG/1
10 TABLET ORAL DAILY
Qty: 90 TABLET | Refills: 0 | Status: SHIPPED | OUTPATIENT
Start: 2021-05-14 | End: 2021-07-29

## 2021-05-14 RX ORDER — LISINOPRIL 5 MG/1
5 TABLET ORAL DAILY
Qty: 90 TABLET | Refills: 0 | Status: CANCELLED | OUTPATIENT
Start: 2021-05-14 | End: 2021-08-12

## 2021-05-14 RX ORDER — CHLORTHALIDONE 25 MG/1
12.5 TABLET ORAL DAILY
Qty: 45 TABLET | Refills: 0 | Status: SHIPPED | OUTPATIENT
Start: 2021-05-14 | End: 2021-05-14

## 2021-05-14 RX ORDER — CHLORTHALIDONE 25 MG/1
12.5 TABLET ORAL DAILY
Qty: 45 TABLET | Refills: 0 | Status: SHIPPED | OUTPATIENT
Start: 2021-05-14 | End: 2021-06-03

## 2021-05-14 RX ORDER — PROPRANOLOL HYDROCHLORIDE 10 MG/1
10 TABLET ORAL DAILY
Qty: 90 TABLET | Refills: 0 | Status: SHIPPED | OUTPATIENT
Start: 2021-05-14 | End: 2021-05-14

## 2021-05-14 ASSESSMENT — PAIN SCALES - GENERAL: PAINLEVEL: MILD PAIN (2)

## 2021-05-14 ASSESSMENT — MIFFLIN-ST. JEOR: SCORE: 1330.44

## 2021-05-14 NOTE — NURSING NOTE
"Chief Complaint   Patient presents with     Hypertension     Recheck Medication       Initial BP (!) 188/124 (BP Location: Left arm, Patient Position: Chair, Cuff Size: Adult Large)   Pulse 60   Temp 97.3  F (36.3  C) (Tympanic)   Ht 1.549 m (5' 1\")   Wt 92.8 kg (204 lb 9.6 oz)   SpO2 98%   BMI 38.66 kg/m   Estimated body mass index is 38.66 kg/m  as calculated from the following:    Height as of this encounter: 1.549 m (5' 1\").    Weight as of this encounter: 92.8 kg (204 lb 9.6 oz).  Medication Reconciliation: complete  Naa Slaughter LPN  "

## 2021-05-14 NOTE — TELEPHONE ENCOUNTER
Patient calling back and is still waiting on a call back regarding appointment. Patient reports her BP being 199/108. States she has been having high BP for several days and was in UC yesterday. Patient denies chest pain, difficulty breathing, sweating, numbness. States she was lightheaded an hour ago and has weakness and is hot. Would you like to see patient today or have patient go back to UC/ER? Advised patient if symptoms worsen to return to ER/UC. Please advise, thank you.    Patient's phone number is [phone]

## 2021-05-14 NOTE — TELEPHONE ENCOUNTER
Reason for ER/UC visit: hypertension  185/97 this morning.  ER advised she see provider this morning regarding the hypertension and possible med change.  Patient lives in [place] and has a 30 min drive and would like to get in today asap    New or Worsening symptoms:  no     Prescription Received/Picked up from Pharmacy?: none Any questions regarding your prescription?:     Follow-up Results or Labs that are pending:     Do you have any questions or concerns?:     ER Recommends Follow-up By: will olga lidia    RN Recommendations: quirino duggan.    Thank you for your time, if you start feeling worse, or have any further questions, please feel free to contact us again at [phone].  If needing immediate medical attention at any time please call 911/Go to the ER.

## 2021-05-14 NOTE — PROGRESS NOTES
Assessment & Plan   Problem List Items Addressed This Visit        Digestive    Autoimmune hepatitis (H)    Relevant Orders    Comprehensive metabolic panel (BMP + Alb, Alk Phos, ALT, AST, Total. Bili, TP) (Completed)       Circulatory    Benign essential hypertension - Primary  Patient is concerned about taxing her liver too much as she does have a history of hepatitis and this remains on her chart as an active problem. In addition to this, she has had a nephrectomy with a decreased GFR and slightly elevated creatinine. These factors, as well as her age,  complicate management slightly. Due to Ileeta's pulse being on the low end of normal, I am going to lower her dose of propranolol to 10 mg.  I have considered making this an extended release but will continue with immediate release for now at once daily dosing. I am adding a minimal dose (12.5 mg daily) of chlorthalidone and losartan 25 mg daily. I have stressed the need to change position slowly and have a glass of water at bedside for this morning. Move from supine to sitting (leg dangling) for a while prior to standing. She verbalizes understanding of this.   Follow up with virtual visit on 5/17/21 after checking at home BP at least daily.     Relevant Medications    chlorthalidone (HYGROTON) 25 MG tablet    losartan (COZAAR) 25 MG tablet    propranolol (INDERAL) 10 MG tablet    Other Relevant Orders    UA with Microscopic reflex to Culture - [facility] (Completed)    Urine Culture Aerobic Bacterial (Completed)    TSH (Completed)    Comprehensive metabolic panel (BMP + Alb, Alk Phos, ALT, AST, Total. Bili, TP) (Completed)       Other    S/p nephrectomy    Relevant Orders    Urine Culture Aerobic Bacterial (Completed)      Other Visit Diagnoses     Urgency of urination        Relevant Orders    Urine Culture Aerobic Bacterial (Completed)           Ordering of each unique test  Prescription drug management     No follow-ups on file.    Dayan Hines,  CNP  [facility]    Camilo Tejeda is a 80 year old who presents for the following health issues  accompanied by her son       HPI     Hypertension Follow-up  Ileana Davis is an 80 year old female of Dr. Jones who came to the clinic today for a for a ED/UC visit yesterday for unconrolled hypertension. I have reviewed the notes from this ED visit as well as vital sign trends and labs. She is currently on propanolol 10 mg daily(immediate release) her hypertension and has been on this dose for many years. She tolerates this well. She did recently have a fall related to tripping vs dizziness/sycope and is seeing wound care for injuries from that fall. See chart for details.     Do you check your blood pressure regularly outside of the clinic? Yes     Elevated b/p at ER yesterday  5/13 203/114    AT home readings from today:  9:30       151/85  12:30     185/99  2:30     199/108   Are you following a low salt diet? No    Are your blood pressures ever more than 140 on the top number (systolic) OR more   than 90 on the bottom number (diastolic), for example 140/90? Yes      How many servings of fruits and vegetables do you eat daily? 1-3    On average, how many sweetened beverages do you drink each day (Examples: soda, juice, sweet tea, etc.  Do NOT count diet or artificially sweetened beverages)?   occasional -pop    How many days per week do you exercise enough to make your heart beat faster? 3 or less    How many minutes a day do you exercise enough to make your heart beat faster? 9 or less    How many days per week do you miss taking your medication? 0    She reports  incontinence x 3 months. She did feel some increased urgency a few days ago which has continued    Medication Followup of Propanolol    Taking Medication as prescribed: yes    Side Effects:  None    Medication Helping Symptoms:  NO    May need dosage increased        Review of Systems   Constitutional, HEENT,  "cardiovascular, pulmonary, gi and gu systems are negative, except as otherwise noted.      Objective    BP (!) 188/124 (BP Location: Left arm, Patient Position: Chair, Cuff Size: Adult Large)   Pulse 60   Temp 97.3  F (36.3  C) (Tympanic)   Ht 1.549 m (5' 1\")   Wt 92.8 kg (204 lb 9.6 oz)   SpO2 98%   BMI 38.66 kg/m    Body mass index is 38.66 kg/m .     Physical Exam   GENERAL: healthy, alert and no distress  EYES: Eyes grossly normal to inspection, PERRL and conjunctivae and sclerae normal  RESP: lungs clear to auscultation - no rales, rhonchi or wheezes  CV: regular rate and rhythm, normal S1 S2, no S3 or S4, no murmur, click or rub, no peripheral edema and peripheral pulses strong  SKIN: Bandage to right forearm is clean, dry, and intact. Skin sheer of left forearm healing. Left knee wound improved. Right lower leg hematoma stable. Follows with wound care          Results for orders placed or performed in visit on 05/14/21   UA with Microscopic reflex to Culture - [facility]     Status: Abnormal    Specimen: Midstream Urine   Result Value Ref Range    Color Urine Yellow     Appearance Urine Clear     Glucose Urine Negative NEG^Negative mg/dL    Bilirubin Urine Negative NEG^Negative    Ketones Urine Negative NEG^Negative mg/dL    Specific Gravity Urine 1.010 1.003 - 1.035    pH Urine 7.5 (H) 5.0 - 7.0 pH    Protein Albumin Urine Negative NEG^Negative mg/dL    Urobilinogen Urine 0.2 0.2 - 1.0 EU/dL    Nitrite Urine Negative NEG^Negative    Blood Urine Negative NEG^Negative    Leukocyte Esterase Urine Trace (A) NEG^Negative    Source Midstream Urine     WBC Urine 0 - 5 OTO5^0 - 5 /HPF    RBC Urine O - 2 OTO2^O - 2 /HPF    Squamous Epithelial /LPF Urine Many (A) FEW^Few /LPF   TSH     Status: None   Result Value Ref Range    TSH 3.25 0.40 - 4.00 mU/L   Comprehensive metabolic panel (BMP + Alb, Alk Phos, ALT, AST, Total. Bili, TP)     Status: Abnormal   Result Value Ref Range    Sodium 132 (L) 133 - 144 " mmol/L    Potassium 5.0 3.4 - 5.3 mmol/L    Chloride 103 94 - 109 mmol/L    Carbon Dioxide 19 (L) 20 - 32 mmol/L    Anion Gap 10 3 - 14 mmol/L    Glucose 96 70 - 99 mg/dL    Urea Nitrogen 18 7 - 30 mg/dL    Creatinine 1.06 (H) 0.52 - 1.04 mg/dL    GFR Estimate 49 (L) >60 mL/min/[1.73_m2]    GFR Estimate If Black 57 (L) >60 mL/min/[1.73_m2]    Calcium 8.9 8.5 - 10.1 mg/dL    Bilirubin Total 0.8 0.2 - 1.3 mg/dL    Albumin 3.5 3.4 - 5.0 g/dL    Protein Total 6.9 6.8 - 8.8 g/dL    Alkaline Phosphatase 80 40 - 150 U/L    ALT 14 0 - 50 U/L    AST 31 0 - 45 U/L   Urine Culture Aerobic Bacterial     Status: Abnormal    Specimen: Midstream Urine   Result Value Ref Range    Specimen Description Midstream Urine     Culture Micro (A)      10,000 to 50,000 colonies/mL  Mixed bacterial daisha  No further identification or sensitivity done

## 2021-05-16 LAB
BACTERIA SPEC CULT: ABNORMAL
SPECIMEN SOURCE: ABNORMAL

## 2021-05-17 ENCOUNTER — VIRTUAL VISIT (OUTPATIENT)
Dept: FAMILY MEDICINE | Facility: OTHER | Age: 81
End: 2021-05-17
Attending: NURSE PRACTITIONER
Payer: MEDICARE

## 2021-05-17 VITALS
WEIGHT: 204 LBS | HEART RATE: 67 BPM | DIASTOLIC BLOOD PRESSURE: 77 MMHG | SYSTOLIC BLOOD PRESSURE: 118 MMHG | BODY MASS INDEX: 38.55 KG/M2

## 2021-05-17 DIAGNOSIS — I10 BENIGN ESSENTIAL HYPERTENSION: Primary | ICD-10-CM

## 2021-05-17 PROCEDURE — 99441 PR PHYSICIAN TELEPHONE EVALUATION 5-10 MIN: CPT | Mod: 95 | Performed by: NURSE PRACTITIONER

## 2021-05-17 RX ORDER — NAPROXEN SODIUM 220 MG
220 TABLET ORAL 2 TIMES DAILY WITH MEALS
Status: ON HOLD | COMMUNITY
End: 2022-04-19

## 2021-05-17 ASSESSMENT — PAIN SCALES - GENERAL: PAINLEVEL: NO PAIN (0)

## 2021-05-17 NOTE — PROGRESS NOTES
Ileana is a 80 year old who is being evaluated via a billable telephone visit.      What phone number would you like to be contacted at? [phone]  How would you like to obtain your AVS? Mail a copy    Assessment & Plan   Problem List Items Addressed This Visit        Circulatory    Benign essential hypertension - Primary  Continue with current medication regime. Notify clinic if any changes or symptoms are noted.   Follow up with primary, Dr. Jones in 3 months,                  Culture results reviewed with patient  Prescription drug management  10 minutes spent on the date of the encounter: 7 minutes doing patient visit and 3 minutes on documentation     No follow-ups on file.    Dayan Hines, CNP  [facility] [place] [facility]    Subjective   Ileana is a 80 year old who presents for the following health issues     HPI     Hypertension Follow-up    Last visit, we decreased her propranolol from 20 mg to 10 mg daily (immediatel release) due to puse being in los 60's consistently and started losartan 25 mg and chlorthalidone 25 mg in light of her s/p nephrectomy and hepatic status. This is a follow up visit.     Do you check your blood pressure regularly outside of the clinic? Yes     Are you following a low salt diet? Yes- no added salt     Are your blood pressures ever more than 140 on the top number (systolic) OR more   than 90 on the bottom number (diastolic), for example 140/90? Yes- in the past -   doing better now   132/82 and 134/80 on 5/16  118/77 today     How many servings of fruits and vegetables do you eat daily?  0-1    On average, how many sweetened beverages do you drink each day (Examples: soda, juice, sweet tea, etc.  Do NOT count diet or artificially sweetened beverages)?   0- occasionally can of coke     How many days per week do you exercise enough to make your heart beat faster? 3 or less    How many minutes a day do you exercise enough to make your heart beat faster? 9 or  less    How many days per week do you miss taking your medication? 0    Medication Followup of Propanolol    Taking Medication as prescribed: yes     Side Effects:  None    Medication Helping Symptoms:  Yes    She is taking her medications as recently adjusted. She denies dizziness, lightheadedness or falls. Overall, she is tolerating the medication changes well.   She denies any urinary symptoms but will continue to monitor for any. We reviewed her urine culture which shows some contamination but not infection.        Review of Systems   Constitutional, HEENT, cardiovascular, pulmonary, gi and gu systems are negative, except as otherwise noted.      Objective    Vitals - Patient Reported  Pain Score: No Pain (0)      Vitals:  No vitals were obtained today due to virtual visit.    Physical Exam   healthy, alert and no distress  PSYCH: Alert and oriented times 3; coherent speech, normal   rate and volume, able to articulate logical thoughts, able   to abstract reason, no tangential thoughts, no hallucinations   or delusions  Her affect is normal  RESP: No cough, no audible wheezing, able to talk in full sentences  Remainder of exam unable to be completed due to telephone visits                Phone call duration: 7 minutes no the phone and 3 minutes of chartig

## 2021-05-17 NOTE — PROGRESS NOTES
I made many attempts to contact patient and kept getting a busy signal - mailed AVS highlighting the need fo r 3 mo F/U HTN with Dr Becerril and for patient to call to scheduled.

## 2021-05-17 NOTE — NURSING NOTE
"Chief Complaint   Patient presents with     Hypertension     Recheck Medication       Initial /77 (BP Location: Left arm, Patient Position: Sitting)   Pulse 67   Wt 92.5 kg (204 lb)   BMI 38.55 kg/m   Estimated body mass index is 38.55 kg/m  as calculated from the following:    Height as of 5/14/21: 1.549 m (5' 1\").    Weight as of this encounter: 92.5 kg (204 lb).  Medication Reconciliation: complete  Naa Slaughter LPN  "

## 2021-05-20 ENCOUNTER — OFFICE VISIT (OUTPATIENT)
Dept: WOUND CARE | Facility: OTHER | Age: 81
End: 2021-05-20
Attending: NURSE PRACTITIONER
Payer: MEDICARE

## 2021-05-20 VITALS
BODY MASS INDEX: 38.55 KG/M2 | SYSTOLIC BLOOD PRESSURE: 148 MMHG | DIASTOLIC BLOOD PRESSURE: 76 MMHG | WEIGHT: 204 LBS | HEART RATE: 67 BPM | OXYGEN SATURATION: 97 % | TEMPERATURE: 96.6 F

## 2021-05-20 DIAGNOSIS — T14.8XXA HEMATOMA OF SKIN: ICD-10-CM

## 2021-05-20 DIAGNOSIS — T14.8XXA OPEN WOUND: Primary | ICD-10-CM

## 2021-05-20 PROCEDURE — G0463 HOSPITAL OUTPT CLINIC VISIT: HCPCS

## 2021-05-20 PROCEDURE — 99212 OFFICE O/P EST SF 10 MIN: CPT | Performed by: NURSE PRACTITIONER

## 2021-05-20 ASSESSMENT — PAIN SCALES - GENERAL: PAINLEVEL: WORST PAIN (10)

## 2021-05-20 NOTE — PROGRESS NOTES
CLINIC NOTE - Wound Care  5/20/2021    Patient:Ileana Davis    Reason for Visit: Multiple wounds    This is a 80 year old female here in follow up from multiple wounds sustained in a fall.  She is accompanied to this appointment by her son who is assisting in the patient's wound care. The patient's right arm wound is painful to wound changes.     Current Medications:  Current Outpatient Medications   Medication Sig Dispense Refill     acetaminophen (TYLENOL) 500 MG tablet Take 500 mg by mouth every 8 hours as needed for mild pain       azaTHIOprine (IMURAN) 50 MG tablet TAKE 2 TABLETS (100 MG) BY MOUTH DAILY 180 tablet 1     chlorthalidone (HYGROTON) 25 MG tablet Take 0.5 tablets (12.5 mg) by mouth daily 45 tablet 0     Cholecalciferol (VITAMIN D3) 1.25 MG (56384 UT) TABS        Cranberry-Cholecalciferol (SUPER CRANBERRY/VITAMIN D3) 4200-500 MG-UNIT CAPS        ferrous sulfate (FEROSUL) 325 (65 Fe) MG tablet Take 325 mg by mouth daily (with breakfast)       hydrocortisone 2.5 % cream APPLY TWICE TIMES DAILY TO ACTIVE ECZEMA ON THE HANDS, FOLLOW WITH MOISTURIZING CREAM.       losartan (COZAAR) 25 MG tablet Take 1 tablet (25 mg) by mouth daily 90 tablet 0     melatonin 1 MG TABS tablet Take by mouth nightly as needed for sleep        multivitamin w/minerals (MULTI-VITAMIN) tablet Take 1 tablet by mouth daily       naproxen sodium (ANAPROX) 220 MG tablet Take 220 mg by mouth 2 times daily (with meals)       NYAMYC 894565 UNIT/GM external powder APPLY TO AFFECTED AREA NIGHTLY AS NEEDED FOR RASH 60 g 1     nystatin (MYCOSTATIN) 592661 UNIT/GM external cream APPLY TOPICALLY 2 TIMES DAILY AS NEEDED FOR DRY SKIN 30 g 2     omeprazole (PRILOSEC) 40 MG capsule Take  by mouth daily. Before the same meal daily       Polyethylene Glycol 3350 (MIRALAX PO) Take by mouth daily as needed        propranolol (INDERAL) 10 MG tablet Take 1 tablet (10 mg) by mouth daily 90 tablet 0     cyclobenzaprine (FLEXERIL) 10 MG tablet TAKE 1  TABLET (10 MG) BY MOUTH 3 TIMES DAILY AS NEEDED FOR MUSCLE SPASMS (Patient not taking: Reported on 5/17/2021) 90 tablet 1     fluticasone (FLONASE) 50 MCG/ACT nasal spray SPRAY 2 SPRAYS INTO BOTH NOSTRILS DAILY AS NEEDED (Patient not taking: Reported on 5/17/2021) 16 g 1     levothyroxine (SYNTHROID/LEVOTHROID) 100 MCG tablet TAKE 1 TABLET BY MOUTH ONCE DAILY (Patient not taking: Reported on 5/17/2021) 90 tablet 1       Allergies:  Allergies   Allergen Reactions     Fentanyl Other (See Comments)     Severe agitation when used during angiogram     Codeine Sulfate Other (See Comments)     hallucinations     Fentanyl And Related      Other reaction(s): Confusion     Morphine Other (See Comments)     Hallucinations with iv therapy     Tape [Adhesive Tape]      Tramadol Other (See Comments)     hallucination       PHYSICAL EXAM:   Vital signs: BP (!) 148/76   Pulse 67   Temp 96.6  F (35.9  C) (Tympanic)   Wt 92.5 kg (204 lb)   SpO2 97%   BMI 38.55 kg/m     BMI: Body mass index is 38.55 kg/m .   General: Normal, healthy, cooperative, in no acute distress, alert   Skin: A right lower leg hematoma which measures 2 cm x 2 cm is noted   Lungs:  Non-labored respirations are noted   Abdominal: non-distended   Extremities: To the left knee there is a scab noted.     To the right lateral arm right just below the elbow is a 0.7 cm x 0.6 cm x 0.1 cm wound noted. The wound is granular without signs/symptoms of infection.    Neurological: without deficit    ASSESSMENT:  80 year old female with open wound of right arm; hematoma    PLAN:   Will continue to treat right arm wound with mepilex ag border pads changed every 3 days or more often as needed due to saturation. Patient to monitor right leg hematoma.  Patient is to be seen immediately with signs/symptoms of infection noted to the wounds/hematoma which were verbalized as understood by the patient and her son.     FOLLOW UP: 1 week.  Sooner with problems/concerns.

## 2021-05-27 ENCOUNTER — OFFICE VISIT (OUTPATIENT)
Dept: WOUND CARE | Facility: OTHER | Age: 81
End: 2021-05-27
Attending: NURSE PRACTITIONER
Payer: MEDICARE

## 2021-05-27 VITALS
SYSTOLIC BLOOD PRESSURE: 150 MMHG | HEART RATE: 60 BPM | OXYGEN SATURATION: 97 % | TEMPERATURE: 96.9 F | DIASTOLIC BLOOD PRESSURE: 82 MMHG

## 2021-05-27 DIAGNOSIS — T14.8XXA OPEN WOUND: Primary | ICD-10-CM

## 2021-05-27 DIAGNOSIS — T14.8XXA HEMATOMA OF SKIN: ICD-10-CM

## 2021-05-27 PROCEDURE — 99212 OFFICE O/P EST SF 10 MIN: CPT | Performed by: NURSE PRACTITIONER

## 2021-05-27 ASSESSMENT — PAIN SCALES - GENERAL: PAINLEVEL: MODERATE PAIN (4)

## 2021-05-27 NOTE — PROGRESS NOTES
CLINIC NOTE - Wound Care  5/27/2021    Patient:Ileana Davis    Reason for Visit: Multiple wounds    This is a 80 year old female here in follow up from multiple wounds sustained in a fall.  She is accompanied to this appointment by her son who is assisting in the patient's wound care. The patient's right arm wound is painful to wound changes still she reports.     Current Medications:  Current Outpatient Medications   Medication Sig Dispense Refill     acetaminophen (TYLENOL) 500 MG tablet Take 500 mg by mouth every 8 hours as needed for mild pain       azaTHIOprine (IMURAN) 50 MG tablet TAKE 2 TABLETS (100 MG) BY MOUTH DAILY 180 tablet 1     chlorthalidone (HYGROTON) 25 MG tablet Take 0.5 tablets (12.5 mg) by mouth daily 45 tablet 0     Cholecalciferol (VITAMIN D3) 1.25 MG (49601 UT) TABS        Cranberry-Cholecalciferol (SUPER CRANBERRY/VITAMIN D3) 4200-500 MG-UNIT CAPS        cyclobenzaprine (FLEXERIL) 10 MG tablet TAKE 1 TABLET (10 MG) BY MOUTH 3 TIMES DAILY AS NEEDED FOR MUSCLE SPASMS 90 tablet 1     ferrous sulfate (FEROSUL) 325 (65 Fe) MG tablet Take 325 mg by mouth daily (with breakfast)       fluticasone (FLONASE) 50 MCG/ACT nasal spray SPRAY 2 SPRAYS INTO BOTH NOSTRILS DAILY AS NEEDED 16 g 1     hydrocortisone 2.5 % cream APPLY TWICE TIMES DAILY TO ACTIVE ECZEMA ON THE HANDS, FOLLOW WITH MOISTURIZING CREAM.       levothyroxine (SYNTHROID/LEVOTHROID) 100 MCG tablet TAKE 1 TABLET BY MOUTH ONCE DAILY 90 tablet 1     losartan (COZAAR) 25 MG tablet Take 1 tablet (25 mg) by mouth daily 90 tablet 0     melatonin 1 MG TABS tablet Take by mouth nightly as needed for sleep        multivitamin w/minerals (MULTI-VITAMIN) tablet Take 1 tablet by mouth daily       naproxen sodium (ANAPROX) 220 MG tablet Take 220 mg by mouth 2 times daily (with meals)       NYAMYC 710940 UNIT/GM external powder APPLY TO AFFECTED AREA NIGHTLY AS NEEDED FOR RASH 60 g 1     nystatin (MYCOSTATIN) 155798 UNIT/GM external cream APPLY  TOPICALLY 2 TIMES DAILY AS NEEDED FOR DRY SKIN 30 g 2     omeprazole (PRILOSEC) 40 MG capsule Take  by mouth daily. Before the same meal daily       Polyethylene Glycol 3350 (MIRALAX PO) Take by mouth daily as needed        propranolol (INDERAL) 10 MG tablet Take 1 tablet (10 mg) by mouth daily 90 tablet 0       Allergies:  Allergies   Allergen Reactions     Fentanyl Other (See Comments)     Severe agitation when used during angiogram     Codeine Sulfate Other (See Comments)     hallucinations     Fentanyl And Related      Other reaction(s): Confusion     Morphine Other (See Comments)     Hallucinations with iv therapy     Tape [Adhesive Tape]      Tramadol Other (See Comments)     hallucination       PHYSICAL EXAM:   Vital signs: BP (!) 150/82 (BP Location: Left arm, Patient Position: Sitting, Cuff Size: Adult Regular)   Pulse 60   Temp 96.9  F (36.1  C) (Tympanic)   SpO2 97%    BMI: There is no height or weight on file to calculate BMI.   General: Normal, healthy, cooperative, in no acute distress, alert   Skin: A right lower leg hematoma is noted.  This is improving and smaller than last seen   Lungs:  Non-labored respirations are noted   Abdominal: non-distended   Extremities: To the right lateral arm right just below the elbow is a 0.4 cm x 0.4 cm x 0.1 cm wound noted. The wound is granular without signs/symptoms of infection.    Neurological: without deficit    ASSESSMENT:  80 year old female with open wound of right arm; hematoma    PLAN:   Will continue to treat right arm wound with mepilex ag border pads changed every 3 days or more often as needed due to saturation. Patient to monitor right leg hematoma.  Patient is to be seen immediately with signs/symptoms of infection noted to the wounds/hematoma which were verbalized as understood by the patient and her son.     FOLLOW UP: 2 weeks.  Sooner with problems/concerns.

## 2021-05-27 NOTE — NURSING NOTE
"Chief Complaint   Patient presents with     WOUND CARE       Initial BP (!) 150/82 (BP Location: Left arm, Patient Position: Sitting, Cuff Size: Adult Regular)   Pulse 60   Temp 96.9  F (36.1  C) (Tympanic)   SpO2 97%  Estimated body mass index is 38.55 kg/m  as calculated from the following:    Height as of 5/14/21: 1.549 m (5' 1\").    Weight as of 5/20/21: 92.5 kg (204 lb).  Medication Reconciliation: complete  Viola Abbott  "

## 2021-06-02 ENCOUNTER — NURSE TRIAGE (OUTPATIENT)
Dept: FAMILY MEDICINE | Facility: OTHER | Age: 81
End: 2021-06-02

## 2021-06-02 NOTE — TELEPHONE ENCOUNTER
"Next 5 appointments (look out 90 days)    Jun 03, 2021  2:30 PM  (Arrive by 2:15 PM)  SHORT with Iris Becerril MD  [facility] ([facility] ) [address]  [place] [address]  [phone]   Tex 10, 2021 10:00 AM  (Arrive by 9:45 AM)  Return Visit with Cathy Vences NP  [facility] ([facility] [place] ) [address]  [place] [id number] [phone]            Reason for Disposition    Taking a medicine that could cause weakness (e.g., blood pressure medications, diuretics)    Additional Information    Negative: Severe difficulty breathing (e.g., struggling for each breath, speaks in single words)    Negative: Shock suspected (e.g., cold/pale/clammy skin, too weak to stand, low BP, rapid pulse)    Negative: Difficult to awaken or acting confused (e.g., disoriented, slurred speech)    Negative: [1] Fainted > 15 minutes ago AND [2] still feels too weak or dizzy to stand    Negative: [1] SEVERE weakness (i.e., unable to walk or barely able to walk, requires support) AND [2] new onset or worsening    Negative: Sounds like a life-threatening emergency to the triager    Negative: Weakness of the face, arm or leg on one side of the body    Negative: [1] Has diabetes (diabetes mellitus) AND [2] weakness from low blood sugar (i.e., < 60 mg/dl or 3.5 mmol/l)    Negative: Heat exhaustion suspected (i.e., dehydration from heat exposure)    Negative: Chest pain    Negative: Vomiting is main symptom    Negative: Diarrhea is main symptom    Negative: Difficulty breathing    Negative: Heart beating < 50 beats per minute OR > 140 beats per minute    Negative: Extra heart beats OR irregular heart beating   (i.e., \"palpitations\")    Negative: Follows bleeding (e.g., from vomiting, rectum, vagina; Exception: small brief weakness from sight of a small amount blood)    Negative: Black or tarry bowel movements    Negative: " "[1] Drinking very little AND [2] dehydration suspected (e.g., no urine > 12 hours, very dry mouth, very lightheaded)    Negative: Patient sounds very sick or weak to the triager    Negative: [1] MODERATE weakness (i.e., interferes with work, school, normal activities) AND [2] cause unknown  (Exceptions: weakness with acute minor illness, or weakness from poor fluid intake)    Negative: [1] MODERATE weakness AND [2] from poor fluid intake AND [3] no improvement after 2 hours of rest and fluids    Negative: [1] Fever > 103 F (39.4 C) AND [2] not able to get the fever down using Fever Care Advice    Negative: [1] Fever > 101 F (38.3 C) AND [2] age > 60    Negative: [1] Fever > 100.0 F (37.8 C) AND [2] bedridden (e.g., nursing home patient, CVA, chronic illness, recovering from surgery)    Negative: [1] Fever > 100.0 F (37.8 C) AND [2] diabetes mellitus or weak immune system (e.g., HIV positive, cancer chemo, splenectomy, organ transplant, chronic steroids)    Negative: Pale skin (pallor)    Negative: [1] MODERATE weakness (i.e., interferes with work, school, normal activities) AND [2] persists > 3 days    Answer Assessment - Initial Assessment Questions  1. DESCRIPTION: \"Describe how you are feeling.\"      Decrease strength fatigue  2. SEVERITY: \"How bad is it?\"  \"Can you stand and walk?\"    - MILD - Feels weak or tired, but does not interfere with work, school or normal activities    - MODERATE - Able to stand and walk; weakness interferes with work, school, or normal activities    - SEVERE - Unable to stand or walk      Able to stand and ambulate   3. ONSET:  \"When did the weakness begin?\"      About one week ago  4. CAUSE: \"What do you think is causing the weakness?\"      unknown  5. MEDICINES: \"Have you recently started a new medicine or had a change in the amount of a medicine?\"      no  6. OTHER SYMPTOMS: \"Do you have any other symptoms?\" (e.g., chest pain, fever, cough, SOB, vomiting, diarrhea, bleeding, other " "areas of pain)      no  7. PREGNANCY: \"Is there any chance you are pregnant?\" \"When was your last menstrual period?\"      no    Protocols used: WEAKNESS (GENERALIZED) AND FATIGUE-A-AH      "

## 2021-06-03 ENCOUNTER — TELEPHONE (OUTPATIENT)
Dept: PODIATRY | Facility: OTHER | Age: 81
End: 2021-06-03

## 2021-06-03 ENCOUNTER — OFFICE VISIT (OUTPATIENT)
Dept: FAMILY MEDICINE | Facility: OTHER | Age: 81
End: 2021-06-03
Attending: PODIATRIST
Payer: MEDICARE

## 2021-06-03 ENCOUNTER — ANCILLARY PROCEDURE (OUTPATIENT)
Dept: GENERAL RADIOLOGY | Facility: OTHER | Age: 81
End: 2021-06-03
Attending: PODIATRIST
Payer: MEDICARE

## 2021-06-03 ENCOUNTER — OFFICE VISIT (OUTPATIENT)
Dept: PODIATRY | Facility: OTHER | Age: 81
End: 2021-06-03
Attending: PODIATRIST
Payer: MEDICARE

## 2021-06-03 VITALS
TEMPERATURE: 97.1 F | RESPIRATION RATE: 16 BRPM | DIASTOLIC BLOOD PRESSURE: 80 MMHG | OXYGEN SATURATION: 90 % | HEART RATE: 60 BPM | SYSTOLIC BLOOD PRESSURE: 134 MMHG

## 2021-06-03 VITALS
RESPIRATION RATE: 16 BRPM | HEART RATE: 68 BPM | TEMPERATURE: 97.5 F | OXYGEN SATURATION: 98 % | DIASTOLIC BLOOD PRESSURE: 78 MMHG | WEIGHT: 206.1 LBS | BODY MASS INDEX: 38.94 KG/M2 | SYSTOLIC BLOOD PRESSURE: 136 MMHG

## 2021-06-03 DIAGNOSIS — M51.379 DEGENERATION OF LUMBAR OR LUMBOSACRAL INTERVERTEBRAL DISC: ICD-10-CM

## 2021-06-03 DIAGNOSIS — S93.145A SUBLUXATION OF METATARSOPHALANGEAL JOINT OF LESSER TOE OF LEFT FOOT, INITIAL ENCOUNTER: ICD-10-CM

## 2021-06-03 DIAGNOSIS — M79.671 RIGHT FOOT PAIN: ICD-10-CM

## 2021-06-03 DIAGNOSIS — L97.522 ULCER OF LEFT FOOT WITH FAT LAYER EXPOSED (H): Primary | ICD-10-CM

## 2021-06-03 DIAGNOSIS — R53.83 FATIGUE, UNSPECIFIED TYPE: Primary | ICD-10-CM

## 2021-06-03 DIAGNOSIS — Z90.5 S/P NEPHRECTOMY: ICD-10-CM

## 2021-06-03 DIAGNOSIS — M20.5X1 ACQUIRED MALLET TOE OF RIGHT FOOT: ICD-10-CM

## 2021-06-03 DIAGNOSIS — M21.42 PES PLANUS OF BOTH FEET: ICD-10-CM

## 2021-06-03 DIAGNOSIS — Z98.890 STATUS POST LEFT FOOT SURGERY: ICD-10-CM

## 2021-06-03 DIAGNOSIS — L97.522 ULCER OF LEFT FOOT WITH FAT LAYER EXPOSED (H): ICD-10-CM

## 2021-06-03 DIAGNOSIS — M19.071 OSTEOARTHRITIS OF JOINT OF TOE OF RIGHT FOOT: ICD-10-CM

## 2021-06-03 DIAGNOSIS — E03.9 HYPOTHYROIDISM, UNSPECIFIED TYPE: ICD-10-CM

## 2021-06-03 DIAGNOSIS — M79.672 LEFT FOOT PAIN: ICD-10-CM

## 2021-06-03 DIAGNOSIS — I10 BENIGN ESSENTIAL HYPERTENSION: ICD-10-CM

## 2021-06-03 DIAGNOSIS — E66.01 MORBID OBESITY (H): ICD-10-CM

## 2021-06-03 DIAGNOSIS — K75.4 AUTOIMMUNE HEPATITIS (H): ICD-10-CM

## 2021-06-03 DIAGNOSIS — M79.7 FIBROMYALGIA: ICD-10-CM

## 2021-06-03 DIAGNOSIS — M21.41 PES PLANUS OF BOTH FEET: ICD-10-CM

## 2021-06-03 PROCEDURE — 73630 X-RAY EXAM OF FOOT: CPT | Mod: TC,RT

## 2021-06-03 PROCEDURE — 73630 X-RAY EXAM OF FOOT: CPT | Mod: TC,LT

## 2021-06-03 PROCEDURE — G0463 HOSPITAL OUTPT CLINIC VISIT: HCPCS | Mod: 25

## 2021-06-03 PROCEDURE — 99214 OFFICE O/P EST MOD 30 MIN: CPT | Performed by: FAMILY MEDICINE

## 2021-06-03 PROCEDURE — 99213 OFFICE O/P EST LOW 20 MIN: CPT | Performed by: PODIATRIST

## 2021-06-03 PROCEDURE — G0463 HOSPITAL OUTPT CLINIC VISIT: HCPCS

## 2021-06-03 ASSESSMENT — PAIN SCALES - GENERAL
PAINLEVEL: NO PAIN (0)
PAINLEVEL: NO PAIN (0)

## 2021-06-03 NOTE — TELEPHONE ENCOUNTER
Faxed referral, demographics and note to [facility] in [place], [place]  Fax # [phone]  Phone # [phone]

## 2021-06-03 NOTE — PATIENT INSTRUCTIONS
1.  We will stop the chlorthalidone - the small 1/2 pill for blood pressure.  Continue taking propranolol and losartan.    2.  We need to limit your use of Flexeril, please try to stick to one to two pills daily, at the most.    3.  Naproxen is metabolized through the kidneys, so we do need to limit that use.  I would take at most 2 of the over the counter tablets daily, I would keep this at nighttime.  You can add a small dose of Tylenol in the morning to help with pain, up to 1000 mg daily (2 Extra Strength Tylenol).  We could possibly add a bit more if this medication is helpful.

## 2021-06-03 NOTE — PROGRESS NOTES
"    Assessment & Plan     1. Fatigue, unspecified type  I do think her symptoms are most likely related to her increased use of Flexeril.  Her son notes that after she takes the medication, she sits in almost a daze.  We talked about use of this type of medication at her age.  We also talked about her excessive NSAID use, especially since she has had a nephrectomy, see below.    2. Benign essential hypertension  We will stop chlorthalidone and keep an eye on her BP.  She is asked to return to clinic in a couple of weeks for BP recheck.    3. S/p nephrectomy  We talked about NSAID use and how that is not advisable given her status of one kidney.  She may use a small amount of Tylenol, which she has always avoided given her autoimmune hepatitis.  We talked about very conservative Tylenol use, limiting Aleve use, and severely limiting Flexeril use.  She expresses understanding, and written instructions are given.    4. Degeneration of lumbar or lumbosacral intervertebral disc  Medication use discussed as above    5. Morbid obesity (H)       BMI:   Estimated body mass index is 38.94 kg/m  as calculated from the following:    Height as of 5/14/21: 1.549 m (5' 1\").    Weight as of this encounter: 93.5 kg (206 lb 1.6 oz).     Return in about 2 weeks (around 6/17/2021) for Medication review.    Iris Becerril MD  [facility]   Ileana is a 80 year old who presents for the following health issues  accompanied by her son:    HPI     Fatigue/Weakness      Duration: worsening over the past few weeks    Description (location/character/radiation): weak, tired, can't stand long enough to finish the dishes without having to sit and take a rest, etc    Intensity:  moderate    Accompanying signs and symptoms: patient has notice increase pain, see below    History (similar episodes/previous evaluation): None    Precipitating or alleviating factors: None    Therapies tried and outcome: Patient " has noticed increase pain, so she has been taking Aleve daily (up to 6 OTC tablets daily) and she has also increased her Flexeril use to 2 to often 3 doses daily        Hypertension Follow-up      Do you check your blood pressure regularly outside of the clinic? Yes    Are you following a low salt diet? Yes    Are your blood pressures ever more than 140 on the top number (systolic) OR more   than 90 on the bottom number (diastolic), for example 140/90? Yes   Pt states the she has been, dizzy, weak and tired all the time.  She is also very out of balance after the new medication.      How many servings of fruits and vegetables do you eat daily?  2-3    On average, how many sweetened beverages do you drink each day (Examples: soda, juice, sweet tea, etc.  Do NOT count diet or artificially sweetened beverages)?   1    How many days per week do you exercise enough to make your heart beat faster? 3 or less    How many minutes a day do you exercise enough to make your heart beat faster? 10 - 19    How many days per week do you miss taking your medication? 0      Review of Systems   Constitutional, HEENT, cardiovascular, pulmonary, gi and gu systems are negative, except as otherwise noted.      Objective    /78 (BP Location: Right arm, Patient Position: Sitting, Cuff Size: Adult Regular)   Pulse 68   Temp 97.5  F (36.4  C)   Resp 16   Wt 93.5 kg (206 lb 1.6 oz)   SpO2 98%   BMI 38.94 kg/m    Body mass index is 38.94 kg/m .  Physical Exam   GENERAL: healthy, alert and no distress  PSYCH: mentation appears normal, affect normal/bright

## 2021-06-03 NOTE — NURSING NOTE
"Chief Complaint   Patient presents with     Hypertension       Initial /78 (BP Location: Right arm, Patient Position: Sitting, Cuff Size: Adult Regular)   Pulse 68   Temp 97.5  F (36.4  C)   Resp 16   Wt 93.5 kg (206 lb 1.6 oz)   SpO2 98%   BMI 38.94 kg/m   Estimated body mass index is 38.94 kg/m  as calculated from the following:    Height as of 5/14/21: 1.549 m (5' 1\").    Weight as of this encounter: 93.5 kg (206 lb 1.6 oz).  Medication Reconciliation: complete  Myla Evans MA  "

## 2021-06-03 NOTE — PROGRESS NOTES
Chief complaint: Patient presents with:  Musculoskeletal Problem      History of Present Illness: This 80 year old female with osteoporosis, Hepatitis C, degeneration of lumbosacral intervertebral discs (injections every 6 months), and a history of multiple foot surgeries is being seen for a wound between the LEFT foot 2nd and 3rd toes. She says the wound has healed and re-opened off and on for several months. She has tried applied cotton balls, toe sleeves and lamb's wool between the toes, but it is not helping. The wound is very painful.    Her RIGHT dorsal hallux IPJ is also very painful. She says she had a RIGHT 1st MTPJ fusion several years ago, but the great toe has become more crooked and she develops a red spot on the top of the toe.    Patient had a LEFT foot Bethea bunionectomy with interpositional graft in the 1st MTPJ (DOS 09/21/2020).      The patient is also being treated by Cathy Vences for a wound on her arm from a recent fall. She says that Cathy recommended she speak to podiatry to see if some better shoes or inserts would help with the healing of her wound. She is wondering if she can have some shoes and inserts ordered.    Patient develops a LEFT foot neuroma and she normally has this injected once a year (most recently around August, 2020) by Dr. Enciso, but she says he is retiring soon.      No further pedal complaints today.       Historically:    Patient had a RIGHT foot 1st MTPJ fusion, Hammertoe correction digits 2-5, and two neuroma removals in 2014. She later saw the same ortho doctor from [facility] for the LEFT foot to fix more hammertoes and a neuroma. She was told to stretch the bunion manually into place but she says it didn't do much so she stopped stretching it.      /80 (BP Location: Left arm, Patient Position: Sitting, Cuff Size: Adult Large)   Pulse 60   Temp 97.1  F (36.2  C) (Tympanic)   Resp 16   SpO2 90%     Patient Active Problem List    Diagnosis     Hypothyroidism     Generalized anxiety disorder     Benign essential hypertension     GERD (gastroesophageal reflux disease)     Hepatitis     Contact dermatitis     Rosacea     Osteoarthritis     Fibromyalgia     Open wound of knee, leg, and ankle     Degeneration of lumbar or lumbosacral intervertebral disc     Anemia     Autoimmune hepatitis (H)     Morbid obesity (H)     Mild major depression (H)     Hallux valgus of left foot     Left foot pain     Ametropia     Decreased vision of right eye     Dry eye syndrome of both eyes     PCO (posterior capsular opacification), left     Ptosis of both eyelids     Esodeviation     S/p nephrectomy       Past Surgical History:   Procedure Laterality Date     APPENDECTOMY       ARTHROPLASTY TOE(S) Left 9/21/2020    Procedure: Left  Bethea Arthroplasty with interpositional arthroplasty using graft.;  Surgeon: Gina Rodriguez DPM;  Location: [facility]     BACK SURGERY  2015    lumbar epidural injection     CARDIAC SURGERY  10/2017    angioplasty     CHOLECYSTECTOMY       COLONOSCOPY  07/27/2017    [facility]     COLONOSCOPY - HIM SCAN  05/15/2001    Small internal hemorrhoids; otherwise normal;[facility]     COLONOSCOPY - HIM SCAN  12/14/2006    Colonoscopy, [facility], SNARE     ENT SURGERY      tonsillectomy     ESOPHAGOGASTRODUODENOSCOPY  07/27/2017    [facility]     EYE SURGERY      bilateral cataract removal     GI SURGERY      anal fistula     GYN SURGERY  1985    NICHOLAS BSO     GYN SURGERY      cessarian x 2     GYN SURGERY      tubal sterilazation     NEPHRECTOMY Right 05/02/2019     ORTHOPEDIC SURGERY      right knee     ORTHOPEDIC SURGERY  1986    plantar fascia release     ORTHOPEDIC SURGERY      left knee     ORTHOPEDIC SURGERY      right trigger finger release     ORTHOPEDIC SURGERY  2013    Right great toe MTPJ fusion, adductor tenotomy,correction of hammer toe     ORTHOPEDIC SURGERY  [id number]    multiple procedures left forefoot       Current Outpatient  Medications   Medication     acetaminophen (TYLENOL) 500 MG tablet     azaTHIOprine (IMURAN) 50 MG tablet     chlorthalidone (HYGROTON) 25 MG tablet     Cholecalciferol (VITAMIN D3) 1.25 MG (18897 UT) TABS     Cranberry-Cholecalciferol (SUPER CRANBERRY/VITAMIN D3) 4200-500 MG-UNIT CAPS     cyclobenzaprine (FLEXERIL) 10 MG tablet     ferrous sulfate (FEROSUL) 325 (65 Fe) MG tablet     fluticasone (FLONASE) 50 MCG/ACT nasal spray     hydrocortisone 2.5 % cream     levothyroxine (SYNTHROID/LEVOTHROID) 100 MCG tablet     losartan (COZAAR) 25 MG tablet     melatonin 1 MG TABS tablet     multivitamin w/minerals (MULTI-VITAMIN) tablet     naproxen sodium (ANAPROX) 220 MG tablet     NYAMYC 241023 UNIT/GM external powder     nystatin (MYCOSTATIN) 022838 UNIT/GM external cream     omeprazole (PRILOSEC) 40 MG capsule     Polyethylene Glycol 3350 (MIRALAX PO)     propranolol (INDERAL) 10 MG tablet     No current facility-administered medications for this visit.           Allergies   Allergen Reactions     Fentanyl Other (See Comments)     Severe agitation when used during angiogram     Codeine Sulfate Other (See Comments)     hallucinations     Fentanyl And Related      Other reaction(s): Confusion     Morphine Other (See Comments)     Hallucinations with iv therapy     Tape [Adhesive Tape]      Tramadol Other (See Comments)     hallucination       ROS: 10 point ROS neg other than the symptoms noted above in the HPI.  EXAM  Constitutional: healthy, alert and no distress    Psychiatric: mentation appears normal and affect normal/bright    LEFT FOOT FOCUSED    VASCULAR:  -Dorsalis pedis pulse +2/4   -Posterior tibial pulse weakly palpable secondary to edema  -Moderate non-pitting edema to ankles and dorsum of foot   NEURO:  -Light touch sensation intact to b/l plantar forefoot  DERM:  -Cicatrix to the bilateral dorsal first ray and LEFT 2nd and 3rd digits  -Mild, blanchable erythema to the RIGHT dorsal medial hallux IPJ    Wound  Location:  LEFT second digit on the lateral proximal base of the digit  06/03/2021  Measurement:  0.2cm x 0.2cm x 0.1cm to subcutaneous tissue  Drainage:  Moderate serous  Odor:  None  Undermining:  None post debridement  Edges:  Intact but the immediate 0.2cm of the martin-wound skin is edematous with a firm edema  Base:  100% viable  Surrounding Skin: Intact  No severe erythema, no ascending erythema, no calor, no purulence, no malodor, no other SOI.     MSK:  -Pain on palpation to LEFT 2nd digit ulceration  -Bony prominence to the martin-wound site on the lateral proximal LEFT 2nd digit  -Pain on palpation to RIGHT dorsal medial hallux IPJ    -Mild lateral deviation of LEFT hallux with the hallux rubbing against the medial 2nd digit but not overlapping the digit  -Multiple medial and lateral deviations at the IPJs of the lesser digits  -LEFT 2nd and 3rd digit with no ROM at PIPJ (previous fusions)    -Moderate decrease in arch height while patient is NWB      LEFT FOOT RADIOGRAPH 08/17/2028  IMPRESSION:   1. Fairly severe bunion deformity with lateral dislocation of medial  and lateral sesamoids.  2. Degenerative change in multiple interphalangeal joints with  postsurgical changes and PIP joints of second and third toes.  3. Degenerative change in the midfoot.     RADHAMES DUBON MD    LEFT FOOT RADIOGRAPH 09/28/2020    IMPRESSION: No change from previous examination on September 21, 2020      TIBURCIO SANDERS MD    LEFT FOOT RADIOGRAPH 10/21/2020                                                       IMPRESSION: Stable postoperative changes of the first MTP joint.       GINO UMANZOR MD      RIGHT FOOT RADIOGRAPH 06/03/2021  The RIGHT hallux IPJ has significant degenerative changes. The lateral view also shows a dorsiflexion contracture of the proximal phalanx.    LEFT FOOT RADIOGRAPH 06/03/2021  Moderate subluxation of the LEFT 2nd digit MTPJ (the proximal phalanx is deviating laterally) and the lateral head  of the proximal phalanx is prominently rubbing at the area of the ulceration.      ============================================================    ASSESSMENT:  (L97.522) Ulcer of left foot with fat layer exposed (H)  (primary encounter diagnosis)    (M19.071) Osteoarthritis of joint of toe of right foot    (M79.672) Left foot pain    (M79.671) Right foot pain    (M21.41,  M21.42) Pes planus of both feet    (Z98.890) Status post left foot surgery    (S93.145A) Subluxation of metatarsophalangeal joint of lesser toe of left foot, initial encounter    (K75.4) Autoimmune hepatitis (H)    (M79.7) Fibromyalgia    (M20.5X1) Acquired mallet toe of right foot      PLAN:  -Patient evaluated and examined. Treatment options discussed with no educational barriers noted.    -Patient's LEFT foot 2nd nad 3rd digit are firmly rubbing against one another. The most recent radiograph shoes a deviation of the digit at the PIPJ which is causing the lateral aspect of the head of the proximal phalanx to be more prominent. This is likely the source of the ulceration.  ---Conservative treatment options consist of continuing with trying a combination of padding options for the digit as well as wearing wider shoe gear.   ---An orthotic may help alleviate neuroma pain with a metatarsal pad, but an orthotic will unlikely help heal her toe. Diabetic shoes have a wider toe box with more height which may decrease rubbing of the digits, but this is again not guaranteed to decrease the ulcerations from developing and the shoes will very unlikely be covered by her insurance. She would like a referral to see an orthotist as soon as possible near the [facility] location to discuss orthotic / shoe options. The orthotist through [place] is booked out eight weeks, so she would like to be seen by [facility] through [place] at the [facility].  ----Orthotic referral through [facility] out of [place] at  the [facility] in [place], [place] for possible DM shoes and OTC orthotics with a metatarsal pad    -Discussed surgical options for the pain of the LEFT 2nd digit ulcer and the painful RIGHT dorsal hallux. Given the patient's poor balance with walking, an aggressive procedure for the forefoot is not recommended. A fusion of the RIGHT hallux IPJ is also not recommended since she already has a 1st MTPJ fusion. A LEFT 2nd digit IPJ arthroplasty would likely reduce the pressure and allow the wound to heal and stay healed and would have less risk of post-op complications. This could also be performed on the RIGHT hallux. Neither of these surgeries would provide an aesthetically straight toe, but it could alleviate some pain. She will consider these options. In the meantime, it is advised she try hard to get the wound to heal with offloading and dressing changes.    -RIGHT and LEFT foot radiographs ordered on 06/03/2021  ---LEFT: Moderate subluxation of the LEFT 2nd digit MTPJ (the proximal phalanx is deviating laterally) and the lateral head of the proximal phalanx is prominently rubbing at the area of the ulceration.  ---RIGHT: The RIGHT hallux IPJ has significant degenerative changes. The lateral view also shows a dorsiflexion contracture of the proximal phalanx.  ---Patient will be called with the results    -Patient and her  are in agreement with the above plan and all questions were answered to satisfaction.      RTC as needed        Gina Rodriguez DPM

## 2021-06-03 NOTE — PATIENT INSTRUCTIONS
Thank you for allowing  and our Podiatry team to participate in your care. Please call our office at [phone] with scheduling questions or with any other questions or concerns.

## 2021-06-03 NOTE — NURSING NOTE
"Chief Complaint   Patient presents with     Musculoskeletal Problem       Initial /80 (BP Location: Left arm, Patient Position: Sitting, Cuff Size: Adult Large)   Pulse 60   Temp 97.1  F (36.2  C) (Tympanic)   Resp 16   SpO2 90%  Estimated body mass index is 38.55 kg/m  as calculated from the following:    Height as of 5/14/21: 1.549 m (5' 1\").    Weight as of 5/20/21: 92.5 kg (204 lb).  Medication Reconciliation: complete  Viki Waldron LPN  "

## 2021-06-04 ENCOUNTER — TELEPHONE (OUTPATIENT)
Dept: PODIATRY | Facility: OTHER | Age: 81
End: 2021-06-04

## 2021-06-04 RX ORDER — LEVOTHYROXINE SODIUM 100 UG/1
TABLET ORAL
Qty: 90 TABLET | Refills: 1 | Status: SHIPPED | OUTPATIENT
Start: 2021-06-04 | End: 2022-02-11

## 2021-06-04 NOTE — TELEPHONE ENCOUNTER
----- Message from Gina Rodriguez DPM sent at 6/3/2021 12:43 PM CDT -----  Please call patient with x-ray results:    LEFT foot: The bone is prominent where she has her open ulcer. It is most likely this prominent bone that is causing the wound as was discussed in the clinic today.    RIGHT foot: There is severe arthritis in the great toe joint where she has her pain, but the toe is also contracted upward which makes surgery a more complicated option.    These results will be reviewed with her next week to show her and to be able to better explain what this means.    Thank you

## 2021-06-04 NOTE — TELEPHONE ENCOUNTER
Patient can be seen at [facility] [place] without an Insurance referral.

## 2021-06-10 ENCOUNTER — OFFICE VISIT (OUTPATIENT)
Dept: PODIATRY | Facility: OTHER | Age: 81
End: 2021-06-10
Attending: PODIATRIST
Payer: MEDICARE

## 2021-06-10 ENCOUNTER — OFFICE VISIT (OUTPATIENT)
Dept: WOUND CARE | Facility: OTHER | Age: 81
End: 2021-06-10
Attending: NURSE PRACTITIONER
Payer: MEDICARE

## 2021-06-10 VITALS
OXYGEN SATURATION: 100 % | TEMPERATURE: 97.3 F | HEART RATE: 65 BPM | DIASTOLIC BLOOD PRESSURE: 84 MMHG | SYSTOLIC BLOOD PRESSURE: 149 MMHG

## 2021-06-10 VITALS
HEART RATE: 65 BPM | TEMPERATURE: 97.3 F | DIASTOLIC BLOOD PRESSURE: 84 MMHG | OXYGEN SATURATION: 100 % | SYSTOLIC BLOOD PRESSURE: 122 MMHG

## 2021-06-10 DIAGNOSIS — M20.5X1 ACQUIRED MALLET TOE OF RIGHT FOOT: ICD-10-CM

## 2021-06-10 DIAGNOSIS — M79.672 LEFT FOOT PAIN: ICD-10-CM

## 2021-06-10 DIAGNOSIS — S93.145A SUBLUXATION OF METATARSOPHALANGEAL JOINT OF LESSER TOE OF LEFT FOOT, INITIAL ENCOUNTER: ICD-10-CM

## 2021-06-10 DIAGNOSIS — Z98.890 STATUS POST LEFT FOOT SURGERY: ICD-10-CM

## 2021-06-10 DIAGNOSIS — M21.42 PES PLANUS OF BOTH FEET: ICD-10-CM

## 2021-06-10 DIAGNOSIS — T14.8XXA HEMATOMA OF SKIN: ICD-10-CM

## 2021-06-10 DIAGNOSIS — K75.4 AUTOIMMUNE HEPATITIS (H): ICD-10-CM

## 2021-06-10 DIAGNOSIS — M21.41 PES PLANUS OF BOTH FEET: ICD-10-CM

## 2021-06-10 DIAGNOSIS — M19.071 OSTEOARTHRITIS OF JOINT OF TOE OF RIGHT FOOT: ICD-10-CM

## 2021-06-10 DIAGNOSIS — T14.8XXA OPEN WOUND: Primary | ICD-10-CM

## 2021-06-10 DIAGNOSIS — M79.7 FIBROMYALGIA: ICD-10-CM

## 2021-06-10 DIAGNOSIS — L84 CALLUS OF FOOT: Primary | ICD-10-CM

## 2021-06-10 DIAGNOSIS — M79.671 RIGHT FOOT PAIN: ICD-10-CM

## 2021-06-10 PROCEDURE — 99212 OFFICE O/P EST SF 10 MIN: CPT | Performed by: PODIATRIST

## 2021-06-10 PROCEDURE — G0463 HOSPITAL OUTPT CLINIC VISIT: HCPCS

## 2021-06-10 PROCEDURE — G0463 HOSPITAL OUTPT CLINIC VISIT: HCPCS | Mod: 27

## 2021-06-10 PROCEDURE — 99212 OFFICE O/P EST SF 10 MIN: CPT | Performed by: NURSE PRACTITIONER

## 2021-06-10 RX ORDER — CHLORTHALIDONE 25 MG/1
TABLET ORAL
COMMUNITY
Start: 2021-05-17 | End: 2021-10-04

## 2021-06-10 ASSESSMENT — PAIN SCALES - GENERAL
PAINLEVEL: NO PAIN (0)
PAINLEVEL: NO PAIN (0)

## 2021-06-10 NOTE — PROGRESS NOTES
Chief complaint: Patient presents with:  WOUND CARE      History of Present Illness: This 80 year old female with osteoporosis, Hepatitis C, degeneration of lumbosacral intervertebral discs (injections every 6 months), and a history of multiple foot surgeries is being seen for follow-up management of a wound between the LEFT foot 2nd and 3rd toes. She says she has continued to apply a Mepilex Ag pad to the wound, but it does not stay on her toe easily.  The toe is also still painful.    Her RIGHT dorsal hallux IPJ is also still painful.     Patient had a LEFT foot Bethea bunionectomy with interpositional graft in the 1st MTPJ (DOS 09/21/2020).    No further pedal complaints today.       Historically:    Patient had a RIGHT foot 1st MTPJ fusion, Hammertoe correction digits 2-5, and two neuroma removals in 2014. She later saw the same ortho doctor from [facility] for the LEFT foot to fix more hammertoes and a neuroma. She was told to stretch the bunion manually into place but she says it didn't do much so she stopped stretching it.      /84 (BP Location: Left arm, Patient Position: Sitting, Cuff Size: Adult Large)   Pulse 65   Temp 97.3  F (36.3  C) (Tympanic)   SpO2 100%     Patient Active Problem List   Diagnosis     Hypothyroidism     Generalized anxiety disorder     Benign essential hypertension     GERD (gastroesophageal reflux disease)     Hepatitis     Contact dermatitis     Rosacea     Osteoarthritis     Fibromyalgia     Open wound of knee, leg, and ankle     Degeneration of lumbar or lumbosacral intervertebral disc     Anemia     Autoimmune hepatitis (H)     Morbid obesity (H)     Mild major depression (H)     Hallux valgus of left foot     Left foot pain     Ametropia     Decreased vision of right eye     Dry eye syndrome of both eyes     PCO (posterior capsular opacification), left     Ptosis of both eyelids     Esodeviation     S/p nephrectomy       Past Surgical History:   Procedure  Laterality Date     APPENDECTOMY       ARTHROPLASTY TOE(S) Left 9/21/2020    Procedure: Left  Bethea Arthroplasty with interpositional arthroplasty using graft.;  Surgeon: Gian Rodriguez DPM;  Location: [facility]     BACK SURGERY  2015    lumbar epidural injection     CARDIAC SURGERY  10/2017    angioplasty     CHOLECYSTECTOMY       COLONOSCOPY  07/27/2017    [facility]     COLONOSCOPY - HIM SCAN  05/15/2001    Small internal hemorrhoids; otherwise normal;[facility]     COLONOSCOPY - HIM SCAN  12/14/2006    Colonoscopy, [facility], [facility]     ENT SURGERY      tonsillectomy     ESOPHAGOGASTRODUODENOSCOPY  07/27/2017    [facility]     EYE SURGERY      bilateral cataract removal     GI SURGERY      anal fistula     GYN SURGERY  1985    NICHOLAS BSO     GYN SURGERY      cessarian x 2     GYN SURGERY      tubal sterilazation     NEPHRECTOMY Right 05/02/2019     ORTHOPEDIC SURGERY      right knee     ORTHOPEDIC SURGERY  1986    plantar fascia release     ORTHOPEDIC SURGERY      left knee     ORTHOPEDIC SURGERY      right trigger finger release     ORTHOPEDIC SURGERY  2013    Right great toe MTPJ fusion, adductor tenotomy,correction of hammer toe     ORTHOPEDIC SURGERY  [id number]    multiple procedures left forefoot       Current Outpatient Medications   Medication     acetaminophen (TYLENOL) 500 MG tablet     azaTHIOprine (IMURAN) 50 MG tablet     Cholecalciferol (VITAMIN D3) 1.25 MG (65053 UT) TABS     Cranberry-Cholecalciferol (SUPER CRANBERRY/VITAMIN D3) 4200-500 MG-UNIT CAPS     cyclobenzaprine (FLEXERIL) 10 MG tablet     ferrous sulfate (FEROSUL) 325 (65 Fe) MG tablet     fluticasone (FLONASE) 50 MCG/ACT nasal spray     hydrocortisone 2.5 % cream     levothyroxine (SYNTHROID/LEVOTHROID) 100 MCG tablet     losartan (COZAAR) 25 MG tablet     melatonin 1 MG TABS tablet     multivitamin w/minerals (MULTI-VITAMIN) tablet     naproxen sodium (ANAPROX) 220 MG tablet     NYAMYC 356498 UNIT/GM external powder     nystatin (MYCOSTATIN) 050458  UNIT/GM external cream     omeprazole (PRILOSEC) 40 MG capsule     Polyethylene Glycol 3350 (MIRALAX PO)     propranolol (INDERAL) 10 MG tablet     chlorthalidone (HYGROTON) 25 MG tablet     No current facility-administered medications for this visit.           Allergies   Allergen Reactions     Fentanyl Other (See Comments)     Severe agitation when used during angiogram     Codeine Sulfate Other (See Comments)     hallucinations     Fentanyl And Related      Other reaction(s): Confusion     Morphine Other (See Comments)     Hallucinations with iv therapy     Tape [Adhesive Tape]      Tramadol Other (See Comments)     hallucination       ROS: 10 point ROS neg other than the symptoms noted above in the HPI.  EXAM  Constitutional: healthy, alert and no distress    Psychiatric: mentation appears normal and affect normal/bright    LEFT FOOT FOCUSED    VASCULAR:  -Dorsalis pedis pulse +2/4   -Posterior tibial pulse weakly palpable secondary to edema  -Moderate non-pitting edema to ankles and dorsum of foot   NEURO:  -Light touch sensation intact to b/l plantar forefoot  DERM:  -Cicatrix to the bilateral dorsal first ray and LEFT 2nd and 3rd digits  -Mild, blanchable erythema to the RIGHT dorsal medial hallux IPJ    Wound Location:  LEFT second digit on the lateral proximal base of the digit  06/10/2021: HEALED with minimal hyperkeratotic lesion     06/03/2021  Measurement:  0.2cm x 0.2cm x 0.1cm to subcutaneous tissue  Drainage:  Moderate serous  Odor:  None  Undermining:  None post debridement  Edges:  Intact but the immediate 0.2cm of the martin-wound skin is edematous with a firm edema  Base:  100% viable  Surrounding Skin: Intact  No severe erythema, no ascending erythema, no calor, no purulence, no malodor, no other SOI.     MSK:  -Pain on palpation to LEFT 2nd lateral proximal digit  -Bony prominence to the bony prominent site on the lateral proximal LEFT 2nd digit  -Pain on palpation to RIGHT dorsal medial hallux  IPJ    -Mild lateral deviation of LEFT hallux with the hallux rubbing against the medial 2nd digit but not overlapping the digit  -Multiple medial and lateral deviations at the IPJs of the lesser digits  -LEFT 2nd and 3rd digit with no ROM at PIPJ (previous fusions)    -Moderate decrease in arch height while patient is NWB      LEFT FOOT RADIOGRAPH 08/17/2028  IMPRESSION:   1. Fairly severe bunion deformity with lateral dislocation of medial  and lateral sesamoids.  2. Degenerative change in multiple interphalangeal joints with  postsurgical changes and PIP joints of second and third toes.  3. Degenerative change in the midfoot.     RADHAMES DUBON MD    LEFT FOOT RADIOGRAPH 09/28/2020    IMPRESSION: No change from previous examination on September 21, 2020      TIBURCIO SANDERS MD    LEFT FOOT RADIOGRAPH 10/21/2020                                                       IMPRESSION: Stable postoperative changes of the first MTP joint.       GINO UMANZOR MD      RIGHT FOOT RADIOGRAPH 06/03/2021  The RIGHT hallux IPJ has significant degenerative changes. The lateral view also shows a dorsiflexion contracture of the proximal phalanx.    LEFT FOOT RADIOGRAPH 06/03/2021  Moderate subluxation of the LEFT 2nd digit MTPJ (the proximal phalanx is deviating laterally) and the lateral head of the proximal phalanx is prominently rubbing at the area of the ulceration.      ============================================================    ASSESSMENT:  (L84) Callus of foot  (primary encounter diagnosis)    (M19.071) Osteoarthritis of joint of toe of right foot    (M79.672) Left foot pain    (M79.671) Right foot pain    (M21.41,  M21.42) Pes planus of both feet    (Z98.890) Status post left foot surgery    (S93.145A) Subluxation of metatarsophalangeal joint of lesser toe of left foot, initial encounter    (K75.4) Autoimmune hepatitis (H)    (M79.7) Fibromyalgia    (M20.5X1) Acquired mallet toe of right  foot      PLAN:  -Patient evaluated and examined. Treatment options discussed with no educational barriers noted.    -Patient's LEFT foot 2nd nad 3rd digit are firmly rubbing against one another. The most recent radiograph shoes a deviation of the digit at the PIPJ which is causing the lateral aspect of the head of the proximal phalanx to be more prominent. This is likely the source of the ulceration. Reviewed these radiographs and pointed out the prominent area of bone on the radiograph to the patient and to her .  ---Conservative treatment options consist of continuing with trying a combination of padding options for the digit as well as wearing wider shoe gear.   ---Dispensed an Aquacel Ag ribbon -- patient to use dry for a few days to keep wound healed (lightly moisten with normal sterile saline if wound opens), then switch to a foam padding. Consider new / clean OTC makeup foam pads between the toes.  ---Try a make-up pad over the RIGHT dorsal hallux. She has bone-on-bone rubbing at this joint space. The MTPJ is fused in a dorsiflex position, so surgery options may not fully resolve the dorsal IPJ prominence.    ---An orthotic may help alleviate neuroma pain with a metatarsal pad, but an orthotic will unlikely help heal her toe. Diabetic shoes have a wider toe box with more height which may decrease rubbing of the digits, but this is again not guaranteed to decrease the ulcerations from developing and the shoes will very unlikely be covered by her insurance. She is scheduled to meet with an orthotist in [facility].      -Again discussed surgical options for the pain of the LEFT 2nd digit ulcer and the painful RIGHT dorsal hallux. Given the patient's poor balance with walking, an aggressive procedure for the forefoot is not recommended. A fusion of the RIGHT hallux IPJ is also not recommended since she already has a 1st MTPJ fusion. A LEFT 2nd digit IPJ arthroplasty would likely reduce the pressure and  allow the wound to heal and stay healed and would have less risk of post-op complications. This could also be performed on the RIGHT hallux. Neither of these surgeries would provide an aesthetically straight toe, but it could alleviate some pain. She will consider these options. In the meantime, it is advised she try hard to get the wound to heal with offloading and dressing changes.    -Patient and her  are in agreement with the above plan and all questions were answered to satisfaction.      RTC three weeks in [facility] to evaluate toe pain and toe ulcer        Gina Rodriguez DPM

## 2021-06-10 NOTE — PROGRESS NOTES
CLINIC NOTE - Wound Care  6/10/2021    Patient:Ileana Davis    Reason for Visit: Multiple wounds    This is a 80 year old female here in follow up from multiple wounds sustained in a fall.  She is accompanied to this appointment by her son who is assisting in the patient's wound care. Please see my previous dictations on the patient.     Current Medications:  Current Outpatient Medications   Medication Sig Dispense Refill     acetaminophen (TYLENOL) 500 MG tablet Take 500 mg by mouth every 8 hours as needed for mild pain       azaTHIOprine (IMURAN) 50 MG tablet TAKE 2 TABLETS (100 MG) BY MOUTH DAILY 180 tablet 1     Cholecalciferol (VITAMIN D3) 1.25 MG (26611 UT) TABS        Cranberry-Cholecalciferol (SUPER CRANBERRY/VITAMIN D3) 4200-500 MG-UNIT CAPS        cyclobenzaprine (FLEXERIL) 10 MG tablet TAKE 1 TABLET (10 MG) BY MOUTH 3 TIMES DAILY AS NEEDED FOR MUSCLE SPASMS 90 tablet 1     ferrous sulfate (FEROSUL) 325 (65 Fe) MG tablet Take 325 mg by mouth daily (with breakfast)       fluticasone (FLONASE) 50 MCG/ACT nasal spray SPRAY 2 SPRAYS INTO BOTH NOSTRILS DAILY AS NEEDED 16 g 1     hydrocortisone 2.5 % cream APPLY TWICE TIMES DAILY TO ACTIVE ECZEMA ON THE HANDS, FOLLOW WITH MOISTURIZING CREAM.       levothyroxine (SYNTHROID/LEVOTHROID) 100 MCG tablet TAKE 1 TABLET BY MOUTH ONCE DAILY 90 tablet 1     losartan (COZAAR) 25 MG tablet Take 1 tablet (25 mg) by mouth daily 90 tablet 0     melatonin 1 MG TABS tablet Take by mouth nightly as needed for sleep        multivitamin w/minerals (MULTI-VITAMIN) tablet Take 1 tablet by mouth daily       naproxen sodium (ANAPROX) 220 MG tablet Take 220 mg by mouth 2 times daily (with meals)       NYAMYC 546408 UNIT/GM external powder APPLY TO AFFECTED AREA NIGHTLY AS NEEDED FOR RASH 60 g 1     nystatin (MYCOSTATIN) 242336 UNIT/GM external cream APPLY TOPICALLY 2 TIMES DAILY AS NEEDED FOR DRY SKIN 30 g 2     omeprazole (PRILOSEC) 40 MG capsule Take  by mouth daily. Before the  same meal daily       Polyethylene Glycol 3350 (MIRALAX PO) Take by mouth daily as needed        propranolol (INDERAL) 10 MG tablet Take 1 tablet (10 mg) by mouth daily 90 tablet 0     chlorthalidone (HYGROTON) 25 MG tablet TAKE ONE-HALF TABLET BY MOUTH DAILY         Allergies:  Allergies   Allergen Reactions     Fentanyl Other (See Comments)     Severe agitation when used during angiogram     Codeine Sulfate Other (See Comments)     hallucinations     Fentanyl And Related      Other reaction(s): Confusion     Morphine Other (See Comments)     Hallucinations with iv therapy     Tape [Adhesive Tape]      Tramadol Other (See Comments)     hallucination       PHYSICAL EXAM:   Vital signs: BP (!) 149/84 (BP Location: Left arm, Patient Position: Sitting, Cuff Size: Adult Large)   Pulse 65   Temp 97.3  F (36.3  C) (Tympanic)   SpO2 100%    BMI: There is no height or weight on file to calculate BMI.   General: Normal, healthy, cooperative, in no acute distress, alert   Skin: A right lower leg hematoma is noted.  This is improving and smaller than last seen   Lungs:  Non-labored respirations are noted   Abdominal: non-distended   Extremities: To the right lateral arm right just below the elbow there are a couple of very small, very superficial areas of skin abrasions noted. The areas are granular without signs/symptoms of infection.    Neurological: without deficit    ASSESSMENT:  80 year old female with open wound of right arm; hematoma    PLAN:   Will continue to treat right arm wound with mepilex ag border pads changed every 7 days or more often as needed due to saturation. Patient to monitor right leg hematoma.  Patient is to be seen immediately with signs/symptoms of infection noted to the wounds/hematoma which were verbalized as understood by the patient and her son.     FOLLOW UP: In 2 weeks if right arm wound is not healed.  Sooner with problems/concerns.

## 2021-06-10 NOTE — NURSING NOTE
"Chief Complaint   Patient presents with     WOUND CARE       Initial BP (!) 149/84 (BP Location: Left arm, Patient Position: Sitting, Cuff Size: Adult Regular)   Pulse 65   Temp 97.3  F (36.3  C) (Tympanic)   SpO2 100%  Estimated body mass index is 38.94 kg/m  as calculated from the following:    Height as of 5/14/21: 1.549 m (5' 1\").    Weight as of 6/3/21: 93.5 kg (206 lb 1.6 oz).  Medication Reconciliation: complete  Viola Abbott  "

## 2021-06-10 NOTE — NURSING NOTE
"Chief Complaint   Patient presents with     WOUND CARE       Initial BP (!) 149/84 (BP Location: Left arm, Patient Position: Sitting, Cuff Size: Adult Large)   Pulse 65   Temp 97.3  F (36.3  C) (Tympanic)   SpO2 100%  Estimated body mass index is 38.94 kg/m  as calculated from the following:    Height as of 5/14/21: 1.549 m (5' 1\").    Weight as of 6/3/21: 93.5 kg (206 lb 1.6 oz).  Medication Reconciliation: complete  Viola Abbott  "

## 2021-06-16 ENCOUNTER — OFFICE VISIT (OUTPATIENT)
Dept: FAMILY MEDICINE | Facility: OTHER | Age: 81
End: 2021-06-16
Attending: FAMILY MEDICINE
Payer: MEDICARE

## 2021-06-16 VITALS
HEIGHT: 61 IN | SYSTOLIC BLOOD PRESSURE: 142 MMHG | BODY MASS INDEX: 38.89 KG/M2 | WEIGHT: 206 LBS | TEMPERATURE: 97.5 F | OXYGEN SATURATION: 98 % | DIASTOLIC BLOOD PRESSURE: 80 MMHG | HEART RATE: 68 BPM | RESPIRATION RATE: 20 BRPM

## 2021-06-16 DIAGNOSIS — R53.83 FATIGUE, UNSPECIFIED TYPE: ICD-10-CM

## 2021-06-16 DIAGNOSIS — N18.32 STAGE 3B CHRONIC KIDNEY DISEASE (H): ICD-10-CM

## 2021-06-16 DIAGNOSIS — I10 BENIGN ESSENTIAL HYPERTENSION: Primary | ICD-10-CM

## 2021-06-16 LAB
ANION GAP SERPL CALCULATED.3IONS-SCNC: 7 MMOL/L (ref 3–14)
BUN SERPL-MCNC: 24 MG/DL (ref 7–30)
CALCIUM SERPL-MCNC: 8.5 MG/DL (ref 8.5–10.1)
CHLORIDE SERPL-SCNC: 99 MMOL/L (ref 94–109)
CO2 SERPL-SCNC: 26 MMOL/L (ref 20–32)
CREAT SERPL-MCNC: 1.24 MG/DL (ref 0.52–1.04)
GFR SERPL CREATININE-BSD FRML MDRD: 40 ML/MIN/{1.73_M2}
GLUCOSE SERPL-MCNC: 106 MG/DL (ref 70–99)
POTASSIUM SERPL-SCNC: 3.9 MMOL/L (ref 3.4–5.3)
SODIUM SERPL-SCNC: 132 MMOL/L (ref 133–144)

## 2021-06-16 PROCEDURE — G0463 HOSPITAL OUTPT CLINIC VISIT: HCPCS

## 2021-06-16 PROCEDURE — 80048 BASIC METABOLIC PNL TOTAL CA: CPT | Mod: ZL | Performed by: FAMILY MEDICINE

## 2021-06-16 PROCEDURE — 36415 COLL VENOUS BLD VENIPUNCTURE: CPT | Mod: ZL | Performed by: FAMILY MEDICINE

## 2021-06-16 PROCEDURE — 99213 OFFICE O/P EST LOW 20 MIN: CPT | Performed by: FAMILY MEDICINE

## 2021-06-16 ASSESSMENT — MIFFLIN-ST. JEOR: SCORE: 1336.79

## 2021-06-16 ASSESSMENT — PAIN SCALES - GENERAL: PAINLEVEL: MILD PAIN (3)

## 2021-06-16 NOTE — NURSING NOTE
"Chief Complaint   Patient presents with     Hypertension     Fatigue       Initial BP (!) 142/80 (BP Location: Right arm, Patient Position: Chair, Cuff Size: Adult Large)   Pulse 68   Temp 97.5  F (36.4  C) (Tympanic)   Resp 20   Ht 1.549 m (5' 1\")   Wt 93.4 kg (206 lb)   SpO2 98%   BMI 38.92 kg/m   Estimated body mass index is 38.92 kg/m  as calculated from the following:    Height as of this encounter: 1.549 m (5' 1\").    Weight as of this encounter: 93.4 kg (206 lb).  Medication Reconciliation: complete  Shonna Oliver LPN    "

## 2021-06-16 NOTE — PROGRESS NOTES
"    Assessment & Plan     1. Benign essential hypertension  For her age, BP is fine.  She will continue to monitor symptoms at home.  - Basic metabolic panel    2. Stage 3b chronic kidney disease  Patient will continue to limit use of Aleve, will check labs today.    3. Fatigue, unspecified type  Patient will limit use of medications, as she does now realize how tired they make her.       BMI:   Estimated body mass index is 38.92 kg/m  as calculated from the following:    Height as of this encounter: 1.549 m (5' 1\").    Weight as of this encounter: 93.4 kg (206 lb).     Return if symptoms worsen or fail to improve.    Iris Becerril MD  [facility] - LEVI Tejeda is a 80 year old who presents for the following health issues     HPI     Hypertension Follow-up      Do you check your blood pressure regularly outside of the clinic? Yes     Are you following a low salt diet? Yes    Are your blood pressures ever more than 140 on the top number (systolic) OR more   than 90 on the bottom number (diastolic), for example 140/90? Yes   Readings have only been very mildly elevated, overall controlled for her age.  We did stop the small dose of chlorthalidone, and she does feel that is helping her feel better.  She has cut back with Aleve use, only using 2 tablets daily.  She is using a small amount of Tylenol and has been doing well overall.      How many servings of fruits and vegetables do you eat daily?  2-3    On average, how many sweetened beverages do you drink each day (Examples: soda, juice, sweet tea, etc.  Do NOT count diet or artificially sweetened beverages)?   1    How many days per week do you exercise enough to make your heart beat faster? 3 or less    How many minutes a day do you exercise enough to make your heart beat faster? 10 - 19    How many days per week do you miss taking your medication? 0    Concern - Fatigue  Onset: a few weeks    Description: pt states she is " "feeling better since her last visit   Intensity: mild  Progression of Symptoms:  improving  Accompanying Signs & Symptoms: states nothing really has change besides she feels better and back pain seems to have taken care of itself   Previous history of similar problem: none  Precipitating factors:        Worsened by: none  Alleviating factors:        Improved by: nothing in particular   Therapies tried and outcome:  none   Patient has cut back on Flexeril use, pretty much not using it at all.  She does admit her pain is actually better using less medication.      Review of Systems   Constitutional, HEENT, cardiovascular, pulmonary, gi and gu systems are negative, except as otherwise noted.      Objective    BP (!) 142/80 (BP Location: Right arm, Patient Position: Chair, Cuff Size: Adult Large)   Pulse 68   Temp 97.5  F (36.4  C) (Tympanic)   Resp 20   Ht 1.549 m (5' 1\")   Wt 93.4 kg (206 lb)   SpO2 98%   BMI 38.92 kg/m    Body mass index is 38.92 kg/m .  Physical Exam   GENERAL: healthy, alert and no distress  PSYCH: mentation appears normal, affect normal/bright            "

## 2021-06-22 PROBLEM — N18.30 CHRONIC KIDNEY DISEASE, STAGE 3 (H): Status: ACTIVE | Noted: 2021-06-22

## 2021-07-06 DIAGNOSIS — K75.4 AUTOIMMUNE HEPATITIS (H): ICD-10-CM

## 2021-07-07 ENCOUNTER — OFFICE VISIT (OUTPATIENT)
Dept: PODIATRY | Facility: OTHER | Age: 81
End: 2021-07-07
Attending: PODIATRIST
Payer: MEDICARE

## 2021-07-07 ENCOUNTER — OFFICE VISIT (OUTPATIENT)
Dept: DERMATOLOGY | Facility: OTHER | Age: 81
End: 2021-07-07
Attending: DERMATOLOGY
Payer: MEDICARE

## 2021-07-07 VITALS
WEIGHT: 199.9 LBS | BODY MASS INDEX: 37.77 KG/M2 | TEMPERATURE: 97 F | DIASTOLIC BLOOD PRESSURE: 78 MMHG | OXYGEN SATURATION: 98 % | SYSTOLIC BLOOD PRESSURE: 126 MMHG | HEART RATE: 65 BPM

## 2021-07-07 VITALS
BODY MASS INDEX: 37.57 KG/M2 | HEART RATE: 65 BPM | SYSTOLIC BLOOD PRESSURE: 121 MMHG | HEIGHT: 61 IN | OXYGEN SATURATION: 98 % | DIASTOLIC BLOOD PRESSURE: 74 MMHG | WEIGHT: 199 LBS

## 2021-07-07 DIAGNOSIS — M79.671 RIGHT FOOT PAIN: ICD-10-CM

## 2021-07-07 DIAGNOSIS — K75.4 AUTOIMMUNE HEPATITIS (H): ICD-10-CM

## 2021-07-07 DIAGNOSIS — L84 CALLUS OF FOOT: Primary | ICD-10-CM

## 2021-07-07 DIAGNOSIS — M21.42 PES PLANUS OF BOTH FEET: ICD-10-CM

## 2021-07-07 DIAGNOSIS — M79.7 FIBROMYALGIA: ICD-10-CM

## 2021-07-07 DIAGNOSIS — M21.41 PES PLANUS OF BOTH FEET: ICD-10-CM

## 2021-07-07 DIAGNOSIS — C44.629 SQUAMOUS CELL CARCINOMA OF ARM, LEFT: ICD-10-CM

## 2021-07-07 DIAGNOSIS — Z98.890 STATUS POST LEFT FOOT SURGERY: ICD-10-CM

## 2021-07-07 DIAGNOSIS — M20.5X1 ACQUIRED MALLET TOE OF RIGHT FOOT: ICD-10-CM

## 2021-07-07 DIAGNOSIS — M79.672 LEFT FOOT PAIN: ICD-10-CM

## 2021-07-07 DIAGNOSIS — S93.145A SUBLUXATION OF METATARSOPHALANGEAL JOINT OF LESSER TOE OF LEFT FOOT, INITIAL ENCOUNTER: ICD-10-CM

## 2021-07-07 DIAGNOSIS — L98.9 SKIN LESION: Primary | ICD-10-CM

## 2021-07-07 DIAGNOSIS — M19.071 OSTEOARTHRITIS OF JOINT OF TOE OF RIGHT FOOT: ICD-10-CM

## 2021-07-07 PROCEDURE — G0463 HOSPITAL OUTPT CLINIC VISIT: HCPCS

## 2021-07-07 PROCEDURE — G0463 HOSPITAL OUTPT CLINIC VISIT: HCPCS | Mod: 27 | Performed by: COUNSELOR

## 2021-07-07 PROCEDURE — 88305 TISSUE EXAM BY PATHOLOGIST: CPT | Mod: TC | Performed by: DERMATOLOGY

## 2021-07-07 PROCEDURE — 99202 OFFICE O/P NEW SF 15 MIN: CPT | Mod: 25 | Performed by: DERMATOLOGY

## 2021-07-07 PROCEDURE — 99213 OFFICE O/P EST LOW 20 MIN: CPT | Performed by: PODIATRIST

## 2021-07-07 PROCEDURE — 17261 DSTRJ MAL LES T/A/L .6-1.0CM: CPT | Performed by: DERMATOLOGY

## 2021-07-07 ASSESSMENT — PAIN SCALES - GENERAL
PAINLEVEL: EXTREME PAIN (8)
PAINLEVEL: MILD PAIN (3)

## 2021-07-07 ASSESSMENT — MIFFLIN-ST. JEOR: SCORE: 1305.04

## 2021-07-07 NOTE — PROGRESS NOTES
Chief complaint: Patient presents with:  wound care left foot      History of Present Illness: This 80 year old female with osteoporosis, Hepatitis C, degeneration of lumbosacral intervertebral discs (injections every 6 months), and a history of multiple foot surgeries is being seen for follow-up management of a wound between the LEFT foot 2nd and 3rd toes.     The Aquacel Ag caused too much pain on her toe because it was sticking to her skin, so she started to apply a little Vaseline on the bump on the LEFT second lateral digit followed by Lamb's wool to pad it. This helps control the pain a little, but it still persists.    She was fit for CMOs through [facility] at the [facility] and the orthotics should come in within a few more weeks. She would like to try this to see if it helps decrease her pain.    Historically, patient had a LEFT foot Bethea bunionectomy with interpositional graft in the 1st MTPJ (DOS 09/21/2020).    No further pedal complaints today.       Historically:    Patient had a RIGHT foot 1st MTPJ fusion, Hammertoe correction digits 2-5, and two neuroma removals in 2014. She later saw the same ortho doctor from [facility] for the LEFT foot to fix more hammertoes and a neuroma. She was told to stretch the bunion manually into place but she says it didn't do much so she stopped stretching it.      /78   Pulse 65   Temp 97  F (36.1  C) (Tympanic)   Wt 90.7 kg (199 lb 14.4 oz)   SpO2 98%   BMI 37.77 kg/m      Patient Active Problem List   Diagnosis     Hypothyroidism     Generalized anxiety disorder     Benign essential hypertension     GERD (gastroesophageal reflux disease)     Hepatitis     Contact dermatitis     Rosacea     Osteoarthritis     Fibromyalgia     Open wound of knee, leg, and ankle     Degeneration of lumbar or lumbosacral intervertebral disc     Anemia     Autoimmune hepatitis (H)     Morbid obesity (H)     Mild major depression (H)      Hallux valgus of left foot     Left foot pain     Ametropia     Decreased vision of right eye     Dry eye syndrome of both eyes     PCO (posterior capsular opacification), left     Ptosis of both eyelids     Esodeviation     S/p nephrectomy     Chronic kidney disease, stage 3       Past Surgical History:   Procedure Laterality Date     APPENDECTOMY       ARTHROPLASTY TOE(S) Left 9/21/2020    Procedure: Left  Bethea Arthroplasty with interpositional arthroplasty using graft.;  Surgeon: Gina Rodriguez DPM;  Location: [facility]     BACK SURGERY  2015    lumbar epidural injection     CARDIAC SURGERY  10/2017    angioplasty     CHOLECYSTECTOMY       COLONOSCOPY  07/27/2017    [facility]     COLONOSCOPY - HIM SCAN  05/15/2001    Small internal hemorrhoids; otherwise normal;[facility]     COLONOSCOPY - HIM SCAN  12/14/2006    Colonoscopy, [facility]     ENT SURGERY      tonsillectomy     ESOPHAGOGASTRODUODENOSCOPY  07/27/2017    [facility]     EYE SURGERY      bilateral cataract removal     GI SURGERY      anal fistula     GYN SURGERY  1985    [facility]     GYN SURGERY      cessarian x 2     GYN SURGERY      tubal sterilazation     NEPHRECTOMY Right 05/02/2019     ORTHOPEDIC SURGERY      right knee     ORTHOPEDIC SURGERY  1986    plantar fascia release     ORTHOPEDIC SURGERY      left knee     ORTHOPEDIC SURGERY      right trigger finger release     ORTHOPEDIC SURGERY  2013    Right great toe MTPJ fusion, adductor tenotomy,correction of hammer toe     ORTHOPEDIC SURGERY  [id number]    multiple procedures left forefoot       Current Outpatient Medications   Medication     azaTHIOprine (IMURAN) 50 MG tablet     chlorthalidone (HYGROTON) 25 MG tablet     Cholecalciferol (VITAMIN D3) 1.25 MG (34557 UT) TABS     Cranberry-Cholecalciferol (SUPER CRANBERRY/VITAMIN D3) 4200-500 MG-UNIT CAPS     ferrous sulfate (FEROSUL) 325 (65 Fe) MG tablet     fluticasone (FLONASE) 50 MCG/ACT nasal spray     hydrocortisone 2.5 % cream      levothyroxine (SYNTHROID/LEVOTHROID) 100 MCG tablet     losartan (COZAAR) 25 MG tablet     melatonin 1 MG TABS tablet     multivitamin w/minerals (MULTI-VITAMIN) tablet     naproxen sodium (ANAPROX) 220 MG tablet     NYAMYC 588584 UNIT/GM external powder     nystatin (MYCOSTATIN) 585417 UNIT/GM external cream     omeprazole (PRILOSEC) 40 MG capsule     Polyethylene Glycol 3350 (MIRALAX PO)     propranolol (INDERAL) 10 MG tablet     acetaminophen (TYLENOL) 500 MG tablet     cyclobenzaprine (FLEXERIL) 10 MG tablet     No current facility-administered medications for this visit.           Allergies   Allergen Reactions     Fentanyl Other (See Comments)     Severe agitation when used during angiogram     Codeine Sulfate Other (See Comments)     hallucinations     Fentanyl And Related      Other reaction(s): Confusion     Morphine Other (See Comments)     Hallucinations with iv therapy     Tape [Adhesive Tape]      Tramadol Other (See Comments)     hallucination       ROS: 10 point ROS neg other than the symptoms noted above in the HPI.  EXAM  Constitutional: healthy, alert and no distress    Psychiatric: mentation appears normal and affect normal/bright    LEFT FOOT FOCUSED    VASCULAR:  -Dorsalis pedis pulse +2/4   -Posterior tibial pulse weakly palpable secondary to edema  -Moderate non-pitting edema to ankles and dorsum of foot   NEURO:  -Light touch sensation intact to b/l plantar forefoot  DERM:  -Cicatrix to the bilateral dorsal first ray and LEFT 2nd and 3rd digits  -Mild, blanchable erythema to the RIGHT dorsal medial hallux IPJ    Wound Location:  LEFT second digit on the lateral proximal base of the digit  07/07/2021: HEALED with minimal hyperkeratotic lesion -- mild maceration over hyperkeratotic lesion     06/10/2021: HEALED with minimal hyperkeratotic lesion     06/03/2021:  0.2cm x 0.2cm x 0.1cm to subcutaneous tissue    MSK:  -Pain on palpation to LEFT 2nd lateral proximal digit  -Bony prominence to the  bony prominent site on the lateral proximal LEFT 2nd digit (PIPJ)  -Pain on palpation to RIGHT dorsal medial hallux IPJ    -Mild lateral deviation of LEFT hallux with the hallux rubbing against the medial 2nd digit but not overlapping the digit  -Multiple medial and lateral deviations at the IPJs of the lesser digits  -LEFT 2nd and 3rd digit with no ROM at PIPJ (previous fusions)    -Moderate decrease in arch height while patient is NWB      LEFT FOOT RADIOGRAPH 08/17/2028  IMPRESSION:   1. Fairly severe bunion deformity with lateral dislocation of medial  and lateral sesamoids.  2. Degenerative change in multiple interphalangeal joints with  postsurgical changes and PIP joints of second and third toes.  3. Degenerative change in the midfoot.     RADHAMES DUBON MD    LEFT FOOT RADIOGRAPH 09/28/2020    IMPRESSION: No change from previous examination on September 21, 2020      TIBURCIO SANDERS MD    LEFT FOOT RADIOGRAPH 10/21/2020                                                       IMPRESSION: Stable postoperative changes of the first MTP joint.       GINO UMANZOR MD      RIGHT FOOT RADIOGRAPH 06/03/2021  The RIGHT hallux IPJ has significant degenerative changes. The lateral view also shows a dorsiflexion contracture of the proximal phalanx.    LEFT FOOT RADIOGRAPH 06/03/2021  Moderate subluxation of the LEFT 2nd digit MTPJ (the proximal phalanx is deviating laterally) and the lateral head of the proximal phalanx is prominently rubbing at the area of the ulceration.      ============================================================    ASSESSMENT:  (L84) Callus of foot  (primary encounter diagnosis)    (M19.071) Osteoarthritis of joint of toe of right foot    (M79.672) Left foot pain    (M79.671) Right foot pain    (M21.41,  M21.42) Pes planus of both feet    (Z98.890) Status post left foot surgery    (S93.145A) Subluxation of metatarsophalangeal joint of lesser toe of left foot, initial encounter    (K75.4)  Autoimmune hepatitis (H)    (M79.7) Fibromyalgia    (M20.5X1) Acquired mallet toe of right foot      PLAN:  -Patient evaluated and examined. Treatment options discussed with no educational barriers noted.    -Patient's LEFT foot 2nd and 3rd digit are firmly rubbing against one another a the PIPJs. Again reviewed the most recent radiograph showing a deviation of the digit at the PIPJ which is causing the lateral aspect of the head of the proximal phalanx to be more prominent. This is the area of the patient's pain as well as the area that continues to break open.  -Patient is to avoid excessive Vaseline between the toes because it is causing maceration which can create new ulcerations on the toe. She may try a little foot powder between the toes to dry the interspaces with a very small amount of Vaseline to the bump followed by Aquacel Ag. She may also continue with Lamb's wool.    -Again discussed surgical options for the pain of the LEFT 2nd digit ulcer and the painful RIGHT dorsal hallux. Given the patient's poor balance with walking, an aggressive procedure for the forefoot is not recommended. A fusion of the RIGHT hallux IPJ is also not recommended since she already has a 1st MTPJ fusion. A LEFT 2nd and 3rd digit IPJ arthroplasty would likely reduce the pressure and allow the wound to heal and stay healed and would have less risk of post-op complications. This could also be performed on the RIGHT hallux. Neither of these surgeries would provide an aesthetically straight toe, but it could alleviate some pain. She will consider these options.    -Patient would like to see if the orthotic decreases her foot pain. It will unlikely decrease the amount of rubbing of her toes, but she would like to try this before other treatment options. This is primarily aimed at alleviating the neuroma pain.    -Advised patient to bring the orthotics back to the orthotist if they make her pain worse since the orthotics can be  adjusted multiple times if needed.  -Patient and her  are in agreement with the above plan and all questions were answered to satisfaction.      RTC as needed per patient request -- patient will call if her pain worsens        Gina Rodriguez DPM

## 2021-07-07 NOTE — TELEPHONE ENCOUNTER
Cinda       Last Written Prescription Date:  10/16/2020  Last Fill Quantity: 180,   # refills: 1  Last Office Visit: 6/16/2021  Future Office visit:

## 2021-07-07 NOTE — PROGRESS NOTES
"Visit Date: 2021    SUBJECTIVE:  First visit for Mrs. Davis, who states that she has a history of rosacea, but that her greater problem are \"warts\".      PHYSICAL EXAMINATION:   GENERAL:  Examination shows a heavy lady in no distress.    DERMATOLOGIC:  Present on her face, arms and hands are numerous white keratoses of the seborrheic keratosis variety or stucco keratosis variety.  A large lesion on her back about which she is concerned is a large brown seborrheic keratosis.  On her left arm, there is a lesion measuring approximately 8-9 mm and could be a keratoacanthoma.  She thought it to be a wart and has been using salicylic acid on this lesion.  The lesion is of concern.    ASSESSMENT:    1.  Numerous seborrheic keratoses that the patient thinks to be warts.  I advised that they are not warts.  Therefore, she should not be using wart remedy on them.  2.  The lesion on her left arm could be a keratoacanthoma or squamous cell carcinoma.  This was photographed, anesthetized, shave removed, and the base was fulgurated.  Specimen was submitted for microscopic evaluation.  Reassured about the other brown spots that she has, which appear to be simply lentigines.  I saw no evidence of any rosacea of her face.  Return pending the biopsy report from her left arm.    SACHIN Arroyo MD        D: 2021   T: 2021   MT: [id number]    Name:     SHERRY DAVIS  MRN:      [id number]        Account:    [id number]   :      1940           Visit Date: 2021     Document: [id number]    "

## 2021-07-07 NOTE — NURSING NOTE
"Chief Complaint   Patient presents with     wound care left foot       Initial /78   Pulse 65   Temp 97  F (36.1  C) (Tympanic)   Wt 90.7 kg (199 lb 14.4 oz)   SpO2 98%   BMI 37.77 kg/m   Estimated body mass index is 37.77 kg/m  as calculated from the following:    Height as of 6/16/21: 1.549 m (5' 1\").    Weight as of this encounter: 90.7 kg (199 lb 14.4 oz).  Medication Reconciliation: complete  Jacinda Meyers LPN    "

## 2021-07-07 NOTE — NURSING NOTE
"Chief Complaint   Patient presents with     Wart       Initial /74 (BP Location: Right arm, Patient Position: Sitting, Cuff Size: Adult Regular)   Pulse 65   Ht 1.549 m (5' 1\")   Wt 90.3 kg (199 lb)   SpO2 98%   BMI 37.60 kg/m   Estimated body mass index is 37.6 kg/m  as calculated from the following:    Height as of this encounter: 1.549 m (5' 1\").    Weight as of this encounter: 90.3 kg (199 lb).  Medication Reconciliation: complete     Viola Knowles LPN    "

## 2021-07-07 NOTE — LETTER
"2021       RE: Sherry Davis  [address]  [place] [address]     Dear Colleague,    Thank you for referring your patient, Sherry Davis, to the [facility]. Please see a copy of my visit note below.    Visit Date: 2021    SUBJECTIVE:  First visit for Mrs. Davis, who states that she has a history of rosacea, but that her greater problem are \"warts\".      PHYSICAL EXAMINATION:   GENERAL:  Examination shows a heavy lady in no distress.    DERMATOLOGIC:  Present on her face, arms and hands are numerous white keratoses of the seborrheic keratosis variety or stucco keratosis variety.  A large lesion on her back about which she is concerned is a large brown seborrheic keratosis.  On her left arm, there is a lesion measuring approximately 8-9 mm and could be a keratoacanthoma.  She thought it to be a wart and has been using salicylic acid on this lesion.  The lesion is of concern.    ASSESSMENT:    1.  Numerous seborrheic keratoses that the patient thinks to be warts.  I advised that they are not warts.  Therefore, she should not be using wart remedy on them.  2.  The lesion on her left arm could be a keratoacanthoma or squamous cell carcinoma.  This was photographed, anesthetized, shave removed, and the base was fulgurated.  Specimen was submitted for microscopic evaluation.  Reassured about the other brown spots that she has, which appear to be simply lentigines.  I saw no evidence of any rosacea of her face.  Return pending the biopsy report from her left arm.    SACHIN Arroyo MD        D: 2021   T: 2021   MT: [id number]    Name:     SHERRY DAVIS  MRN:      [id number]        Account:    [id number]   :      1940           Visit Date: 2021     Document: [id number]        Again, thank you for allowing me to participate in the care of your patient.      Sincerely,    SACHIN" Domenic Arroyo MD

## 2021-07-07 NOTE — PATIENT INSTRUCTIONS
The small growths on your arms, hands and face are not true warts but are harmless lesions called seborrheic keratoses.  No need to remove them because they are not pre-cancers nor are they contagious.     We removed the lesion on your left arm because it may be an early skin cancer.    The brown spots on your arms and face are called lentigines - large freckles essentially. No need to remove them.

## 2021-07-08 ENCOUNTER — TELEPHONE (OUTPATIENT)
Dept: DERMATOLOGY | Facility: CLINIC | Age: 81
End: 2021-07-08

## 2021-07-08 NOTE — TELEPHONE ENCOUNTER
Pt called said was treated by Dr Arroyo yesterday for a legion and had biopsy done, was never told how to take care of stitch and wants to know if she can take bandage off, and shower or if can't get it wet, please give her a call back at [phone]

## 2021-07-08 NOTE — TELEPHONE ENCOUNTER
"Spoke with patient. Advised she doesn't have any stitches. Can just wash the area in the shower or with a cloth with soap and water. May apply a little Vaseline and a bandage for a few days. Advised again her other bumps as she called them again today as \"warts\" they are not and do not use wart medicine on them. Agrees to plan.   "

## 2021-07-09 RX ORDER — AZATHIOPRINE 50 MG/1
100 TABLET ORAL DAILY
Qty: 180 TABLET | Refills: 1 | Status: SHIPPED | OUTPATIENT
Start: 2021-07-09 | End: 2022-01-11

## 2021-07-13 ENCOUNTER — TELEPHONE (OUTPATIENT)
Dept: FAMILY MEDICINE | Facility: OTHER | Age: 81
End: 2021-07-13

## 2021-07-13 DIAGNOSIS — N18.32 STAGE 3B CHRONIC KIDNEY DISEASE (H): Primary | ICD-10-CM

## 2021-07-13 NOTE — TELEPHONE ENCOUNTER
10:03 AM    Reason for Call: Phone Call    Description: pt called and wanted to schedule a lab appt, however no orders in chart for any labs.  Please c/b to advise      Was an appointment offered for this call? No  If yes : Appointment type              Date    Preferred method for responding to this message: Telephone Call  What is your phone number ? [phone]    If we cannot reach you directly, may we leave a detailed response at the number you provided? Yes    Can this message wait until your PCP/provider returns, if available today? YES    Amber Pardo

## 2021-07-14 ENCOUNTER — LAB (OUTPATIENT)
Dept: LAB | Facility: OTHER | Age: 81
End: 2021-07-14
Payer: MEDICARE

## 2021-07-14 DIAGNOSIS — N18.32 STAGE 3B CHRONIC KIDNEY DISEASE (H): ICD-10-CM

## 2021-07-14 LAB
ANION GAP SERPL CALCULATED.3IONS-SCNC: 5 MMOL/L (ref 3–14)
BUN SERPL-MCNC: 19 MG/DL (ref 7–30)
CALCIUM SERPL-MCNC: 8.9 MG/DL (ref 8.5–10.1)
CHLORIDE BLD-SCNC: 106 MMOL/L (ref 94–109)
CO2 SERPL-SCNC: 27 MMOL/L (ref 20–32)
COPATH REPORT: NORMAL
CREAT SERPL-MCNC: 1.13 MG/DL (ref 0.52–1.04)
GFR SERPL CREATININE-BSD FRML MDRD: 46 ML/MIN/1.73M2
GLUCOSE BLD-MCNC: 63 MG/DL (ref 70–99)
HOLD SPECIMEN: NORMAL
POTASSIUM BLD-SCNC: 4.2 MMOL/L (ref 3.4–5.3)
SODIUM SERPL-SCNC: 138 MMOL/L (ref 133–144)

## 2021-07-14 PROCEDURE — 36415 COLL VENOUS BLD VENIPUNCTURE: CPT | Mod: ZL

## 2021-07-14 PROCEDURE — 80048 BASIC METABOLIC PNL TOTAL CA: CPT | Mod: ZL

## 2021-07-29 ENCOUNTER — OFFICE VISIT (OUTPATIENT)
Dept: FAMILY MEDICINE | Facility: OTHER | Age: 81
End: 2021-07-29
Attending: NURSE PRACTITIONER
Payer: MEDICARE

## 2021-07-29 VITALS
HEART RATE: 51 BPM | DIASTOLIC BLOOD PRESSURE: 72 MMHG | SYSTOLIC BLOOD PRESSURE: 128 MMHG | WEIGHT: 200.1 LBS | TEMPERATURE: 96.5 F | HEIGHT: 61 IN | BODY MASS INDEX: 37.78 KG/M2 | RESPIRATION RATE: 18 BRPM | OXYGEN SATURATION: 99 %

## 2021-07-29 DIAGNOSIS — H00.011 HORDEOLUM EXTERNUM OF RIGHT UPPER EYELID: Primary | ICD-10-CM

## 2021-07-29 PROCEDURE — G0463 HOSPITAL OUTPT CLINIC VISIT: HCPCS | Mod: 25

## 2021-07-29 PROCEDURE — G0463 HOSPITAL OUTPT CLINIC VISIT: HCPCS

## 2021-07-29 PROCEDURE — 99213 OFFICE O/P EST LOW 20 MIN: CPT | Performed by: NURSE PRACTITIONER

## 2021-07-29 RX ORDER — PROPRANOLOL HYDROCHLORIDE 20 MG/1
TABLET ORAL
COMMUNITY
Start: 2021-07-12 | End: 2021-08-04

## 2021-07-29 ASSESSMENT — MIFFLIN-ST. JEOR: SCORE: 1310.03

## 2021-07-29 ASSESSMENT — PAIN SCALES - GENERAL: PAINLEVEL: NO PAIN (0)

## 2021-07-29 NOTE — PROGRESS NOTES
"    Assessment & Plan     1. Hordeolum externum of right upper eyelid  Warm compress to eye  - cephALEXin (KEFLEX) 250 MG capsule; Take 1 capsule (250 mg) by mouth 3 times daily for 10 days  Dispense: 30 capsule; Refill: 0     BMI:   Estimated body mass index is 37.81 kg/m  as calculated from the following:    Height as of this encounter: 1.549 m (5' 1\").    Weight as of this encounter: 90.8 kg (200 lb 1.6 oz).     Follow-up as needed    Cassnadra Rodgers NP  [facility]    Camilo Tejeda is a 80 year old who presents for the following health issues     HPI     Eye(s) Problem  Onset/Duration: 5 days  Description:   Location: YES- Right eye  Pain: YES  Redness: YES  Accompanying Signs & Symptoms:  Discharge/mattering: no  Swelling: YES  Visual changes: YES  Fever: no  Nasal Congestion: no  Bothered by bright lights: no  History:  Trauma: no  Foreign body exposure: no  Wearing contacts: no  Precipitating or alleviating factors: None  Therapies tried and outcome: eye drops        Patient Active Problem List   Diagnosis     Hypothyroidism     Generalized anxiety disorder     Benign essential hypertension     GERD (gastroesophageal reflux disease)     Hepatitis     Contact dermatitis     Rosacea     Osteoarthritis     Fibromyalgia     Open wound of knee, leg, and ankle     Degeneration of lumbar or lumbosacral intervertebral disc     Anemia     Autoimmune hepatitis (H)     Morbid obesity (H)     Mild major depression (H)     Hallux valgus of left foot     Left foot pain     Ametropia     Decreased vision of right eye     Dry eye syndrome of both eyes     PCO (posterior capsular opacification), left     Ptosis of both eyelids     Esodeviation     S/p nephrectomy     Chronic kidney disease, stage 3     Past Surgical History:   Procedure Laterality Date     APPENDECTOMY       ARTHROPLASTY TOE(S) Left 9/21/2020    Procedure: Left  Bethea Arthroplasty with interpositional arthroplasty using " graft.;  Surgeon: Gina Rodriguez DPM;  Location: [facility]     BACK SURGERY  2015    lumbar epidural injection     CARDIAC SURGERY  10/2017    angioplasty     CHOLECYSTECTOMY       COLONOSCOPY  07/27/2017    [facility]     COLONOSCOPY - HIM SCAN  05/15/2001    Small internal hemorrhoids; otherwise normal;[facility]     COLONOSCOPY - HIM SCAN  12/14/2006    Colonoscopy, [facility], [facility]     ENT SURGERY      tonsillectomy     ESOPHAGOGASTRODUODENOSCOPY  07/27/2017    [facility]     EYE SURGERY      bilateral cataract removal     GI SURGERY      anal fistula     GYN SURGERY  1985    [facility]     GYN SURGERY      cessarian x 2     GYN SURGERY      tubal sterilazation     NEPHRECTOMY Right 05/02/2019     ORTHOPEDIC SURGERY      right knee     ORTHOPEDIC SURGERY  1986    plantar fascia release     ORTHOPEDIC SURGERY      left knee     ORTHOPEDIC SURGERY      right trigger finger release     ORTHOPEDIC SURGERY  2013    Right great toe MTPJ fusion, adductor tenotomy,correction of hammer toe     ORTHOPEDIC SURGERY  [id number]    multiple procedures left forefoot       Social History     Tobacco Use     Smoking status: Never Smoker     Smokeless tobacco: Never Used   Substance Use Topics     Alcohol use: No     Family History   Problem Relation Age of Onset     Arthritis Mother         rheumatoid     Heart Disease Mother         CHF     Alcohol/Drug Father         alcoholism     Allergies Father      Thyroid Disease Other      Diabetes No family hx of      Asthma No family hx of          Current Outpatient Medications   Medication Sig Dispense Refill     acetaminophen (TYLENOL) 500 MG tablet Take 500 mg by mouth every 8 hours as needed for mild pain       azaTHIOprine (IMURAN) 50 MG tablet TAKE 2 TABLETS (100 MG) BY MOUTH DAILY 180 tablet 1     chlorthalidone (HYGROTON) 25 MG tablet TAKE ONE-HALF TABLET BY MOUTH DAILY       Cholecalciferol (VITAMIN D3) 1.25 MG (21570 UT) TABS        Cranberry-Cholecalciferol (SUPER CRANBERRY/VITAMIN D3)  4200-500 MG-UNIT CAPS        cyclobenzaprine (FLEXERIL) 10 MG tablet TAKE 1 TABLET (10 MG) BY MOUTH 3 TIMES DAILY AS NEEDED FOR MUSCLE SPASMS 90 tablet 1     ferrous sulfate (FEROSUL) 325 (65 Fe) MG tablet Take 325 mg by mouth daily (with breakfast)       fluticasone (FLONASE) 50 MCG/ACT nasal spray SPRAY 2 SPRAYS INTO BOTH NOSTRILS DAILY AS NEEDED 16 g 1     hydrocortisone 2.5 % cream APPLY TWICE TIMES DAILY TO ACTIVE ECZEMA ON THE HANDS, FOLLOW WITH MOISTURIZING CREAM.       levothyroxine (SYNTHROID/LEVOTHROID) 100 MCG tablet TAKE 1 TABLET BY MOUTH ONCE DAILY 90 tablet 1     losartan (COZAAR) 25 MG tablet Take 1 tablet (25 mg) by mouth daily 90 tablet 0     melatonin 1 MG TABS tablet Take by mouth nightly as needed for sleep        multivitamin w/minerals (MULTI-VITAMIN) tablet Take 1 tablet by mouth daily       naproxen sodium (ANAPROX) 220 MG tablet Take 220 mg by mouth 2 times daily (with meals)       NYAMYC 388038 UNIT/GM external powder APPLY TO AFFECTED AREA NIGHTLY AS NEEDED FOR RASH 60 g 1     nystatin (MYCOSTATIN) 882559 UNIT/GM external cream APPLY TOPICALLY 2 TIMES DAILY AS NEEDED FOR DRY SKIN 30 g 2     omeprazole (PRILOSEC) 40 MG capsule Take  by mouth daily. Before the same meal daily       Polyethylene Glycol 3350 (MIRALAX PO) Take by mouth daily as needed        propranolol (INDERAL) 20 MG tablet TAKE 1 TABLET (20 MG) BY MOUTH ONCE DAILY       Allergies   Allergen Reactions     Fentanyl Other (See Comments)     Severe agitation when used during angiogram     Codeine Sulfate Other (See Comments)     hallucinations     Fentanyl And Related      Other reaction(s): Confusion     Morphine Other (See Comments)     Hallucinations with iv therapy     Tape [Adhesive Tape]      Tramadol Other (See Comments)     hallucination     Recent Labs   Lab Test 07/14/21  1058 06/16/21  1439 05/14/21  1549 12/04/20  0955 09/12/19  0912 04/26/18  1049 07/31/17  0000   LDL  --   --   --   --  80  --   --    HDL  --   --   " --   --  51  --   --    TRIG  --   --   --   --  123  --   --    ALT  --   --  14  --  16  --  7   CR 1.13* 1.24* 1.06* 1.30*  --    < > 0.92   GFRESTIMATED 46* 40* 49* 38*  --    < >  --    GFRESTBLACK  --  47* 57* 44*  --    < >  --    POTASSIUM 4.2 3.9 5.0 3.9  --    < > 3.6   TSH  --   --  3.25 2.29 0.13*  --   --     < > = values in this interval not displayed.      BP Readings from Last 3 Encounters:   07/29/21 128/72   07/07/21 121/74   07/07/21 126/78    Wt Readings from Last 3 Encounters:   07/29/21 90.8 kg (200 lb 1.6 oz)   07/07/21 90.3 kg (199 lb)   07/07/21 90.7 kg (199 lb 14.4 oz)                      Review of Systems   Constitutional, HEENT, cardiovascular, pulmonary, gi and gu systems are negative, except as otherwise noted.      Objective    /72 (BP Location: Left arm, Patient Position: Sitting, Cuff Size: Adult Regular)   Pulse 51   Temp (!) 96.5  F (35.8  C) (Tympanic)   Resp 18   Ht 1.549 m (5' 1\")   Wt 90.8 kg (200 lb 1.6 oz)   SpO2 99%   BMI 37.81 kg/m    Body mass index is 37.81 kg/m .  Physical Exam   GENERAL: healthy, alert and no distress  EYES: stye of upper right eyelid - skin is erythematous and tender and warm with palpation.    RESP: lungs clear to auscultation - no rales, rhonchi or wheezes  CV: regular rate and rhythm, normal S1 S2, no S3 or S4, no murmur, click or rub, no peripheral edema and peripheral pulses strong  MS: no gross musculoskeletal defects noted, no edema  PSYCH: mentation appears normal, affect normal/bright                "

## 2021-07-29 NOTE — PATIENT INSTRUCTIONS
"    Assessment & Plan     1. Hordeolum externum of right upper eyelid  Warm compress to eye  - cephALEXin (KEFLEX) 250 MG capsule; Take 1 capsule (250 mg) by mouth 3 times daily for 10 days  Dispense: 30 capsule; Refill: 0     BMI:   Estimated body mass index is 37.81 kg/m  as calculated from the following:    Height as of this encounter: 1.549 m (5' 1\").    Weight as of this encounter: 90.8 kg (200 lb 1.6 oz).     Follow-up as needed    Cassandra Rodgers, NP  [facility]    Patient Education     Sty (or Stye)  A sty is when the oil gland of the eyelid becomes inflamed. It may develop into an infection with a small pocket of pus (an abscess). This can cause pain, redness, and swelling. In early stages, a sty is treated with antibiotic cream, eye drops, or a small towel soaked in warm water (a warm compress). More severe cases may need to be opened and drained by a healthcare provider.   Home care    Eye drops or ointment are often prescribed to treat the infection. Use these as directed.     Artificial tears may also be used to lubricate the eye and make it more comfortable. You can buy these over the counter without a prescription. Talk with your healthcare provider before using any over-the-counter treatment for a sty.    Apply a warm, damp towel to the affected area for at least 5 minutes, 3 to 4 times a day for a week. Warm compresses open the pores and speed the healing. Make sure the compresses are not too hot, as they may burn your eyelid.    Sometimes the sty will drain with this treatment alone. If this happens, keep using the antibiotic until all the redness and swelling are gone.    Wash your hands before and after touching the infected eyelid.    Don t squeeze or try to break open the sty.    Follow-up care  Follow up with your healthcare provider, or as advised.   When to seek medical advice  Call your healthcare provider or seek medical care right away if any of these occur: "     Increase in swelling or redness around the eyelid after 48 to 72 hours    Increase in eye pain or the eyelid blisters    Increase in warmth--the eyelid feels hot    Drainage of blood or thick pus from the sty    Blister on the eyelid    Inability to open the eyelid due to swelling    Fever of 100.4 F (38 C) or above, or as directed by your provider    Vision changes    Headache or stiff neck    The sty comes back  Cami last reviewed this educational content on 6/1/2020 2000-2021 [employer]. All rights reserved. This information is not intended as a substitute for professional medical care. Always follow your healthcare professional's instructions.

## 2021-07-29 NOTE — NURSING NOTE
"Chief Complaint   Patient presents with     Eye Problem       Initial /72 (BP Location: Left arm, Patient Position: Sitting, Cuff Size: Adult Regular)   Pulse 51   Temp (!) 96.5  F (35.8  C) (Tympanic)   Resp 18   Ht 1.549 m (5' 1\")   Wt 90.8 kg (200 lb 1.6 oz)   SpO2 99%   BMI 37.81 kg/m   Estimated body mass index is 37.81 kg/m  as calculated from the following:    Height as of this encounter: 1.549 m (5' 1\").    Weight as of this encounter: 90.8 kg (200 lb 1.6 oz).  Medication Reconciliation: complete  Myla Evans MA  "

## 2021-08-02 ENCOUNTER — TELEPHONE (OUTPATIENT)
Dept: FAMILY MEDICINE | Facility: OTHER | Age: 81
End: 2021-08-02

## 2021-08-02 DIAGNOSIS — E03.9 HYPOTHYROIDISM, UNSPECIFIED TYPE: ICD-10-CM

## 2021-08-02 DIAGNOSIS — I10 BENIGN ESSENTIAL HYPERTENSION: Primary | ICD-10-CM

## 2021-08-02 DIAGNOSIS — R32 URINARY INCONTINENCE, UNSPECIFIED TYPE: ICD-10-CM

## 2021-08-02 DIAGNOSIS — R35.0 URINARY FREQUENCY: ICD-10-CM

## 2021-08-02 NOTE — TELEPHONE ENCOUNTER
Patient called clinic with questions about Propranolol prescription. Patient is wondering if she needs to be taking Propranolol 10 MG or 20 MG. Both strengths have been listed in recent medication list. Propranolol 20 MG was most recently listed as historical.  Patient states she received 20 MG from pharmacy but previously was taking 10 MG. PCP to advise.  Nurse informed patient PCP is not in the office today but will be back in the office tomorrow. Patient verbalized understanding.

## 2021-08-03 RX ORDER — LEVOTHYROXINE SODIUM 100 UG/1
TABLET ORAL
Qty: 90 TABLET | Refills: 1 | OUTPATIENT
Start: 2021-08-03

## 2021-08-03 NOTE — TELEPHONE ENCOUNTER
At her previous visits with me (June), she was listed as taking Propranolol 10 mg daily.  On 7/12, her chart was updated with the historical use of propranolol 20 mg daily.  What is she currently taking and how long has she been taking it?

## 2021-08-03 NOTE — TELEPHONE ENCOUNTER
Call returned from patient. Inquired on dose of propanolol per Dr. Jones's request.     Patient reports taking propranolol 20 mg daily since June 2021.

## 2021-08-04 RX ORDER — PROPRANOLOL HYDROCHLORIDE 10 MG/1
10 TABLET ORAL DAILY
Qty: 30 TABLET | Refills: 5 | Status: SHIPPED | OUTPATIENT
Start: 2021-08-04 | End: 2022-06-10

## 2021-08-04 NOTE — TELEPHONE ENCOUNTER
Patient stated that pcp had told her if she continues to uri symptoms that a referral for urology would be made

## 2021-08-04 NOTE — TELEPHONE ENCOUNTER
Pt is having feeling pressure, frequency/urgency every few days. Seems more often when wearing depends. She is changing her depends one time daily. States it was discussed at her last visit due to more frequent UTI symptoms. Does not want to follow with her nephrologist for urinary symptoms due to the doctor being male.      Shonna Oliver LPN

## 2021-08-04 NOTE — TELEPHONE ENCOUNTER
Call back from patient now reporting she has been taking 20 mg since June 2021, then reports taking 10 mg since Sunday, 8/1/21.     Patient reports BP readings:    127/80 on 8/2/21  127/74 on 8/3/21  117/73 on 8/4/21    Please advise.

## 2021-08-04 NOTE — TELEPHONE ENCOUNTER
Patient notified just received 90 day supply times 2 noted can cut in half also would like referral for urology

## 2021-08-30 ENCOUNTER — OFFICE VISIT (OUTPATIENT)
Dept: DERMATOLOGY | Facility: OTHER | Age: 81
End: 2021-08-30
Attending: DERMATOLOGY
Payer: MEDICARE

## 2021-08-30 VITALS
WEIGHT: 202 LBS | TEMPERATURE: 96.6 F | BODY MASS INDEX: 37.17 KG/M2 | HEART RATE: 57 BPM | SYSTOLIC BLOOD PRESSURE: 120 MMHG | OXYGEN SATURATION: 99 % | HEIGHT: 62 IN | DIASTOLIC BLOOD PRESSURE: 90 MMHG

## 2021-08-30 DIAGNOSIS — Z12.83 SCREENING FOR SKIN CANCER: ICD-10-CM

## 2021-08-30 DIAGNOSIS — L82.1 SEBORRHEIC KERATOSES: ICD-10-CM

## 2021-08-30 DIAGNOSIS — D22.9 MULTIPLE BENIGN NEVI: Primary | ICD-10-CM

## 2021-08-30 PROCEDURE — 17110 DESTRUCTION B9 LES UP TO 14: CPT | Performed by: DERMATOLOGY

## 2021-08-30 PROCEDURE — G0463 HOSPITAL OUTPT CLINIC VISIT: HCPCS

## 2021-08-30 PROCEDURE — 17110 DESTRUCTION B9 LES UP TO 14: CPT

## 2021-08-30 ASSESSMENT — MIFFLIN-ST. JEOR: SCORE: 1334.52

## 2021-08-30 ASSESSMENT — PAIN SCALES - GENERAL: PAINLEVEL: NO PAIN (0)

## 2021-08-30 NOTE — PROGRESS NOTES
Visit Date: 2021    SUBJECTIVE/O and CARMEN Tejeda returns.  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  This area is still somewhat indurated, but to me it looks healthy and I advised that the induration is likely simply scar tissue.  I see no evidence of recurrence there.     She also had several lesions on her hands and arms that, on examination, present as well defined verrucous lesions, somewhat reminiscent of a viral wart, but with all her seborrheic keratoses they are probably a variation of a seborrheic keratosis.  Five lesions were treated with liquid nitrogen.  Also, a few lentigos on her face I lightly touch with nitrogen, advising these were darken before going away and improving.    Return in 3-4 months for a general followup of the keratoacanthoma.    MEDICATIONS AND ALLERGIES:  Reviewed.      SACHIN Arroyo MD        D: 2021   T: 2021   MT: [id number]    Name:     SHERRY DAVIDSON  MRN:      [id number]        Account:    [id number]   :      1940           Visit Date: 2021     Document: [id number]     Melolabial Transposition Flap Text: The defect edges were debeveled with a #15 scalpel blade.  Given the location of the defect and the proximity to free margins a melolabial flap was deemed most appropriate.  Using a sterile surgical marker, an appropriate melolabial transposition flap was drawn incorporating the defect.    The area thus outlined was incised deep to adipose tissue with a #15 scalpel blade.  The skin margins were undermined to an appropriate distance in all directions utilizing iris scissors.

## 2021-08-30 NOTE — NURSING NOTE
"Chief Complaint   Patient presents with     Skin Check       Initial BP (!) 120/90 (BP Location: Left arm, Patient Position: Chair, Cuff Size: Adult Regular)   Pulse 57   Temp (!) 96.6  F (35.9  C) (Tympanic)   Ht 1.575 m (5' 2\")   Wt 91.6 kg (202 lb)   SpO2 99%   BMI 36.95 kg/m   Estimated body mass index is 36.95 kg/m  as calculated from the following:    Height as of this encounter: 1.575 m (5' 2\").    Weight as of this encounter: 91.6 kg (202 lb).  Medication Reconciliation: complete  WYATT HUNTER LPN    "

## 2021-08-30 NOTE — LETTER
2021       RE: Sherry Davis  [address] [place] [address] [address]  [facility] [address]     Dear Colleague,    Thank you for referring your patient, Sherry Davis, to the [facility]. Please see a copy of my visit note below.    Visit Date: 2021    SUBJECTIVE/O and A.   Sherry returns.  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  This area is still somewhat indurated, but to me it looks healthy and I advised that the induration is likely simply scar tissue.  I see no evidence of recurrence there.     She also had several lesions on her hands and arms that, on examination, present as well defined verrucous lesions, somewhat reminiscent of a viral wart, but with all her seborrheic keratoses they are probably a variation of a seborrheic keratosis.  Five lesions were treated with liquid nitrogen.  Also, a few lentigos on her face I lightly touch with nitrogen, advising these were darken before going away and improving.    Return in 3-4 months for a general followup of the keratoacanthoma.    MEDICATIONS AND ALLERGIES:  Reviewed.      SACHIN Arroyo MD        D: 2021   T: 2021   MT: [id number]    Name:     SHERRY DAVIS  MRN:      [id number]        Account:    [id number]   :      1940           Visit Date: 2021     Document: [id number]        Again, thank you for allowing me to participate in the care of your patient.      Sincerely,    SACHIN Arroyo MD

## 2021-10-01 DIAGNOSIS — R32 URINARY INCONTINENCE: Primary | ICD-10-CM

## 2021-10-04 ENCOUNTER — LAB (OUTPATIENT)
Dept: LAB | Facility: OTHER | Age: 81
End: 2021-10-04
Attending: FAMILY MEDICINE
Payer: MEDICARE

## 2021-10-04 ENCOUNTER — OFFICE VISIT (OUTPATIENT)
Dept: UROLOGY | Facility: OTHER | Age: 81
End: 2021-10-04
Attending: FAMILY MEDICINE
Payer: MEDICARE

## 2021-10-04 VITALS
OXYGEN SATURATION: 100 % | HEART RATE: 67 BPM | TEMPERATURE: 97.9 F | DIASTOLIC BLOOD PRESSURE: 78 MMHG | BODY MASS INDEX: 34.41 KG/M2 | SYSTOLIC BLOOD PRESSURE: 146 MMHG | HEIGHT: 62 IN | WEIGHT: 187 LBS

## 2021-10-04 DIAGNOSIS — R32 URINARY INCONTINENCE: ICD-10-CM

## 2021-10-04 DIAGNOSIS — N39.0 CHRONIC UTI: Primary | ICD-10-CM

## 2021-10-04 DIAGNOSIS — R32 URINARY INCONTINENCE, UNSPECIFIED TYPE: ICD-10-CM

## 2021-10-04 DIAGNOSIS — R35.0 URINARY FREQUENCY: ICD-10-CM

## 2021-10-04 LAB
ALBUMIN UR-MCNC: NEGATIVE MG/DL
APPEARANCE UR: CLEAR
BACTERIA #/AREA URNS HPF: ABNORMAL /HPF
BILIRUB UR QL STRIP: NEGATIVE
COLOR UR AUTO: ABNORMAL
GLUCOSE UR STRIP-MCNC: NEGATIVE MG/DL
HGB UR QL STRIP: NEGATIVE
KETONES UR STRIP-MCNC: NEGATIVE MG/DL
LEUKOCYTE ESTERASE UR QL STRIP: ABNORMAL
MUCOUS THREADS #/AREA URNS LPF: PRESENT /LPF
NITRATE UR QL: NEGATIVE
PH UR STRIP: 7 [PH] (ref 4.7–8)
RBC URINE: 2 /HPF
SP GR UR STRIP: 1.01 (ref 1–1.03)
SQUAMOUS EPITHELIAL: 0 /HPF
UROBILINOGEN UR STRIP-MCNC: NORMAL MG/DL
WBC URINE: 11 /HPF

## 2021-10-04 PROCEDURE — 87086 URINE CULTURE/COLONY COUNT: CPT | Mod: ZL

## 2021-10-04 PROCEDURE — 81001 URINALYSIS AUTO W/SCOPE: CPT | Mod: ZL

## 2021-10-04 PROCEDURE — 99204 OFFICE O/P NEW MOD 45 MIN: CPT | Performed by: UROLOGY

## 2021-10-04 PROCEDURE — 51798 US URINE CAPACITY MEASURE: CPT

## 2021-10-04 PROCEDURE — G0463 HOSPITAL OUTPT CLINIC VISIT: HCPCS | Mod: 25

## 2021-10-04 PROCEDURE — G0463 HOSPITAL OUTPT CLINIC VISIT: HCPCS

## 2021-10-04 RX ORDER — TROSPIUM CHLORIDE 20 MG/1
20 TABLET, FILM COATED ORAL
Qty: 60 TABLET | Refills: 3 | Status: SHIPPED | OUTPATIENT
Start: 2021-10-04 | End: 2021-10-29

## 2021-10-04 ASSESSMENT — ENCOUNTER SYMPTOMS
BACK PAIN: 1
UNEXPECTED WEIGHT CHANGE: 1

## 2021-10-04 ASSESSMENT — PAIN SCALES - GENERAL: PAINLEVEL: NO PAIN (0)

## 2021-10-04 ASSESSMENT — MIFFLIN-ST. JEOR: SCORE: 1261.48

## 2021-10-04 NOTE — PATIENT INSTRUCTIONS
Cut back on coffee     Drink water    Take sanctura one hour before meals two times daily    Use the premarin cream in the vagina two time a week.     Follow up in 3 months    Sleep apnea test will likely be scheduled by Dr. Becerril.

## 2021-10-04 NOTE — PROGRESS NOTES
History     Chief Complaint:    Consult For (Urinary incontinence-has been ongoing for a year-Dr Becerril is referring)      MACHO Davis is a 81 year old female who presents with history of recurrent urinary tract infections as well as urinary frequency particularly at night and urge incontinence.  DENIS Rios has a history of a right radical nephrectomy for renal cell carcinoma.  She has elected to not do any further follow-up for that so we did discuss that briefly but she is wanted to avoid any significant imaging at this time.  She had 4 urinary tract infections over the course of about 6 months.  All of these were E. coli.  Her symptoms are typically pressure in the suprapubic area she does not get a fever sometimes it is hard to start her urine stream.  She denies any dysuria.  She has never been hospitalized for sepsis.  She does have some constipation for which she takes MiraLAX but that is fairly well controlled.  She also complains of urinary urge incontinence.  She does not have significant stress incontinence.  She is using a depends and she most of the time can make it to the bathroom.  Her biggest complaint is that she is getting up 6 or 7 times at night to urinate.  During the day she can go 3 hours without urinating.  She does admit that she snores quite loudly and sometimes wakes herself up with her snoring.  She has never been evaluated for sleep apnea.  She has been taking her cranberry supplement on and off for her UTIs but continues to have this issue.    Allergies:    Allergies   Allergen Reactions     Fentanyl Other (See Comments)     Severe agitation when used during angiogram     Codeine Sulfate Other (See Comments)     hallucinations     Fentanyl And Related      Other reaction(s): Confusion     Morphine Other (See Comments)     Hallucinations with iv therapy     Tape [Adhesive Tape]      Tramadol Other (See Comments)     hallucination        Medications:      acetaminophen  (TYLENOL) 500 MG tablet  azaTHIOprine (IMURAN) 50 MG tablet  Cholecalciferol (VITAMIN D3) 1.25 MG (21096 UT) TABS  Cranberry-Cholecalciferol (SUPER CRANBERRY/VITAMIN D3) 4200-500 MG-UNIT CAPS  cyclobenzaprine (FLEXERIL) 10 MG tablet  ferrous sulfate (FEROSUL) 325 (65 Fe) MG tablet  fluticasone (FLONASE) 50 MCG/ACT nasal spray  hydrocortisone 2.5 % cream  levothyroxine (SYNTHROID/LEVOTHROID) 100 MCG tablet  losartan (COZAAR) 25 MG tablet  melatonin 1 MG TABS tablet  multivitamin w/minerals (MULTI-VITAMIN) tablet  naproxen sodium (ANAPROX) 220 MG tablet  NYAMYC 406085 UNIT/GM external powder  nystatin (MYCOSTATIN) 553681 UNIT/GM external cream  omeprazole (PRILOSEC) 40 MG capsule  Polyethylene Glycol 3350 (MIRALAX PO)  propranolol (INDERAL) 10 MG tablet        Problem List:      Patient Active Problem List    Diagnosis Date Noted     Chronic kidney disease, stage 3 (H) 06/22/2021     Priority: Medium     S/p nephrectomy 05/14/2021     Priority: Medium     Esodeviation 12/17/2020     Priority: Medium     Hallux valgus of left foot 09/08/2020     Priority: Medium     Added automatically from request for surgery [id number]       Left foot pain 09/08/2020     Priority: Medium     Added automatically from request for surgery [id number]       Ametropia 09/01/2020     Priority: Medium     Decreased vision of right eye 09/01/2020     Priority: Medium     Dry eye syndrome of both eyes 09/01/2020     Priority: Medium     PCO (posterior capsular opacification), left 09/01/2020     Priority: Medium     Ptosis of both eyelids 09/01/2020     Priority: Medium     suspect lev deh RT greater than LT       Mild major depression (H) 04/26/2018     Priority: Medium     Morbid obesity (H) 08/28/2017     Priority: Medium     Autoimmune hepatitis (H) 06/02/2016     Priority: Medium     Anemia 08/20/2015     Priority: Medium     Degeneration of lumbar or lumbosacral intervertebral disc 01/30/2015     Priority: Medium     Open wound of knee, leg,  and ankle      Priority: Medium     Hypothyroidism 03/27/2013     Priority: Medium     Generalized anxiety disorder 03/27/2013     Priority: Medium     Benign essential hypertension 03/27/2013     Priority: Medium     GERD (gastroesophageal reflux disease) 03/27/2013     Priority: Medium     Hepatitis 03/27/2013     Priority: Medium     Contact dermatitis 03/27/2013     Priority: Medium     Rosacea 03/27/2013     Priority: Medium     Osteoarthritis 03/27/2013     Priority: Medium     Fibromyalgia 03/27/2013     Priority: Medium        Past Medical History:      Past Medical History:   Diagnosis Date     Anemia 8/20/2015     Autoimmune hepatitis (H) 6/2/2016     Benign paroxysmal positional vertigo 10/7/2010     Contact dermatitis and other eczema, due to unspecified cause 10/7/2010     Esophageal reflux 10/7/2010     Generalized anxiety disorder 10/7/2010     Generalized osteoarthrosis, involving multiple sites 10/7/2010     Hepatitis, unspecified 10/7/2010     Infected wound      Myalgia and myositis, unspecified 10/7/2010     Nonallopathic lesion of cervical region, not elsewhere classified 12/5/2001     Nonallopathic lesion of thoracic region, not elsewhere classified 3/31/2005     Open wound of knee, leg, and ankle      Rosacea 10/7/2010     Unspecified essential hypertension 10/7/2010     Unspecified hypothyroidism 10/7/2010       Past Surgical History:      Past Surgical History:   Procedure Laterality Date     APPENDECTOMY       ARTHROPLASTY TOE(S) Left 9/21/2020    Procedure: Left  Bethea Arthroplasty with interpositional arthroplasty using graft.;  Surgeon: Gina Rodriguez DPM;  Location: [facility]     BACK SURGERY  2015    lumbar epidural injection     CARDIAC SURGERY  10/2017    angioplasty     CHOLECYSTECTOMY       COLONOSCOPY  07/27/2017    [facility]     COLONOSCOPY - HIM SCAN  05/15/2001    Small internal hemorrhoids; otherwise normal;[facility]     COLONOSCOPY - HIM SCAN  12/14/2006    Colonoscopy, [facility]  "JESUSITA, JAYLON     ENT SURGERY      tonsillectomy     ESOPHAGOGASTRODUODENOSCOPY  07/27/2017    [facility]     EYE SURGERY      bilateral cataract removal     GI SURGERY      anal fistula     GYN SURGERY  1985    [facility]SO     GYN SURGERY      cessarian x 2     GYN SURGERY      tubal sterilazation     NEPHRECTOMY Right 05/02/2019     ORTHOPEDIC SURGERY      right knee     ORTHOPEDIC SURGERY  1986    plantar fascia release     ORTHOPEDIC SURGERY      left knee     ORTHOPEDIC SURGERY      right trigger finger release     ORTHOPEDIC SURGERY  2013    Right great toe MTPJ fusion, adductor tenotomy,correction of hammer toe     ORTHOPEDIC SURGERY  [id number]    multiple procedures left forefoot       Family History:      Family History   Problem Relation Age of Onset     Arthritis Mother         rheumatoid     Heart Disease Mother         CHF     Alcohol/Drug Father         alcoholism     Allergies Father      Thyroid Disease Other      Diabetes No family hx of      Asthma No family hx of        Social History:    Marital Status:   [5]  Social History     Tobacco Use     Smoking status: Never Smoker     Smokeless tobacco: Never Used   Substance Use Topics     Alcohol use: No     Drug use: No        Review of Systems   Constitutional: Positive for unexpected weight change.   Endocrine: Positive for heat intolerance.   Musculoskeletal: Positive for back pain.   Skin: Positive for rash.   All other systems reviewed and are negative.        Physical Exam   Vitals:  BP (!) 146/78 (BP Location: Right arm, Patient Position: Chair, Cuff Size: Adult Large)   Pulse 67   Temp 97.9  F (36.6  C) (Tympanic)   Ht 1.575 m (5' 2\")   Wt 84.8 kg (187 lb)   SpO2 100%   BMI 34.20 kg/m        Physical Exam  Constitutional:       Appearance: Normal appearance. She is obese.   Pulmonary:      Effort: Pulmonary effort is normal.   Abdominal:      General: Abdomen is flat. There is no distension.      Palpations: Abdomen is soft. There is no " mass.      Tenderness: There is no abdominal tenderness.      Hernia: No hernia is present.          Comments: Multiple surgical scars from previous abdominal surgeries.   Genitourinary:     Comments: External genitalia show atrophic vaginitis.  Normal-appearing urethra with just a very small carbuncle that is not friable.  No urethral or bladder tenderness.  No vaginal masses or obvious pelvic masses.  External rectal area shows a small fissure normal rectal tone and no rectal masses.  Neurological:      Mental Status: She is alert.       Results for orders placed or performed in visit on 10/04/21   UA reflex to Microscopic and Culture - [place]     Status: Abnormal    Specimen: Urine, Midstream   Result Value Ref Range    Color Urine Light Yellow Colorless, Straw, Light Yellow, Yellow    Appearance Urine Clear Clear    Glucose Urine Negative Negative mg/dL    Bilirubin Urine Negative Negative    Ketones Urine Negative Negative mg/dL    Specific Gravity Urine 1.013 1.003 - 1.035    Blood Urine Negative Negative    pH Urine 7.0 4.7 - 8.0    Protein Albumin Urine Negative Negative mg/dL    Urobilinogen Urine Normal Normal, 2.0 mg/dL    Nitrite Urine Negative Negative    Leukocyte Esterase Urine Moderate (A) Negative    Bacteria Urine Few (A) None Seen /HPF    Mucus Urine Present (A) None Seen /LPF    RBC Urine 2 <=2 /HPF    WBC Urine 11 (H) <=5 /HPF    Squamous Epithelials Urine 0 <=1 /HPF    Narrative    Urine Culture ordered based on laboratory criteria       Void residual is 19    Impression: #1 recurrent E. coli urinary tract infections #2 history of right radical nephrectomy for renal cell carcinoma #3 urinary urge incontinence      Plan   Plan: Dilated does not want to do any further follow-up for her right radical nephrectomy.  Typically I would do some minimal imaging for the UTIs but will hold off on that at this time as well as she is preferring to do minimal invasive procedures.  I have a urine cytology  pending but if there is anything wrong with that I will bring her back for cystoscopy.  I started her on Premarin cream for the urinary tract infections because she has had a significant amount of these and they are symptomatic.  She will use 0.5 mg 2 times a week.  For her urinary urgency and frequency and urge incontinence her biggest problem is that she is getting up 6-7 times at night and during the day she can go at least 3 hours without any problem and usually is able to make it to the toilet.  She does tell me that she snores and she has never been evaluated waited for sleep apnea.  At this point I think that is at least a reasonable consideration given the significant urinary frequency.  She is interested in trialing a medication so we will do Sanctura which has less likely to cross the blood-brain barrier for confusion and she is to take this 2 times daily.  I will see her back in 3 months.  Let me know if she has any problems with medication.      No follow-ups on file.    Lizzeth Gaviria MD  [facility] [place]

## 2021-10-04 NOTE — NURSING NOTE
"Chief Complaint   Patient presents with     Consult For     Urinary incontinence-has been ongoing for a year-Dr Becerril is referring       Initial BP (!) 146/78 (BP Location: Right arm, Patient Position: Chair, Cuff Size: Adult Large)   Pulse 67   Temp 97.9  F (36.6  C) (Tympanic)   Ht 1.575 m (5' 2\")   Wt 84.8 kg (187 lb)   SpO2 100%   BMI 34.20 kg/m   Estimated body mass index is 34.2 kg/m  as calculated from the following:    Height as of this encounter: 1.575 m (5' 2\").    Weight as of this encounter: 84.8 kg (187 lb).  Medication Reconciliation: complete  ANIYAH GARCIA LPN      Review of Systems:    Weight loss:    yes     Recent fever/chills:  No   Night sweats:   No  Current skin rash:  yes   Recent hair loss:  yes  Heat intolerance:  yes   Cold intolerance:  No  Chest pain:   No   Palpitations:   No  Shortness of breath:  No   Wheezing:   No  Constipation:    No   Diarrhea:   No   Nausea:   No   Vomiting:   No   Kidney/side pain:  No   Back pain:   yes  Frequent headaches:  No   Dizziness:     No  Leg swelling:   No   Calf pain:    No    Parents, brothers or sisters with history of kidney cancer?   No  Parents, brothers or sisters with history of bladder cancer: No    "

## 2021-10-04 NOTE — LETTER
10/4/2021       RE: Ileana Davis  [address]  [place] [address]     Dear Colleague,    Thank you for referring your patient, Ileana Davis, to the [facility]. Please see a copy of my visit note below.      History     Chief Complaint:    Consult For (Urinary incontinence-has been ongoing for a year-Dr Becerril is referring)      HPI   Ileana Davis is a 81 year old female who presents with history of recurrent urinary tract infections as well as urinary frequency particularly at night and urge incontinence.  DENIS Rios has a history of a right radical nephrectomy for renal cell carcinoma.  She has elected to not do any further follow-up for that so we did discuss that briefly but she is wanted to avoid any significant imaging at this time.  She had 4 urinary tract infections over the course of about 6 months.  All of these were E. coli.  Her symptoms are typically pressure in the suprapubic area she does not get a fever sometimes it is hard to start her urine stream.  She denies any dysuria.  She has never been hospitalized for sepsis.  She does have some constipation for which she takes MiraLAX but that is fairly well controlled.  She also complains of urinary urge incontinence.  She does not have significant stress incontinence.  She is using a depends and she most of the time can make it to the bathroom.  Her biggest complaint is that she is getting up 6 or 7 times at night to urinate.  During the day she can go 3 hours without urinating.  She does admit that she snores quite loudly and sometimes wakes herself up with her snoring.  She has never been evaluated for sleep apnea.  She has been taking her cranberry supplement on and off for her UTIs but continues to have this issue.    Allergies:    Allergies   Allergen Reactions     Fentanyl Other (See Comments)     Severe agitation when used during angiogram      Codeine Sulfate Other (See Comments)     hallucinations     Fentanyl And Related      Other reaction(s): Confusion     Morphine Other (See Comments)     Hallucinations with iv therapy     Tape [Adhesive Tape]      Tramadol Other (See Comments)     hallucination        Medications:      acetaminophen (TYLENOL) 500 MG tablet  azaTHIOprine (IMURAN) 50 MG tablet  Cholecalciferol (VITAMIN D3) 1.25 MG (49683 UT) TABS  Cranberry-Cholecalciferol (SUPER CRANBERRY/VITAMIN D3) 4200-500 MG-UNIT CAPS  cyclobenzaprine (FLEXERIL) 10 MG tablet  ferrous sulfate (FEROSUL) 325 (65 Fe) MG tablet  fluticasone (FLONASE) 50 MCG/ACT nasal spray  hydrocortisone 2.5 % cream  levothyroxine (SYNTHROID/LEVOTHROID) 100 MCG tablet  losartan (COZAAR) 25 MG tablet  melatonin 1 MG TABS tablet  multivitamin w/minerals (MULTI-VITAMIN) tablet  naproxen sodium (ANAPROX) 220 MG tablet  NYAMYC 784591 UNIT/GM external powder  nystatin (MYCOSTATIN) 528279 UNIT/GM external cream  omeprazole (PRILOSEC) 40 MG capsule  Polyethylene Glycol 3350 (MIRALAX PO)  propranolol (INDERAL) 10 MG tablet        Problem List:      Patient Active Problem List    Diagnosis Date Noted     Chronic kidney disease, stage 3 (H) 06/22/2021     Priority: Medium     S/p nephrectomy 05/14/2021     Priority: Medium     Esodeviation 12/17/2020     Priority: Medium     Hallux valgus of left foot 09/08/2020     Priority: Medium     Added automatically from request for surgery [id number]       Left foot pain 09/08/2020     Priority: Medium     Added automatically from request for surgery [id number]       Ametropia 09/01/2020     Priority: Medium     Decreased vision of right eye 09/01/2020     Priority: Medium     Dry eye syndrome of both eyes 09/01/2020     Priority: Medium     PCO (posterior capsular opacification), left 09/01/2020     Priority: Medium     Ptosis of both eyelids 09/01/2020     Priority: Medium     suspect lev deh RT greater than LT       Mild major depression (H) 04/26/2018      Priority: Medium     Morbid obesity (H) 08/28/2017     Priority: Medium     Autoimmune hepatitis (H) 06/02/2016     Priority: Medium     Anemia 08/20/2015     Priority: Medium     Degeneration of lumbar or lumbosacral intervertebral disc 01/30/2015     Priority: Medium     Open wound of knee, leg, and ankle      Priority: Medium     Hypothyroidism 03/27/2013     Priority: Medium     Generalized anxiety disorder 03/27/2013     Priority: Medium     Benign essential hypertension 03/27/2013     Priority: Medium     GERD (gastroesophageal reflux disease) 03/27/2013     Priority: Medium     Hepatitis 03/27/2013     Priority: Medium     Contact dermatitis 03/27/2013     Priority: Medium     Rosacea 03/27/2013     Priority: Medium     Osteoarthritis 03/27/2013     Priority: Medium     Fibromyalgia 03/27/2013     Priority: Medium        Past Medical History:      Past Medical History:   Diagnosis Date     Anemia 8/20/2015     Autoimmune hepatitis (H) 6/2/2016     Benign paroxysmal positional vertigo 10/7/2010     Contact dermatitis and other eczema, due to unspecified cause 10/7/2010     Esophageal reflux 10/7/2010     Generalized anxiety disorder 10/7/2010     Generalized osteoarthrosis, involving multiple sites 10/7/2010     Hepatitis, unspecified 10/7/2010     Infected wound      Myalgia and myositis, unspecified 10/7/2010     Nonallopathic lesion of cervical region, not elsewhere classified 12/5/2001     Nonallopathic lesion of thoracic region, not elsewhere classified 3/31/2005     Open wound of knee, leg, and ankle      Rosacea 10/7/2010     Unspecified essential hypertension 10/7/2010     Unspecified hypothyroidism 10/7/2010       Past Surgical History:      Past Surgical History:   Procedure Laterality Date     APPENDECTOMY       ARTHROPLASTY TOE(S) Left 9/21/2020    Procedure: Left  Bethea Arthroplasty with interpositional arthroplasty using graft.;  Surgeon: Gina Rodriguez DPM;  Location: [facility]     BACK  "SURGERY  2015    lumbar epidural injection     CARDIAC SURGERY  10/2017    angioplasty     CHOLECYSTECTOMY       COLONOSCOPY  07/27/2017    [facility]     COLONOSCOPY - HIM SCAN  05/15/2001    Small internal hemorrhoids; otherwise normal;[facility]     COLONOSCOPY - HIM SCAN  12/14/2006    Colonoscopy, [facility]     ENT SURGERY      tonsillectomy     ESOPHAGOGASTRODUODENOSCOPY  07/27/2017    [facility]     EYE SURGERY      bilateral cataract removal     GI SURGERY      anal fistula     GYN SURGERY  1985    NICHOLAS BSO     GYN SURGERY      cessarian x 2     GYN SURGERY      tubal sterilazation     NEPHRECTOMY Right 05/02/2019     ORTHOPEDIC SURGERY      right knee     ORTHOPEDIC SURGERY  1986    plantar fascia release     ORTHOPEDIC SURGERY      left knee     ORTHOPEDIC SURGERY      right trigger finger release     ORTHOPEDIC SURGERY  2013    Right great toe MTPJ fusion, adductor tenotomy,correction of hammer toe     ORTHOPEDIC SURGERY  [id number]    multiple procedures left forefoot       Family History:      Family History   Problem Relation Age of Onset     Arthritis Mother         rheumatoid     Heart Disease Mother         CHF     Alcohol/Drug Father         alcoholism     Allergies Father      Thyroid Disease Other      Diabetes No family hx of      Asthma No family hx of        Social History:    Marital Status:   [5]  Social History     Tobacco Use     Smoking status: Never Smoker     Smokeless tobacco: Never Used   Substance Use Topics     Alcohol use: No     Drug use: No        Review of Systems   Constitutional: Positive for unexpected weight change.   Endocrine: Positive for heat intolerance.   Musculoskeletal: Positive for back pain.   Skin: Positive for rash.   All other systems reviewed and are negative.        Physical Exam   Vitals:  BP (!) 146/78 (BP Location: Right arm, Patient Position: Chair, Cuff Size: Adult Large)   Pulse 67   Temp 97.9  F (36.6  C) (Tympanic)   Ht 1.575 m (5' 2\")   Wt 84.8 " kg (187 lb)   SpO2 100%   BMI 34.20 kg/m        Physical Exam  Constitutional:       Appearance: Normal appearance. She is obese.   Pulmonary:      Effort: Pulmonary effort is normal.   Abdominal:      General: Abdomen is flat. There is no distension.      Palpations: Abdomen is soft. There is no mass.      Tenderness: There is no abdominal tenderness.      Hernia: No hernia is present.          Comments: Multiple surgical scars from previous abdominal surgeries.   Genitourinary:     Comments: External genitalia show atrophic vaginitis.  Normal-appearing urethra with just a very small carbuncle that is not friable.  No urethral or bladder tenderness.  No vaginal masses or obvious pelvic masses.  External rectal area shows a small fissure normal rectal tone and no rectal masses.  Neurological:      Mental Status: She is alert.       Results for orders placed or performed in visit on 10/04/21   UA reflex to Microscopic and Culture - [place]     Status: Abnormal    Specimen: Urine, Midstream   Result Value Ref Range    Color Urine Light Yellow Colorless, Straw, Light Yellow, Yellow    Appearance Urine Clear Clear    Glucose Urine Negative Negative mg/dL    Bilirubin Urine Negative Negative    Ketones Urine Negative Negative mg/dL    Specific Gravity Urine 1.013 1.003 - 1.035    Blood Urine Negative Negative    pH Urine 7.0 4.7 - 8.0    Protein Albumin Urine Negative Negative mg/dL    Urobilinogen Urine Normal Normal, 2.0 mg/dL    Nitrite Urine Negative Negative    Leukocyte Esterase Urine Moderate (A) Negative    Bacteria Urine Few (A) None Seen /HPF    Mucus Urine Present (A) None Seen /LPF    RBC Urine 2 <=2 /HPF    WBC Urine 11 (H) <=5 /HPF    Squamous Epithelials Urine 0 <=1 /HPF    Narrative    Urine Culture ordered based on laboratory criteria       Void residual is 19    Impression: #1 recurrent E. coli urinary tract infections #2 history of right radical nephrectomy for renal cell carcinoma #3 urinary urge  incontinence      Plan   Plan: Dilated does not want to do any further follow-up for her right radical nephrectomy.  Typically I would do some minimal imaging for the UTIs but will hold off on that at this time as well as she is preferring to do minimal invasive procedures.  I have a urine cytology pending but if there is anything wrong with that I will bring her back for cystoscopy.  I started her on Premarin cream for the urinary tract infections because she has had a significant amount of these and they are symptomatic.  She will use 0.5 mg 2 times a week.  For her urinary urgency and frequency and urge incontinence her biggest problem is that she is getting up 6-7 times at night and during the day she can go at least 3 hours without any problem and usually is able to make it to the toilet.  She does tell me that she snores and she has never been evaluated waited for sleep apnea.  At this point I think that is at least a reasonable consideration given the significant urinary frequency.  She is interested in trialing a medication so we will do Sanctura which has less likely to cross the blood-brain barrier for confusion and she is to take this 2 times daily.  I will see her back in 3 months.  Let me know if she has any problems with medication.      No follow-ups on file.    Lizzeth Gaviria MD  [facility] - [place]              Again, thank you for allowing me to participate in the care of your patient.      Sincerely,    Lizzeth Gaviria MD

## 2021-10-05 ENCOUNTER — TELEPHONE (OUTPATIENT)
Dept: UROLOGY | Facility: OTHER | Age: 81
End: 2021-10-05

## 2021-10-05 DIAGNOSIS — R06.83 SNORING: Primary | ICD-10-CM

## 2021-10-05 NOTE — TELEPHONE ENCOUNTER
Received PA from [employer] for Premarin 0.625MG/GM cream. Submitted on CMM. Waiting for response.

## 2021-10-06 LAB — BACTERIA UR CULT: NORMAL

## 2021-10-06 NOTE — TELEPHONE ENCOUNTER
Received APPROVAL from [employer] for Premarin 0.625MG/GM cream. Effective 07/07/2021 - 10/05/2022. Forms scanned to [facility].

## 2021-10-19 PROBLEM — F32.9 MAJOR DEPRESSION: Status: ACTIVE | Noted: 2018-04-26

## 2021-10-22 ENCOUNTER — TELEPHONE (OUTPATIENT)
Dept: UROLOGY | Facility: OTHER | Age: 81
End: 2021-10-22

## 2021-10-22 NOTE — TELEPHONE ENCOUNTER
Pt was seen on 10/4/21 and prescribed Sanctura.  Pt states she had to stop the medication due to severe constipation.  Pt is wondering if another med can be prescribed?  Please advise.  WYATT HUNTER LPN

## 2021-10-25 DIAGNOSIS — N39.41 URGE INCONTINENCE OF URINE: Primary | ICD-10-CM

## 2021-10-25 RX ORDER — MIRABEGRON 25 MG/1
25 TABLET, FILM COATED, EXTENDED RELEASE ORAL DAILY
Qty: 30 TABLET | Refills: 3 | Status: ON HOLD | OUTPATIENT
Start: 2021-10-25 | End: 2022-04-19

## 2021-10-26 DIAGNOSIS — I10 BENIGN ESSENTIAL HYPERTENSION: ICD-10-CM

## 2021-10-28 RX ORDER — LOSARTAN POTASSIUM 25 MG/1
25 TABLET ORAL DAILY
Qty: 90 TABLET | Refills: 0 | Status: SHIPPED | OUTPATIENT
Start: 2021-10-28 | End: 2022-01-11

## 2021-10-28 NOTE — TELEPHONE ENCOUNTER
COZAAR      Last Written Prescription Date:  5-  Last Fill Quantity: 90,   # refills: 0  Last Office Visit: 7-  Future Office visit:       Routing refill request to provider for review/approval because:

## 2021-10-29 ENCOUNTER — NURSE TRIAGE (OUTPATIENT)
Dept: FAMILY MEDICINE | Facility: OTHER | Age: 81
End: 2021-10-29

## 2021-10-29 ENCOUNTER — OFFICE VISIT (OUTPATIENT)
Dept: FAMILY MEDICINE | Facility: OTHER | Age: 81
End: 2021-10-29
Attending: FAMILY MEDICINE
Payer: MEDICARE

## 2021-10-29 VITALS
BODY MASS INDEX: 34.82 KG/M2 | OXYGEN SATURATION: 99 % | DIASTOLIC BLOOD PRESSURE: 86 MMHG | HEART RATE: 64 BPM | RESPIRATION RATE: 18 BRPM | WEIGHT: 189.2 LBS | TEMPERATURE: 97.3 F | SYSTOLIC BLOOD PRESSURE: 142 MMHG | HEIGHT: 62 IN

## 2021-10-29 DIAGNOSIS — N30.00 ACUTE CYSTITIS WITHOUT HEMATURIA: Primary | ICD-10-CM

## 2021-10-29 DIAGNOSIS — R35.0 URINARY FREQUENCY: ICD-10-CM

## 2021-10-29 LAB
ALBUMIN UR-MCNC: NEGATIVE MG/DL
APPEARANCE UR: ABNORMAL
BACTERIA #/AREA URNS HPF: ABNORMAL /HPF
BILIRUB UR QL STRIP: NEGATIVE
COLOR UR AUTO: YELLOW
GLUCOSE UR STRIP-MCNC: NEGATIVE MG/DL
HGB UR QL STRIP: ABNORMAL
KETONES UR STRIP-MCNC: NEGATIVE MG/DL
LEUKOCYTE ESTERASE UR QL STRIP: ABNORMAL
NITRATE UR QL: POSITIVE
PH UR STRIP: 7.5 [PH] (ref 5–7)
RBC #/AREA URNS AUTO: ABNORMAL /HPF
SP GR UR STRIP: 1.01 (ref 1–1.03)
UROBILINOGEN UR STRIP-ACNC: 0.2 E.U./DL
WBC #/AREA URNS AUTO: >100 /HPF

## 2021-10-29 PROCEDURE — 87086 URINE CULTURE/COLONY COUNT: CPT | Mod: ZL | Performed by: FAMILY MEDICINE

## 2021-10-29 PROCEDURE — 81015 MICROSCOPIC EXAM OF URINE: CPT | Mod: ZL | Performed by: FAMILY MEDICINE

## 2021-10-29 PROCEDURE — 88112 CYTOPATH CELL ENHANCE TECH: CPT | Mod: TC | Performed by: FAMILY MEDICINE

## 2021-10-29 PROCEDURE — G0463 HOSPITAL OUTPT CLINIC VISIT: HCPCS | Mod: 25

## 2021-10-29 PROCEDURE — G0463 HOSPITAL OUTPT CLINIC VISIT: HCPCS

## 2021-10-29 PROCEDURE — 99214 OFFICE O/P EST MOD 30 MIN: CPT | Performed by: FAMILY MEDICINE

## 2021-10-29 RX ORDER — CIPROFLOXACIN 250 MG/1
250 TABLET, FILM COATED ORAL 2 TIMES DAILY
Qty: 14 TABLET | Refills: 0 | Status: CANCELLED | OUTPATIENT
Start: 2021-10-29 | End: 2021-11-05

## 2021-10-29 RX ORDER — SULFAMETHOXAZOLE/TRIMETHOPRIM 800-160 MG
1 TABLET ORAL 2 TIMES DAILY
Qty: 14 TABLET | Refills: 0 | Status: SHIPPED | OUTPATIENT
Start: 2021-10-29 | End: 2021-10-29

## 2021-10-29 RX ORDER — SULFAMETHOXAZOLE/TRIMETHOPRIM 800-160 MG
1 TABLET ORAL 2 TIMES DAILY
Qty: 14 TABLET | Refills: 0 | Status: ON HOLD | OUTPATIENT
Start: 2021-10-29 | End: 2022-04-19

## 2021-10-29 ASSESSMENT — MIFFLIN-ST. JEOR: SCORE: 1271.46

## 2021-10-29 ASSESSMENT — PAIN SCALES - GENERAL: PAINLEVEL: MILD PAIN (3)

## 2021-10-29 NOTE — NURSING NOTE
"Chief Complaint   Patient presents with     UTI       Initial BP (!) 142/86 (BP Location: Right arm, Patient Position: Sitting, Cuff Size: Adult Regular)   Pulse 64   Temp 97.3  F (36.3  C) (Tympanic)   Resp 18   Ht 1.575 m (5' 2\")   Wt 85.8 kg (189 lb 3.2 oz)   SpO2 99%   BMI 34.61 kg/m   Estimated body mass index is 34.61 kg/m  as calculated from the following:    Height as of this encounter: 1.575 m (5' 2\").    Weight as of this encounter: 85.8 kg (189 lb 3.2 oz).  Medication Reconciliation: complete  Myla Evans MA  "

## 2021-10-29 NOTE — LETTER
November 3, 2021      Ileana Davis  [address]   [place] [address]        Dear ,    We are writing to inform you of your test results.        Resulted Orders   Cytology non gyn   Result Value Ref Range    Final Diagnosis       Specimen A     Interpretation:      Negative for High Grade Urothelial Carcinoma     Other Findings:      Acute inflammation present.     Adequacy:     Satisfactory for evaluation          Clinical Information       81 year old female with urinary frequency.      Gross Description       A. Urine, Midstream, , Urine Cytology:  Received 80 ml of yellow, cloudy fluid, processed as 1 Pap stained Autocyte.                 Performing Labs       The technical component of this testing was completed at [facility]       [facility], the urine test looks good.  If you have any questions or concerns, please call the clinic at the number listed above.       Sincerely,        Lizzeth Gaviria MD

## 2021-10-29 NOTE — TELEPHONE ENCOUNTER
Protocol advises patient to be seen for possible UTI/bladder infection. Patient reports flank/back pain, frequency and pressure in pelvic region for two days. No available appointment with PCP. Can patient be worked in or come in for UA only? Please advise, thank you.    Patient's phone number is [phone]    Reason for Disposition    Side (flank) or lower back pain present    Additional Information    Negative: Shock suspected (e.g., cold/pale/clammy skin, too weak to stand, low BP, rapid pulse)    Negative: Sounds like a life-threatening emergency to the triager    Negative: Followed a genital area injury    Negative: Followed a genital area injury (penis, scrotum)    Negative: Vaginal discharge    Negative: Pus (white, yellow) or bloody discharge from end of penis    Negative: [1] Taking antibiotic for urinary tract infection (UTI) AND [2] female    Negative: [1] Taking antibiotic for urinary tract infection (UTI) AND [2] male    Negative: [1] Discomfort (pain, burning or stinging) when passing urine AND [2] pregnant    Negative: [1] Discomfort (pain, burning or stinging) when passing urine AND [2] postpartum (from 0 to 6 weeks after delivery)    Negative: [1] Discomfort (pain, burning or stinging) when passing urine AND [2] female    Negative: [1] Discomfort (pain, burning or stinging) when passing urine AND [2] male    Negative: Pain or itching in the vulvar area    Negative: Pain in scrotum is main symptom    Negative: Blood in the urine is main symptom    Negative: Symptoms arising from use of a urinary catheter (Almaguer or Coude)    Negative: [1] Unable to urinate (or only a few drops) > 4 hours AND     [2] bladder feels very full (e.g., palpable bladder or strong urge to urinate)    Negative: [1] Decreased urination and [2] drinking very little AND [2] dehydration suspected (e.g., dark urine, no urine > 12 hours, very dry mouth, very lightheaded)    Negative: Patient sounds very sick or weak to the  "triager    Negative: Fever > 100.4 F (38.0 C)    Answer Assessment - Initial Assessment Questions  1. SYMPTOM: \"What's the main symptom you're concerned about?\" (e.g., frequency, incontinence)      Frequency, flank/back pain, pressure in pelvic area  2. ONSET: \"When did the  symptoms  start?\"      2 days ago  3. PAIN: \"Is there any pain?\" If so, ask: \"How bad is it?\" (Scale: 1-10; mild, moderate, severe)      Pressure 7/10  4. CAUSE: \"What do you think is causing the symptoms?\"      Bladder infection  5. OTHER SYMPTOMS: \"Do you have any other symptoms?\" (e.g., fever, flank pain, blood in urine, pain with urination)      Flank pain  6. PREGNANCY: \"Is there any chance you are pregnant?\" \"When was your last menstrual period?\"      no    Protocols used: URINARY SYMPTOMS-A-AH      "

## 2021-10-29 NOTE — PROGRESS NOTES
"  Assessment & Plan     1. Acute cystitis without hematuria  Bactrim prescribed, patient states she has tolerated this well previously.  Symptomatic cares reviewed.  Follow-up if no improvement noted.  - sulfamethoxazole-trimethoprim (BACTRIM DS) 800-160 MG tablet; Take 1 tablet by mouth 2 times daily  Dispense: 14 tablet; Refill: 0    2. Urinary frequency  - UA with Microscopic reflex to Culture - [facility]/[place]; Future  - UA with Microscopic reflex to Culture - [facility]  - Urine Microscopic Exam  - Urine Culture  - Cytology non gyn       BMI:   Estimated body mass index is 34.61 kg/m  as calculated from the following:    Height as of this encounter: 1.575 m (5' 2\").    Weight as of this encounter: 85.8 kg (189 lb 3.2 oz).     Return if symptoms worsen or fail to improve.    Iris Becerril MD  [facility] - [facility]      Camilo Tejeda is a 81 year old who presents for the following health issues     HPI     Genitourinary - Female  Onset/Duration: 2 days  Description:   Painful urination (Dysuria): YES           Frequency: YES  Blood in urine (Hematuria): no  Delay in urine (Hesitency): no  Intensity: moderate  Progression of Symptoms:  worsening  Accompanying Signs & Symptoms:  Fever/chills: no  Flank pain: YES  Nausea and vomiting: no  Vaginal symptoms: none  Abdominal/Pelvic Pain: YES  History:   History of frequent UTI s: YES  History of kidney stones: no  Sexually Active: no  Possibility of pregnancy: No  Precipitating or alleviating factors: None  Therapies tried and outcome: Increase fluid intake       Review of Systems   Constitutional, HEENT, cardiovascular, pulmonary, gi and gu systems are negative, except as otherwise noted.      Objective    BP (!) 142/86 (BP Location: Right arm, Patient Position: Sitting, Cuff Size: Adult Regular)   Pulse 64   Temp 97.3  F (36.3  C) (Tympanic)   Resp 18   Ht 1.575 m (5' 2\")   Wt 85.8 kg (189 lb 3.2 oz)   SpO2 99%   BMI 34.61 kg/m  "   Body mass index is 34.61 kg/m .  Physical Exam   GENERAL: healthy, alert and no distress  PSYCH: mentation appears normal, affect normal/bright    Results for orders placed or performed in visit on 10/29/21   UA with Microscopic reflex to Culture - [facility]/[place]     Status: Abnormal    Specimen: Urine, Midstream   Result Value Ref Range    Color Urine Yellow Colorless, Straw, Light Yellow, Yellow    Appearance Urine Cloudy (A) Clear    Glucose Urine Negative Negative mg/dL    Bilirubin Urine Negative Negative    Ketones Urine Negative Negative mg/dL    Specific Gravity Urine 1.015 1.003 - 1.035    Blood Urine Small (A) Negative    pH Urine 7.5 (H) 5.0 - 7.0    Protein Albumin Urine Negative Negative mg/dL    Urobilinogen Urine 0.2 0.2, 1.0 E.U./dL    Nitrite Urine Positive (A) Negative    Leukocyte Esterase Urine Large (A) Negative   Urine Microscopic Exam     Status: Abnormal   Result Value Ref Range    Bacteria Urine Moderate (A) None Seen /HPF    RBC Urine 2-5 (A) 0-2 /HPF /HPF    WBC Urine >100 (A) 0-5 /HPF /HPF   Urine Culture     Status: Abnormal    Specimen: Urine, Midstream   Result Value Ref Range    Culture 50,000-100,000 CFU/mL Escherichia coli (A)        Susceptibility    Escherichia coli - MAIK     Ampicillin >=32 Resistant      Cefazolin <=4 Susceptible      Cefepime <=1 Susceptible      Ceftazidime <=1 Susceptible      Ceftriaxone <=1 Susceptible      Ciprofloxacin <=0.25 Susceptible      Ertapenem <=0.5 Susceptible      Gentamicin <=1 Susceptible      Imipenem <=0.25 Susceptible      Levofloxacin <=0.12 Susceptible      Nitrofurantoin <=16 Susceptible      Tobramycin <=1 Susceptible      Trimethoprim/Sulfamethoxazole <=1/19 Susceptible      Ampicillin/ Sulbactam >=32 Resistant      Piperacillin/Tazobactam <=4 Susceptible

## 2021-10-31 LAB — BACTERIA UR CULT: ABNORMAL

## 2021-11-02 LAB
PATH REPORT.COMMENTS IMP SPEC: NORMAL
PATH REPORT.FINAL DX SPEC: NORMAL
PATH REPORT.GROSS SPEC: NORMAL
PATH REPORT.RELEVANT HX SPEC: NORMAL

## 2021-11-02 PROCEDURE — 88112 CYTOPATH CELL ENHANCE TECH: CPT | Mod: 26 | Performed by: PATHOLOGY

## 2021-12-02 ENCOUNTER — TELEPHONE (OUTPATIENT)
Dept: UROLOGY | Facility: OTHER | Age: 81
End: 2021-12-02

## 2021-12-02 NOTE — TELEPHONE ENCOUNTER
Patient is a [facility] pt. I called to schedule a 3 month follow up in January and patient states she lives in [place] and [place] is too far to go. She declined the appointment at this time.    Jasmina Scott on 12/2/2021 at 12:45 PM

## 2021-12-20 ENCOUNTER — TRANSFERRED RECORDS (OUTPATIENT)
Dept: HEALTH INFORMATION MANAGEMENT | Facility: CLINIC | Age: 81
End: 2021-12-20

## 2021-12-27 ENCOUNTER — NURSE TRIAGE (OUTPATIENT)
Dept: FAMILY MEDICINE | Facility: OTHER | Age: 81
End: 2021-12-27
Payer: MEDICARE

## 2021-12-27 DIAGNOSIS — K59.00 CONSTIPATION, UNSPECIFIED CONSTIPATION TYPE: ICD-10-CM

## 2021-12-27 DIAGNOSIS — R10.84 ABDOMINAL PAIN, GENERALIZED: Primary | ICD-10-CM

## 2021-12-27 NOTE — TELEPHONE ENCOUNTER
Call from patient requesting referral to Gastro due to cycles of constipation and cramping. Patient reports she is afraid to eat as she gets constipated after eating.     Requesting a message to Dr. Jones to inquire on referral.     Patient reports Dr. Jones has sent referral in the past and is aware of patients colon problems.     Please advise. (patient unable to travel today due to weather conditions and possibly tomorrow as well_.    Patient can be reached at [phone]

## 2022-01-08 DIAGNOSIS — K75.4 AUTOIMMUNE HEPATITIS (H): ICD-10-CM

## 2022-01-08 DIAGNOSIS — I10 BENIGN ESSENTIAL HYPERTENSION: ICD-10-CM

## 2022-01-10 ENCOUNTER — TRANSFERRED RECORDS (OUTPATIENT)
Dept: HEALTH INFORMATION MANAGEMENT | Facility: CLINIC | Age: 82
End: 2022-01-10

## 2022-01-10 NOTE — TELEPHONE ENCOUNTER
Cinda       Last Written Prescription Date:  7/9/2021  Last Fill Quantity: 180,   # refills: 1    Cozaar      Last Written Prescription Date:  10/28/2021  Last Fill Quantity: 90,   # refills: 0  Last Office Visit: 10/29/2021  Future Office visit:

## 2022-01-11 RX ORDER — LOSARTAN POTASSIUM 25 MG/1
25 TABLET ORAL DAILY
Qty: 90 TABLET | Refills: 0 | Status: SHIPPED | OUTPATIENT
Start: 2022-01-11 | End: 2022-04-20

## 2022-01-11 RX ORDER — AZATHIOPRINE 50 MG/1
100 TABLET ORAL DAILY
Qty: 180 TABLET | Refills: 0 | Status: ON HOLD | OUTPATIENT
Start: 2022-01-11 | End: 2022-04-19

## 2022-01-20 DIAGNOSIS — D64.89 OTHER SPECIFIED ANEMIAS: ICD-10-CM

## 2022-01-20 DIAGNOSIS — R71.8 OTHER ABNORMALITY OF RED BLOOD CELLS: Primary | ICD-10-CM

## 2022-01-26 ENCOUNTER — LAB (OUTPATIENT)
Dept: LAB | Facility: OTHER | Age: 82
End: 2022-01-26
Payer: MEDICARE

## 2022-01-26 DIAGNOSIS — R71.8 OTHER ABNORMALITY OF RED BLOOD CELLS: ICD-10-CM

## 2022-01-26 DIAGNOSIS — D64.89 OTHER SPECIFIED ANEMIAS: ICD-10-CM

## 2022-01-26 LAB
RETICS # AUTO: 0.11 10E6/UL (ref 0.03–0.1)
RETICS/RBC NFR AUTO: 3.2 % (ref 0.5–2)
TSH SERPL DL<=0.005 MIU/L-ACNC: 6.68 MU/L (ref 0.4–4)
VIT B12 SERPL-MCNC: 391 PG/ML (ref 193–986)

## 2022-01-26 PROCEDURE — 84443 ASSAY THYROID STIM HORMONE: CPT | Mod: ZL

## 2022-01-26 PROCEDURE — 36415 COLL VENOUS BLD VENIPUNCTURE: CPT | Mod: ZL

## 2022-01-26 PROCEDURE — 85045 AUTOMATED RETICULOCYTE COUNT: CPT | Mod: ZL

## 2022-01-26 PROCEDURE — 82607 VITAMIN B-12: CPT | Mod: ZL

## 2022-01-26 PROCEDURE — 83921 ORGANIC ACID SINGLE QUANT: CPT | Mod: ZL

## 2022-02-03 LAB — METHYLMALONATE SERPL-SCNC: 0.19 UMOL/L (ref 0–0.4)

## 2022-02-04 DIAGNOSIS — K75.4 CHRONIC AGGRESSIVE HEPATITIS (H): Primary | ICD-10-CM

## 2022-02-07 ENCOUNTER — TRANSFERRED RECORDS (OUTPATIENT)
Dept: HEALTH INFORMATION MANAGEMENT | Facility: CLINIC | Age: 82
End: 2022-02-07

## 2022-02-11 DIAGNOSIS — E03.9 HYPOTHYROIDISM, UNSPECIFIED TYPE: ICD-10-CM

## 2022-02-11 RX ORDER — LEVOTHYROXINE SODIUM 100 UG/1
100 TABLET ORAL DAILY
Qty: 90 TABLET | Refills: 1 | Status: SHIPPED | OUTPATIENT
Start: 2022-02-11 | End: 2022-08-11

## 2022-02-11 NOTE — TELEPHONE ENCOUNTER
levothyroxine (SYNTHROID/LEVOTHROID) 100 MCG tablet      Last Written Prescription Date:  6-4-21  Last Fill Quantity: 90,   # refills: 1  Last Office Visit: 10-29-21  Future Office visit:       Routing refill request to provider for review/approval because:   Normal TSH on file in past 12 months    TSH   Date Value Ref Range Status   01/26/2022 6.68 (H) 0.40 - 4.00 mU/L Final   05/14/2021 3.25 0.40 - 4.00 mU/L Final

## 2022-02-24 ENCOUNTER — LAB (OUTPATIENT)
Dept: LAB | Facility: OTHER | Age: 82
End: 2022-02-24
Payer: MEDICARE

## 2022-02-24 ENCOUNTER — MEDICAL CORRESPONDENCE (OUTPATIENT)
Dept: HEALTH INFORMATION MANAGEMENT | Facility: CLINIC | Age: 82
End: 2022-02-24

## 2022-02-24 DIAGNOSIS — K75.4 CHRONIC AGGRESSIVE HEPATITIS (H): ICD-10-CM

## 2022-02-24 LAB
ALBUMIN SERPL-MCNC: 3.4 G/DL (ref 3.4–5)
ALP SERPL-CCNC: 88 U/L (ref 40–150)
ALT SERPL W P-5'-P-CCNC: 25 U/L (ref 0–50)
ANION GAP SERPL CALCULATED.3IONS-SCNC: 6 MMOL/L (ref 3–14)
AST SERPL W P-5'-P-CCNC: 24 U/L (ref 0–45)
BASOPHILS # BLD AUTO: 0.1 10E3/UL (ref 0–0.2)
BASOPHILS NFR BLD AUTO: 1 %
BILIRUB SERPL-MCNC: 0.5 MG/DL (ref 0.2–1.3)
BUN SERPL-MCNC: 19 MG/DL (ref 7–30)
CALCIUM SERPL-MCNC: 9.1 MG/DL (ref 8.5–10.1)
CHLORIDE BLD-SCNC: 104 MMOL/L (ref 94–109)
CO2 SERPL-SCNC: 26 MMOL/L (ref 20–32)
CREAT SERPL-MCNC: 1.15 MG/DL (ref 0.52–1.04)
EOSINOPHIL # BLD AUTO: 0.2 10E3/UL (ref 0–0.7)
EOSINOPHIL NFR BLD AUTO: 3 %
ERYTHROCYTE [DISTWIDTH] IN BLOOD BY AUTOMATED COUNT: 12.7 % (ref 10–15)
GFR SERPL CREATININE-BSD FRML MDRD: 47 ML/MIN/1.73M2
GLUCOSE BLD-MCNC: 85 MG/DL (ref 70–99)
HCT VFR BLD AUTO: 38.1 % (ref 35–47)
HGB BLD-MCNC: 12.7 G/DL (ref 11.7–15.7)
LYMPHOCYTES # BLD AUTO: 1.2 10E3/UL (ref 0.8–5.3)
LYMPHOCYTES NFR BLD AUTO: 16 %
MCH RBC QN AUTO: 35.2 PG (ref 26.5–33)
MCHC RBC AUTO-ENTMCNC: 33.3 G/DL (ref 31.5–36.5)
MCV RBC AUTO: 106 FL (ref 78–100)
MONOCYTES # BLD AUTO: 1 10E3/UL (ref 0–1.3)
MONOCYTES NFR BLD AUTO: 13 %
NEUTROPHILS # BLD AUTO: 5 10E3/UL (ref 1.6–8.3)
NEUTROPHILS NFR BLD AUTO: 67 %
PLATELET # BLD AUTO: 203 10E3/UL (ref 150–450)
POTASSIUM BLD-SCNC: 4 MMOL/L (ref 3.4–5.3)
PROT SERPL-MCNC: 7.3 G/DL (ref 6.8–8.8)
RBC # BLD AUTO: 3.61 10E6/UL (ref 3.8–5.2)
SODIUM SERPL-SCNC: 136 MMOL/L (ref 133–144)
WBC # BLD AUTO: 7.5 10E3/UL (ref 4–11)

## 2022-02-24 PROCEDURE — 82040 ASSAY OF SERUM ALBUMIN: CPT | Mod: ZL

## 2022-02-24 PROCEDURE — 80053 COMPREHEN METABOLIC PANEL: CPT | Mod: ZL

## 2022-02-24 PROCEDURE — 85025 COMPLETE CBC W/AUTO DIFF WBC: CPT | Mod: ZL

## 2022-02-24 PROCEDURE — 36415 COLL VENOUS BLD VENIPUNCTURE: CPT | Mod: ZL

## 2022-03-03 ENCOUNTER — MEDICAL CORRESPONDENCE (OUTPATIENT)
Dept: MRI IMAGING | Facility: HOSPITAL | Age: 82
End: 2022-03-03
Payer: MEDICARE

## 2022-03-11 DIAGNOSIS — K75.4 AUTOIMMUNE HEPATITIS (H): Primary | ICD-10-CM

## 2022-03-14 DIAGNOSIS — J30.2 SEASONAL ALLERGIC RHINITIS, UNSPECIFIED TRIGGER: ICD-10-CM

## 2022-03-15 ENCOUNTER — TELEPHONE (OUTPATIENT)
Dept: FAMILY MEDICINE | Facility: OTHER | Age: 82
End: 2022-03-15
Payer: MEDICARE

## 2022-03-15 DIAGNOSIS — S81.009A OPEN WOUND OF KNEE, LEG, AND ANKLE, UNSPECIFIED LATERALITY, INITIAL ENCOUNTER: Primary | ICD-10-CM

## 2022-03-15 DIAGNOSIS — S91.009A OPEN WOUND OF KNEE, LEG, AND ANKLE, UNSPECIFIED LATERALITY, INITIAL ENCOUNTER: Primary | ICD-10-CM

## 2022-03-15 DIAGNOSIS — S81.809A OPEN WOUND OF KNEE, LEG, AND ANKLE, UNSPECIFIED LATERALITY, INITIAL ENCOUNTER: Primary | ICD-10-CM

## 2022-03-15 RX ORDER — FLUTICASONE PROPIONATE 50 MCG
SPRAY, SUSPENSION (ML) NASAL
Qty: 16 G | Refills: 1 | Status: SHIPPED | OUTPATIENT
Start: 2022-03-15 | End: 2023-02-16

## 2022-03-28 ENCOUNTER — OFFICE VISIT (OUTPATIENT)
Dept: WOUND CARE | Facility: OTHER | Age: 82
End: 2022-03-28
Attending: FAMILY MEDICINE
Payer: MEDICARE

## 2022-03-28 ENCOUNTER — OFFICE VISIT (OUTPATIENT)
Dept: FAMILY MEDICINE | Facility: OTHER | Age: 82
End: 2022-03-28
Attending: INTERNAL MEDICINE
Payer: MEDICARE

## 2022-03-28 VITALS
SYSTOLIC BLOOD PRESSURE: 153 MMHG | HEART RATE: 64 BPM | TEMPERATURE: 96.8 F | DIASTOLIC BLOOD PRESSURE: 74 MMHG | OXYGEN SATURATION: 98 %

## 2022-03-28 DIAGNOSIS — S81.009A OPEN WOUND OF KNEE, LEG, AND ANKLE, UNSPECIFIED LATERALITY, INITIAL ENCOUNTER: ICD-10-CM

## 2022-03-28 DIAGNOSIS — S81.802D NON-HEALING WOUND OF LOWER EXTREMITY, LEFT, SUBSEQUENT ENCOUNTER: Primary | ICD-10-CM

## 2022-03-28 DIAGNOSIS — S91.009A OPEN WOUND OF KNEE, LEG, AND ANKLE, UNSPECIFIED LATERALITY, INITIAL ENCOUNTER: ICD-10-CM

## 2022-03-28 DIAGNOSIS — Z01.818 PREOP GENERAL PHYSICAL EXAM: Primary | ICD-10-CM

## 2022-03-28 DIAGNOSIS — S81.809A OPEN WOUND OF KNEE, LEG, AND ANKLE, UNSPECIFIED LATERALITY, INITIAL ENCOUNTER: ICD-10-CM

## 2022-03-28 PROCEDURE — G0463 HOSPITAL OUTPT CLINIC VISIT: HCPCS | Mod: 25

## 2022-03-28 PROCEDURE — U0003 INFECTIOUS AGENT DETECTION BY NUCLEIC ACID (DNA OR RNA); SEVERE ACUTE RESPIRATORY SYNDROME CORONAVIRUS 2 (SARS-COV-2) (CORONAVIRUS DISEASE [COVID-19]), AMPLIFIED PROBE TECHNIQUE, MAKING USE OF HIGH THROUGHPUT TECHNOLOGIES AS DESCRIBED BY CMS-2020-01-R: HCPCS | Mod: ZL

## 2022-03-28 PROCEDURE — 99213 OFFICE O/P EST LOW 20 MIN: CPT | Performed by: NURSE PRACTITIONER

## 2022-03-28 PROCEDURE — G0463 HOSPITAL OUTPT CLINIC VISIT: HCPCS

## 2022-03-28 PROCEDURE — 88305 TISSUE EXAM BY PATHOLOGIST: CPT | Mod: TC | Performed by: NURSE PRACTITIONER

## 2022-03-28 RX ORDER — POLYETHYLENE GLYCOL 3350, SODIUM CHLORIDE, SODIUM BICARBONATE, POTASSIUM CHLORIDE 420; 11.2; 5.72; 1.48 G/4L; G/4L; G/4L; G/4L
POWDER, FOR SOLUTION ORAL
COMMUNITY
Start: 2022-03-24 | End: 2022-03-31

## 2022-03-28 RX ORDER — POLYETHYLENE GLYCOL 3350 17 G/17G
POWDER, FOR SOLUTION ORAL
Status: ON HOLD | COMMUNITY
Start: 2022-03-24 | End: 2022-09-13

## 2022-03-28 ASSESSMENT — PAIN SCALES - GENERAL: PAINLEVEL: NO PAIN (0)

## 2022-03-28 NOTE — PATIENT INSTRUCTIONS
Wash hands prior to dressing change.   Clean instruments as appropriate    Left leg (top) wound (biopsy site)  Wash wound with mild soap and water, dry  Apply antibiotic ointment for the 1st 3 days then vaseline  Cover with a bandaid  Change every daily or as needed    Left leg (back) wound  Wash wound with mild soap and water, dry  Apply honey gauze (friendly reminder, remove both plastic backings) to wound  Cover with foam (friendly reminder, remove plastic backing)  Change every 3 days or as needed    Please call if any questions/concerns and/or problems ([phone])    Follow up   1 week     Increase protein

## 2022-03-28 NOTE — PROGRESS NOTES
SUBJECTIVE:  Ileana Davis, 81 year old, female presents with the following Chief Complaint(s) with HPI to follow:  Chief Complaint   Patient presents with     Wound Check        Wound check    HPI:  Ileana is here today for the reassessment and treatment of LLE wounds.  Back story:  Ileana isn't new to Wound Care.   We have seen her in October, 2013 for a LLE ulceration; July, 2014 for a left knee laceration; November, 2020 for left knee and left elbow abrasion; and May, 2021 for multiple wounds due to a fall.    Most currently, she has two areas of concern.   1. Left posterior wound  States it started about 20 years ago.   It was a lump.   Doesn't remember the diagnosis the doctors gave her but said it was harmless.   In the past 2 years, it's been opening up off and on.    Current tx: antiseptic ointment and bandaid, changed every 1-3 days.   2. Left anterior wound  Told she had a wart about a year ago  She's been using wart remover and it's not helping.   Now, it's tender and will bleed occasionally.   Current tx: OTC wart remover, bandaid.     Denies any fevers, chills, and/or malodorous drainage.   PMH: anxiety, HTN, fibromyalgia, MO, CKD stage 3,   Denies hx of smoking, no diabetes  Last A1c  No results found for: A1C    Noted BP  Denies any associated symptoms    Has a colonoscopy and endoscopy this Friday due to swallowing issues and constipation    Patient Active Problem List   Diagnosis     Hypothyroidism     Generalized anxiety disorder     Benign essential hypertension     GERD (gastroesophageal reflux disease)     Hepatitis     Contact dermatitis     Rosacea     Osteoarthritis     Fibromyalgia     Open wound of knee, leg, and ankle     Degeneration of lumbar or lumbosacral intervertebral disc     Anemia     Autoimmune hepatitis (H)     Morbid obesity (H)     Mild major depression (H)     Hallux valgus of left foot     Left foot pain     Ametropia     Decreased vision of right eye     Dry eye  syndrome of both eyes     PCO (posterior capsular opacification), left     Ptosis of both eyelids     Esodeviation     S/p nephrectomy     Chronic kidney disease, stage 3 (H)       Past Medical History:   Diagnosis Date     Anemia 8/20/2015     Autoimmune hepatitis (H) 6/2/2016     Benign paroxysmal positional vertigo 10/7/2010     Contact dermatitis and other eczema, due to unspecified cause 10/7/2010     Esophageal reflux 10/7/2010     Generalized anxiety disorder 10/7/2010     Generalized osteoarthrosis, involving multiple sites 10/7/2010     Hepatitis, unspecified 10/7/2010     Infected wound      Myalgia and myositis, unspecified 10/7/2010    fibromyalgia     Nonallopathic lesion of cervical region, not elsewhere classified 12/5/2001     Nonallopathic lesion of thoracic region, not elsewhere classified 3/31/2005     Open wound of knee, leg, and ankle      Rosacea 10/7/2010     Unspecified essential hypertension 10/7/2010     Unspecified hypothyroidism 10/7/2010       Past Surgical History:   Procedure Laterality Date     APPENDECTOMY       ARTHROPLASTY TOE(S) Left 9/21/2020    Procedure: Left  Bethea Arthroplasty with interpositional arthroplasty using graft.;  Surgeon: Gina Rodriguez DPM;  Location: [facility] OR     BACK SURGERY  2015    lumbar epidural injection     CARDIAC SURGERY  10/2017    angioplasty     CHOLECYSTECTOMY       COLONOSCOPY  07/27/2017    [facility]     COLONOSCOPY - HIM SCAN  05/15/2001    Small internal hemorrhoids; otherwise normal;EB     COLONOSCOPY - HIM SCAN  12/14/2006    Colonoscopy, [facility]     ENT SURGERY      tonsillectomy     ESOPHAGOGASTRODUODENOSCOPY  07/27/2017    [facility]     EYE SURGERY      bilateral cataract removal     GI SURGERY      anal fistula     GYN SURGERY  1985    [facility] BSO     GYN SURGERY      cessarian x 2     GYN SURGERY      tubal sterilazation     NEPHRECTOMY Right 05/02/2019     ORTHOPEDIC SURGERY      right knee     ORTHOPEDIC SURGERY  1986     plantar fascia release     ORTHOPEDIC SURGERY      left knee     ORTHOPEDIC SURGERY      right trigger finger release     ORTHOPEDIC SURGERY  2013    Right great toe MTPJ fusion, adductor tenotomy,correction of hammer toe     ORTHOPEDIC SURGERY  [id number]    multiple procedures left forefoot       Family History   Problem Relation Age of Onset     Arthritis Mother         rheumatoid     Heart Disease Mother         CHF     Alcohol/Drug Father         alcoholism     Allergies Father      Thyroid Disease Other      Diabetes No family hx of      Asthma No family hx of        Social History     Tobacco Use     Smoking status: Never Smoker     Smokeless tobacco: Never Used   Substance Use Topics     Alcohol use: No       Current Outpatient Medications   Medication Sig Dispense Refill     acetaminophen (TYLENOL) 500 MG tablet Take 500 mg by mouth every 8 hours as needed for mild pain       azaTHIOprine (IMURAN) 50 MG tablet TAKE 2 TABLETS (100 MG) BY MOUTH DAILY 180 tablet 0     Cholecalciferol (VITAMIN D3) 1.25 MG (64208 UT) TABS        conjugated estrogens (PREMARIN) 0.625 MG/GM vaginal cream Place 0.5 g vaginally twice a week 30 g 3     Cranberry-Cholecalciferol (SUPER CRANBERRY/VITAMIN D3) 4200-500 MG-UNIT CAPS        cyclobenzaprine (FLEXERIL) 10 MG tablet TAKE 1 TABLET (10 MG) BY MOUTH 3 TIMES DAILY AS NEEDED FOR MUSCLE SPASMS 90 tablet 1     ferrous sulfate (FEROSUL) 325 (65 Fe) MG tablet Take 325 mg by mouth daily (with breakfast)       fluticasone (FLONASE) 50 MCG/ACT nasal spray SPRAY 2 SPRAYS INTO BOTH NOSTRILS DAILY AS NEEDED 16 g 1     hydrocortisone 2.5 % cream APPLY TWICE TIMES DAILY TO ACTIVE ECZEMA ON THE HANDS, FOLLOW WITH MOISTURIZING CREAM.       levothyroxine (SYNTHROID/LEVOTHROID) 100 MCG tablet Take 1 tablet (100 mcg) by mouth daily 90 tablet 1     losartan (COZAAR) 25 MG tablet TAKE 1 TABLET (25 MG) BY MOUTH DAILY 90 tablet 0     melatonin 1 MG TABS tablet Take by mouth nightly as needed for sleep         mirabegron (MYRBETRIQ) 25 MG 24 hr tablet Take 1 tablet (25 mg) by mouth daily 30 tablet 3     MIRALAX 17 GM/SCOOP powder TAKE 1 DOSE (17G) BY MOUTH DAILY FOR 5 DAYS STARTING ON 3/26 AS PART OF BOWEL PREP, MIX IN 8OZ WATER OR JUICE PRIOR TO ADMINISTRATION       multivitamin w/minerals (MULTI-VITAMIN) tablet Take 1 tablet by mouth daily       naproxen sodium (ANAPROX) 220 MG tablet Take 220 mg by mouth 2 times daily (with meals)       NYAMYC 977804 UNIT/GM external powder APPLY TO AFFECTED AREA NIGHTLY AS NEEDED FOR RASH 60 g 1     nystatin (MYCOSTATIN) 408495 UNIT/GM external cream APPLY TOPICALLY 2 TIMES DAILY AS NEEDED FOR DRY SKIN 30 g 2     omeprazole (PRILOSEC) 40 MG capsule Take  by mouth daily. Before the same meal daily       Polyethylene Glycol 3350 (MIRALAX PO) Take by mouth daily as needed        polyethylene glycol-electrolytes (NULYTELY) 420 g solution Take one half the evening before procedure.  Take second half the morning of procedure.       propranolol (INDERAL) 10 MG tablet Take 1 tablet (10 mg) by mouth daily 30 tablet 5     sulfamethoxazole-trimethoprim (BACTRIM DS) 800-160 MG tablet Take 1 tablet by mouth 2 times daily 14 tablet 0       Allergies   Allergen Reactions     Fentanyl Other (See Comments)     Severe agitation when used during angiogram     Codeine Sulfate Other (See Comments)     hallucinations     Fentanyl And Related      Other reaction(s): Confusion     Morphine Other (See Comments)     Hallucinations with iv therapy     Tape [Adhesive Tape]      Tramadol Other (See Comments)     hallucination       REVIEW OF SYSTEMS  Skin: as stated above  Eyes: glasses  Ears/Nose/Throat: negative; issues with swallowing  Respiratory: Shortness of breath- same, No cough and No hemoptysis  Cardiovascular: negative  Gastrointestinal: constipation  Genitourinary: incontinence; has one kidney   Musculoskeletal: arthritis, fibromyalgia  Neurologic: negative  Psychiatric: positive for anxiety and  depression  Hematologic/Lymphatic/Immunologic: negative  Endocrine: negative    OBJECTIVE:  BP (!) 153/74 (BP Location: Left arm, Patient Position: Sitting, Cuff Size: Adult Regular)   Pulse 64   Temp 96.8  F (36  C) (Tympanic)   SpO2 98%   Constitutional: alert and no distress  Cardiovascular: left foot: DP and PT heard via doppler, color and warmth intact, cap refill <3 sec  Respiratory:  Good diaphragmatic excursion.   Musculoskeletal: extremities normal- no gross deformities noted and gait normal  Skin:   Wound description: Type of Wound- ?; Location- LLE, posterior, Drainage amount-scant, Drainage color- serous, Odor- none; Wound bed-see picture, Surrounding skin- slight erythema, slight edema, no increase heat or lymphangitis    Measurements: 1 x 1.6 cm  Dressing change: Wound cleansed with soap and water, dressed with honey gauze + foam and tape.   Wound debridement completed on: , debridement type: dry gauze    LLE anterior lesion:  0.6 cm round nodule, skin colored with dry blood.     Psychiatric: mentation appears normal and affect normal/bright    Skin biopsy  2 patient identifiers used   Explained risks and benefits of procedure.  Patient consents to continue.    Area cleansed with hibiclens  Injected 1.5 ml of 1% lidocaine without epi into subcutaneus area under lesion.   Shave bx done  Aluminum chloride  used as blood stop  No bleeding  No pain  Tolerate procedure well      LABS  No results found for any visits on 03/28/22.    ASSESSMENT / PLAN:  (O14.351W) Non-healing wound of lower extremity, left, subsequent encounter  (primary encounter diagnosis)  Comment: noted  Plan: Surgical Pathology Exam          Skin biopsy done to rule out skin cancer    Post-care:  Wash wound with mild soap and water, dry  Apply antibiotic ointment for the 1st 3 days then vaseline  Cover with a bandaid  Change every daily or as needed    (S81.009A,  S81.809A,  S91.009A) Open wound of knee, leg, and ankle,  unspecified laterality, initial encounter  Comment: noted  Plan:   Changed plan to the following:  Wash wound with mild soap and water, dry  Apply honey gauze (friendly reminder, remove both plastic backings) to wound  Cover with foam (friendly reminder, remove plastic backing)  Change every 3 days or as needed    Trace edema  Might need to add compression if there's a change    Follow up  1 week    Treatment goal:  Moisture balance  Prevent infection  Promote healing.      Patient Instructions   Wash hands prior to dressing change.   Clean instruments as appropriate    Left leg (top) wound (biopsy site)  Wash wound with mild soap and water, dry  Apply antibiotic ointment for the 1st 3 days then vaseline  Cover with a bandaid  Change every daily or as needed    Left leg (back) wound  Wash wound with mild soap and water, dry  Apply honey gauze (friendly reminder, remove both plastic backings) to wound  Cover with foam (friendly reminder, remove plastic backing)  Change every 3 days or as needed    Please call if any questions/concerns and/or problems ([phone])    Follow up   1 week     Increase protein          Time: 30 min  Barrier: anxious  Willingness to learn: accepting    Kristine CROWLEY FNP-BC  Diabetes and Wound Care    Cc: Dr. Jnoes

## 2022-03-28 NOTE — LETTER
April 7, 2022      Ileana Davis  [address]   [place] [address]        Dear ,    We are writing to inform you of your test results.    Negative COVID test       Resulted Orders   Asymptomatic COVID-19 Virus (Coronavirus) by PCR Nose   Result Value Ref Range    SARS CoV2 PCR Negative Negative      Comment:      NEGATIVE: SARS-CoV-2 (COVID-19) RNA not detected, presumed negative.    Narrative    Testing was performed using the AptEvodental SARS-CoV-2 Assay on the  [employer] Instrument System. Additional information about this  Emergency Use Authorization (EUA) assay can be found via the Lab  Guide. This test should be ordered for the detection of SARS-CoV-2 in  individuals who meet SARS-CoV-2 clinical and/or epidemiological  criteria. Test performance is unknown in asymptomatic patients. This  test is for in vitro diagnostic use under the FDA EUA for  laboratories certified under CLIA to perform high complexity testing.  This test has not been FDA cleared or approved. A negative result  does not rule out the presence of PCR inhibitors in the specimen or  target RNA in concentration below the limit of detection for the  assay. The possibility of a false negative should be considered if  the patient's recent exposure or clinical presentation suggests  COVID-19. This test was validated by the [facility]. This laboratory is certified under  the Clinical Laboratory Improvement Amendments of 1988 (CLIA-88) as  qualified to perform high complexity laboratory testing.       If you have any questions or concerns, please call the clinic at the number listed above.       Sincerely,      Iris Becerril MD

## 2022-03-28 NOTE — NURSING NOTE
"Chief Complaint   Patient presents with     Wound Check       Initial BP (!) 153/74 (BP Location: Left arm, Patient Position: Sitting, Cuff Size: Adult Regular)   Pulse 64   Temp 96.8  F (36  C) (Tympanic)   SpO2 98%  Estimated body mass index is 34.61 kg/m  as calculated from the following:    Height as of 10/29/21: 1.575 m (5' 2\").    Weight as of 10/29/21: 85.8 kg (189 lb 3.2 oz).  Medication Reconciliation: complete  Jacquie Quintana  "

## 2022-03-29 LAB — SARS-COV-2 RNA RESP QL NAA+PROBE: NEGATIVE

## 2022-04-01 ENCOUNTER — TRANSFERRED RECORDS (OUTPATIENT)
Dept: HEALTH INFORMATION MANAGEMENT | Facility: CLINIC | Age: 82
End: 2022-04-01
Payer: MEDICARE

## 2022-04-03 LAB
PATH REPORT.COMMENTS IMP SPEC: NORMAL
PATH REPORT.FINAL DX SPEC: NORMAL
PATH REPORT.GROSS SPEC: NORMAL
PATH REPORT.MICROSCOPIC SPEC OTHER STN: NORMAL
PATH REPORT.RELEVANT HX SPEC: NORMAL
PHOTO IMAGE: NORMAL

## 2022-04-03 PROCEDURE — 88305 TISSUE EXAM BY PATHOLOGIST: CPT | Mod: 26 | Performed by: PATHOLOGY

## 2022-04-05 DIAGNOSIS — C44.729 SCC (SQUAMOUS CELL CARCINOMA), LEG, LEFT: Primary | ICD-10-CM

## 2022-04-05 NOTE — PROGRESS NOTES
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     Recommendations:  Complete excision.     Referral to Dr. Huynh and JUNG Restrepo.     Patient aware of plan.     Kristine Coreas APRN FNP-BC  [facility]

## 2022-04-06 ENCOUNTER — HOSPITAL ENCOUNTER (OUTPATIENT)
Dept: MRI IMAGING | Facility: HOSPITAL | Age: 82
Discharge: HOME | End: 2022-04-06
Admitting: ANESTHESIOLOGY
Payer: MEDICARE

## 2022-04-06 DIAGNOSIS — M47.817 LUMBOSACRAL SPONDYLOSIS WITHOUT MYELOPATHY: ICD-10-CM

## 2022-04-06 DIAGNOSIS — M47.812 CERVICAL SPONDYLOSIS WITHOUT MYELOPATHY: ICD-10-CM

## 2022-04-06 PROCEDURE — 72141 MRI NECK SPINE W/O DYE: CPT

## 2022-04-08 ENCOUNTER — HOSPITAL ENCOUNTER (OUTPATIENT)
Dept: WOUND CARE | Facility: HOSPITAL | Age: 82
Discharge: HOME | End: 2022-04-08
Attending: FAMILY MEDICINE | Admitting: FAMILY MEDICINE
Payer: MEDICARE

## 2022-04-08 VITALS
TEMPERATURE: 97.4 F | OXYGEN SATURATION: 100 % | DIASTOLIC BLOOD PRESSURE: 84 MMHG | SYSTOLIC BLOOD PRESSURE: 136 MMHG | HEART RATE: 55 BPM

## 2022-04-08 PROBLEM — N32.81 OVERACTIVE BLADDER: Status: ACTIVE | Noted: 2022-03-16

## 2022-04-08 PROBLEM — H00.11 CHALAZION OF RIGHT UPPER EYELID: Status: ACTIVE | Noted: 2021-09-02

## 2022-04-08 PROCEDURE — G0463 HOSPITAL OUTPT CLINIC VISIT: HCPCS

## 2022-04-08 RX ORDER — PROPYLENE GLYCOL 0.06 MG/ML
1-2 EMULSION OPHTHALMIC
Status: ON HOLD | COMMUNITY
Start: 2021-09-02 | End: 2022-09-13

## 2022-04-08 RX ORDER — SENNOSIDES 8.6 MG
TABLET ORAL
COMMUNITY
Start: 2022-04-01 | End: 2024-01-15

## 2022-04-08 NOTE — PROGRESS NOTES
Ileana Davis, 1940  Chief Complaint   Patient presents with     WOUND CARE       Patient Active Problem List   Diagnosis     Hypothyroidism     Generalized anxiety disorder     Benign essential hypertension     GERD (gastroesophageal reflux disease)     Hepatitis     Contact dermatitis     Rosacea     Osteoarthritis     Fibromyalgia     Open wound of knee, leg, and ankle     Degeneration of lumbar or lumbosacral intervertebral disc     Anemia     Autoimmune hepatitis (H)     Morbid obesity (H)     Mild major depression (H)     Hallux valgus of left foot     Left foot pain     Ametropia     Decreased vision of right eye     Dry eye syndrome of both eyes     PCO (posterior capsular opacification), left     Ptosis of both eyelids     Esodeviation     S/p nephrectomy     Chronic kidney disease, stage 3 (H)     Overactive bladder     Chalazion of right upper eyelid       Concerns/Comments:   Ileana Davis is here for re-evaluation and tx of L posterior leg ulceration which has been present on and of for about 20 years. She had it looked at by a doctor but cannot remember the dx and was told it was harmless.  First visit to the Wound Center was on 3/28/22 with Kristine Coreas. At this visit Kristine also did a shave biopsy of a round nodule to a L anterior LE lesion. This was positive for SCC and excision is recommended. The posterior LE ulceration was treated with honey gauze and foam secured with Medipore to be changed every three days.    Returns today with no complaints. She is surprised that the lesion on her leg was cancer. [facility] Capellan (Wound Center) present to evaluate and set her up for excision of the lesion to be performed in the ER due to Ileana's admission of having low pain tolerance. She also explained that she would not be able to tolerate something like that awake.    Objective:   Location:   LLE posterior  Drainage:   Moderate, serosang    Odor:   none  Measurement:   1.4x1.0cm (slightly  smaller)  Edges:   attached  Base:   Red/moist with buds of epithelial tissue  Surrounding Skin:   intact    Wound debridement completed on: 3/28/22, debridement type: mechanical with dry gauze performed by Kristine Coreas NP.        No open areas to where the shave bx was taken to the anterior LE. Small area of brown discoloration to the skin and some bruising which Ileana says is from the lidocaine injection.    Procedures:   Wound bed cleansed and evaluated.  Dressed with honey gauze and foam secured with Medipore tape.    Assessment/Response to tx:  Posterior LE ulceration improving.  Anterior nodule will be excised.     Plan of care:   Pt is scheduled in the OR for excision of SCC lesion.  I have placed her on my schedule so that the posterior LE ulceration can be evaluated the same day. Will work around the OR schedule.  Ileana explains that Kristine Coreas NP was going to prescribe her something for her ecchymotic skin discoloration to her hands and arm. I will staff message her regarding this.    Left leg (back) wound  Wash wound with mild soap and water, dry  Apply honey gauze (friendly reminder, remove both plastic backings) to wound  Cover with foam (friendly reminder, remove plastic backing)  Change every 3 days or as needed     Please call if any questions/concerns and/or problems ([phone])     Follow up   1 week      Increase protein    Treatment Goal:  Epithelial migration.  SCC excision with clean margins.    Supplies provided:  Honey gauze and foam.    Education:  Wound care instructions/education provided. Patient verbalized understanding of instruction/education.    CC: Iris Becerril/Kristine Coreas NP

## 2022-04-08 NOTE — PROGRESS NOTES
"Chief Complaint   Patient presents with     WOUND CARE       Initial /84 (BP Location: Left arm, Patient Position: Sitting, Cuff Size: Adult Regular)   Pulse 55   Temp 97.4  F (36.3  C) (Tympanic)   SpO2 100%  Estimated body mass index is 34.61 kg/m  as calculated from the following:    Height as of 10/29/21: 1.575 m (5' 2\").    Weight as of 10/29/21: 85.8 kg (189 lb 3.2 oz).  Medication Reconciliation: complete  Tanya Tilley MA    "

## 2022-04-08 NOTE — DISCHARGE INSTRUCTIONS
Left leg (back) wound  Wash wound with mild soap and water, dry  Apply honey gauze (friendly reminder, remove both plastic backings) to wound  Cover with foam (friendly reminder, remove plastic backing)  Change every 3 days or as needed     Please call if any questions/concerns and/or problems ([phone])     Follow up   1 week      Increase protein

## 2022-04-12 DIAGNOSIS — Z85.828 PERSONAL HISTORY OF MALIGNANT NEOPLASM OF SKIN: Primary | ICD-10-CM

## 2022-04-14 ENCOUNTER — TELEPHONE (OUTPATIENT)
Dept: SURGERY | Facility: OTHER | Age: 82
End: 2022-04-14
Payer: MEDICARE

## 2022-04-14 NOTE — TELEPHONE ENCOUNTER
Patient contacted regarding setting up her procedure for 4/19/22 with Dr Huynh.  New orders placed.  A letter was written for the patient, and will be given to her when she comes in for her covid swab.  Patient was instructed to have someone drive her home, and was also educated on how to use the pre-surgical soap.  Patient is scheduled for her covid swab tomorrow 4/15/22 and is aware of the time and the place.  No further questions or concerns.  ANIYAH GARCIA LPN

## 2022-04-15 ENCOUNTER — OFFICE VISIT (OUTPATIENT)
Dept: FAMILY MEDICINE | Facility: OTHER | Age: 82
End: 2022-04-15
Attending: FAMILY MEDICINE
Payer: MEDICARE

## 2022-04-15 ENCOUNTER — LAB (OUTPATIENT)
Dept: LAB | Facility: OTHER | Age: 82
End: 2022-04-15
Attending: FAMILY MEDICINE
Payer: MEDICARE

## 2022-04-15 DIAGNOSIS — K75.4 AUTOIMMUNE HEPATITIS (H): ICD-10-CM

## 2022-04-15 DIAGNOSIS — Z01.818 PREOP GENERAL PHYSICAL EXAM: Primary | ICD-10-CM

## 2022-04-15 LAB
ALBUMIN SERPL-MCNC: 3.2 G/DL (ref 3.4–5)
ALP SERPL-CCNC: 105 U/L (ref 40–150)
ALT SERPL W P-5'-P-CCNC: 25 U/L (ref 0–50)
AST SERPL W P-5'-P-CCNC: 24 U/L (ref 0–45)
BILIRUB DIRECT SERPL-MCNC: 0.1 MG/DL (ref 0–0.2)
BILIRUB SERPL-MCNC: 0.5 MG/DL (ref 0.2–1.3)
HOLD SPECIMEN: NORMAL
HOLD SPECIMEN: NORMAL
PROT SERPL-MCNC: 7.1 G/DL (ref 6.8–8.8)

## 2022-04-15 PROCEDURE — 36415 COLL VENOUS BLD VENIPUNCTURE: CPT | Mod: ZL

## 2022-04-15 PROCEDURE — 80076 HEPATIC FUNCTION PANEL: CPT | Mod: ZL

## 2022-04-15 PROCEDURE — U0005 INFEC AGEN DETEC AMPLI PROBE: HCPCS | Mod: ZL

## 2022-04-15 PROCEDURE — 82784 ASSAY IGA/IGD/IGG/IGM EACH: CPT | Mod: ZL

## 2022-04-16 LAB — SARS-COV-2 RNA RESP QL NAA+PROBE: NEGATIVE

## 2022-04-18 DIAGNOSIS — I10 BENIGN ESSENTIAL HYPERTENSION: ICD-10-CM

## 2022-04-18 LAB — IGG SERPL-MCNC: 1022 MG/DL (ref 610–1616)

## 2022-04-19 ENCOUNTER — HOSPITAL ENCOUNTER (OUTPATIENT)
Facility: HOSPITAL | Age: 82
Discharge: HOME | End: 2022-04-19
Attending: SURGERY | Admitting: SURGERY
Payer: MEDICARE

## 2022-04-19 ENCOUNTER — ANESTHESIA EVENT (OUTPATIENT)
Dept: SURGERY | Facility: HOSPITAL | Age: 82
End: 2022-04-19
Payer: MEDICARE

## 2022-04-19 ENCOUNTER — ANESTHESIA (OUTPATIENT)
Dept: SURGERY | Facility: HOSPITAL | Age: 82
End: 2022-04-19
Payer: MEDICARE

## 2022-04-19 ENCOUNTER — HOSPITAL ENCOUNTER (OUTPATIENT)
Dept: WOUND CARE | Facility: HOSPITAL | Age: 82
Discharge: HOME | End: 2022-04-19
Attending: PODIATRIST | Admitting: PODIATRIST
Payer: MEDICARE

## 2022-04-19 VITALS
RESPIRATION RATE: 18 BRPM | SYSTOLIC BLOOD PRESSURE: 143 MMHG | BODY MASS INDEX: 36.62 KG/M2 | DIASTOLIC BLOOD PRESSURE: 94 MMHG | TEMPERATURE: 96.8 F | HEIGHT: 62 IN | OXYGEN SATURATION: 98 % | WEIGHT: 199 LBS | HEART RATE: 59 BPM

## 2022-04-19 VITALS
OXYGEN SATURATION: 97 % | HEART RATE: 57 BPM | DIASTOLIC BLOOD PRESSURE: 116 MMHG | BODY MASS INDEX: 36.62 KG/M2 | SYSTOLIC BLOOD PRESSURE: 194 MMHG | TEMPERATURE: 96.7 F | WEIGHT: 199 LBS | HEIGHT: 62 IN

## 2022-04-19 PROCEDURE — 370N000017 HC ANESTHESIA TECHNICAL FEE, PER MIN: Performed by: SURGERY

## 2022-04-19 PROCEDURE — 11406 EXC TR-EXT B9+MARG >4.0 CM: CPT | Performed by: SURGERY

## 2022-04-19 PROCEDURE — 250N000011 HC RX IP 250 OP 636: Performed by: SURGERY

## 2022-04-19 PROCEDURE — 250N000009 HC RX 250: Performed by: SURGERY

## 2022-04-19 PROCEDURE — 250N000013 HC RX MED GY IP 250 OP 250 PS 637: Performed by: NURSE ANESTHETIST, CERTIFIED REGISTERED

## 2022-04-19 PROCEDURE — 250N000011 HC RX IP 250 OP 636: Performed by: NURSE ANESTHETIST, CERTIFIED REGISTERED

## 2022-04-19 PROCEDURE — 11603 EXC TR-EXT MAL+MARG 2.1-3 CM: CPT | Performed by: NURSE ANESTHETIST, CERTIFIED REGISTERED

## 2022-04-19 PROCEDURE — 360N000075 HC SURGERY LEVEL 2, PER MIN: Performed by: SURGERY

## 2022-04-19 PROCEDURE — G0463 HOSPITAL OUTPT CLINIC VISIT: HCPCS | Mod: 25

## 2022-04-19 PROCEDURE — 272N000001 HC OR GENERAL SUPPLY STERILE: Performed by: SURGERY

## 2022-04-19 PROCEDURE — 88305 TISSUE EXAM BY PATHOLOGIST: CPT | Mod: TC | Performed by: SURGERY

## 2022-04-19 PROCEDURE — 999N000141 HC STATISTIC PRE-PROCEDURE NURSING ASSESSMENT: Performed by: SURGERY

## 2022-04-19 PROCEDURE — G0463 HOSPITAL OUTPT CLINIC VISIT: HCPCS

## 2022-04-19 PROCEDURE — 250N000009 HC RX 250: Performed by: NURSE ANESTHETIST, CERTIFIED REGISTERED

## 2022-04-19 PROCEDURE — 710N000012 HC RECOVERY PHASE 2, PER MINUTE: Performed by: SURGERY

## 2022-04-19 PROCEDURE — 11403 EXC TR-EXT B9+MARG 2.1-3CM: CPT | Performed by: SURGERY

## 2022-04-19 PROCEDURE — 258N000003 HC RX IP 258 OP 636: Performed by: NURSE ANESTHETIST, CERTIFIED REGISTERED

## 2022-04-19 PROCEDURE — 250N000009 HC RX 250

## 2022-04-19 PROCEDURE — 99100 ANES PT EXTEME AGE<1 YR&>70: CPT | Performed by: NURSE ANESTHETIST, CERTIFIED REGISTERED

## 2022-04-19 RX ORDER — PROPOFOL 10 MG/ML
INJECTION, EMULSION INTRAVENOUS PRN
Status: DISCONTINUED | OUTPATIENT
Start: 2022-04-19 | End: 2022-04-19

## 2022-04-19 RX ORDER — BUPIVACAINE HYDROCHLORIDE 2.5 MG/ML
INJECTION, SOLUTION EPIDURAL; INFILTRATION; INTRACAUDAL
Status: DISCONTINUED
Start: 2022-04-19 | End: 2022-04-19 | Stop reason: HOSPADM

## 2022-04-19 RX ORDER — LIDOCAINE HYDROCHLORIDE 20 MG/ML
INJECTION, SOLUTION INFILTRATION; PERINEURAL PRN
Status: DISCONTINUED | OUTPATIENT
Start: 2022-04-19 | End: 2022-04-19

## 2022-04-19 RX ORDER — LIDOCAINE 40 MG/G
CREAM TOPICAL
Status: DISCONTINUED | OUTPATIENT
Start: 2022-04-19 | End: 2022-04-19 | Stop reason: HOSPADM

## 2022-04-19 RX ORDER — ONDANSETRON 4 MG/1
4 TABLET, ORALLY DISINTEGRATING ORAL EVERY 30 MIN PRN
Status: DISCONTINUED | OUTPATIENT
Start: 2022-04-19 | End: 2022-04-19 | Stop reason: HOSPADM

## 2022-04-19 RX ORDER — CEFAZOLIN SODIUM 2 G/100ML
2 INJECTION, SOLUTION INTRAVENOUS
Status: COMPLETED | OUTPATIENT
Start: 2022-04-19 | End: 2022-04-19

## 2022-04-19 RX ORDER — HYDRALAZINE HYDROCHLORIDE 20 MG/ML
5 INJECTION INTRAMUSCULAR; INTRAVENOUS ONCE
Status: COMPLETED | OUTPATIENT
Start: 2022-04-19 | End: 2022-04-19

## 2022-04-19 RX ORDER — FENTANYL CITRATE 50 UG/ML
12.5 INJECTION, SOLUTION INTRAMUSCULAR; INTRAVENOUS
Status: DISCONTINUED | OUTPATIENT
Start: 2022-04-19 | End: 2022-04-19 | Stop reason: HOSPADM

## 2022-04-19 RX ORDER — SODIUM CHLORIDE, SODIUM LACTATE, POTASSIUM CHLORIDE, CALCIUM CHLORIDE 600; 310; 30; 20 MG/100ML; MG/100ML; MG/100ML; MG/100ML
INJECTION, SOLUTION INTRAVENOUS CONTINUOUS
Status: DISCONTINUED | OUTPATIENT
Start: 2022-04-19 | End: 2022-04-19 | Stop reason: HOSPADM

## 2022-04-19 RX ORDER — HYDRALAZINE HYDROCHLORIDE 20 MG/ML
2.5-5 INJECTION INTRAMUSCULAR; INTRAVENOUS EVERY 10 MIN PRN
Status: DISCONTINUED | OUTPATIENT
Start: 2022-04-19 | End: 2022-04-19 | Stop reason: HOSPADM

## 2022-04-19 RX ORDER — ONDANSETRON 2 MG/ML
4 INJECTION INTRAMUSCULAR; INTRAVENOUS EVERY 30 MIN PRN
Status: DISCONTINUED | OUTPATIENT
Start: 2022-04-19 | End: 2022-04-19 | Stop reason: HOSPADM

## 2022-04-19 RX ORDER — OXYCODONE HYDROCHLORIDE 5 MG/1
5 TABLET ORAL ONCE
Status: COMPLETED | OUTPATIENT
Start: 2022-04-19 | End: 2022-04-19

## 2022-04-19 RX ORDER — LIDOCAINE HYDROCHLORIDE 10 MG/ML
INJECTION, SOLUTION EPIDURAL; INFILTRATION; INTRACAUDAL; PERINEURAL
Status: DISCONTINUED
Start: 2022-04-19 | End: 2022-04-19 | Stop reason: HOSPADM

## 2022-04-19 RX ORDER — LABETALOL 20 MG/4 ML (5 MG/ML) INTRAVENOUS SYRINGE
10
Status: DISCONTINUED | OUTPATIENT
Start: 2022-04-19 | End: 2022-04-19 | Stop reason: HOSPADM

## 2022-04-19 RX ORDER — GINSENG 100 MG
CAPSULE ORAL PRN
Status: DISCONTINUED | OUTPATIENT
Start: 2022-04-19 | End: 2022-04-19 | Stop reason: HOSPADM

## 2022-04-19 RX ORDER — BACITRACIN ZINC 500 [USP'U]/G
OINTMENT TOPICAL
Status: DISCONTINUED
Start: 2022-04-19 | End: 2022-04-19 | Stop reason: HOSPADM

## 2022-04-19 RX ORDER — CEFAZOLIN SODIUM 2 G/100ML
2 INJECTION, SOLUTION INTRAVENOUS SEE ADMIN INSTRUCTIONS
Status: DISCONTINUED | OUTPATIENT
Start: 2022-04-19 | End: 2022-04-19 | Stop reason: HOSPADM

## 2022-04-19 RX ADMIN — HYDRALAZINE HYDROCHLORIDE 5 MG: 20 INJECTION INTRAMUSCULAR; INTRAVENOUS at 12:28

## 2022-04-19 RX ADMIN — Medication 5 MCG: at 12:13

## 2022-04-19 RX ADMIN — PROPOFOL 30 MG: 10 INJECTION, EMULSION INTRAVENOUS at 13:36

## 2022-04-19 RX ADMIN — PROPOFOL 30 MG: 10 INJECTION, EMULSION INTRAVENOUS at 13:48

## 2022-04-19 RX ADMIN — PROPOFOL 30 MG: 10 INJECTION, EMULSION INTRAVENOUS at 14:01

## 2022-04-19 RX ADMIN — PROPOFOL 20 MG: 10 INJECTION, EMULSION INTRAVENOUS at 14:12

## 2022-04-19 RX ADMIN — PROPOFOL 30 MG: 10 INJECTION, EMULSION INTRAVENOUS at 14:05

## 2022-04-19 RX ADMIN — PROPOFOL 30 MG: 10 INJECTION, EMULSION INTRAVENOUS at 13:27

## 2022-04-19 RX ADMIN — PHENYLEPHRINE HYDROCHLORIDE 100 MCG: 10 INJECTION INTRAVENOUS at 14:12

## 2022-04-19 RX ADMIN — PROPOFOL 30 MG: 10 INJECTION, EMULSION INTRAVENOUS at 13:40

## 2022-04-19 RX ADMIN — Medication 5 MCG: at 12:10

## 2022-04-19 RX ADMIN — Medication 10 MCG: at 13:28

## 2022-04-19 RX ADMIN — MIDAZOLAM 1 MG: 1 INJECTION INTRAMUSCULAR; INTRAVENOUS at 11:42

## 2022-04-19 RX ADMIN — PROPOFOL 30 MG: 10 INJECTION, EMULSION INTRAVENOUS at 13:43

## 2022-04-19 RX ADMIN — LIDOCAINE HYDROCHLORIDE 40 MG: 20 INJECTION, SOLUTION INFILTRATION; PERINEURAL at 13:27

## 2022-04-19 RX ADMIN — PROPOFOL 30 MG: 10 INJECTION, EMULSION INTRAVENOUS at 13:53

## 2022-04-19 RX ADMIN — OXYCODONE HYDROCHLORIDE 5 MG: 5 TABLET ORAL at 14:55

## 2022-04-19 RX ADMIN — PROPOFOL 30 MG: 10 INJECTION, EMULSION INTRAVENOUS at 13:32

## 2022-04-19 RX ADMIN — PROPOFOL 30 MG: 10 INJECTION, EMULSION INTRAVENOUS at 13:45

## 2022-04-19 RX ADMIN — CEFAZOLIN SODIUM 2 G: 2 INJECTION, SOLUTION INTRAVENOUS at 13:22

## 2022-04-19 NOTE — ANESTHESIA POSTPROCEDURE EVALUATION
Patient: Ileana Davis    Procedure: Procedure(s):  Excision Left Medial Lower Leg Ulceration  Excision lesion right upper extremity       Anesthesia Type:  MAC    Note:  Disposition: Outpatient   Postop Pain Control: Uneventful            Sign Out: Well controlled pain   PONV: No   Neuro/Psych: Uneventful            Sign Out: Acceptable/Baseline neuro status   Airway/Respiratory: Uneventful            Sign Out: Acceptable/Baseline resp. status   CV/Hemodynamics: Uneventful            Sign Out: Acceptable CV status; No obvious hypovolemia; No obvious fluid overload   Other NRE: NONE   DID A NON-ROUTINE EVENT OCCUR? No           Last vitals:  Vitals Value Taken Time   /94 04/19/22 1505   Temp 96.8  F (36  C) 04/19/22 1505   Pulse 59 04/19/22 1505   Resp 18 04/19/22 1505   SpO2 98 % 04/19/22 1505       Electronically Signed By: KEO ESTES CRNA  April 19, 2022  3:21 PM

## 2022-04-19 NOTE — DISCHARGE INSTRUCTIONS
Post-Anesthesia Patient Instructions    IMMEDIATELY FOLLOWING SURGERY:  Do not drive or operate machinery for the first twenty four hours after surgery.  Do not make any important decisions for twenty four hours after surgery or while taking narcotic pain medications or sedatives.  If you develop intractable nausea and vomiting or a severe headache please notify your doctor immediately.    FOLLOW-UP:  Please make an appointment with your surgeon as instructed. You do not need to follow up with anesthesia unless specifically instructed to do so.    WOUND CARE INSTRUCTIONS (if applicable):  Keep a dry clean dressing on the anesthesia/puncture wound site if there is drainage.  Once the wound has quit draining you may leave it open to air.  Generally you should leave the bandage intact for twenty four hours unless there is drainage.  If the epidural site drains for more than 36-48 hours please call the anesthesia department.    QUESTIONS?:  Please feel free to call your physician or the hospital  if you have any questions, and they will be happy to assist you.

## 2022-04-19 NOTE — PROVIDER NOTIFICATION
[facility] updated rating pain 8/10 to elbow area, pt does not want Fentanyl d/t previous hallucinations and bad experience. Pt reports she has previously tolerated oxycodone and hydrocodone orally.     [facility] to order.

## 2022-04-19 NOTE — PROGRESS NOTES
Ileana Davis, 1940  Chief Complaint   Patient presents with     WOUND CARE       Patient Active Problem List   Diagnosis     Hypothyroidism     Generalized anxiety disorder     Benign essential hypertension     GERD (gastroesophageal reflux disease)     Hepatitis     Contact dermatitis     Rosacea     Osteoarthritis     Fibromyalgia     Open wound of knee, leg, and ankle     Degeneration of lumbar or lumbosacral intervertebral disc     Anemia     Autoimmune hepatitis (H)     Morbid obesity (H)     Mild major depression (H)     Hallux valgus of left foot     Left foot pain     Ametropia     Decreased vision of right eye     Dry eye syndrome of both eyes     PCO (posterior capsular opacification), left     Ptosis of both eyelids     Esodeviation     S/p nephrectomy     Chronic kidney disease, stage 3 (H)     Overactive bladder     Chalazion of right upper eyelid       Concerns/Comments:   Ileana Davis is here for re-evaluation and tx of L posterior leg ulceration which has been present on and of for about 20 years. She had it looked at by a doctor but cannot remember the dx and was told it was harmless.  First visit to the Wound Center was on 3/28/22 with Kristine Coreas. At this visit Kristine also did a shave biopsy of a round nodule to a L anterior LE lesion. This was positive for SCC and excision is recommended. The posterior LE ulceration was treated with honey gauze and foam secured with Medipore to be changed every three days.    4/8/22-[facility] (Wound Center) present to evaluate and set her up for excision of the anterior leg lesion to be performed in the ER. Tx of posterior LE ulceration-honey gauze and foam.    Objective:   Location:   LLE posterior  Drainage:   small, serous    Odor:   none  Measurement:   1.1x1.0cm (smaller)  Edges:   attached  Base:   Almost completely epithelialized with only a few pinpoint open areas within  Surrounding Skin:   intact    Wound debridement completed  on: 3/28/22, debridement type: mechanical with dry gauze performed by Kristine Coreas NP.      Procedures:   Wound bed cleansed and evaluated.  Dressed with honey gauze and foam secured with Medipore tape.    Assessment/Response to tx:  Posterior LE ulceration improving.  Anterior nodule will be excised today.     Plan of care:   Pt is scheduled in the OR today for excision of SCC lesion.  Will schedule pt with Kristine Coreas NP for follow up on cutaneous concerns and for re-evaluation of L posterior LE ulceration.    Left leg (back) wound  Wash wound with mild soap and water, dry  Apply honey gauze (friendly reminder, remove both plastic backings) to wound  Cover with foam (friendly reminder, remove plastic backing)  Change every 3 days or as needed     Please call if any questions/concerns and/or problems ([phone])     Follow up   1 week      Increase protein    Treatment Goal:  Epithelial migration.    Supplies provided:  Foam and Medipore tape.    Education:  Wound care instructions/education provided. Patient verbalized understanding of instruction/education.    CC: Iris Becerril

## 2022-04-19 NOTE — ANESTHESIA PREPROCEDURE EVALUATION
Anesthesia Pre-Procedure Evaluation    Patient: Ileana Davis   MRN: [id number] : 1940        Procedure : Procedure(s):  Excision Left Posterior Leg Ulceration          Past Medical History:   Diagnosis Date     Anemia 2015     Autoimmune hepatitis (H) 2016     Benign paroxysmal positional vertigo 10/7/2010     Contact dermatitis and other eczema, due to unspecified cause 10/7/2010     Esophageal reflux 10/7/2010     Generalized anxiety disorder 10/7/2010     Generalized osteoarthrosis, involving multiple sites 10/7/2010     Hepatitis, unspecified 10/7/2010     Infected wound      Myalgia and myositis, unspecified 10/7/2010    fibromyalgia     Nonallopathic lesion of cervical region, not elsewhere classified 2001     Nonallopathic lesion of thoracic region, not elsewhere classified 3/31/2005     Open wound of knee, leg, and ankle      Rosacea 10/7/2010     Unspecified essential hypertension 10/7/2010     Unspecified hypothyroidism 10/7/2010      Past Surgical History:   Procedure Laterality Date     APPENDECTOMY       ARTHROPLASTY TOE(S) Left 2020    Procedure: Left  Bethea Arthroplasty with interpositional arthroplasty using graft.;  Surgeon: Gina Rodriguez DPM;  Location: [facility] OR     BACK SURGERY      lumbar epidural injection     CARDIAC SURGERY  10/2017    angioplasty     CHOLECYSTECTOMY       COLONOSCOPY  2017    [facility]     COLONOSCOPY - HIM SCAN  05/15/2001    Small internal hemorrhoids; otherwise normal;[facility]     COLONOSCOPY - HIM SCAN  2006    Colonoscopy, [facility]     ENT SURGERY      tonsillectomy     ESOPHAGOGASTRODUODENOSCOPY  2017    [facility]     EYE SURGERY      bilateral cataract removal     GI SURGERY      anal fistula     GYN SURGERY  1985    [facility]     GYN SURGERY      cessarian x 2     GYN SURGERY      tubal sterilazation     NEPHRECTOMY Right 2019     ORTHOPEDIC SURGERY      right knee     ORTHOPEDIC SURGERY  1986    plantar  fascia release     ORTHOPEDIC SURGERY      left knee     ORTHOPEDIC SURGERY      right trigger finger release     ORTHOPEDIC SURGERY  2013    Right great toe MTPJ fusion, adductor tenotomy,correction of hammer toe     ORTHOPEDIC SURGERY  [id number]    multiple procedures left forefoot      Allergies   Allergen Reactions     Fentanyl Other (See Comments)     Severe agitation when used during angiogram     Codeine Sulfate Other (See Comments)     hallucinations     Fentanyl And Related      Other reaction(s): Confusion     Morphine Other (See Comments)     Hallucinations with iv therapy     Tape [Adhesive Tape]      Tramadol Other (See Comments)     hallucination      Social History     Tobacco Use     Smoking status: Never Smoker     Smokeless tobacco: Never Used   Substance Use Topics     Alcohol use: No      Wt Readings from Last 1 Encounters:   10/29/21 85.8 kg (189 lb 3.2 oz)        Anesthesia Evaluation   Pt has had prior anesthetic. Type: General and MAC.    No history of anesthetic complications       ROS/MED HX  ENT/Pulmonary:     (+) sleep apnea, doesn't use CPAP,     Neurologic: Comment: Nonallopathic lesion of cervical & thoracic region, not elsewhere classified  Myalgia and myositis, unspecified  Benign paroxysmal positional vertigo  Fibromyalgia  Impression:  Grade 1 anterolisthesis of C3 on C4 and trace anterolisthesis of C4 on  C5 with severe facet arthropathy at these levels. There is moderate to  severe multilevel facet arthropathy throughout much of the cervical  spine.     There is moderate to severe right neural foraminal narrowing at C4-C5.  There is otherwise mild to moderate multilevel neural foraminal  narrowing as detailed above.     MRI;   There is a small central disc protrusion at C5-C6 with mild mass  effect on the ventral cord. No high-grade central canal narrowing.      Cardiovascular:     (+) hypertension (took inderal & losartan 0530 today)-----    METS/Exercise Tolerance: 4 - Raking  leaves, gardening    Hematologic:     (+) anemia,     Musculoskeletal: Comment: Rosacea  Generalized osteoarthrosis  Infected wound  Contact dermatitis and other eczema  Squamous cell carcinoma left posterior leg      GI/Hepatic:     (+) GERD, Asymptomatic on medication, liver disease (hepatitis, autoimmune),     Renal/Genitourinary:     (+) renal disease (baseline creatinine 1.15; s/p nephrectomy), type: CRI,     Endo:     (+) thyroid problem, hypothyroidism, Obesity,     Psychiatric/Substance Use:     (+) psychiatric history anxiety and depression     Infectious Disease:  - neg infectious disease ROS     Malignancy:   (+) Malignancy, History of Other.Other CA skin cancer, s/p nephrectomy for cancer status post.    Other:            Physical Exam    Airway        Mallampati: II   TM distance: > 3 FB   Neck ROM: full   Mouth opening: > 3 cm    Respiratory Devices and Support         Dental       (+) upper dentures and lower dentures      Cardiovascular          Rhythm and rate: regular and normal     Pulmonary           breath sounds clear to auscultation           OUTSIDE LABS:  CBC:   Lab Results   Component Value Date    WBC 7.5 02/24/2022    WBC 8.3 05/13/2021    HGB 12.7 02/24/2022    HGB 12.8 05/13/2021    HCT 38.1 02/24/2022    HCT 36.5 05/13/2021     02/24/2022     05/13/2021     BMP:   Lab Results   Component Value Date     02/24/2022     07/14/2021    POTASSIUM 4.0 02/24/2022    POTASSIUM 4.2 07/14/2021    CHLORIDE 104 02/24/2022    CHLORIDE 106 07/14/2021    CO2 26 02/24/2022    CO2 27 07/14/2021    BUN 19 02/24/2022    BUN 19 07/14/2021    CR 1.15 (H) 02/24/2022    CR 1.13 (H) 07/14/2021    GLC 85 02/24/2022    GLC 63 (L) 07/14/2021     COAGS:   Lab Results   Component Value Date    INR 1.3 (H) 07/31/2017     POC: No results found for: BGM, HCG, HCGS  HEPATIC:   Lab Results   Component Value Date    ALBUMIN 3.2 (L) 04/15/2022    PROTTOTAL 7.1 04/15/2022    ALT 25 04/15/2022     AST 24 04/15/2022    ALKPHOS 105 04/15/2022    BILITOTAL 0.5 04/15/2022     OTHER:   Lab Results   Component Value Date    CASSI 9.1 02/24/2022    MAG 2.3 12/04/2020    TSH 6.68 (H) 01/26/2022    T4 1.61 (H) 09/15/2020       Anesthesia Plan    ASA Status:  3   NPO Status:  NPO Appropriate (0600 4/19/22 black coffee)    Anesthesia Type: MAC.     - Reason for MAC: straight local not clinically adequate   Induction: Propofol.           Consents    Anesthesia Plan(s) and associated risks, benefits, and realistic alternatives discussed. Questions answered and patient/representative(s) expressed understanding.     - Discussed: Risks, Benefits and Alternatives for BOTH SEDATION and the PROCEDURE were discussed     - Discussed with:  Patient      - Extended Intubation/Ventilatory Support Discussed: No.      - Patient is DNR/DNI Status: No    Use of blood products discussed: No .     Postoperative Care            Comments:                KEO ESTES CRNA

## 2022-04-19 NOTE — ANESTHESIA CARE TRANSFER NOTE
Patient: Ileeta I Ryan    Procedure: Procedure(s):  Excision Left Medial Lower Leg Ulceration  Excision lesion right upper extremity       Diagnosis: Personal history of malignant neoplasm of skin [Z85.828]  Diagnosis Additional Information: No value filed.    Anesthesia Type:   MAC     Note:    Oropharynx: oropharynx clear of all foreign objects and spontaneously breathing  Level of Consciousness: awake  Oxygen Supplementation: room air    Independent Airway: airway patency satisfactory and stable  Dentition: dentition unchanged  Vital Signs Stable: post-procedure vital signs reviewed and stable  Report to RN Given: handoff report given  Patient transferred to: Phase II    Handoff Report: Identifed the Patient, Identified the Reponsible Provider, Reviewed the pertinent medical history, Discussed the surgical course, Reviewed Intra-OP anesthesia mangement and issues during anesthesia, Set expectations for post-procedure period and Allowed opportunity for questions and acknowledgement of understanding      Vitals:  Vitals Value Taken Time   /64 04/19/22 1421   Temp 96.6  F (35.9  C) 04/19/22 1420   Pulse 59 04/19/22 1421   Resp 14 04/19/22 1420   SpO2 97 % 04/19/22 1438   Vitals shown include unvalidated device data.    Electronically Signed By: KEO ESTES CRNA  April 19, 2022  2:39 PM

## 2022-04-19 NOTE — H&P
Surgery Consult Clinic Note      RE: Ileana Davis  : 1940        Chief Complaint:  Left lower leg lesion  Right arm lesion    History of Present Illness:  I am seeing Ileana Davis at the request of Kristine Coreas NP for evaluation of a lesion on the left lower extremity, which was biopsied on 3/28/2022 and a lesion on the right upper extremity near the elbow for which she is requesting evaluation and excision.  The left lower leg lesion biopsy shave biopsy reports is as follows.  She reports the right arm lesion is similar to the left lower extremity lesion prior to biopsy.     Final Diagnosis   Skin lesion, left leg, shave biopsy:  - Hyperkeratotic well differentiated atypical squamous cell proliferation, favor squamous cell carcinoma.  - Base not visualized.  - Recommend complete excision.   Electronically signed by Ki Carvalho MD on 4/3/2022 at  6:09 PM         Medical history:  Past Medical History:   Diagnosis Date     Anemia 2015     Autoimmune hepatitis (H) 2016     Benign paroxysmal positional vertigo 10/7/2010     Contact dermatitis and other eczema, due to unspecified cause 10/7/2010     Esophageal reflux 10/7/2010     Generalized anxiety disorder 10/7/2010     Generalized osteoarthrosis, involving multiple sites 10/7/2010     Hepatitis, unspecified 10/7/2010     Infected wound      Myalgia and myositis, unspecified 10/7/2010    fibromyalgia     Nonallopathic lesion of cervical region, not elsewhere classified 2001     Nonallopathic lesion of thoracic region, not elsewhere classified 3/31/2005     Open wound of knee, leg, and ankle      Rosacea 10/7/2010     Unspecified essential hypertension 10/7/2010     Unspecified hypothyroidism 10/7/2010       Surgical history:  Past Surgical History:   Procedure Laterality Date     APPENDECTOMY       ARTHROPLASTY TOE(S) Left 2020    Procedure: Left  Bethea Arthroplasty with interpositional arthroplasty using graft.;  Surgeon:  Gina Rodriguez, ANTONETTE;  Location: [facility]     BACK SURGERY  2015    lumbar epidural injection     CARDIAC SURGERY  10/2017    angioplasty     CHOLECYSTECTOMY       COLONOSCOPY  07/27/2017    [facility]     COLONOSCOPY - HIM SCAN  05/15/2001    Small internal hemorrhoids; otherwise normal;[facility]     COLONOSCOPY - HIM SCAN  12/14/2006    Colonoscopy, REMV LESN, SNARE     ENT SURGERY      tonsillectomy     ESOPHAGOGASTRODUODENOSCOPY  07/27/2017    [facility]     EYE SURGERY      bilateral cataract removal     GI SURGERY      anal fistula     GYN SURGERY  1985    NICHOLAS BSO     GYN SURGERY      cessarian x 2     GYN SURGERY      tubal sterilazation     NEPHRECTOMY Right 05/02/2019     ORTHOPEDIC SURGERY      right knee     ORTHOPEDIC SURGERY  1986    plantar fascia release     ORTHOPEDIC SURGERY      left knee     ORTHOPEDIC SURGERY      right trigger finger release     ORTHOPEDIC SURGERY  2013    Right great toe MTPJ fusion, adductor tenotomy,correction of hammer toe     ORTHOPEDIC SURGERY  [id number]    multiple procedures left forefoot       Family history:  Family History   Problem Relation Age of Onset     Arthritis Mother         rheumatoid     Heart Disease Mother         CHF     Alcohol/Drug Father         alcoholism     Allergies Father      Thyroid Disease Other      Diabetes No family hx of      Asthma No family hx of        Medications:  No current outpatient medications on file.     Allergies:  The patient is allergic to fentanyl, codeine sulfate, fentanyl and related, morphine, tape [adhesive tape], and tramadol.  .  Social history:  Social History     Tobacco Use     Smoking status: Never Smoker     Smokeless tobacco: Never Used   Substance Use Topics     Alcohol use: No     Marital status: .      Review of Systems:    Constitutional: Negative for fever, chills and weight loss.   HENT: Negative for ear pain, nosebleeds, congestion, sore throat, tinnitus and ear discharge.    Eyes: Negative for blurred  "vision, double vision, photophobia and pain.   Respiratory: Negative for cough, hemoptysis, wheezing and stridor.    Cardiovascular: Negative for chest pain, palpitations and orthopnea.   Gastrointestinal: Negative for heartburn, nausea, vomiting, abdominal pain and blood in stool.   Genitourinary: Negative for urgency, frequency and hematuria.   Musculoskeletal: Negative for myalgias, back pain and joint pain.   Neurological: Negative for tingling, speech change and headaches.   Endo/Heme/Allergies: Does not bruise/bleed easily.   Psychiatric/Behavioral: Negative for depression, suicidal ideas and hallucinations.    Physical Examination:  BP (!) 187/107   Pulse 62   Temp 97.6  F (36.4  C) (Temporal)   Resp 20   Ht 1.575 m (5' 2\")   Wt 90.3 kg (199 lb)   SpO2 100%   BMI 36.40 kg/m    General: Alert and orientedx4, no acute distress, well-developed/well-nourished  HEENT: normocephalic atraumatic, extraocular movements intact, PERRL Sclerae anicteric; Trachea mideline, no JVD  Chest:   Clear to auscultation bilaterally.  Cardiac: S1S2 , regular rate and rhythm without additional sounds  Abdomen: Soft, non-tender, non-distended  Extremities: Cursory exam unremarkable.  No peripheral edema noted.  Skin: Warm, dry, less than 2 sec cap refill  Neuro: CN 2-12 grossly intact, no focal deficit, GCS 15  Psych: Pleasant, calm, asks appropriate questions      Assessment/Plan:  #1 Left lower leg lesion excision  #2 Right upper extremity excision    After discussing the risks of excision including but not limited to, bleeding,  recurrence, need for further resection, wound healing, scar formation and chronic pain she wishes to proceed with the lesion removals.  Her questions were asked and answered.  We will proceed as scheduled with Dr. Huynh.       Adela Charles [facility]  [address] [place] [address]    [id number]    Referring Provider:  No referring provider defined for " this encounter.     Primary Care Provider:  Iris Becerril

## 2022-04-19 NOTE — OR NURSING
Patient and responsible adult given discharge instructions with no questions regarding instructions. Les score 20/20. Pain level 4/10.  Discharged from unit via wheelchair. Patient discharged to home with son.

## 2022-04-19 NOTE — OP NOTE
[facility] Surgery  Minor Procedure Note    Preoperative diagnosis:  Atypical squamous lesion left lower leg, suspicious right arm lesion    Postoperative diagnosis:  Same     Procedure:  Excisional biopsy x 2    Anesthesia:  Local    History: 81 year old year old female with history of shave biopsy of left lower leg that returned concerning for squamous cell carcinoma also has right arm lesion that per patient report is how the other lesion started here for outpatient excision.    Findings:  Very thin skin on lower leg made difficult to close without tearing skin. Arm lesion closed without issue.     Tissue to pathology:    Both skin lesions lower leg lesion was oriented.     Details:   The requisite time out pause observed during which the patient confirmed their identity, date of birth, side and site of the requested excision.  The region was prepped and draped sterilely; local-MAC anesthesia was obtained with infiltration of 1% lidocaine.  The lower leg lesion was marked for 0.5 cm margins of healthy tissue, it was then excised with 15 blade aprox 2 x 6 cm in length and taken full thickeness. Using cautery skin flaps were raised. Due to location on front of leg it was not coming together without tension and her skin was very thin causing ripping. The incisions was then extended up and down to aprox 9 cm and skin flaps were further raised. Then the skin would come together using 3-0 nylon interrupted sutures.  with only mild tension, though the prior skin tears left an open area of skin at center. The arm lesion was removed to negative gross margins aprox 3 cm.  without issue and there was enough extra skin that there was no tension and skin was closed with 3-0 nylon sutures.    The procedure was well tolerated and the patient was discharged with wound care instructions and followup appointment.       Kai Huynh MD

## 2022-04-20 RX ORDER — LOSARTAN POTASSIUM 25 MG/1
25 TABLET ORAL DAILY
Qty: 90 TABLET | Refills: 0 | Status: SHIPPED | OUTPATIENT
Start: 2022-04-20 | End: 2022-07-18

## 2022-04-20 NOTE — TELEPHONE ENCOUNTER
Losartan      Last Written Prescription Date:  1/11/22  Last Fill Quantity: 90,   # refills: 0  Last Office Visit: 10/29/21  Future Office visit:    Next 5 appointments (look out 90 days)    Apr 25, 2022 10:00 AM  (Arrive by 9:45 AM)  Return Visit with KEO Chapa CNP  [facility] ([facility] ) [address] [place] [address] [phone]           Routing refill request to provider for review/approval because:

## 2022-04-22 LAB
PATH REPORT.COMMENTS IMP SPEC: NORMAL
PATH REPORT.COMMENTS IMP SPEC: NORMAL
PATH REPORT.FINAL DX SPEC: NORMAL
PATH REPORT.GROSS SPEC: NORMAL
PATH REPORT.MICROSCOPIC SPEC OTHER STN: NORMAL
PATH REPORT.RELEVANT HX SPEC: NORMAL
PHOTO IMAGE: NORMAL

## 2022-04-22 PROCEDURE — 88305 TISSUE EXAM BY PATHOLOGIST: CPT | Mod: 26 | Performed by: PATHOLOGY

## 2022-04-25 ENCOUNTER — OFFICE VISIT (OUTPATIENT)
Dept: WOUND CARE | Facility: OTHER | Age: 82
End: 2022-04-25
Attending: NURSE PRACTITIONER
Payer: MEDICARE

## 2022-04-25 VITALS
TEMPERATURE: 95.2 F | OXYGEN SATURATION: 98 % | WEIGHT: 199 LBS | HEART RATE: 55 BPM | BODY MASS INDEX: 36.62 KG/M2 | HEIGHT: 62 IN

## 2022-04-25 DIAGNOSIS — E88.09 HYPOALBUMINEMIA: ICD-10-CM

## 2022-04-25 DIAGNOSIS — S81.802A OPEN WOUND OF LEFT KNEE, LEG, AND ANKLE, INITIAL ENCOUNTER: ICD-10-CM

## 2022-04-25 DIAGNOSIS — S81.802D NON-HEALING WOUND OF LOWER EXTREMITY, LEFT, SUBSEQUENT ENCOUNTER: Primary | ICD-10-CM

## 2022-04-25 DIAGNOSIS — S91.002A OPEN WOUND OF LEFT KNEE, LEG, AND ANKLE, INITIAL ENCOUNTER: ICD-10-CM

## 2022-04-25 DIAGNOSIS — S81.002A OPEN WOUND OF LEFT KNEE, LEG, AND ANKLE, INITIAL ENCOUNTER: ICD-10-CM

## 2022-04-25 PROCEDURE — G0463 HOSPITAL OUTPT CLINIC VISIT: HCPCS

## 2022-04-25 PROCEDURE — 99213 OFFICE O/P EST LOW 20 MIN: CPT | Performed by: NURSE PRACTITIONER

## 2022-04-25 ASSESSMENT — PAIN SCALES - GENERAL: PAINLEVEL: NO PAIN (0)

## 2022-04-25 NOTE — PROGRESS NOTES
SUBJECTIVE:  Ileana Davis, 81 year old, female presents with the following Chief Complaint(s) with HPI to follow:  Chief Complaint   Patient presents with     RECHECK     posterior L leg wound        Wound check    HPI:  Ileana is here today for the reassessment and treatment of LLE wounds.  Back story:  Ileana isn't new to Wound Care and she's a poor historian.   We have seen her in October, 2013 for a LLE ulceration; July, 2014 for a left knee laceration; November, 2020 for left knee and left elbow abrasion; and May, 2021 for multiple wounds due to a fall.    Current wound, not sure how it when or how it started.   Believe she had it for 20 years.   Started as a lump (doesn't remember the diagnosis the doctors gave her but said it was harmless)  In the past 2 years, it's been opening up off and on.    Past tx: antiseptic ointment and bandaid  3/28/22: saw me for 1st wound care appt. Tx: LLE posterior: honey gauze and foam. Removed a non-healing lesion from anterior LLE.  Results: SCC. Recommend complete excision.  Referral to Dr. Huynh and JUNG Restrepo  4/8/22: saw Yolanda DUMONT RN [facility]OCN. Tx: honey gauze, foam  4/19/22: saw Yolanda DUMONT Rn [facility]. Tx:  honey gauze, foam  4/19/22: surgical procedure by Dr. Huynh   Right elbow lesion removed. Results: Benign verrucal keratosis, no evidence of malignancy  4/25/22: seeing myself today   No issues with dressing on LLE posterior wound.    LLE anterior wound: dressing is falling off.   Right elbow: tender  Denies any fevers, chills, and/or malodorous drainage.   PMH: anxiety, HTN, fibromyalgia, MO, CKD stage 3,   Denies hx of smoking, no diabetes  Last A1c  No results found for: A1C    Refused BP to be taken today  Denies any symptoms  Reports working with her PCP      Patient Active Problem List   Diagnosis     Hypothyroidism     Generalized anxiety disorder     Benign essential hypertension     GERD (gastroesophageal reflux disease)     Hepatitis     Contact dermatitis      Rosacea     Osteoarthritis     Fibromyalgia     Open wound of knee, leg, and ankle     Degeneration of lumbar or lumbosacral intervertebral disc     Anemia     Autoimmune hepatitis (H)     Morbid obesity (H)     Mild major depression (H)     Hallux valgus of left foot     Left foot pain     Ametropia     Decreased vision of right eye     Dry eye syndrome of both eyes     PCO (posterior capsular opacification), left     Ptosis of both eyelids     Esodeviation     S/p nephrectomy     Chronic kidney disease, stage 3 (H)     Overactive bladder     Chalazion of right upper eyelid       Past Medical History:   Diagnosis Date     Anemia 8/20/2015     Autoimmune hepatitis (H) 6/2/2016     Benign paroxysmal positional vertigo 10/7/2010     Contact dermatitis and other eczema, due to unspecified cause 10/7/2010     Esophageal reflux 10/7/2010     Generalized anxiety disorder 10/7/2010     Generalized osteoarthrosis, involving multiple sites 10/7/2010     Hepatitis, unspecified 10/7/2010     Infected wound      Myalgia and myositis, unspecified 10/7/2010    fibromyalgia     Nonallopathic lesion of cervical region, not elsewhere classified 12/5/2001     Nonallopathic lesion of thoracic region, not elsewhere classified 3/31/2005     Open wound of knee, leg, and ankle      Rosacea 10/7/2010     Unspecified essential hypertension 10/7/2010     Unspecified hypothyroidism 10/7/2010       Past Surgical History:   Procedure Laterality Date     APPENDECTOMY       ARTHROPLASTY TOE(S) Left 9/21/2020    Procedure: Left  Bethea Arthroplasty with interpositional arthroplasty using graft.;  Surgeon: Gina Rodriguez DPM;  Location: [facility] OR     BACK SURGERY  2015    lumbar epidural injection     CARDIAC SURGERY  10/2017    angioplasty     CHOLECYSTECTOMY       COLONOSCOPY  07/27/2017    [facility]     COLONOSCOPY - HIM SCAN  05/15/2001    Small internal hemorrhoids; otherwise normal;[facility]     COLONOSCOPY - HIM SCAN  12/14/2006     Colonoscopy, REMV LESN, SNARE     ENT SURGERY      tonsillectomy     ESOPHAGOGASTRODUODENOSCOPY  07/27/2017    [facility]     EXCISE LESION LOWER EXTREMITY Left 4/19/2022    Procedure: Excision Left Medial Lower Leg Ulceration;  Surgeon: Kai Huynh MD;  Location: [facility] OR     EXCISE LESION UPPER EXTREMITY Right 4/19/2022    Procedure: Excision lesion right upper extremity;  Surgeon: Kai Huynh MD;  Location: [facility]     EYE SURGERY      bilateral cataract removal     GI SURGERY      anal fistula     GYN SURGERY  1985    NICHOLAS BSO     GYN SURGERY      cessarian x 2     GYN SURGERY      tubal sterilazation     NEPHRECTOMY Right 05/02/2019     ORTHOPEDIC SURGERY      right knee     ORTHOPEDIC SURGERY  1986    plantar fascia release     ORTHOPEDIC SURGERY      left knee     ORTHOPEDIC SURGERY      right trigger finger release     ORTHOPEDIC SURGERY  2013    Right great toe MTPJ fusion, adductor tenotomy,correction of hammer toe     ORTHOPEDIC SURGERY  [id number]    multiple procedures left forefoot       Family History   Problem Relation Age of Onset     Arthritis Mother         rheumatoid     Heart Disease Mother         CHF     Alcohol/Drug Father         alcoholism     Allergies Father      Thyroid Disease Other      Diabetes No family hx of      Asthma No family hx of        Social History     Tobacco Use     Smoking status: Never Smoker     Smokeless tobacco: Never Used   Substance Use Topics     Alcohol use: No       Current Outpatient Medications   Medication Sig Dispense Refill     conjugated estrogens (PREMARIN) 0.625 MG/GM vaginal cream Place 0.5 g vaginally twice a week 30 g 3     cyclobenzaprine (FLEXERIL) 10 MG tablet TAKE 1 TABLET (10 MG) BY MOUTH 3 TIMES DAILY AS NEEDED FOR MUSCLE SPASMS 90 tablet 1     ferrous sulfate (FEROSUL) 325 (65 Fe) MG tablet Take 325 mg by mouth daily (with breakfast)       fluticasone (FLONASE) 50 MCG/ACT nasal spray SPRAY 2 SPRAYS INTO BOTH NOSTRILS DAILY AS NEEDED 16 g 1      hydrocortisone 2.5 % cream APPLY TWICE TIMES DAILY TO ACTIVE ECZEMA ON THE HANDS, FOLLOW WITH MOISTURIZING CREAM.       levothyroxine (SYNTHROID/LEVOTHROID) 100 MCG tablet Take 1 tablet (100 mcg) by mouth daily 90 tablet 1     losartan (COZAAR) 25 MG tablet TAKE 1 TABLET (25 MG) BY MOUTH DAILY 90 tablet 0     melatonin 1 MG TABS tablet Take by mouth nightly as needed for sleep        MIRALAX 17 GM/SCOOP powder TAKE 1 DOSE (17G) BY MOUTH DAILY FOR 5 DAYS STARTING ON 3/26 AS PART OF BOWEL PREP, MIX IN 8OZ WATER OR JUICE PRIOR TO ADMINISTRATION       NYAMYC 025370 UNIT/GM external powder APPLY TO AFFECTED AREA NIGHTLY AS NEEDED FOR RASH 60 g 1     nystatin (MYCOSTATIN) 750611 UNIT/GM external cream APPLY TOPICALLY 2 TIMES DAILY AS NEEDED FOR DRY SKIN 30 g 2     omeprazole (PRILOSEC) 40 MG capsule Take by mouth daily Before the same meal daily       Polyethylene Glycol 3350 (MIRALAX PO) Take by mouth daily as needed        propranolol (INDERAL) 10 MG tablet Take 1 tablet (10 mg) by mouth daily 30 tablet 5     propylene glycol (SYSTANE BALANCE) 0.6 % SOLN ophthalmic solution Inject 1-2 drops into the eye       sennosides (SENOKOT) 8.6 MG tablet TAKE 1 TABLET BY MOUTH ONE TIME A DAY AT BEDTIME         Allergies   Allergen Reactions     Fentanyl Other (See Comments)     Severe agitation when used during angiogram     Codeine Sulfate Other (See Comments)     hallucinations     Fentanyl And Related      Other reaction(s): Confusion     Morphine Other (See Comments)     Hallucinations with iv therapy     Tape [Adhesive Tape]      Tramadol Other (See Comments)     hallucination       REVIEW OF SYSTEMS  Skin: as stated above  Eyes: glasses  Ears/Nose/Throat: negative; issues with swallowing  Respiratory: Shortness of breath- same, No cough and No hemoptysis  Cardiovascular: negative  Gastrointestinal: constipation  Genitourinary: incontinence; has one kidney   Musculoskeletal: as stated above, hx of arthritis and  "fibromyalgia  Neurologic: negative  Psychiatric: positive for anxiety and depression  Hematologic/Lymphatic/Immunologic: negative  Endocrine: negative    OBJECTIVE:  Pulse 55   Temp (!) 95.2  F (35.1  C)   Ht 1.575 m (5' 2\")   Wt 90.3 kg (199 lb)   SpO2 98%   BMI 36.40 kg/m    Constitutional: alert and no distress  Cardiovascular: left foot: DP and PT heard via doppler, color and warmth intact, cap refill <3 sec  Respiratory:  Good diaphragmatic excursion.   Musculoskeletal: extremities normal- no gross deformities noted and gait normal  Skin:   Wound description: Type of Wound- ?; Location- LLE, posterior, Drainage amount-scant, Drainage color- serous, Odor- none; Wound bed-see picture, Surrounding skin- slight erythema, slight edema, no increase heat or lymphangitis  Measurements: 1.1 x 1 cm (C shape)    Dressing change: Wound cleansed with soap and water, dressed with honey gauze + foam with border.   Wound debridement completed on: , debridement type: dry gauze    LLE anterior poster prodecure:    No heat.   Covered with optifoam with border as dressing was falling off    Right elbow:    No heat  Covered with optifoam with border as dressing was falling off    Psychiatric: mentation appears normal and affect normal/bright    LABS  No results found for any visits on 04/25/22.    ASSESSMENT / PLAN:  (S81.802D) Non-healing wound of lower extremity, left, subsequent encounter  (primary encounter diagnosis)  Comment: noted post-procedure  Plan:   Changed dressing to the following:  Plain optifoam with gentle  Keep in place until Thursday's appt    (S81.002A,  S81.802A,  S91.002A) Open wound of left knee, leg, and ankle, initial encounter  Comment: noted and almost healed  Plan:   Continue with the following:  Wash wound with mild soap and water, dry  Apply honey gauze (friendly reminder, remove both plastic backings) to wound  Cover with foam   Change every 3 days or as needed    (E88.09) " Hypoalbuminemia  Comment: noted  Plan:   Education focused on protein intake  Gave her a list of high protein food iteam.      Follow up  Thursday with JUNG Restrepo  2 week    Treatment goal:  Moisture balance  Prevent infection  Promote healing.      Patient Instructions   Wash hands prior to dressing change.   Clean instruments as appropriate    Keep foam intact until Thursday on your skin biopsy sites.      Left leg (back) wound  Wash wound with mild soap and water, dry  Apply honey gauze (friendly reminder, remove both plastic backings) to wound  Cover with foam   Change every 3 days or as needed    Please call if any questions/concerns and/or problems ([phone])    Follow up   2 week     Increase protein                Time: 25 min  Barrier: anxious  Willingness to learn: accepting    Kristine CROWLEY FNP-BC  Diabetes and Wound Care    Cc: Dr. Jones

## 2022-04-25 NOTE — PATIENT INSTRUCTIONS
Wash hands prior to dressing change.   Clean instruments as appropriate    Keep foam intact until Thursday on your skin biopsy sites.      Left leg (back) wound  Wash wound with mild soap and water, dry  Apply honey gauze (friendly reminder, remove both plastic backings) to wound  Cover with foam   Change every 3 days or as needed    Please call if any questions/concerns and/or problems ([phone])    Follow up   2 week     Increase protein

## 2022-04-25 NOTE — NURSING NOTE
"Chief Complaint   Patient presents with     RECHECK     posterior L leg wound       Initial Pulse 55   Temp (!) 95.2  F (35.1  C)   Ht 1.575 m (5' 2\")   Wt 90.3 kg (199 lb)   SpO2 98%   BMI 36.40 kg/m   Estimated body mass index is 36.4 kg/m  as calculated from the following:    Height as of this encounter: 1.575 m (5' 2\").    Weight as of this encounter: 90.3 kg (199 lb).  Medication Reconciliation: complete  Judy Steiner LPN    "

## 2022-04-28 ENCOUNTER — OFFICE VISIT (OUTPATIENT)
Dept: SURGERY | Facility: OTHER | Age: 82
End: 2022-04-28
Attending: CLINICAL NURSE SPECIALIST
Payer: MEDICARE

## 2022-04-28 VITALS
SYSTOLIC BLOOD PRESSURE: 124 MMHG | DIASTOLIC BLOOD PRESSURE: 74 MMHG | HEART RATE: 58 BPM | OXYGEN SATURATION: 98 % | BODY MASS INDEX: 36.62 KG/M2 | WEIGHT: 199 LBS | HEIGHT: 62 IN | TEMPERATURE: 97.4 F

## 2022-04-28 DIAGNOSIS — Z85.828 PERSONAL HISTORY OF MALIGNANT NEOPLASM OF SKIN: Primary | ICD-10-CM

## 2022-04-28 DIAGNOSIS — Z98.890 STATUS POST RE-EXCISION OF MALIGNANT SKIN LESION: ICD-10-CM

## 2022-04-28 DIAGNOSIS — Z98.890 H/O LOCAL EXCISION OF SKIN LESION: ICD-10-CM

## 2022-04-28 DIAGNOSIS — Z85.828 STATUS POST RE-EXCISION OF MALIGNANT SKIN LESION: ICD-10-CM

## 2022-04-28 PROCEDURE — 99024 POSTOP FOLLOW-UP VISIT: CPT | Performed by: CLINICAL NURSE SPECIALIST

## 2022-04-28 PROCEDURE — G0463 HOSPITAL OUTPT CLINIC VISIT: HCPCS

## 2022-04-28 ASSESSMENT — PAIN SCALES - GENERAL: PAINLEVEL: NO PAIN (0)

## 2022-04-28 NOTE — PROGRESS NOTES
"S:  Ileeta returns 9 days after excision of left medial lower leg ulceration and excision of right upper extremity lesion  by Dr. Huynh for atypical squamous lesion of the left lower leg and suspicious right arm lesion.  Doing well post operatively, tolerating a general diet, having regular bowel movements, passing flatus, no diarrhea, doesn't take pain medication regularly. Specifically denies fevers, chills, nausea, vomiting, shortness of breath.    Patient is seen with her son present.      Pathology report as follows:  Specimens:   A) - Leg, Below Knee, Left, short stitch superior, long stitch lateral                               B) - Elbow, Right, right elbow lesion                                                      Final Diagnosis   A.  Skin, below left knee, excision-  Nonspecific dermal fibrosis, no evidence of malignancy (see description)    B.  Skin, right elbow, biopsy-  Benign verrucal keratosis, no evidence of malignancy      Electronically signed by Miller Eastman MD on 4/22/2022 at  1:24 PM     O:  /74 (BP Location: Left arm, Patient Position: Chair, Cuff Size: Adult Large)   Pulse 58   Temp 97.4  F (36.3  C) (Tympanic)   Ht 1.575 m (5' 2\")   Wt 90.3 kg (199 lb)   SpO2 98%   BMI 36.40 kg/m    Gen: Alert and orientedx4, no apparent distress, ambulating with cane; anxious related to her health care  Abd: Soft, ND/NT no rebound, no guarding  Wound description:     Type of Wound:  surgical   Location:  Right elbow    Drainage amount:  none   Drainage color:  N/A   Odor:  none   Wound bed:  Incision intact   Surrounding skin:  WDL        Pain:  Tender to touch   Suture removed without difficulty, 1/4\" steri strips applied.    Wound description:     Type of Wound:  surgical   Location:  Left lower leg    Drainage amount:  none   Drainage color:  N/A   Odor:  none   Wound bed:  Incision intact, with stable black eschar   Surrounding skin:  ecchymosis        Pain:  Tender to " "touch   Every other suture removed without difficulty, 1/4\" steri strips applied.       A/P  #1 S/P Skin excision X2    The wounds are healing as expected.  I was able to remove all of the right elbow sutures today and removed every other suture from the left lower leg.  There is an area of stable black eschar to the left lower leg, which was expected after surgery.  I applied steri strips to the right elbow incision and also the the left lower leg where sutures were removed.  I covered the left lower leg with a 4x4 gauze folded in half, secured with tape.  Of note, she has many concerns related to tape and we are trying a new tape for her in the hopes that it will \"stick, but not too much\" per her request.      Adela Charles [facility]  Surgery and Wound Care  [place]   "

## 2022-04-28 NOTE — NURSING NOTE
"Chief Complaint   Patient presents with     Post-Op - General Surgery     4/19/22, [place], Excision Left Lower Medial Lower Leg Ulceration        Initial /74 (BP Location: Left arm, Patient Position: Chair, Cuff Size: Adult Large)   Pulse 58   Temp 97.4  F (36.3  C) (Tympanic)   Ht 1.575 m (5' 2\")   Wt 90.3 kg (199 lb)   SpO2 98%   BMI 36.40 kg/m   Estimated body mass index is 36.4 kg/m  as calculated from the following:    Height as of this encounter: 1.575 m (5' 2\").    Weight as of this encounter: 90.3 kg (199 lb).  Medication Reconciliation: complete  ANIYAH GARCIA LPN    "

## 2022-04-28 NOTE — PATIENT INSTRUCTIONS
Thank you for allowing KEO Ludwig and our surgical team to participate in your care. Please call our office at [phone] with scheduling questions or with any other questions or concerns.

## 2022-05-04 ENCOUNTER — OFFICE VISIT (OUTPATIENT)
Dept: WOUND CARE | Facility: OTHER | Age: 82
End: 2022-05-04
Attending: CLINICAL NURSE SPECIALIST
Payer: MEDICARE

## 2022-05-04 VITALS
TEMPERATURE: 97.3 F | OXYGEN SATURATION: 97 % | SYSTOLIC BLOOD PRESSURE: 136 MMHG | HEART RATE: 63 BPM | DIASTOLIC BLOOD PRESSURE: 72 MMHG

## 2022-05-04 DIAGNOSIS — S81.802D OPEN WOUND OF LEFT KNEE, LEG, AND ANKLE, SUBSEQUENT ENCOUNTER: ICD-10-CM

## 2022-05-04 DIAGNOSIS — S81.002D OPEN WOUND OF LEFT KNEE, LEG, AND ANKLE, SUBSEQUENT ENCOUNTER: ICD-10-CM

## 2022-05-04 DIAGNOSIS — S81.802D NON-HEALING WOUND OF LOWER EXTREMITY, LEFT, SUBSEQUENT ENCOUNTER: Primary | ICD-10-CM

## 2022-05-04 DIAGNOSIS — S91.002D OPEN WOUND OF LEFT KNEE, LEG, AND ANKLE, SUBSEQUENT ENCOUNTER: ICD-10-CM

## 2022-05-04 PROCEDURE — 99212 OFFICE O/P EST SF 10 MIN: CPT | Performed by: CLINICAL NURSE SPECIALIST

## 2022-05-04 PROCEDURE — G0463 HOSPITAL OUTPT CLINIC VISIT: HCPCS

## 2022-05-04 ASSESSMENT — PAIN SCALES - GENERAL: PAINLEVEL: NO PAIN (0)

## 2022-05-04 NOTE — PROGRESS NOTES
SUBJECTIVE:  Ileana Davis, 81 year old, female presents with the following Chief Complaint(s) with HPI to follow:   Chief Complaint   Patient presents with     WOUND CARE     Left lower leg wound          HPI:    Ileana is here for the reassessment of the left posterior leg wound, left anterior leg skin excision, and right elbow skin excision.     The posterior left leg wound began as a bump, per patient, and has opened off/on over the last two years.    She is two weeks post excision of left medial lower leg ulceration and excision of right upper extremity lesion by Dr. Huynh for atypical squamous lesion of the left lower leg and suspicious right arm lesion.    Patient Active Problem List   Diagnosis     Hypothyroidism     Generalized anxiety disorder     Benign essential hypertension     GERD (gastroesophageal reflux disease)     Hepatitis     Contact dermatitis     Rosacea     Osteoarthritis     Fibromyalgia     Open wound of knee, leg, and ankle     Degeneration of lumbar or lumbosacral intervertebral disc     Anemia     Autoimmune hepatitis (H)     Morbid obesity (H)     Mild major depression (H)     Hallux valgus of left foot     Left foot pain     Ametropia     Decreased vision of right eye     Dry eye syndrome of both eyes     PCO (posterior capsular opacification), left     Ptosis of both eyelids     Esodeviation     S/p nephrectomy     Chronic kidney disease, stage 3 (H)     Overactive bladder     Chalazion of right upper eyelid       Past Medical History:   Diagnosis Date     Anemia 8/20/2015     Autoimmune hepatitis (H) 6/2/2016     Benign paroxysmal positional vertigo 10/7/2010     Contact dermatitis and other eczema, due to unspecified cause 10/7/2010     Esophageal reflux 10/7/2010     Generalized anxiety disorder 10/7/2010     Generalized osteoarthrosis, involving multiple sites 10/7/2010     Hepatitis, unspecified 10/7/2010     Infected wound      Myalgia and myositis, unspecified 10/7/2010     fibromyalgia     Nonallopathic lesion of cervical region, not elsewhere classified 12/5/2001     Nonallopathic lesion of thoracic region, not elsewhere classified 3/31/2005     Open wound of knee, leg, and ankle      Rosacea 10/7/2010     Unspecified essential hypertension 10/7/2010     Unspecified hypothyroidism 10/7/2010       Past Surgical History:   Procedure Laterality Date     APPENDECTOMY       ARTHROPLASTY TOE(S) Left 9/21/2020    Procedure: Left  Bethea Arthroplasty with interpositional arthroplasty using graft.;  Surgeon: Gina Rodriguez DPM;  Location: [facility]     BACK SURGERY  2015    lumbar epidural injection     CARDIAC SURGERY  10/2017    angioplasty     CHOLECYSTECTOMY       COL[facility]COPY  07/27/2017    [facility]     COLONOSCOPY - HIM SCAN  05/15/2001    Small internal hemorrhoids; otherwise normal;[facility]     COLONOSCOPY - HIM SCAN  12/14/2006    Colonoscopy, [facility]     ENT SURGERY      tonsillectomy     ESOPHAGOGASTRODUODENOSCOPY  07/27/2017    [facility]     EXCISE LESION LOWER EXTREMITY Left 4/19/2022    Procedure: Excision Left Medial Lower Leg Ulceration;  Surgeon: Kai Huynh MD;  Location: [facility]     EXCISE LESION UPPER EXTREMITY Right 4/19/2022    Procedure: Excision lesion right upper extremity;  Surgeon: Kai Huynh MD;  Location: [facility]     EYE SURGERY      bilateral cataract removal     GI SURGERY      anal fistula     GYN SURGERY  1985    [facility]     GYN SURGERY      cessarian x 2     GYN SURGERY      tubal sterilazation     NEPHRECTOMY Right 05/02/2019     ORTHOPEDIC SURGERY      right knee     ORTHOPEDIC SURGERY  1986    plantar fascia release     ORTHOPEDIC SURGERY      left knee     ORTHOPEDIC SURGERY      right trigger finger release     ORTHOPEDIC SURGERY  2013    Right great toe MTPJ fusion, adductor tenotomy,correction of hammer toe     ORTHOPEDIC SURGERY  [id number]    multiple procedures left forefoot       Family History   Problem Relation Age of Onset      Arthritis Mother         rheumatoid     Heart Disease Mother         CHF     Alcohol/Drug Father         alcoholism     Allergies Father      Thyroid Disease Other      Diabetes No family hx of      Asthma No family hx of        Social History     Tobacco Use     Smoking status: Never Smoker     Smokeless tobacco: Never Used   Substance Use Topics     Alcohol use: No       Current Outpatient Medications   Medication Sig Dispense Refill     conjugated estrogens (PREMARIN) 0.625 MG/GM vaginal cream Place 0.5 g vaginally twice a week 30 g 3     cyclobenzaprine (FLEXERIL) 10 MG tablet TAKE 1 TABLET (10 MG) BY MOUTH 3 TIMES DAILY AS NEEDED FOR MUSCLE SPASMS 90 tablet 1     ferrous sulfate (FEROSUL) 325 (65 Fe) MG tablet Take 325 mg by mouth daily (with breakfast)       fluticasone (FLONASE) 50 MCG/ACT nasal spray SPRAY 2 SPRAYS INTO BOTH NOSTRILS DAILY AS NEEDED 16 g 1     hydrocortisone 2.5 % cream APPLY TWICE TIMES DAILY TO ACTIVE ECZEMA ON THE HANDS, FOLLOW WITH MOISTURIZING CREAM.       levothyroxine (SYNTHROID/LEVOTHROID) 100 MCG tablet Take 1 tablet (100 mcg) by mouth daily 90 tablet 1     losartan (COZAAR) 25 MG tablet TAKE 1 TABLET (25 MG) BY MOUTH DAILY 90 tablet 0     melatonin 1 MG TABS tablet Take by mouth nightly as needed for sleep        MIRALAX 17 GM/SCOOP powder TAKE 1 DOSE (17G) BY MOUTH DAILY FOR 5 DAYS STARTING ON 3/26 AS PART OF BOWEL PREP, MIX IN 8OZ WATER OR JUICE PRIOR TO ADMINISTRATION       NYAMYC 527194 UNIT/GM external powder APPLY TO AFFECTED AREA NIGHTLY AS NEEDED FOR RASH 60 g 1     nystatin (MYCOSTATIN) 396854 UNIT/GM external cream APPLY TOPICALLY 2 TIMES DAILY AS NEEDED FOR DRY SKIN 30 g 2     omeprazole (PRILOSEC) 40 MG capsule Take by mouth daily Before the same meal daily       Polyethylene Glycol 3350 (MIRALAX PO) Take by mouth daily as needed        propranolol (INDERAL) 10 MG tablet Take 1 tablet (10 mg) by mouth daily 30 tablet 5     propylene glycol (SYSTANE BALANCE) 0.6 % SOLN  ophthalmic solution Inject 1-2 drops into the eye       sennosides (SENOKOT) 8.6 MG tablet TAKE 1 TABLET BY MOUTH ONE TIME A DAY AT BEDTIME         Allergies   Allergen Reactions     Fentanyl Other (See Comments)     Severe agitation when used during angiogram     Codeine Sulfate Other (See Comments)     hallucinations     Fentanyl And Related      Other reaction(s): Confusion     Morphine Other (See Comments)     Hallucinations with iv therapy     Tape [Adhesive Tape]      Tramadol Other (See Comments)     hallucination       REVIEW OF SYSTEMS  Skin: as above  Eyes: positive for glasses  Ears/Nose/Throat: negative  Respiratory: Shortness of breath- unchanged  Cardiovascular: negative  Gastrointestinal: positive for constipation  Genitourinary: positive for incontinence; one kidney  Musculoskeletal: positive for arthritis, fibromyalgia and ambulates with a cane  Neurologic: negative  Psychiatric: positive for anxiety and depression  Hematologic/Lymphatic/Immunologic: negative  Endocrine: negative    OBJECTIVE:    B/P: 136/72, T: 97.3, P: 63, R: Data Unavailable, W: 0 lbs 0 oz, BMI: There is no height or weight on file to calculate BMI.  Constitutional: alert, cooperative and over weight  Head: Normocephalic. No masses, lesions, tenderness or abnormalities  Respiratory:  Respirations even and unlabored  Musculoskeletal: extremities normal- no gross deformities noted  Skin:        Wound description:     Type of Wound:  ? unknown   Location:  Left posterior lower extremity    Drainage amount:  none   Drainage color:  N/A   Odor:  none   Wound bed:  pink   Surrounding skin:  WDL        Measurements:  1 x 0.5 cm   Pain:  Tender with palpation   Wound debridement completed on: N.A, debridement type: N/A        Dressing change:      Wound cleansed with mild soap, rinse, dried.  Dressed with Mepilex Ag 3x3 bordered foam.      Wound description:                Type of Wound:  surgical              Location:  Right elbow       "         Drainage amount:  none              Drainage color:  N/A              Odor:  none              Wound bed:  Healed              Surrounding skin:  WDL              Pain:  Denies                Wound description:                Type of Wound:  surgical              Location:  Left lower leg               Drainage amount:  none              Drainage color:  N/A              Odor:  none              Wound bed:  Incision intact, with stable black eschar              Surrounding skin:  ecchymosis (fading)              Pain:  Tender to touch              Remaining suture removed today without difficulty, 1/4\" steri strips applied.  Painted incision with betadine.     Neurologic: Reflexes normal and symmetric. Sensation grossly WNL.  Psychiatric: mentation appears normal and affect normal/bright    THERAPY GOAL: Complete healing    ASSESSMENT / PLAN:    Comments:  The right elbow is healed and she has stable eschar to the left lower anterior leg, so I removed the remaining sutures.  The posterior left leg wound is developing a thin layer of epithelium.     Plan:     - Paint the left anterior leg wound with betadine daily for an additional five days.    - Apply Mepilex Ag bordered foam to the left posterior leg; change every three days.     Follow-up in 2 weeks in surgery clinic or as needed for acute concerns.    Adela Charles CNS  Surgery and Wound Care  [place] [facility]  "

## 2022-05-04 NOTE — NURSING NOTE
"Chief Complaint   Patient presents with     WOUND CARE     Left lower leg wound       Initial /72 (BP Location: Left arm, Patient Position: Chair, Cuff Size: Adult Large)   Pulse 63   Temp 97.3  F (36.3  C) (Tympanic)   SpO2 97%  Estimated body mass index is 36.4 kg/m  as calculated from the following:    Height as of 4/28/22: 1.575 m (5' 2\").    Weight as of 4/28/22: 90.3 kg (199 lb).  Medication Reconciliation: complete  ANIYAH GARCIA LPN    "

## 2022-05-09 ENCOUNTER — OFFICE VISIT (OUTPATIENT)
Dept: WOUND CARE | Facility: OTHER | Age: 82
End: 2022-05-09
Attending: FAMILY MEDICINE
Payer: MEDICARE

## 2022-05-09 VITALS
HEART RATE: 65 BPM | SYSTOLIC BLOOD PRESSURE: 125 MMHG | OXYGEN SATURATION: 98 % | TEMPERATURE: 97.7 F | DIASTOLIC BLOOD PRESSURE: 75 MMHG

## 2022-05-09 DIAGNOSIS — S81.802D NON-HEALING WOUND OF LOWER EXTREMITY, LEFT, SUBSEQUENT ENCOUNTER: Primary | ICD-10-CM

## 2022-05-09 DIAGNOSIS — S81.002D OPEN WOUND OF LEFT KNEE, LEG, AND ANKLE, SUBSEQUENT ENCOUNTER: ICD-10-CM

## 2022-05-09 DIAGNOSIS — S91.002D OPEN WOUND OF LEFT KNEE, LEG, AND ANKLE, SUBSEQUENT ENCOUNTER: ICD-10-CM

## 2022-05-09 DIAGNOSIS — S81.802D OPEN WOUND OF LEFT KNEE, LEG, AND ANKLE, SUBSEQUENT ENCOUNTER: ICD-10-CM

## 2022-05-09 LAB
ANION GAP SERPL CALCULATED.3IONS-SCNC: 6 MMOL/L (ref 3–14)
BASOPHILS # BLD AUTO: 0.1 10E3/UL (ref 0–0.2)
BASOPHILS NFR BLD AUTO: 1 %
BUN SERPL-MCNC: 20 MG/DL (ref 7–30)
CALCIUM SERPL-MCNC: 8.7 MG/DL (ref 8.5–10.1)
CHLORIDE BLD-SCNC: 106 MMOL/L (ref 94–109)
CO2 SERPL-SCNC: 26 MMOL/L (ref 20–32)
CREAT SERPL-MCNC: 1.14 MG/DL (ref 0.52–1.04)
EOSINOPHIL # BLD AUTO: 0.2 10E3/UL (ref 0–0.7)
EOSINOPHIL NFR BLD AUTO: 3 %
ERYTHROCYTE [DISTWIDTH] IN BLOOD BY AUTOMATED COUNT: 11.9 % (ref 10–15)
GFR SERPL CREATININE-BSD FRML MDRD: 48 ML/MIN/1.73M2
GLUCOSE BLD-MCNC: 90 MG/DL (ref 70–99)
HCT VFR BLD AUTO: 39.4 % (ref 35–47)
HGB BLD-MCNC: 13.3 G/DL (ref 11.7–15.7)
IMM GRANULOCYTES # BLD: 0 10E3/UL
IMM GRANULOCYTES NFR BLD: 0 %
LYMPHOCYTES # BLD AUTO: 1.8 10E3/UL (ref 0.8–5.3)
LYMPHOCYTES NFR BLD AUTO: 24 %
MCH RBC QN AUTO: 32.4 PG (ref 26.5–33)
MCHC RBC AUTO-ENTMCNC: 33.8 G/DL (ref 31.5–36.5)
MCV RBC AUTO: 96 FL (ref 78–100)
MONOCYTES # BLD AUTO: 0.7 10E3/UL (ref 0–1.3)
MONOCYTES NFR BLD AUTO: 9 %
NEUTROPHILS # BLD AUTO: 4.8 10E3/UL (ref 1.6–8.3)
NEUTROPHILS NFR BLD AUTO: 63 %
NRBC # BLD AUTO: 0 10E3/UL
NRBC BLD AUTO-RTO: 0 /100
PLATELET # BLD AUTO: 167 10E3/UL (ref 150–450)
POTASSIUM BLD-SCNC: 4 MMOL/L (ref 3.4–5.3)
PROCALCITONIN SERPL-MCNC: <0.05 NG/ML
RBC # BLD AUTO: 4.1 10E6/UL (ref 3.8–5.2)
SODIUM SERPL-SCNC: 138 MMOL/L (ref 133–144)
WBC # BLD AUTO: 7.5 10E3/UL (ref 4–11)

## 2022-05-09 PROCEDURE — 80048 BASIC METABOLIC PNL TOTAL CA: CPT | Mod: ZL | Performed by: NURSE PRACTITIONER

## 2022-05-09 PROCEDURE — G0463 HOSPITAL OUTPT CLINIC VISIT: HCPCS

## 2022-05-09 PROCEDURE — 99214 OFFICE O/P EST MOD 30 MIN: CPT | Performed by: NURSE PRACTITIONER

## 2022-05-09 PROCEDURE — 36415 COLL VENOUS BLD VENIPUNCTURE: CPT | Mod: ZL | Performed by: NURSE PRACTITIONER

## 2022-05-09 PROCEDURE — 84145 PROCALCITONIN (PCT): CPT | Mod: ZL | Performed by: NURSE PRACTITIONER

## 2022-05-09 PROCEDURE — 85025 COMPLETE CBC W/AUTO DIFF WBC: CPT | Mod: ZL | Performed by: NURSE PRACTITIONER

## 2022-05-09 PROCEDURE — G0463 HOSPITAL OUTPT CLINIC VISIT: HCPCS | Mod: 25

## 2022-05-09 ASSESSMENT — PAIN SCALES - GENERAL: PAINLEVEL: MILD PAIN (2)

## 2022-05-09 NOTE — PATIENT INSTRUCTIONS
Wash hands prior to dressing change.   Clean instruments as appropriate    Keep wound dressing intact until Saturday.      Left leg wounds  On Saturday,   Wash wounds with mild soap and water, dry  Top of leg:  Paint with betadine (brown solution)   Cover with silver foam  Back of leg:  Paint with gentian violet (purple solution)  Cover with silver foam  Keep intact until you see JUNG Restrepo    Please wear your tubigrip size F--base of toes to bend of knee    Please offload area when up in recliner    Please call if any questions/concerns and/or problems ([phone])    Follow up   10 days (JUNG Restrepo on 5/19)    Increase protein

## 2022-05-09 NOTE — NURSING NOTE
"Chief Complaint   Patient presents with     WOUND CARE       Initial /75 (BP Location: Left arm, Patient Position: Sitting, Cuff Size: Adult Regular)   Pulse 65   Temp 97.7  F (36.5  C) (Tympanic)   SpO2 98%  Estimated body mass index is 36.4 kg/m  as calculated from the following:    Height as of 4/28/22: 1.575 m (5' 2\").    Weight as of 4/28/22: 90.3 kg (199 lb).  Medication Reconciliation: tran Quintana  "

## 2022-05-09 NOTE — PROGRESS NOTES
SUBJECTIVE:  Ileana Davis, 81 year old, female presents with the following Chief Complaint(s) with HPI to follow:  Chief Complaint   Patient presents with     WOUND CARE        Wound check    HPI:  Ileana is here today for the reassessment and treatment of LLE wounds.  Back story:  Ileana isn't new to Wound Care and she's a poor historian.   We have seen her in October, 2013 for a LLE ulceration; July, 2014 for a left knee laceration; November, 2020 for left knee and left elbow abrasion; and May, 2021 for multiple wounds due to a fall.    Current wound, not sure how it when or how it started.   Believe she had it for 20 years.   Started as a lump (doesn't remember the diagnosis the doctors gave her but said it was harmless)  In the past 2 years, it's been opening up off and on.    Past tx: antiseptic ointment and bandaid  3/28/22: saw me for 1st wound care appt. Tx: LLE posterior: honey gauze and foam. Removed a non-healing lesion from anterior LLE.  Results: SCC. Recommend complete excision.  Referral to Dr. Huynh and JUNG Restrepo  4/8/22: saw KEVIN VelazquezOCN. Tx: honey gauze, foam  4/19/22: saw Kevin VelazquezOCN. Tx:  honey gauze, foam  4/19/22: surgical procedure by Dr. Huynh   Right elbow lesion removed. Results: Benign verrucal keratosis, no evidence of malignancy  4/25/22: saw myself. Tx: honey gauze, foam  4/28/22: saw JUNG Restrepo. See note   5/4/22: saw JUNG Restrepo. Tx: L anterior leg: betadine; L posterior: apply mepilex ag foam  Today, received a call this morning that Ileana's posterior leg wound is red and was bleeding over the weekend.    Seeing myself today  Denies any fevers, chills, and/or malodorous drainage.   Has been up in the recliner during the day.    PMH: anxiety, HTN, fibromyalgia, MO, CKD stage 3,   Denies hx of smoking, no diabetes  Last A1c  No results found for: A1C    Patient Active Problem List   Diagnosis     Hypothyroidism     Generalized anxiety disorder     Benign  essential hypertension     GERD (gastroesophageal reflux disease)     Hepatitis     Contact dermatitis     Rosacea     Osteoarthritis     Fibromyalgia     Open wound of knee, leg, and ankle     Degeneration of lumbar or lumbosacral intervertebral disc     Anemia     Autoimmune hepatitis (H)     Morbid obesity (H)     Mild major depression (H)     Hallux valgus of left foot     Left foot pain     Ametropia     Decreased vision of right eye     Dry eye syndrome of both eyes     PCO (posterior capsular opacification), left     Ptosis of both eyelids     Esodeviation     S/p nephrectomy     Chronic kidney disease, stage 3 (H)     Overactive bladder     Chalazion of right upper eyelid       Past Medical History:   Diagnosis Date     Anemia 8/20/2015     Autoimmune hepatitis (H) 6/2/2016     Benign paroxysmal positional vertigo 10/7/2010     Contact dermatitis and other eczema, due to unspecified cause 10/7/2010     Esophageal reflux 10/7/2010     Generalized anxiety disorder 10/7/2010     Generalized osteoarthrosis, involving multiple sites 10/7/2010     Hepatitis, unspecified 10/7/2010     Infected wound      Myalgia and myositis, unspecified 10/7/2010    fibromyalgia     Nonallopathic lesion of cervical region, not elsewhere classified 12/5/2001     Nonallopathic lesion of thoracic region, not elsewhere classified 3/31/2005     Open wound of knee, leg, and ankle      Rosacea 10/7/2010     Unspecified essential hypertension 10/7/2010     Unspecified hypothyroidism 10/7/2010       Past Surgical History:   Procedure Laterality Date     APPENDECTOMY       ARTHROPLASTY TOE(S) Left 9/21/2020    Procedure: Left  Bethea Arthroplasty with interpositional arthroplasty using graft.;  Surgeon: Gina Rodriguez DPM;  Location: [facility]     BACK SURGERY  2015    lumbar epidural injection     CARDIAC SURGERY  10/2017    angioplasty     CHOLECYSTECTOMY       COLONOSCOPY  07/27/2017    [facility]     COLONOSCOPY - HIM SCAN  05/15/2001     Small internal hemorrhoids; otherwise normal;EB     COLONOSCOPY - HIM SCAN  12/14/2006    Colonoscopy, REMV LESN, SNARE     ENT SURGERY      tonsillectomy     ESOPHAGOGASTRODUODENOSCOPY  07/27/2017    [facility]     EXCISE LESION LOWER EXTREMITY Left 4/19/2022    Procedure: Excision Left Medial Lower Leg Ulceration;  Surgeon: Kai Huynh MD;  Location: [facility]     EXCISE LESION UPPER EXTREMITY Right 4/19/2022    Procedure: Excision lesion right upper extremity;  Surgeon: Kai Huynh MD;  Location: [facility]     EYE SURGERY      bilateral cataract removal     GI SURGERY      anal fistula     GYN SURGERY  1985    [facility]     GYN SURGERY      cessarian x 2     GYN SURGERY      tubal sterilazation     NEPHRECTOMY Right 05/02/2019     ORTHOPEDIC SURGERY      right knee     ORTHOPEDIC SURGERY  1986    plantar fascia release     ORTHOPEDIC SURGERY      left knee     ORTHOPEDIC SURGERY      right trigger finger release     ORTHOPEDIC SURGERY  2013    Right great toe MTPJ fusion, adductor tenotomy,correction of hammer toe     ORTHOPEDIC SURGERY  [id number]    multiple procedures left forefoot       Family History   Problem Relation Age of Onset     Arthritis Mother         rheumatoid     Heart Disease Mother         CHF     Alcohol/Drug Father         alcoholism     Allergies Father      Thyroid Disease Other      Diabetes No family hx of      Asthma No family hx of        Social History     Tobacco Use     Smoking status: Never Smoker     Smokeless tobacco: Never Used   Substance Use Topics     Alcohol use: No       Current Outpatient Medications   Medication Sig Dispense Refill     conjugated estrogens (PREMARIN) 0.625 MG/GM vaginal cream Place 0.5 g vaginally twice a week 30 g 3     cyclobenzaprine (FLEXERIL) 10 MG tablet TAKE 1 TABLET (10 MG) BY MOUTH 3 TIMES DAILY AS NEEDED FOR MUSCLE SPASMS 90 tablet 1     ferrous sulfate (FEROSUL) 325 (65 Fe) MG tablet Take 325 mg by mouth daily (with breakfast)       fluticasone  (FLONASE) 50 MCG/ACT nasal spray SPRAY 2 SPRAYS INTO BOTH NOSTRILS DAILY AS NEEDED 16 g 1     hydrocortisone 2.5 % cream APPLY TWICE TIMES DAILY TO ACTIVE ECZEMA ON THE HANDS, FOLLOW WITH MOISTURIZING CREAM.       levothyroxine (SYNTHROID/LEVOTHROID) 100 MCG tablet Take 1 tablet (100 mcg) by mouth daily 90 tablet 1     losartan (COZAAR) 25 MG tablet TAKE 1 TABLET (25 MG) BY MOUTH DAILY 90 tablet 0     melatonin 1 MG TABS tablet Take by mouth nightly as needed for sleep        MIRALAX 17 GM/SCOOP powder TAKE 1 DOSE (17G) BY MOUTH DAILY FOR 5 DAYS STARTING ON 3/26 AS PART OF BOWEL PREP, MIX IN 8OZ WATER OR JUICE PRIOR TO ADMINISTRATION       NYAMYC 069442 UNIT/GM external powder APPLY TO AFFECTED AREA NIGHTLY AS NEEDED FOR RASH 60 g 1     nystatin (MYCOSTATIN) 385675 UNIT/GM external cream APPLY TOPICALLY 2 TIMES DAILY AS NEEDED FOR DRY SKIN 30 g 2     omeprazole (PRILOSEC) 40 MG capsule Take by mouth daily Before the same meal daily       Polyethylene Glycol 3350 (MIRALAX PO) Take by mouth daily as needed        propranolol (INDERAL) 10 MG tablet Take 1 tablet (10 mg) by mouth daily 30 tablet 5     propylene glycol (SYSTANE BALANCE) 0.6 % SOLN ophthalmic solution Inject 1-2 drops into the eye       sennosides (SENOKOT) 8.6 MG tablet TAKE 1 TABLET BY MOUTH ONE TIME A DAY AT BEDTIME         Allergies   Allergen Reactions     Fentanyl Other (See Comments)     Severe agitation when used during angiogram     Codeine Sulfate Other (See Comments)     hallucinations     Fentanyl And Related      Other reaction(s): Confusion     Morphine Other (See Comments)     Hallucinations with iv therapy     Tape [Adhesive Tape]      Tramadol Other (See Comments)     hallucination       REVIEW OF SYSTEMS  Skin: as stated above  Eyes: glasses  Ears/Nose/Throat: negative; issues with swallowing  Respiratory: Shortness of breath- same, No cough and No hemoptysis  Cardiovascular: negative  Gastrointestinal: hx of constipation  Genitourinary:  incontinence; has one kidney   Musculoskeletal: as stated above, hx of arthritis and fibromyalgia  Neurologic: negative  Psychiatric: positive for anxiety and depression  Hematologic/Lymphatic/Immunologic: negative  Endocrine: negative    OBJECTIVE:  /75 (BP Location: Left arm, Patient Position: Sitting, Cuff Size: Adult Regular)   Pulse 65   Temp 97.7  F (36.5  C) (Tympanic)   SpO2 98%   Constitutional: alert and no distress  Respiratory:  Good diaphragmatic excursion.   Musculoskeletal: extremities normal- no gross deformities noted and gait normal  Skin:   Wound description: Type of Wound- pressure; Location- LLE, posterior, Drainage amount-scant, Drainage color- bloody, Odor- none; Wound bed-see picture, Surrounding skin- +erythema, + edema, no increase heat or lymphangitis  Measurements: 2.3 x 1.4 cm     Dressing change: Wound cleansed with soap and water, dressed with gentian violet + bordered foam with silver.  Tubigrip size F--base of toes to bend of knee  Wound debridement completed on: , debridement type: dry gauze    LLE anterior poster prodecure:    No heat or lymphangitis  Dressing: painted with betadine; covered with bordered foam with silver (post-op)  Tubigrip size F--base of toes to bend of knee      Right elbow: healed     Psychiatric: mentation appears normal and affect normal/bright    LABS  Results for orders placed or performed in visit on 05/09/22   Procalcitonin     Status: Normal   Result Value Ref Range    Procalcitonin <0.05 <0.05 ng/mL   Basic metabolic panel     Status: Abnormal   Result Value Ref Range    Sodium 138 133 - 144 mmol/L    Potassium 4.0 3.4 - 5.3 mmol/L    Chloride 106 94 - 109 mmol/L    Carbon Dioxide (CO2) 26 20 - 32 mmol/L    Anion Gap 6 3 - 14 mmol/L    Urea Nitrogen 20 7 - 30 mg/dL    Creatinine 1.14 (H) 0.52 - 1.04 mg/dL    Calcium 8.7 8.5 - 10.1 mg/dL    Glucose 90 70 - 99 mg/dL    GFR Estimate 48 (L) >60 mL/min/1.73m2   CBC with platelets and  differential     Status: None   Result Value Ref Range    WBC Count 7.5 4.0 - 11.0 10e3/uL    RBC Count 4.10 3.80 - 5.20 10e6/uL    Hemoglobin 13.3 11.7 - 15.7 g/dL    Hematocrit 39.4 35.0 - 47.0 %    MCV 96 78 - 100 fL    MCH 32.4 26.5 - 33.0 pg    MCHC 33.8 31.5 - 36.5 g/dL    RDW 11.9 10.0 - 15.0 %    Platelet Count 167 150 - 450 10e3/uL    % Neutrophils 63 %    % Lymphocytes 24 %    % Monocytes 9 %    % Eosinophils 3 %    % Basophils 1 %    % Immature Granulocytes 0 %    NRBCs per 100 WBC 0 <1 /100    Absolute Neutrophils 4.8 1.6 - 8.3 10e3/uL    Absolute Lymphocytes 1.8 0.8 - 5.3 10e3/uL    Absolute Monocytes 0.7 0.0 - 1.3 10e3/uL    Absolute Eosinophils 0.2 0.0 - 0.7 10e3/uL    Absolute Basophils 0.1 0.0 - 0.2 10e3/uL    Absolute Immature Granulocytes 0.0 <=0.4 10e3/uL    Absolute NRBCs 0.0 10e3/uL   CBC with platelets and differential     Status: None    Narrative    The following orders were created for panel order CBC with platelets and differential.  Procedure                               Abnormality         Status                     ---------                               -----------         ------                     CBC with platelets and d...[[id number]]                      Final result                 Please view results for these tests on the individual orders.       ASSESSMENT / PLAN:  (Z73.802D) Non-healing wound of lower extremity, left, subsequent encounter  (primary encounter diagnosis)  Comment: noted  Plan: CBC with platelets and differential,         Procalcitonin, Basic metabolic panel          Labs drawn and normal    Education focused on edema control and pressure  Encouraged her to elevate LLE when in the recliner.      Changed dressing to the following:  On Saturday,   Wash wounds with mild soap and water, dry  Back of leg:  Paint with gentian violet (purple solution)  Cover with silver foam  Leave intact until you see JUNG Restrepo    (S81.002D,  S81.802D,  S91.002D) Open wound of  left knee, leg, and ankle, subsequent encounter  Comment: noted  Plan:   On Saturday,   Wash wounds with mild soap and water, dry  Top of leg:  Paint with betadine (brown solution)   Cover with silver foam  Leave intact until you see JUNG Restrepo    Follow up  10 days    Treatment goal:  Moisture balance  Prevent infection  Promote healing.      Patient Instructions   Wash hands prior to dressing change.   Clean instruments as appropriate    Keep wound dressing intact until Saturday.      Left leg wounds  On Saturday,   Wash wounds with mild soap and water, dry  Top of leg:  Paint with betadine (brown solution)   Cover with silver foam  Back of leg:  Paint with gentian violet (purple solution)  Cover with silver foam  Keep intact until you see JUNG Restrepo    Please wear your tubigrip size F--base of toes to bend of knee    Please offload area when up in recliner    Please call if any questions/concerns and/or problems ([phone])    Follow up   10 days (JUNG Restrepo on 5/19)    Increase protein                Time: 25 min + 5 minute chart review  Barrier: anxious  Willingness to learn: accepting    Kristine CROWLEY [facility]  Diabetes and Wound Care    Cc: Dr. Jones    30 minutes was spent with patient.    All of this time was spent on counseling patient regarding illness, medication and/or treatment options, coordinating further cares and follow ups that are needed along with resource material that will be helpful in the treatment of these issues.

## 2022-05-19 ENCOUNTER — MEDICAL CORRESPONDENCE (OUTPATIENT)
Dept: HEALTH INFORMATION MANAGEMENT | Facility: HOSPITAL | Age: 82
End: 2022-05-19

## 2022-05-19 ENCOUNTER — OFFICE VISIT (OUTPATIENT)
Dept: SURGERY | Facility: OTHER | Age: 82
End: 2022-05-19
Attending: CLINICAL NURSE SPECIALIST
Payer: MEDICARE

## 2022-05-19 VITALS
OXYGEN SATURATION: 97 % | RESPIRATION RATE: 16 BRPM | HEART RATE: 69 BPM | DIASTOLIC BLOOD PRESSURE: 74 MMHG | SYSTOLIC BLOOD PRESSURE: 122 MMHG | TEMPERATURE: 97.7 F

## 2022-05-19 DIAGNOSIS — Z98.890 H/O LOCAL EXCISION OF SKIN LESION: ICD-10-CM

## 2022-05-19 DIAGNOSIS — T14.8XXA OPEN WOUND: ICD-10-CM

## 2022-05-19 DIAGNOSIS — Z85.828 PERSONAL HISTORY OF MALIGNANT NEOPLASM OF SKIN: ICD-10-CM

## 2022-05-19 DIAGNOSIS — L98.9 SKIN LESION: Primary | ICD-10-CM

## 2022-05-19 PROCEDURE — 11104 PUNCH BX SKIN SINGLE LESION: CPT | Mod: 59 | Performed by: CLINICAL NURSE SPECIALIST

## 2022-05-19 PROCEDURE — G0463 HOSPITAL OUTPT CLINIC VISIT: HCPCS

## 2022-05-19 PROCEDURE — 88342 IMHCHEM/IMCYTCHM 1ST ANTB: CPT | Mod: TC | Performed by: CLINICAL NURSE SPECIALIST

## 2022-05-19 PROCEDURE — 99213 OFFICE O/P EST LOW 20 MIN: CPT | Mod: 25 | Performed by: CLINICAL NURSE SPECIALIST

## 2022-05-19 PROCEDURE — 97597 DBRDMT OPN WND 1ST 20 CM/<: CPT | Performed by: CLINICAL NURSE SPECIALIST

## 2022-05-19 PROCEDURE — G0463 HOSPITAL OUTPT CLINIC VISIT: HCPCS | Mod: 25

## 2022-05-19 RX ORDER — LIDOCAINE HYDROCHLORIDE 20 MG/ML
20 INJECTION, SOLUTION EPIDURAL; INFILTRATION; INTRACAUDAL; PERINEURAL ONCE
Status: DISCONTINUED | OUTPATIENT
Start: 2022-05-19 | End: 2023-02-07

## 2022-05-19 ASSESSMENT — PAIN SCALES - GENERAL: PAINLEVEL: SEVERE PAIN (6)

## 2022-05-19 NOTE — NURSING NOTE
"Chief Complaint   Patient presents with     Post-Op - General Surgery     Left leg       Initial /74   Pulse 69   Temp 97.7  F (36.5  C) (Tympanic)   Resp 16   SpO2 97%  Estimated body mass index is 36.4 kg/m  as calculated from the following:    Height as of 4/28/22: 1.575 m (5' 2\").    Weight as of 4/28/22: 90.3 kg (199 lb).  Medication Reconciliation: complete  Jessica Black LPN    "

## 2022-05-19 NOTE — PATIENT INSTRUCTIONS
Wound Care Instructions left front of le) Wash your hands and work space  2) Gather all your supplies: clean wash cloths, mild soap (baby soap), betadine, mepilex Ag bordered foam, white tape  3) Cleanse wound with mild soap, rinse, pat dry  4) Wipe wound with betadine, cover with mepilex Ag bordered foam (reinforce as needed with white tape)   5) Change dressing every three days  6) Dispose of all dirty supplies  7) Wash hands and equipment    Wound Care Instructions left back of le) Wash your hands and work space  2) Gather all your supplies: clean wash cloths, mild soap (baby soap), Gentian violet (purple stuff), 4x4 gauze, white tape  3) Cleanse wound with mild soap, rinse, pat dry  4) Wipe wound with gentian violet (purple stuff), cover with 4x4 gauze folded in 1/4's, secure with white tape   5) Change dressing every three days  6) Dispose of all dirty supplies  7) Wash hands and equipment    Please report any increase in pain, fevers, chills, changes in the drainage odor.   Please call [phone] if you have any questions or concerns or if any problems develop.     Follow up 2022 at 10 a.m. in Wound Care.

## 2022-05-19 NOTE — PROGRESS NOTES
SUBJECTIVE:  Ileana Davis, 81 year old, female presents with the following Chief Complaint(s) with HPI to follow:   Chief Complaint   Patient presents with     Post-Op - General Surgery     Left leg          HPI:  Ileana is here for the re-assessment of the left posterior leg wound and left anterior leg skin excision.   She is one month post excision of the left medial lower leg ulceration by Dr. Huynh for atypical squamous lesion of the left lower leg.    The left posterior leg wound had been nearly healed, but is totally opened up again.  It is red and slightly exuberant today. The posterior left leg wound began as a bump, per patient, and has opened off/on over the last two years.  Discussed the change in the appearance of the wound with Dr. Huynh, he recommended a punch biopsy.    Patient Active Problem List   Diagnosis     Hypothyroidism     Generalized anxiety disorder     Benign essential hypertension     GERD (gastroesophageal reflux disease)     Hepatitis     Contact dermatitis     Rosacea     Osteoarthritis     Fibromyalgia     Open wound of knee, leg, and ankle     Degeneration of lumbar or lumbosacral intervertebral disc     Anemia     Autoimmune hepatitis (H)     Morbid obesity (H)     Mild major depression (H)     Hallux valgus of left foot     Left foot pain     Ametropia     Decreased vision of right eye     Dry eye syndrome of both eyes     PCO (posterior capsular opacification), left     Ptosis of both eyelids     Esodeviation     S/p nephrectomy     Chronic kidney disease, stage 3 (H)     Overactive bladder     Chalazion of right upper eyelid       Past Medical History:   Diagnosis Date     Anemia 8/20/2015     Autoimmune hepatitis (H) 6/2/2016     Benign paroxysmal positional vertigo 10/7/2010     Contact dermatitis and other eczema, due to unspecified cause 10/7/2010     Esophageal reflux 10/7/2010     Generalized anxiety disorder 10/7/2010     Generalized osteoarthrosis, involving  multiple sites 10/7/2010     Hepatitis, unspecified 10/7/2010     Infected wound      Myalgia and myositis, unspecified 10/7/2010    fibromyalgia     Nonallopathic lesion of cervical region, not elsewhere classified 12/5/2001     Nonallopathic lesion of thoracic region, not elsewhere classified 3/31/2005     Open wound of knee, leg, and ankle      Rosacea 10/7/2010     Unspecified essential hypertension 10/7/2010     Unspecified hypothyroidism 10/7/2010       Past Surgical History:   Procedure Laterality Date     APPENDECTOMY       ARTHROPLASTY TOE(S) Left 9/21/2020    Procedure: Left  Bethea Arthroplasty with interpositional arthroplasty using graft.;  Surgeon: Gina Rodriguez DPM;  Location: [facility]     BACK SURGERY  2015    lumbar epidural injection     CARDIAC SURGERY  10/2017    angioplasty     CHOLECYSTECTOMY       COLONOSCOPY  07/27/2017    [facility]     COLONOSCOPY - HIM SCAN  05/15/2001    Small internal hemorrhoids; otherwise normal;[facility]     COLONOSCOPY - HIM SCAN  12/14/2006    Colonoscopy, [facility], SNARE     ENT SURGERY      tonsillectomy     ESOPHAGOGASTRODUODENOSCOPY  07/27/2017    [facility]     EXCISE LESION LOWER EXTREMITY Left 4/19/2022    Procedure: Excision Left Medial Lower Leg Ulceration;  Surgeon: Kai Huynh MD;  Location: [facility]     EXCISE LESION UPPER EXTREMITY Right 4/19/2022    Procedure: Excision lesion right upper extremity;  Surgeon: Kai Huynh MD;  Location: [facility]     EYE SURGERY      bilateral cataract removal     GI SURGERY      anal fistula     GYN SURGERY  1985    [facility] BSO     GYN SURGERY      cessarian x 2     GYN SURGERY      tubal sterilazation     NEPHRECTOMY Right 05/02/2019     ORTHOPEDIC SURGERY      right knee     ORTHOPEDIC SURGERY  1986    plantar fascia release     ORTHOPEDIC SURGERY      left knee     ORTHOPEDIC SURGERY      right trigger finger release     ORTHOPEDIC SURGERY  2013    Right great toe MTPJ fusion, adductor tenotomy,correction of hammer toe      ORTHOPEDIC SURGERY  [id number]    multiple procedures left forefoot       Family History   Problem Relation Age of Onset     Arthritis Mother         rheumatoid     Heart Disease Mother         CHF     Alcohol/Drug Father         alcoholism     Allergies Father      Thyroid Disease Other      Diabetes No family hx of      Asthma No family hx of        Social History     Tobacco Use     Smoking status: Never Smoker     Smokeless tobacco: Never Used   Substance Use Topics     Alcohol use: No       Current Outpatient Medications   Medication Sig Dispense Refill     conjugated estrogens (PREMARIN) 0.625 MG/GM vaginal cream Place 0.5 g vaginally twice a week 30 g 3     cyclobenzaprine (FLEXERIL) 10 MG tablet TAKE 1 TABLET (10 MG) BY MOUTH 3 TIMES DAILY AS NEEDED FOR MUSCLE SPASMS 90 tablet 1     ferrous sulfate (FEROSUL) 325 (65 Fe) MG tablet Take 325 mg by mouth daily (with breakfast)       fluticasone (FLONASE) 50 MCG/ACT nasal spray SPRAY 2 SPRAYS INTO BOTH NOSTRILS DAILY AS NEEDED 16 g 1     hydrocortisone 2.5 % cream APPLY TWICE TIMES DAILY TO ACTIVE ECZEMA ON THE HANDS, FOLLOW WITH MOISTURIZING CREAM.       levothyroxine (SYNTHROID/LEVOTHROID) 100 MCG tablet Take 1 tablet (100 mcg) by mouth daily 90 tablet 1     losartan (COZAAR) 25 MG tablet TAKE 1 TABLET (25 MG) BY MOUTH DAILY 90 tablet 0     melatonin 1 MG TABS tablet Take by mouth nightly as needed for sleep        MIRALAX 17 GM/SCOOP powder TAKE 1 DOSE (17G) BY MOUTH DAILY FOR 5 DAYS STARTING ON 3/26 AS PART OF BOWEL PREP, MIX IN 8OZ WATER OR JUICE PRIOR TO ADMINISTRATION       NYAMYC 287752 UNIT/GM external powder APPLY TO AFFECTED AREA NIGHTLY AS NEEDED FOR RASH 60 g 1     nystatin (MYCOSTATIN) 867842 UNIT/GM external cream APPLY TOPICALLY 2 TIMES DAILY AS NEEDED FOR DRY SKIN 30 g 2     omeprazole (PRILOSEC) 40 MG capsule Take by mouth daily Before the same meal daily       Polyethylene Glycol 3350 (MIRALAX PO) Take by mouth daily as needed         propranolol (INDERAL) 10 MG tablet Take 1 tablet (10 mg) by mouth daily 30 tablet 5     propylene glycol (SYSTANE BALANCE) 0.6 % SOLN ophthalmic solution Inject 1-2 drops into the eye       sennosides (SENOKOT) 8.6 MG tablet TAKE 1 TABLET BY MOUTH ONE TIME A DAY AT BEDTIME         Allergies   Allergen Reactions     Fentanyl Other (See Comments)     Severe agitation when used during angiogram     Codeine Sulfate Other (See Comments)     hallucinations     Fentanyl And Related      Other reaction(s): Confusion     Morphine Other (See Comments)     Hallucinations with iv therapy     Tape [Adhesive Tape]      Tramadol Other (See Comments)     hallucination       REVIEW OF SYSTEMS  Skin: as above  Eyes: positive for glasses  Ears/Nose/Throat: negative  Respiratory: Shortness of breath- unchanged  Cardiovascular: negative  Gastrointestinal: positive for constipation  Genitourinary: positive for incontinence; one kidney  Musculoskeletal: positive for arthritis, fibromyalgia and ambulates with a cane  Neurologic: negative  Psychiatric: positive for anxiety and depression  Hematologic/Lymphatic/Immunologic: negative  Endocrine: negative    OBJECTIVE:  /74   Pulse 69   Temp 97.7  F (36.5  C) (Tympanic)   Resp 16   SpO2 97%   Constitutional: alert, cooperative and over weight  Head: Normocephalic. No masses, lesions, tenderness or abnormalities  Respiratory:  Respirations even and unlabored  Musculoskeletal: extremities normal- no gross deformities noted  Skin:        Wound description:     Type of Wound:  ? unknown   Location:  Left posterior lower extremity    Drainage amount:  moderate   Drainage color:  tan   Odor:  none   Wound bed:  Pink, slightly exuberant   Surrounding skin:  WDL        Measurements:  2 x 1.5 cm   Pain:  Tender with palpation   Wound debridement completed on: N.A, debridement type: N/A         After discussing the risks of biopsy including but not limited to, bleeding,  recurrence, need for  further resection, wound healing, scar formation and chronic pain she wishes to proceed with the punch biopsy.  Her questions were asked and answered.      After a procedural pause was conducted, the left posterior leg was cleansed with betadine.  Local anesthetic was infiltrated around the biopsy.  After the anesthetic had taken effect, the 4 mm punch biopsy down to the subcutaneous tissue and sent to pathology.  Hemostasis was ensured using direct pressure; gauze pressure dressing was applied; tubigrip size F applied over the dressing.               Wound description:                Type of Wound:  surgical              Location:  Left lower leg               Drainage amount:  moderate              Drainage color:  tan              Odor:  none              Wound bed:  creamy tan prior to debridement              Surrounding skin:  WDL              Pain:  Tender              Wound debridement completed on: 5/19/2022, debridement type: Sharp       Dressing change:      Wound cleansed with mild soap, rinse, dried.  Sharp debridement performed with ring curette to remove non-viable tissue (less than 20 sq cm) to the level of the subcutaneous. Patient was informed of the risks and benefits and consented to the procedure.  Dressed with mepilex Ag bordered foam.     Neurologic: Reflexes normal and symmetric. Sensation grossly WNL.  Psychiatric: mentation appears normal and affect normal/bright    THERAPY GOAL: Complete healing    ASSESSMENT / PLAN:    Comments:       Plan:     - Paint the left posterior leg wound with Gentian violet every three days, cover with 4x4 gauze folded in 1/4's, secure with medipore tape.    - Apply Mepilex Ag bordered foam to the left anterior leg; change every three days.     Follow-up in 2 weeks in wound clinic or as needed for acute concerns.    Adela Charles CNS  Surgery and Wound Care  [facility]

## 2022-05-26 LAB
PATH REPORT.COMMENTS IMP SPEC: NORMAL
PATH REPORT.FINAL DX SPEC: NORMAL
PATH REPORT.GROSS SPEC: NORMAL
PATH REPORT.MICROSCOPIC SPEC OTHER STN: NORMAL
PATH REPORT.MICROSCOPIC SPEC OTHER STN: NORMAL
PATH REPORT.RELEVANT HX SPEC: NORMAL
PHOTO IMAGE: NORMAL

## 2022-05-26 PROCEDURE — 88305 TISSUE EXAM BY PATHOLOGIST: CPT | Mod: 26 | Performed by: PATHOLOGY

## 2022-05-26 PROCEDURE — 88342 IMHCHEM/IMCYTCHM 1ST ANTB: CPT | Mod: 26 | Performed by: PATHOLOGY

## 2022-05-27 DIAGNOSIS — K75.4 AUTOIMMUNE HEPATITIS (H): Primary | ICD-10-CM

## 2022-06-01 ENCOUNTER — OFFICE VISIT (OUTPATIENT)
Dept: WOUND CARE | Facility: OTHER | Age: 82
End: 2022-06-01
Attending: CLINICAL NURSE SPECIALIST
Payer: MEDICARE

## 2022-06-01 VITALS
TEMPERATURE: 97.3 F | SYSTOLIC BLOOD PRESSURE: 134 MMHG | OXYGEN SATURATION: 99 % | DIASTOLIC BLOOD PRESSURE: 82 MMHG | HEART RATE: 56 BPM

## 2022-06-01 DIAGNOSIS — S91.002D OPEN WOUND OF LEFT KNEE, LEG, AND ANKLE, SUBSEQUENT ENCOUNTER: ICD-10-CM

## 2022-06-01 DIAGNOSIS — Z85.828 HISTORY OF BASAL CELL CARCINOMA: ICD-10-CM

## 2022-06-01 DIAGNOSIS — L03.116 CELLULITIS OF LEFT LOWER EXTREMITY: Primary | ICD-10-CM

## 2022-06-01 DIAGNOSIS — S81.002D OPEN WOUND OF LEFT KNEE, LEG, AND ANKLE, SUBSEQUENT ENCOUNTER: ICD-10-CM

## 2022-06-01 DIAGNOSIS — S81.802D OPEN WOUND OF LEFT KNEE, LEG, AND ANKLE, SUBSEQUENT ENCOUNTER: ICD-10-CM

## 2022-06-01 DIAGNOSIS — S81.802D NON-HEALING WOUND OF LOWER EXTREMITY, LEFT, SUBSEQUENT ENCOUNTER: ICD-10-CM

## 2022-06-01 PROCEDURE — G0463 HOSPITAL OUTPT CLINIC VISIT: HCPCS | Mod: 25

## 2022-06-01 PROCEDURE — 11042 DBRDMT SUBQ TIS 1ST 20SQCM/<: CPT | Performed by: CLINICAL NURSE SPECIALIST

## 2022-06-01 PROCEDURE — 99213 OFFICE O/P EST LOW 20 MIN: CPT | Mod: 25 | Performed by: CLINICAL NURSE SPECIALIST

## 2022-06-01 RX ORDER — SULFAMETHOXAZOLE/TRIMETHOPRIM 800-160 MG
1 TABLET ORAL 2 TIMES DAILY
Qty: 7 TABLET | Refills: 0 | Status: ON HOLD | OUTPATIENT
Start: 2022-06-01 | End: 2022-09-13

## 2022-06-01 ASSESSMENT — PAIN SCALES - GENERAL: PAINLEVEL: WORST PAIN (10)

## 2022-06-01 NOTE — NURSING NOTE
"No chief complaint on file.      Initial /82   Pulse 56   Temp 97.3  F (36.3  C)   SpO2 99%  Estimated body mass index is 36.4 kg/m  as calculated from the following:    Height as of 4/28/22: 1.575 m (5' 2\").    Weight as of 4/28/22: 90.3 kg (199 lb).  Medication Reconciliation: complete  Tanya Tilley MA    "

## 2022-06-01 NOTE — PATIENT INSTRUCTIONS
Wound Care Instructions:  1) Wash your hands and work space  2) Gather all your supplies: clean wash cloths, mild soap (baby soap), silver gel, 4x4 gauze, tape; tubigrip F  3) Cleanse wound with mild soap, rinse, pat dry  4) Dress wound with silver gel to wounds, cover with 4x4 gauze, secure with tape;  apply tubigrip F over dressings  5) Change dressing every three days  6) Dispose of all dirty supplies  7) Wash hands and equipment    Please report any increase in pain, fevers, chills, changes in the drainage odor.   Please call [phone] if you have any questions or concerns or if any problems develop.     Follow up in two weeks with Dr. Huynh in surgery clinic.

## 2022-06-03 NOTE — PROGRESS NOTES
SUBJECTIVE:  Ileana Davis, 81 year old, female presents with the following Chief Complaint(s) with HPI to follow:   Chief Complaint   Patient presents with     WOUND CARE          HPI:  Ileana is here for the re-assessment of the left posterior leg wound and left anterior leg skin excision.   She is six weeks post excision of the left medial lower leg ulceration by Dr. Huynh for atypical squamous lesion of the left lower leg.    The left posterior leg wound had been nearly healed, and then was opened up again at the last visit.  It was red and slightly exuberant.  This was discussed with Dr. Huynh, and he recommended a biopsy, which was completed.  The pathology report came back as basal cell carcinoma (see report below).   Patient reports the posterior left leg wound began as a bump and has opened off/on over the last two years.      Final Diagnosis   Skin of leg below knee/left calf, biopsy-  -Basal cell carcinoma with morpheaform features, associated with ulceration and granulation tissue  -Carcinoma extends to lateral margins of biopsy, deep margin is negative for malignancy.(please see comment)        Electronically signed by Estela Menchaca MD on 5/26/2022 at  3:01 PM     Comment  [facility]   The slides from shave biopsy of lesion on patient's left leg ([id number]; 3/28/2022) is reexamined, and the previous diagnosis of an atypical squamous proliferation, concerning for well differentiated squamous cell carcinoma is noted and confirmed.  Clinical correlation is requested as to the locations of the previous and current excisions.  All slides are seen in intradepartmental consultation.           Patient Active Problem List   Diagnosis     Hypothyroidism     Generalized anxiety disorder     Benign essential hypertension     GERD (gastroesophageal reflux disease)     Hepatitis     Contact dermatitis     Rosacea     Osteoarthritis     Fibromyalgia     Open wound of knee, leg, and ankle     Degeneration of  lumbar or lumbosacral intervertebral disc     Anemia     Autoimmune hepatitis (H)     Morbid obesity (H)     Mild major depression (H)     Hallux valgus of left foot     Left foot pain     Ametropia     Decreased vision of right eye     Dry eye syndrome of both eyes     PCO (posterior capsular opacification), left     Ptosis of both eyelids     Esodeviation     S/p nephrectomy     Chronic kidney disease, stage 3 (H)     Overactive bladder     Chalazion of right upper eyelid       Past Medical History:   Diagnosis Date     Anemia 8/20/2015     Autoimmune hepatitis (H) 6/2/2016     Benign paroxysmal positional vertigo 10/7/2010     Contact dermatitis and other eczema, due to unspecified cause 10/7/2010     Esophageal reflux 10/7/2010     Generalized anxiety disorder 10/7/2010     Generalized osteoarthrosis, involving multiple sites 10/7/2010     Hepatitis, unspecified 10/7/2010     Infected wound      Myalgia and myositis, unspecified 10/7/2010    fibromyalgia     Nonallopathic lesion of cervical region, not elsewhere classified 12/5/2001     Nonallopathic lesion of thoracic region, not elsewhere classified 3/31/2005     Open wound of knee, leg, and ankle      Rosacea 10/7/2010     Unspecified essential hypertension 10/7/2010     Unspecified hypothyroidism 10/7/2010       Past Surgical History:   Procedure Laterality Date     APPENDECTOMY       ARTHROPLASTY TOE(S) Left 9/21/2020    Procedure: Left  Bethea Arthroplasty with interpositional arthroplasty using graft.;  Surgeon: Gian Rodriguez DPM;  Location: [facility]     BACK SURGERY  2015    lumbar epidural injection     CARDIAC SURGERY  10/2017    angioplasty     CHOLECYSTECTOMY       COLONOSCOPY  07/27/2017    [facility]     COLONOSCOPY - HIM SCAN  05/15/2001    Small internal hemorrhoids; otherwise normal;[facility]     COLONOSCOPY - HIM SCAN  12/14/2006    Colonoscopy, [facility], [facility]     ENT SURGERY      tonsillectomy     ESOPHAGOGASTRODUODENOSCOPY  07/27/2017     [facility]     EXCISE LESION LOWER EXTREMITY Left 4/19/2022    Procedure: Excision Left Medial Lower Leg Ulceration;  Surgeon: Kai Huynh MD;  Location: [facility]     EXCISE LESION UPPER EXTREMITY Right 4/19/2022    Procedure: Excision lesion right upper extremity;  Surgeon: Kai Huynh MD;  Location: [facility]     EYE SURGERY      bilateral cataract removal     GI SURGERY      anal fistula     GYN SURGERY  1985    [facility] BSO     GYN SURGERY      cessarian x 2     GYN SURGERY      tubal sterilazation     NEPHRECTOMY Right 05/02/2019     ORTHOPEDIC SURGERY      right knee     ORTHOPEDIC SURGERY  1986    plantar fascia release     ORTHOPEDIC SURGERY      left knee     ORTHOPEDIC SURGERY      right trigger finger release     ORTHOPEDIC SURGERY  2013    Right great toe MTPJ fusion, adductor tenotomy,correction of hammer toe     ORTHOPEDIC SURGERY  [id number]    multiple procedures left forefoot       Family History   Problem Relation Age of Onset     Arthritis Mother         rheumatoid     Heart Disease Mother         CHF     Alcohol/Drug Father         alcoholism     Allergies Father      Thyroid Disease Other      Diabetes No family hx of      Asthma No family hx of        Social History     Tobacco Use     Smoking status: Never Smoker     Smokeless tobacco: Never Used   Substance Use Topics     Alcohol use: No       Current Outpatient Medications   Medication Sig Dispense Refill     conjugated estrogens (PREMARIN) 0.625 MG/GM vaginal cream Place 0.5 g vaginally twice a week 30 g 3     cyclobenzaprine (FLEXERIL) 10 MG tablet TAKE 1 TABLET (10 MG) BY MOUTH 3 TIMES DAILY AS NEEDED FOR MUSCLE SPASMS 90 tablet 1     ferrous sulfate (FEROSUL) 325 (65 Fe) MG tablet Take 325 mg by mouth daily (with breakfast)       fluticasone (FLONASE) 50 MCG/ACT nasal spray SPRAY 2 SPRAYS INTO BOTH NOSTRILS DAILY AS NEEDED 16 g 1     hydrocortisone 2.5 % cream APPLY TWICE TIMES DAILY TO ACTIVE ECZEMA ON THE HANDS, FOLLOW WITH MOISTURIZING  CREAM.       levothyroxine (SYNTHROID/LEVOTHROID) 100 MCG tablet Take 1 tablet (100 mcg) by mouth daily 90 tablet 1     losartan (COZAAR) 25 MG tablet TAKE 1 TABLET (25 MG) BY MOUTH DAILY 90 tablet 0     melatonin 1 MG TABS tablet Take by mouth nightly as needed for sleep        MIRALAX 17 GM/SCOOP powder TAKE 1 DOSE (17G) BY MOUTH DAILY FOR 5 DAYS STARTING ON 3/26 AS PART OF BOWEL PREP, MIX IN 8OZ WATER OR JUICE PRIOR TO ADMINISTRATION       NYAMYC 024853 UNIT/GM external powder APPLY TO AFFECTED AREA NIGHTLY AS NEEDED FOR RASH 60 g 1     nystatin (MYCOSTATIN) 990000 UNIT/GM external cream APPLY TOPICALLY 2 TIMES DAILY AS NEEDED FOR DRY SKIN 30 g 2     omeprazole (PRILOSEC) 40 MG capsule Take by mouth daily Before the same meal daily       Polyethylene Glycol 3350 (MIRALAX PO) Take by mouth daily as needed        propranolol (INDERAL) 10 MG tablet Take 1 tablet (10 mg) by mouth daily 30 tablet 5     propylene glycol (SYSTANE BALANCE) 0.6 % SOLN ophthalmic solution Inject 1-2 drops into the eye       sennosides (SENOKOT) 8.6 MG tablet TAKE 1 TABLET BY MOUTH ONE TIME A DAY AT BEDTIME       sulfamethoxazole-trimethoprim (BACTRIM DS) 800-160 MG tablet Take 1 tablet by mouth 2 times daily 7 tablet 0       Allergies   Allergen Reactions     Fentanyl Other (See Comments)     Severe agitation when used during angiogram     Codeine Sulfate Other (See Comments)     hallucinations     Fentanyl And Related      Other reaction(s): Confusion     Morphine Other (See Comments)     Hallucinations with iv therapy     Tape [Adhesive Tape]      Tramadol Other (See Comments)     hallucination       REVIEW OF SYSTEMS  Skin: as above  Eyes: positive for glasses  Ears/Nose/Throat: negative  Respiratory: Shortness of breath- unchanged  Cardiovascular: negative  Gastrointestinal: positive for constipation  Genitourinary: positive for incontinence; one kidney  Musculoskeletal: positive for arthritis, fibromyalgia and ambulates with a  cane  Neurologic: negative  Psychiatric: positive for anxiety and depression  Hematologic/Lymphatic/Immunologic: negative  Endocrine: negative    OBJECTIVE:  /82   Pulse 56   Temp 97.3  F (36.3  C)   SpO2 99%   Constitutional: alert, cooperative and over weight  Head: Normocephalic. No masses, lesions, tenderness or abnormalities  Respiratory:  Respirations even and unlabored  Musculoskeletal: extremities normal- no gross deformities noted  Skin:        Wound description:     Type of Wound:  Basal cell carcinoma   Location:  Left posterior lower extremity    Drainage amount:  moderate   Drainage color:  tan   Odor:  none   Wound bed:  Pink   Surrounding skin:  WDL        Measurements:  1 x 1.2 cm   Pain:  Tender with palpation   Wound debridement completed on: 6/1/2022, debridement type: Sharp    Dressing change:      Wound cleansed with mild soap, rinse, dried.  Sharp debridement performed with ring curette to remove non-viable tissue and bioburden (less than 20 sq cm) to the level of the subcutaneous. Patient was informed of the risks and benefits and consented to the procedure.  Dressed with silver gel, covered with 4x4 gauze, secured with 3M gentle release tape.                  Wound description:                Type of Wound:  surgical              Location:  Left lower leg, anterior              Drainage amount:  moderate              Drainage color:  tan              Odor:  none              Wound bed:  creamy tan prior to debridement              Surrounding skin:  Erythematous, with slight warmth   Measurements:  4.5 x 1.8 cm              Pain:  Tender              Wound debridement completed on: 6/1/2022, debridement type: Sharp       Dressing change:      Wound cleansed with mild soap, rinse, dried.  Sharp debridement performed with ring curette to remove non-viable tissue and bioburden (less than 20 sq cm) to the level of the subcutaneous. Patient was informed of the risks and benefits and  consented to the procedure.  Dressed with silver gel, covered with 4x4 gauze, secured with 3M gentle release tape.     Neurologic: Reflexes normal and symmetric. Sensation grossly WNL.  Psychiatric: mentation appears normal and affect normal/bright    THERAPY GOAL: Complete healing    ASSESSMENT / PLAN:    Comments:   We spent a great deal of time reviewing the pathology report and I gave them a copy.  I have scheduled her to follow up with Dr. Huynh to discuss options.  Ileana is unsure if she would like the area of basal cell carcinoma removed or not, Dr. Huynh has been updated.      The skin around the left anterior lower leg wound is erythematous (faint) and slightly warm to the touch, therefore I sent in a Rx for Bactrim for to stay in front of the cellulitis.     Plan:  Apply silver gel to each wound, cover with plain gauze, secure with 3M gentle release tape.     Follow-up in 2 weeks with Dr. Huynh or as needed for acute concerns.    Adela Charles CNS  Surgery and Wound Care  [facility]

## 2022-06-06 ENCOUNTER — TRANSFERRED RECORDS (OUTPATIENT)
Dept: HEALTH INFORMATION MANAGEMENT | Facility: CLINIC | Age: 82
End: 2022-06-06

## 2022-06-07 DIAGNOSIS — R52 PAIN: ICD-10-CM

## 2022-06-07 DIAGNOSIS — M62.838 MUSCLE SPASM: ICD-10-CM

## 2022-06-07 NOTE — TELEPHONE ENCOUNTER
Cyclobenzaprine      Last Written Prescription Date:  2/25/2021  Last Fill Quantity: 90,   # refills: 1  Last Office Visit: 4/15/2022  Future Office visit:    Next 5 appointments (look out 90 days)    Jun 09, 2022  2:00 PM  (Arrive by 1:45 PM)  Return Visit with Kai Huynh MD  [facility] [place] ([facility] [place] ) [address]  [place] [address]  [phone]

## 2022-06-09 ENCOUNTER — OFFICE VISIT (OUTPATIENT)
Dept: SURGERY | Facility: OTHER | Age: 82
End: 2022-06-09
Attending: SURGERY
Payer: MEDICARE

## 2022-06-09 VITALS
OXYGEN SATURATION: 98 % | BODY MASS INDEX: 36.62 KG/M2 | HEIGHT: 62 IN | WEIGHT: 199 LBS | SYSTOLIC BLOOD PRESSURE: 128 MMHG | DIASTOLIC BLOOD PRESSURE: 74 MMHG | HEART RATE: 55 BPM | TEMPERATURE: 97.5 F

## 2022-06-09 DIAGNOSIS — T81.31XD POSTOPERATIVE WOUND DEHISCENCE, SUBSEQUENT ENCOUNTER: Primary | ICD-10-CM

## 2022-06-09 PROCEDURE — G0463 HOSPITAL OUTPT CLINIC VISIT: HCPCS

## 2022-06-09 PROCEDURE — 99212 OFFICE O/P EST SF 10 MIN: CPT | Performed by: SURGERY

## 2022-06-09 RX ORDER — CYCLOBENZAPRINE HCL 10 MG
5-10 TABLET ORAL DAILY PRN
Qty: 30 TABLET | Refills: 0 | Status: SHIPPED | OUTPATIENT
Start: 2022-06-09 | End: 2022-08-15

## 2022-06-09 ASSESSMENT — PAIN SCALES - GENERAL: PAINLEVEL: SEVERE PAIN (7)

## 2022-06-09 NOTE — PROGRESS NOTES
Xeroform was placed over both wounds and covered with kerlix. Kerlix was taped down with paper tape. Patient tolerated well, and will return to clinic with any problems or concerns. Patients sock was then applied, and her stocking placed over top to help hold Kerlix in place. Patient will return to clinic in 2 weeks, sooner if needed.   Bag of supplies provided to patient for dressing changes at home. Judy Steiner LPN

## 2022-06-09 NOTE — NURSING NOTE
"Chief Complaint   Patient presents with     RECHECK     Squamous cell carcinoma, left posterior leg        Initial /74   Pulse 55   Temp 97.5  F (36.4  C)   Ht 1.575 m (5' 2\")   Wt 90.3 kg (199 lb)   SpO2 98%   BMI 36.40 kg/m   Estimated body mass index is 36.4 kg/m  as calculated from the following:    Height as of this encounter: 1.575 m (5' 2\").    Weight as of this encounter: 90.3 kg (199 lb).  Medication Reconciliation: complete  Shelby Lerma LPN  "

## 2022-06-09 NOTE — PATIENT INSTRUCTIONS
Thank you for allowing Dr. Huynh and our surgical team to participate in your care. Please call our health unit coordinator at [phone] with scheduling questions or the nurse at [phone] with any other questions or concerns.       Please cut 2 squares of Xeroform and place over open wounds. Wrap Kerlix around leg, and tape down with paper tape, or the Sensitive Skin tape you have at home.     Please return to clinic 2 two weeks for a dressing change, and wound check. Please call our office if you have any problems, or concerns.

## 2022-06-10 DIAGNOSIS — I10 BENIGN ESSENTIAL HYPERTENSION: ICD-10-CM

## 2022-06-10 RX ORDER — PROPRANOLOL HYDROCHLORIDE 10 MG/1
10 TABLET ORAL DAILY
Qty: 90 TABLET | Refills: 1 | Status: SHIPPED | OUTPATIENT
Start: 2022-06-10 | End: 2022-09-29

## 2022-06-15 NOTE — PROGRESS NOTES
"[facility] Progress Note    HPI  :Ileana is here for the re-assessment of the left posterior leg wound and left anterior leg skin excision.   She is six weeks post excision of the left medial lower leg ulceration by Dr. Huynh for atypical squamous lesion of the left lower leg.     The left posterior leg wound had been nearly healed, and then was opened up again at the last visit.  It was red and slightly exuberant.  This was discussed with Dr. Huynh, and he recommended a biopsy, which was completed.  The pathology report came back as basal cell carcinoma (see report below).   Patient reports the posterior left leg wound began as a bump and has opened off/on over the last two years.       Final Diagnosis   Skin of leg below knee/left calf, biopsy-  -Basal cell carcinoma with morpheaform features, associated with ulceration and granulation tissue  -Carcinoma extends to lateral margins of biopsy, deep margin is negative for malignancy.(please see comment)           S: She is still doing dressing changes to the anterior leg, concern that some healing skin is being pulled off by gauze dressing. She is here today with her son to discuss the posterior leg biopsy.     O:   Vitals:  /74   Pulse 55   Temp 97.5  F (36.4  C)   Ht 1.575 m (5' 2\")   Wt 90.3 kg (199 lb)   SpO2 98%   BMI 36.40 kg/m        Physical Exam:  G: alert oriented, no acute distress   ENT: sclera non-icteric   Pulm: no respiratory distress   CVS: RRR  ABD: soft non-tender non-distended   Ext: There is healing wound on posterior right leg aprox 1 cm in diameter, There is some superfical ulceration to the anterior leg along prior suture line.     Assessment/Plan:  Concern that with how thin her skin is that she is re-opening her wound on the anterior leg, will switch to xeroform dressing to help keep moisture on wound and help limit the amount of healing tissue that is removed with dressing changes. Her leg swelling has improved with " compression. We discussed that the posterior leg wound did return as a basal cell skin cancer. We discussed that the posterior leg is more lax and is better for wound healing though obviously hear her concerns about any further procedures. Did discuss that while this can be a locally aggressive cancer it rarely if ever spreads anywhere and that would likely wait until the anterior leg is completely healed. She unfortunately does not tolerate much for pain and would again require sedation. Will see back in aprox 2 weeks for wound check.      Kai Huynh MD

## 2022-06-23 ENCOUNTER — OFFICE VISIT (OUTPATIENT)
Dept: SURGERY | Facility: OTHER | Age: 82
End: 2022-06-23
Attending: FAMILY MEDICINE
Payer: MEDICARE

## 2022-06-23 VITALS
WEIGHT: 199 LBS | HEART RATE: 56 BPM | HEIGHT: 62 IN | TEMPERATURE: 97.1 F | DIASTOLIC BLOOD PRESSURE: 78 MMHG | BODY MASS INDEX: 36.62 KG/M2 | SYSTOLIC BLOOD PRESSURE: 128 MMHG | OXYGEN SATURATION: 99 %

## 2022-06-23 DIAGNOSIS — T14.8XXA OPEN WOUND: ICD-10-CM

## 2022-06-23 DIAGNOSIS — T81.31XD POSTOPERATIVE WOUND DEHISCENCE, SUBSEQUENT ENCOUNTER: Primary | ICD-10-CM

## 2022-06-23 DIAGNOSIS — L98.9 SKIN LESION: ICD-10-CM

## 2022-06-23 PROCEDURE — 99212 OFFICE O/P EST SF 10 MIN: CPT | Performed by: SURGERY

## 2022-06-23 PROCEDURE — G0463 HOSPITAL OUTPT CLINIC VISIT: HCPCS

## 2022-06-23 ASSESSMENT — PAIN SCALES - GENERAL: PAINLEVEL: MILD PAIN (3)

## 2022-06-23 NOTE — NURSING NOTE
"Chief Complaint   Patient presents with     RECHECK     Follow up, squamous cell carcinoma, left leg        Initial /78   Pulse 56   Temp 97.1  F (36.2  C)   Ht 1.575 m (5' 2\")   Wt 90.3 kg (199 lb)   SpO2 99%   BMI 36.40 kg/m   Estimated body mass index is 36.4 kg/m  as calculated from the following:    Height as of this encounter: 1.575 m (5' 2\").    Weight as of this encounter: 90.3 kg (199 lb).  Medication Reconciliation: complete  Shelby Lerma LPN  "

## 2022-06-23 NOTE — PATIENT INSTRUCTIONS
Thank you for allowing Dr. Huynh and our surgical team to participate in your care. Please call our health unit coordinator at [phone] with scheduling questions or the nurse at [phone] with any other questions or concerns.

## 2022-06-27 NOTE — PROGRESS NOTES
"[facility] Progress Note    HPI:  :Ileana is here for the re-assessment of the left posterior leg wound and left anterior leg skin excision.   Underwent wide local excision of left anterior leg lesion, having difficulty healing due to leg swelling and thinness of skin.      The left posterior leg wound with occasional bleeding with pathology below.      Final Diagnosis   Skin of leg below knee/left calf, biopsy-  -Basal cell carcinoma with morpheaform features, associated with ulceration and granulation tissue  -Carcinoma extends to lateral margins of biopsy, deep margin is negative for malignancy.(please see comment)           S: She continues to dress wound on lower leg with the help of her son. She has pain at times, she does note that the posterior leg wound will bleed on ocassion.     O:   Vitals:  /78   Pulse 56   Temp 97.1  F (36.2  C)   Ht 1.575 m (5' 2\")   Wt 90.3 kg (199 lb)   SpO2 99%   BMI 36.40 kg/m        Physical Exam:  G: alert oriented, no acute distress   ENT: sclera non-icteric   Ext: Left lower leg, with scattered ulceration around prior surgical site, no real depth to it. Beefy red base.     Assessment/Plan:  Slow healing prior surgical wound on lower leg, it is very superficial and is not ulcerated. I believe swelling in leg is contributing. I discussed that I would like to see this wound healed before we remove the posterior leg wound as she does not tolerate in office procedures and will require operating room. While frustrated with the slow progress will see back in a few weeks to see how progress is going.     Kai Huynh MD     "

## 2022-07-08 DIAGNOSIS — B37.2 YEAST DERMATITIS: ICD-10-CM

## 2022-07-11 RX ORDER — NYSTATIN 100000 U/G
CREAM TOPICAL 2 TIMES DAILY PRN
Qty: 30 G | Refills: 2 | Status: SHIPPED | OUTPATIENT
Start: 2022-07-11 | End: 2023-03-07

## 2022-07-14 ENCOUNTER — OFFICE VISIT (OUTPATIENT)
Dept: SURGERY | Facility: OTHER | Age: 82
End: 2022-07-14
Attending: CLINICAL NURSE SPECIALIST
Payer: MEDICARE

## 2022-07-14 VITALS
WEIGHT: 199 LBS | DIASTOLIC BLOOD PRESSURE: 84 MMHG | SYSTOLIC BLOOD PRESSURE: 132 MMHG | OXYGEN SATURATION: 98 % | BODY MASS INDEX: 36.62 KG/M2 | TEMPERATURE: 97 F | HEIGHT: 62 IN | HEART RATE: 58 BPM

## 2022-07-14 DIAGNOSIS — Z85.828 PERSONAL HISTORY OF MALIGNANT NEOPLASM OF SKIN: ICD-10-CM

## 2022-07-14 DIAGNOSIS — Z98.890 H/O LOCAL EXCISION OF SKIN LESION: ICD-10-CM

## 2022-07-14 DIAGNOSIS — L98.9 SKIN LESION: ICD-10-CM

## 2022-07-14 DIAGNOSIS — T14.8XXA OPEN WOUND: ICD-10-CM

## 2022-07-14 DIAGNOSIS — T81.31XD POSTOPERATIVE WOUND DEHISCENCE, SUBSEQUENT ENCOUNTER: Primary | ICD-10-CM

## 2022-07-14 PROCEDURE — 99213 OFFICE O/P EST LOW 20 MIN: CPT | Performed by: CLINICAL NURSE SPECIALIST

## 2022-07-14 PROCEDURE — G0463 HOSPITAL OUTPT CLINIC VISIT: HCPCS

## 2022-07-14 ASSESSMENT — PAIN SCALES - GENERAL: PAINLEVEL: NO PAIN (0)

## 2022-07-14 NOTE — PROGRESS NOTES
SUBJECTIVE:  Ileana Davis, 81 year old, female presents with the following Chief Complaint(s) with HPI to follow:   Chief Complaint   Patient presents with     Surgical Followup     Follow up, squamous cell carcinoma, left leg          HPI:  Ileana is here for the re-assessment of the left posterior leg wound and left anterior leg skin excision.   She has had delayed healing post excision of the left medial lower leg ulceration by Dr. Huynh for atypical squamous lesion of the left lower leg.    The left posterior leg wound had been nearly healed in early May 2022, and then opened up again the end of May 2022.  It was red and slightly exuberant.  This was discussed with Dr. Huynh, and he recommended a biopsy, which was completed.  The pathology report came back as basal cell carcinoma (see report below).   Patient reports the posterior left leg wound began as a bump and has opened off/on over the last two years.      Final Diagnosis   Skin of leg below knee/left calf, biopsy-  -Basal cell carcinoma with morpheaform features, associated with ulceration and granulation tissue  -Carcinoma extends to lateral margins of biopsy, deep margin is negative for malignancy.(please see comment)        Electronically signed by Estela Menchaca MD on 5/26/2022 at  3:01 PM     Comment  [facility]   The slides from shave biopsy of lesion on patient's left leg ([id number]; 3/28/2022) is reexamined, and the previous diagnosis of an atypical squamous proliferation, concerning for well differentiated squamous cell carcinoma is noted and confirmed.  Clinical correlation is requested as to the locations of the previous and current excisions.  All slides are seen in intradepartmental consultation.           Patient Active Problem List   Diagnosis     Hypothyroidism     Generalized anxiety disorder     Benign essential hypertension     GERD (gastroesophageal reflux disease)     Hepatitis     Contact dermatitis     Rosacea      Osteoarthritis     Fibromyalgia     Open wound of knee, leg, and ankle     Degeneration of lumbar or lumbosacral intervertebral disc     Anemia     Autoimmune hepatitis (H)     Morbid obesity (H)     Mild major depression (H)     Hallux valgus of left foot     Left foot pain     Ametropia     Decreased vision of right eye     Dry eye syndrome of both eyes     PCO (posterior capsular opacification), left     Ptosis of both eyelids     Esodeviation     S/p nephrectomy     Chronic kidney disease, stage 3 (H)     Overactive bladder     Chalazion of right upper eyelid       Past Medical History:   Diagnosis Date     Anemia 8/20/2015     Autoimmune hepatitis (H) 6/2/2016     Benign paroxysmal positional vertigo 10/7/2010     Contact dermatitis and other eczema, due to unspecified cause 10/7/2010     Esophageal reflux 10/7/2010     Generalized anxiety disorder 10/7/2010     Generalized osteoarthrosis, involving multiple sites 10/7/2010     Hepatitis, unspecified 10/7/2010     Infected wound      Myalgia and myositis, unspecified 10/7/2010    fibromyalgia     Nonallopathic lesion of cervical region, not elsewhere classified 12/5/2001     Nonallopathic lesion of thoracic region, not elsewhere classified 3/31/2005     Open wound of knee, leg, and ankle      Rosacea 10/7/2010     Unspecified essential hypertension 10/7/2010     Unspecified hypothyroidism 10/7/2010       Past Surgical History:   Procedure Laterality Date     APPENDECTOMY       ARTHROPLASTY TOE(S) Left 9/21/2020    Procedure: Left  Bethea Arthroplasty with interpositional arthroplasty using graft.;  Surgeon: Gina Rodriguez DPM;  Location: [facility]     BACK SURGERY  2015    lumbar epidural injection     CARDIAC SURGERY  10/2017    angioplasty     CHOLECYSTECTOMY       COLONOSCOPY  07/27/2017    [facility]     COLONOSCOPY - HIM SCAN  05/15/2001    Small internal hemorrhoids; otherwise normal;[facility]     COLONOSCOPY - HIM SCAN  12/14/2006    Colonoscopy, [facility]     ENT SURGERY      tonsillectomy     ESOPHAGOGASTRODUODENOSCOPY  07/27/2017    [facility]     EXCISE LESION LOWER EXTREMITY Left 4/19/2022    Procedure: Excision Left Medial Lower Leg Ulceration;  Surgeon: Kai Huynh MD;  Location: [facility]     EXCISE LESION UPPER EXTREMITY Right 4/19/2022    Procedure: Excision lesion right upper extremity;  Surgeon: Kai Huynh MD;  Location: [facility]     EYE SURGERY      bilateral cataract removal     GI SURGERY      anal fistula     GYN SURGERY  1985    [facility] BSO     GYN SURGERY      cessarian x 2     GYN SURGERY      tubal sterilazation     NEPHRECTOMY Right 05/02/2019     ORTHOPEDIC SURGERY      right knee     ORTHOPEDIC SURGERY  1986    plantar fascia release     ORTHOPEDIC SURGERY      left knee     ORTHOPEDIC SURGERY      right trigger finger release     ORTHOPEDIC SURGERY  2013    Right great toe MTPJ fusion, adductor tenotomy,correction of hammer toe     ORTHOPEDIC SURGERY  [id number]    multiple procedures left forefoot       Family History   Problem Relation Age of Onset     Arthritis Mother         rheumatoid     Heart Disease Mother         CHF     Alcohol/Drug Father         alcoholism     Allergies Father      Thyroid Disease Other      Diabetes No family hx of      Asthma No family hx of        Social History     Tobacco Use     Smoking status: Never Smoker     Smokeless tobacco: Never Used   Substance Use Topics     Alcohol use: No       Current Outpatient Medications   Medication Sig Dispense Refill     conjugated estrogens (PREMARIN) 0.625 MG/GM vaginal cream Place 0.5 g vaginally twice a week 30 g 3     cyclobenzaprine (FLEXERIL) 10 MG tablet Take 0.5-1 tablets (5-10 mg) by mouth daily as needed for muscle spasms 30 tablet 0     ferrous sulfate (FEROSUL) 325 (65 Fe) MG tablet Take 325 mg by mouth daily (with breakfast)       fluticasone (FLONASE) 50 MCG/ACT nasal spray SPRAY 2 SPRAYS INTO BOTH NOSTRILS DAILY AS NEEDED 16 g 1     hydrocortisone 2.5 %  cream APPLY TWICE TIMES DAILY TO ACTIVE ECZEMA ON THE HANDS, FOLLOW WITH MOISTURIZING CREAM.       levothyroxine (SYNTHROID/LEVOTHROID) 100 MCG tablet Take 1 tablet (100 mcg) by mouth daily 90 tablet 1     losartan (COZAAR) 25 MG tablet TAKE 1 TABLET (25 MG) BY MOUTH DAILY 90 tablet 0     melatonin 1 MG TABS tablet Take by mouth nightly as needed for sleep        MIRALAX 17 GM/SCOOP powder TAKE 1 DOSE (17G) BY MOUTH DAILY FOR 5 DAYS STARTING ON 3/26 AS PART OF BOWEL PREP, MIX IN 8OZ WATER OR JUICE PRIOR TO ADMINISTRATION       NYAMYC 402165 UNIT/GM external powder APPLY TO AFFECTED AREA NIGHTLY AS NEEDED FOR RASH 60 g 1     nystatin (MYCOSTATIN) 762558 UNIT/GM external cream APPLY TOPICALLY 2 TIMES DAILY AS NEEDED FOR DRY SKIN 30 g 2     omeprazole (PRILOSEC) 40 MG capsule Take by mouth daily Before the same meal daily       Polyethylene Glycol 3350 (MIRALAX PO) Take by mouth daily as needed        propranolol (INDERAL) 10 MG tablet Take 1 tablet (10 mg) by mouth daily 90 tablet 1     propylene glycol (SYSTANE BALANCE) 0.6 % SOLN ophthalmic solution Inject 1-2 drops into the eye       sennosides (SENOKOT) 8.6 MG tablet TAKE 1 TABLET BY MOUTH ONE TIME A DAY AT BEDTIME       sulfamethoxazole-trimethoprim (BACTRIM DS) 800-160 MG tablet Take 1 tablet by mouth 2 times daily 7 tablet 0       Allergies   Allergen Reactions     Fentanyl Other (See Comments)     Severe agitation when used during angiogram     Codeine Sulfate Other (See Comments)     hallucinations     Fentanyl And Related      Other reaction(s): Confusion     Morphine Other (See Comments)     Hallucinations with iv therapy     Tape [Adhesive Tape]      Tramadol Other (See Comments)     hallucination       REVIEW OF SYSTEMS  Skin: as above  Eyes: positive for glasses  Ears/Nose/Throat: negative  Respiratory: Shortness of breath- unchanged  Cardiovascular: negative  Gastrointestinal: positive for constipation  Genitourinary: positive for incontinence; one  "kidney  Musculoskeletal: positive for arthritis, fibromyalgia and ambulates with a cane  Neurologic: negative  Psychiatric: positive for anxiety and depression  Hematologic/Lymphatic/Immunologic: negative  Endocrine: negative    OBJECTIVE:  /84   Pulse 58   Temp 97  F (36.1  C)   Ht 1.575 m (5' 2\")   Wt 90.3 kg (199 lb)   SpO2 98%   BMI 36.40 kg/m    Constitutional: alert, cooperative and over weight  Head: Normocephalic. No masses, lesions, tenderness or abnormalities  Respiratory:  Respirations even and unlabored  Musculoskeletal: extremities normal- no gross deformities noted  Skin:        Wound description:     Type of Wound:  Basal cell carcinoma   Location:  Left posterior lower extremity    Drainage amount:  moderate   Drainage color:  tan   Odor:  none   Wound bed:  Pink   Surrounding skin:  WDL        Measurements:  1 x 1.2 cm   Pain:  Tender with palpation   Wound debridement completed on: 7/14/2022, debridement type: Mechanical    Dressing change:      Wound cleansed with normal saline. Conservative debridement performed with dry gauze to remove non-viable tissue and bioburden (less than 20 sq cm) to the level of the dermis. Patient was informed of the risks and benefits and consented to the procedure.   Dressed with silver gel, covered with 4x4 gauze, secured with 3M gentle release tape.                  Wound description:                Type of Wound:  surgical              Location:  Left lower leg, anterior              Drainage amount:  moderate              Drainage color:  tan              Odor:  none              Wound bed:  pink              Surrounding skin:  WDL   Depth: Partial thickness   Measurements:      Superior - 0.5 x 1 cm    Distal - 0.2 x 0.2 cm              Pain:  Tender              Wound debridement completed on: 7/14/2022, debridement type: Sharp       Dressing change:      Wound cleansed with mild soap, rinse, dried.  Conservative debridement performed with dry gauze " to remove non-viable tissue and bioburden (less than 20 sq cm) to the level of the dermis. Patient was informed of the risks and benefits and consented to the procedure.  Dressed with silver gel, covered with 4x4 gauze, secured with 3M gentle release tape.     Neurologic: Reflexes normal and symmetric. Sensation grossly WNL.  Psychiatric: mentation appears normal and affect normal/bright    THERAPY GOAL: Complete healing    ASSESSMENT / PLAN:    Comments: She is almost healed anteriorly, the posterior basal cell lesion is unchanged.  They would like to take a little break from appointments, which is a reasonable request.  She will be scheduled with Dr. Huynh in approximately one month and at that time will discuss excision of the posterior basal cell lesion.  Ileana and her son both verbalize understanding of this plan and will call with any questions or concerns.     Plan:  Apply silver gel to each wound, cover with plain gauze, secure with 3M gentle release tape.     Follow-up in one month with Dr. Huynh or as needed for acute concerns.    Adela Charles CNS  Surgery and Wound Care  [facility]

## 2022-07-15 DIAGNOSIS — I10 BENIGN ESSENTIAL HYPERTENSION: ICD-10-CM

## 2022-07-18 RX ORDER — LOSARTAN POTASSIUM 25 MG/1
25 TABLET ORAL DAILY
Qty: 90 TABLET | Refills: 0 | Status: SHIPPED | OUTPATIENT
Start: 2022-07-18 | End: 2022-10-10

## 2022-08-09 DIAGNOSIS — E03.9 HYPOTHYROIDISM, UNSPECIFIED TYPE: ICD-10-CM

## 2022-08-10 NOTE — TELEPHONE ENCOUNTER
Synthroid       Last Written Prescription Date:  2/11/22  Last Fill Quantity: 90,   # refills: 1  Last Office Visit: 10/29/21  Future Office visit:    Next 5 appointments (look out 90 days)    Aug 18, 2022 10:30 AM  (Arrive by 10:15 AM)  Return Visit with Kai Huynh MD  [facility] [place] ([facility] [place] ) [address] [place] [address]  [phone]

## 2022-08-11 RX ORDER — LEVOTHYROXINE SODIUM 100 UG/1
100 TABLET ORAL DAILY
Qty: 90 TABLET | Refills: 0 | Status: SHIPPED | OUTPATIENT
Start: 2022-08-11 | End: 2022-09-22

## 2022-08-13 DIAGNOSIS — R52 PAIN: ICD-10-CM

## 2022-08-13 DIAGNOSIS — M62.838 MUSCLE SPASM: ICD-10-CM

## 2022-08-15 RX ORDER — CYCLOBENZAPRINE HCL 10 MG
TABLET ORAL
Qty: 30 TABLET | Refills: 0 | Status: SHIPPED | OUTPATIENT
Start: 2022-08-15 | End: 2022-10-13

## 2022-08-15 NOTE — TELEPHONE ENCOUNTER
Flexeril      Last Written Prescription Date:  6/9/22  Last Fill Quantity: 30,   # refills: 0  Last Office Visit: 10/29/21  Future Office visit:    Next 5 appointments (look out 90 days)    Aug 18, 2022 10:30 AM  (Arrive by 10:15 AM)  Return Visit with Kai Huynh MD  [facility] [place] ([facility] [place] ) [address] [place] [address]  [phone]           Routing refill request to provider for review/approval because:

## 2022-08-18 ENCOUNTER — PREP FOR PROCEDURE (OUTPATIENT)
Dept: SURGERY | Facility: OTHER | Age: 82
End: 2022-08-18

## 2022-08-18 ENCOUNTER — OFFICE VISIT (OUTPATIENT)
Dept: SURGERY | Facility: OTHER | Age: 82
End: 2022-08-18
Attending: SURGERY
Payer: MEDICARE

## 2022-08-18 VITALS
HEART RATE: 57 BPM | HEIGHT: 62 IN | SYSTOLIC BLOOD PRESSURE: 128 MMHG | TEMPERATURE: 97.9 F | DIASTOLIC BLOOD PRESSURE: 84 MMHG | WEIGHT: 199 LBS | BODY MASS INDEX: 36.62 KG/M2 | OXYGEN SATURATION: 100 %

## 2022-08-18 DIAGNOSIS — Z01.818 PREOP TESTING: ICD-10-CM

## 2022-08-18 DIAGNOSIS — C44.91 SKIN CANCER, BASAL CELL: Primary | ICD-10-CM

## 2022-08-18 DIAGNOSIS — C44.719 BASAL CELL CARCINOMA (BCC) OF LEFT LOWER EXTREMITY: Primary | ICD-10-CM

## 2022-08-18 PROCEDURE — 99213 OFFICE O/P EST LOW 20 MIN: CPT | Performed by: SURGERY

## 2022-08-18 PROCEDURE — G0463 HOSPITAL OUTPT CLINIC VISIT: HCPCS

## 2022-08-18 ASSESSMENT — PAIN SCALES - GENERAL: PAINLEVEL: MILD PAIN (2)

## 2022-08-18 NOTE — NURSING NOTE
"Chief Complaint   Patient presents with     RECHECK     Follow up squamous cell carcinoma of left leg        Initial /84   Pulse 57   Temp 97.9  F (36.6  C)   Ht 1.575 m (5' 2\")   Wt 90.3 kg (199 lb)   SpO2 100%   BMI 36.40 kg/m   Estimated body mass index is 36.4 kg/m  as calculated from the following:    Height as of this encounter: 1.575 m (5' 2\").    Weight as of this encounter: 90.3 kg (199 lb).  Medication Reconciliation: complete  Shelby Lerma LPN  "

## 2022-08-18 NOTE — PATIENT INSTRUCTIONS
Thank you for allowing our surgical team to participate in your care. Please review the instructions below.   If you have questions, you may contact us at the any of the following numbers:     [facility] Coordinator: [phone]  Clinic Surgery Nurse ([facility]): [phone]  [facility] Surgery Education Nurse: [phone]    You are scheduled for: Wide local excision of left posterior leg with Dr. Huynh on 9/13/22.  Admit Time: Hospital Surgery will call you the day before your procedure by 5pm with your arrival time.   If your surgery is on Monday, expect a call on Friday.   If you are not contacted before 5pm, please call admitting at [phone].   After hours or on weekends, please call [phone] to postpone.   Call the clinic surgery nurse if you become ill within 2 weeks of your procedure to reschedule.   This includes fever, chills, sore throat, cough, chest congestion, runny nose, or any other symptom of any other illness.     Preop appointment needed within the 30 days prior to your procedure.     COVID-19 test is needed prior to procedure, even if you've been vaccinated.  If you are going home on the same day as your procedure (surgery):   1-2 days before your procedure, take an at-home, rapid antigen test. You can buy these at many stores. Or you can order free, at-home tests at covid.gov/tests. If you can't find an at-home antigen test, please schedule a PCR test with [facility] by calling [phone], or visiting [email or web address]. We can't accept tests that are more than 4 days old.   If your test is NEGATIVE, please take a photo of the test. Show the photo to the nurse when you come in for your procedure (surgery)  If your test is POSITIVE, please contact the Pre-Admission office at [phone]. If you are calling after 5 PM on weekdays, and on the weekend, please call [phone] and ask to speak with the House Supervisor.  If you are staying overnight in the hospital you will need to  get a PCR covid test 2-4 days prior to procedure (surgery).       Please call the [facility] Surgery Education Nurse at [phone] 1 week prior to your procedure and have a medication list ready.   Do not take Aspirin or other NSAIDS (Ibuprofen, Motrin, Aleve, Celebrex, Naproxen, etc), vitamins/supplements 7 days before your surgery unless you have been otherwise directed.   If you are prescribed a daily 81mg Aspirin, you may continue this.   If you are prescribed blood thinners or insulin, please contact your primary care provider for instructions.    Shower the night before and the morning of your procedure with Hibiclens soap.  On The Night Before Your Surgery:  1.  Remove your jewelry and leave off until after surgery. Wash your hair and body with your regular soap/shampoo. Use a freshly washed washcloth. Include cleaning inside your belly button. Rinse off soap/shampoo.  2. Wash your body from neck to feet using 1/2 of the 1st Hibiclens (Chlorhexidine) bottle with your bare hands. Do not use Hibiclens on your face, hair or genital area. Leave the soap on your body for one minute and rinse. If you are under age 18, you should purchase and use Dial soap instead and use enough to generously cover your body.    3. Repeat step 2 with the 2nd half of the bottle (or additional Dial soap if under age 18).  4. Rinse off all soap and dry with a freshly washed towel. Do not use lotions or hair products.  5. Sleep in freshly washed pajamas and freshly washed bedding.  On The Morning of Your Surgery:  1.Wash your hair and body with your regular soap/shampoo. Use another freshly washed washcloth. Rinse off.   2. Wash your body from neck to feet using 1/2 of the 2nd Hibiclens (Chlorhexidine) bottle (or Dial soap if you are under age 18) with your bare hands. Leave the soap on your body for one minute and rinse.  3. Repeat step 2 with the 2nd half of the bottle (or additional Dial soap if under age 18).  4. Rinse off all the  soap and dry with another freshly washed towel. Do not use lotions or hair products.  5. Wear freshly washed clothing to the hospital.    Do not have anything to eat or drink after midnight the night before your surgery (or 8 hours prior to surgery), except clear liquids (water, clear juice, clear broth, plain coffee or tea without cream or milk) up until 2 hours prior to arrival time, and then nothing by mouth.   Do not eat, drink, chew gum, suck on hard candy, or smoke after 2 hours prior to arrival. You can brush your teeth.   If you are directed to take any medications, take them with a tiny sip of water.   If you use an inhaler, bring it with you.  Arrive in clean, comfortable clothing.   Do not wear any jewelry, make-up, nail polish, lotions, hair products, or perfumes.   [place] in hospital admitting through the [place] entrance.  A responsible adult must be available to drive you home and stay with you for 24 hours at home. If you need to take a taxi or the bus, you must have another responsible adult to ride with you. Your procedure will be cancelled if you do not have a responsible adult .     Return to clinic for a postop appointment 2 week(s) from your surgery date.      SURGERY HANDBOOK            Your surgery is scheduled:    Date: 9/13/22  ________________________________    Time: Hospital surgery will call with your arrival time 1 day prior to procedure.  ________________________________    Surgeon's Name:   _______________________        Pre-Op Physical Fax Numbers:          Pre-Admissions  [phone]        Your surgery is located at:  [place] [address]         Before Your Surgery  For Patients and Visitors at [place]    Welcome  You have been scheduled for surgery and we would like to give you some information that will assist in helping get the best possible outcome.    As you get ready for surgery, you may have a lot of  questions.  This brochure will help you know what to expect before and after surgery.  You and your family are the most important members of your health care team.  You will need to take an active role in your care.    Be sure to ask questions and learn all that you can about your surgery.  If you have any safety concerns, we urge you to tell a nurse as soon as possible.   This brochure is for information only.  It does not replace the advice of your doctor.  Always follow your doctor's advice.    If you or your  are deaf or hard of hearing, or prefer a language other than English, please let us know.  We have many free services, including interpreters and other aids to help you communicate. You may ask for help  through any member of your care team or by calling Language Services at [phone], option 2.    Before Surgery:   If for any reason you decide not to have the surgery, please contact our office.  We can easily cancel or reschedule the procedure. Please call your surgeon's office, after hours call [phone]    Any pain related to the surgery that occurs before the surgery needs to be reported and managed by your primary care or referring doctor.    Please keep in mind that the time of surgery is subject to change.  Make sure you have nothing to eat or drink after midnight.  If your surgery is later in the afternoon, this recommendation might change, but not until the day before surgery after the actual time of the surgery has been established.      GETTING READY FOR SURGERY  Always follow your surgeon's instructions.  If you don't, your surgery could be canceled.  Please use the following checklist.    Within 30 Days of Surgery:   Have a pre-surgery physical exam with your family doctor or partner.  If you use a [facility], all of your information from the pre-op physical will be in the [facility] computer system.  Ask the doctor to send all of your results to the hospital before the  surgery.  The doctor also may ask you to bring the results with you on the day of surgery or you can fax them to [phone].    Tell the doctor if:  You are allergic to latex or rubber  (Latex and rubber gloves are often used in medical care).  You are taking any medicines (including aspirin), vitamins (Vitamin E, Fish Oil, Omegas) or herbal products.  You will need to stop taking some medicines before surgery.  You have any medical problems (allergies, diabetes or heart disease, for example).  You have a pacemaker or an AICD (automatic implanted cardiac defibrillator).  If you do, please bring the ID card with you on the day of surgery.  You are a smoker.  People who smoke have a higher risk of infection after surgery.  Ask your doctor how you can quit smoking.    Within 7 days of Surgery:  Prior to your surgical procedure, a nurse will be contacting you to obtain a health history. A nurse will call you within a few days of surgery to go over these and other instructions.  If you do not hear from them, please call them at [phone].      Call your insurance company.  Ask if you need pre-approval for your surgery.  If you do not have insurance, please let us know.    Arrange for someone to drive you home after surgery.  If you will have same-day surgery, you may not drive or take public transportation home by yourself.  Arrange for someone to stay with you for 24 hours after you go home.  This person must be a responsible adult (ie- Family member or friend).    The Day Before Surgery:   Call your surgeon if there are any changes in your health.  This includes signs of a cold or flu (such as a sore throat, runny nose, cough, rash or fever).  Do not smoke, drink alcohol or take over-the-counter medicine (unless your surgeon tells you to) for 24 hours before and after surgery.  If you take prescribed drugs:  You may need to stop them until after the surgery.  Follow your doctor's orders.  You may resume Aspirin  and/or blood thinners after your surgery as directed by your physician/surgeon.  NO FOOD OR DRINK AFTER MIDNIGHT.  Follow your surgeon's orders for eating and drinking.  You need to have an empty stomach before surgery.  This will make the surgery as safe as possible.  If you don't follow your doctor's orders, your surgery could be changed to another date.    The Day of Surgery:  Take a shower or bath the night before and the morning of surgery.  Use antiseptic soap or the soap your surgeon gave you.  Gently clean the skin.  Do not shave or scrub your surgery site.  Please remove deodorant, cologne, scented lotion, makeup, nail polish and jewelry (including rings and body piercings).  If you wear artificial nails, please remove at least one nail before coming to the hospital.  Wear clean, loose clothing to the hospital.  Bring these items to the hospital:  Your insurance card.  A list of all the medicines you take.  Include vitamins, minerals, herbs and over-the-counter drugs.  Note any drug allergies.  A copy of your advance health care directive, if you have one.  This tells us what treatment you would want -- and who would make health care decisions -- if you could no longer speak for yourself.  You may request this form in advance or download it from [email or web address].  A case for any glasses, contact lenses, hearing aids or dentures.  Your inhaler or CPAP machine, if you use these at home.  Leave extra cash, jewelry and other valuables at home.      When You Arrive:  When you get to the hospital, you will:  Check in.  If you are under age 18, you must be with a parent or legal guardian.  Electronically sign consent forms, if you haven't already.  These electronic forms state that you know the risks and benefits of surgery.  When you sign the forms, you give us permission to do the surgery.  Do not sign them unless you understand what will happen during and after your surgery.  If you have any questions  about your surgery, ask to speak with your doctor before you sign the forms.  If you don't understand the answers, ask again.  Receive a copy of the Patients Bill of Rights.  If you do not receive a copy, please ask for one.  Change into hospital clothes.  Your belongings will be placed in a bag.  We will return them to you after surgery.  Meet with the anesthesia provider.  He or she will tell you what kind of anesthesia (medicine) will be used to keep you comfortable during surgery.  Remember: It's okay to remind doctors and nurses to wash their hands before touching you.   In most cases, your surgeon will use a marker to write his or her initials on the surgery site.  This ensures that the exact site is operated on.  For safety reasons, we will ask you the same questions many times.  For example, we may ask your name and birth date over and over again.  Friends and family can stay with you until it's time for surgery.  While you're in surgery, they will be in the waiting area.  Please note that cell phones are not allowed in some patient care areas.  If you have questions about what will happen in the operating room, talk to your care team.    After Surgery:  We will move you to a recovery room where we will watch you closely.  If you have any pain or discomfort, tell your nurse.  He or she will try to make you comfortable.    If you are staying overnight we will move you to your hospital room after you are awake.  If you are going home we will move you to another room.  Friends and family may be able to join you.  The length of time you spend in recovery depends on the type of medicine you received, your medical condition, and the type of surgery you had.  Dealing with pain:  A nurse will check your comfort level often during your stay.  He or she will work with you to manage your pain.  Remember:  All pain is real.  There are many ways to control pain.  We can help you decide what works best for you.  Ask for  "pain medicine when you need it.  Don't try to \"tough it out\" -- this can make you feel worse.  Always take your medicine as ordered.  Medicine doesn't work the same for everyone.  If your medicine isn't working tell your nurse.  There may be other medicines or treatments we can try.    Going Home:  We will let you know when you're ready to leave the hospital.  Before you leave, we will tell you how to care for yourself at home and prevent infections.  If you do not understand something, please say so.  We will answer any questions you have.  We will then help you get ready to leave.  You must have an adult with you for the first 24 hours after you leave the hospital. Take it easy when you get home.  You will need some time to recover -- you may be more tired than you realize at first.  Rest and relax for at least the first 24 hours at home.  You'll feel better and heal faster if you take good care of yourself.                      Pre-Operative Surgery Scrub    Purpose:   The skin harbors a large variety of bacteria, both infectious and noninfectious.  Showering with an antiseptic soap prior to an invasive procedure will decrease the number of transient and resident (good and bad) bacteria that is normally found on the skin.    Procedure:    Shower with 1 bottle of antiseptic soap (enclosed) the evening before and 1 bottle the morning of surgery (bathing the day of the procedure is most important).     Apply the soap at full strength (use the entire bottle).  Gently clean the skin, rinse, and dry with a clean towel that is freshly laundered (out of the dryer) and then put on clean clothing that is freshly laundered.      We have given you information regarding pre-op showering.  We recommend that patients wash with an antiseptic soap prior to surgery.  This cleanser will be given to you at the clinic or mailed to your home.  It is advised that you liberally wash the specific area surgery area the night before, and " again in the morning before the surgery.  Do not apply lotion afterward.  We would like to keep the skin as clean as possible.    Thank you for following these important instructions.          After Surgery:  When you are discharged from the recovery room, the nurses will review instructions with you and your caregiver.    Please wash your hands every time you touch the wound or change bandages or dressings.    Do not submerge the wound in water.  You may not use a bathtub or hot tub until the wound is closed.  The wait time frame is generally 2-3 weeks but any open area can be a source of incoming bacteria, so it is better to be on the safe side and avoid the tub until your wound is fully healed.    You may take a shower 24 hours after surgery.  Double check with your surgeon if it is ok for water to run over a wound, whether it has been sutured, stapled, glued or is open.  You may gently wash the wound using the antiseptic soap provided for your pre-surgery showering (do not use a washcloth).  Any mild soap will work as well.    Many surgical wounds will have small white strips of tape on them called Steri Strips.  Do not remove these.  The edges will curl and fall off within 7-10 days with normal showering.    If you are going home with sutures (stitches) or staples, you must return to the clinic to have them taken out, usually within 1-2 weeks.    Signs and symptoms of infection include:  Fever, temperature over 101.5 ' F  Redness  Swelling  Increasing pain  Green or yellow drainage which may or may not have a foul odor.    Symptoms of infection need to be reported to your surgery office. Please call your Surgeon.

## 2022-08-22 NOTE — PROGRESS NOTES
"[facility] Progress Note    Ileana is here for the re-assessment of the left posterior leg wound and left anterior leg skin excision.   She has had delayed healing post excision of the left medial lower leg ulceration by Dr. uHynh for atypical squamous lesion of the left lower leg.     The left posterior leg wound had been nearly healed in early May 2022, and then opened up again the end of May 2022.  It was red and slightly exuberant.  This was discussed with Dr. Huynh, and he recommended a biopsy, which was completed.  The pathology report came back as basal cell carcinoma (see report below).   Patient reports the posterior left leg wound began as a bump and has opened off/on over the last two years.       Final Diagnosis   Skin of leg below knee/left calf, biopsy-  -Basal cell carcinoma with morpheaform features, associated with ulceration and granulation tissue  -Carcinoma extends to lateral margins of biopsy, deep margin is negative for malignancy.(please see comment)        Electronically signed by Estela Menchaca MD on 5/26/2022 at  3:01 PM      Comment   [facility]   The slides from shave biopsy of lesion on patient's left leg ([id number]; 3/28/2022) is reexamined, and the previous diagnosis of an atypical squamous proliferation, concerning for well differentiated squamous cell carcinoma is noted and confirmed.  Clinical correlation is requested as to the locations of the previous and current excisions.  All slides are seen in intradepartmental consultation.           S: She is doing well, the anterior skin lesion has been healed for about two weeks now. The posterior leg lesion will continue to weep and bleed at times.     O:   Vitals:  /84   Pulse 57   Temp 97.9  F (36.6  C)   Ht 1.575 m (5' 2\")   Wt 90.3 kg (199 lb)   SpO2 100%   BMI 36.40 kg/m        Physical Exam:  G: alert oriented, no acute distress   ENT: sclera non-icteric   Pulm: no respiratory distress   CVS: RRR  Ext: " posterior left leg there is area of hypergranulation tissue, anterior leg healed.     Assessment/Plan:  Discussed that the posterior leg lesion is unlikely to heal without resection due to it being a basal cell skin cancer. Discussed that the posterior compartment should close with less tension making it a much easier recovery. She will require MAC as she does not do well with pain. Will have her get a preop.     Kai Huynh MD

## 2022-09-01 NOTE — TELEPHONE ENCOUNTER
Ruben mcgee calling for dosing on Voltaren.Per pharmacy dosing is 4grams QID,unless it is a small area.Pended for your review.Thank you.  
Updated script sent.  
pleural fluid

## 2022-09-06 ENCOUNTER — ANESTHESIA EVENT (OUTPATIENT)
Dept: SURGERY | Facility: HOSPITAL | Age: 82
End: 2022-09-06
Payer: MEDICARE

## 2022-09-06 NOTE — ANESTHESIA PREPROCEDURE EVALUATION
Anesthesia Pre-Procedure Evaluation    Patient: Ileana Davis   MRN: [id number] : 1940        Procedure : Procedure(s):  Wide local excision of left posterior leg          Past Medical History:   Diagnosis Date     Anemia 2015     Autoimmune hepatitis (H) 2016     Benign paroxysmal positional vertigo 10/7/2010     Contact dermatitis and other eczema, due to unspecified cause 10/7/2010     Esophageal reflux 10/7/2010     Generalized anxiety disorder 10/7/2010     Generalized osteoarthrosis, involving multiple sites 10/7/2010     Hepatitis, unspecified 10/7/2010     Infected wound      Myalgia and myositis, unspecified 10/7/2010    fibromyalgia     Nonallopathic lesion of cervical region, not elsewhere classified 2001     Nonallopathic lesion of thoracic region, not elsewhere classified 3/31/2005     Open wound of knee, leg, and ankle      Rosacea 10/7/2010     Unspecified essential hypertension 10/7/2010     Unspecified hypothyroidism 10/7/2010      Past Surgical History:   Procedure Laterality Date     APPENDECTOMY       ARTHROPLASTY TOE(S) Left 2020    Procedure: Left  Bethea Arthroplasty with interpositional arthroplasty using graft.;  Surgeon: Gina Rodriguez DPM;  Location: [facility]     BACK SURGERY      lumbar epidural injection     CARDIAC SURGERY  10/2017    angioplasty     CHOLECYSTECTOMY       COLONOSCOPY  2017    [facility]     COLONOSCOPY - HIM SCAN  05/15/2001    Small internal hemorrhoids; otherwise normal;[facility]     COLONOSCOPY - HIM SCAN  2006    [facility]onoscopy, [facility], [facility]     ENT SURGERY      tonsillectomy     ESOPHAGOGASTRODUODENOSCOPY  2017    [facility]     EXCISE LESION LOWER EXTREMITY Left 2022    Procedure: Excision Left Medial Lower Leg Ulceration;  Surgeon: Kai Huynh MD;  Location: [facility]     EXCISE LESION UPPER EXTREMITY Right 2022    Procedure: Excision lesion right upper extremity;  Surgeon: Kai Huynh MD;  Location:  [facility]     EYE SURGERY      bilateral cataract removal     GI SURGERY      anal fistula     GYN SURGERY  1985    [facility]     GYN SURGERY      cessarian x 2     GYN SURGERY      tubal sterilazation     NEPHRECTOMY Right 05/02/2019     ORTHOPEDIC SURGERY      right knee     ORTHOPEDIC SURGERY  1986    plantar fascia release     ORTHOPEDIC SURGERY      left knee     ORTHOPEDIC SURGERY      right trigger finger release     ORTHOPEDIC SURGERY  2013    Right great toe MTPJ fusion, adductor tenotomy,correction of hammer toe     ORTHOPEDIC SURGERY  [id number]    multiple procedures left forefoot      Allergies   Allergen Reactions     Fentanyl Other (See Comments)     Severe agitation when used during angiogram     Codeine Sulfate Other (See Comments)     hallucinations     Fentanyl And Related      Other reaction(s): Confusion     Morphine Other (See Comments)     Hallucinations with iv therapy     Tape [Adhesive Tape]      Tramadol Other (See Comments)     hallucination      Social History     Tobacco Use     Smoking status: Never Smoker     Smokeless tobacco: Never Used   Substance Use Topics     Alcohol use: No      Wt Readings from Last 1 Encounters:   08/18/22 90.3 kg (199 lb)        Anesthesia Evaluation   Pt has had prior anesthetic. Type: General and MAC.    No history of anesthetic complications       ROS/MED HX  ENT/Pulmonary:     (+) sleep apnea, doesn't use CPAP, AKI risk factors, snores loudly, hypertension, obese,     Neurologic: Comment: Nonallopathic lesion of cervical & thoracic region, not elsewhere classified  Myalgia and myositis, unspecified  Benign paroxysmal positional vertigo  Fibromyalgia  Impression:  Grade 1 anterolisthesis of C3 on C4 and trace anterolisthesis of C4 on  C5 with severe facet arthropathy at these levels. There is moderate to  severe multilevel facet arthropathy throughout much of the cervical  spine.     There is moderate to severe right neural foraminal narrowing at C4-C5.  There is  otherwise mild to moderate multilevel neural foraminal  narrowing as detailed above.     MRI;   There is a small central disc protrusion at C5-C6 with mild mass  effect on the ventral cord. No high-grade central canal narrowing.      Cardiovascular:     (+) hypertension-range: will give inderol now in preop-did not take this am/ ----Previous cardiac testing   Echo: Date: Results:    Stress Test: Date: Results:    ECG Reviewed: Date: 2020 Results:  NSR LEFt axis deviation  Cath: Date: Results:      METS/Exercise Tolerance: 3 - Able to walk 1-2 blocks without stopping Comment: Uses cane afraid she is going to fall   Hematologic:     (+) anemia, history of blood transfusion, no previous transfusion reaction,     Musculoskeletal: Comment: Rosacea  Generalized osteoarthrosis  Infected wound  Contact dermatitis and other eczema  Squamous cell carcinoma left posterior leg  Fibromyalgia  Epidural steroid injections for back pain  (+) arthritis (pain today 7-8/10 pain.),     GI/Hepatic: Comment: Autoimmune hepatitis    (+) GERD, Asymptomatic on medication, hepatitis type Other, liver disease (hepatitis, autoimmune),     Renal/Genitourinary: Comment: S/p nephrectomy 5 years ago    (+) renal disease (baseline creatinine 1.15; s/p nephrectomy), type: CRI,     Endo:     (+) thyroid problem, hypothyroidism, Obesity,     Psychiatric/Substance Use:     (+) psychiatric history anxiety and depression     Infectious Disease:  - neg infectious disease ROS     Malignancy:   (+) Malignancy, History of Other and Skin.Other CA skin cancer, s/p nephrectomy for cancer Remission status post Surgery.    Other:  - neg other ROS          Physical Exam    Airway        Mallampati: II   TM distance: > 3 FB   Neck ROM: full   Mouth opening: > 3 cm    Respiratory Devices and Support         Dental       (+) upper dentures and lower dentures      Cardiovascular   cardiovascular exam normal       Rhythm and rate: regular and normal     Pulmonary    pulmonary exam normal        breath sounds clear to auscultation           OUTSIDE LABS:  CBC:   Lab Results   Component Value Date    WBC 7.5 05/09/2022    WBC 7.5 02/24/2022    HGB 13.3 05/09/2022    HGB 12.7 02/24/2022    HCT 39.4 05/09/2022    HCT 38.1 02/24/2022     05/09/2022     02/24/2022     BMP:   Lab Results   Component Value Date     05/09/2022     02/24/2022    POTASSIUM 4.0 05/09/2022    POTASSIUM 4.0 02/24/2022    CHLORIDE 106 05/09/2022    CHLORIDE 104 02/24/2022    CO2 26 05/09/2022    CO2 26 02/24/2022    BUN 20 05/09/2022    BUN 19 02/24/2022    CR 1.14 (H) 05/09/2022    CR 1.15 (H) 02/24/2022    GLC 90 05/09/2022    GLC 85 02/24/2022     COAGS:   Lab Results   Component Value Date    INR 1.3 (H) 07/31/2017     POC: No results found for: BGM, HCG, HCGS  HEPATIC:   Lab Results   Component Value Date    ALBUMIN 3.2 (L) 04/15/2022    PROTTOTAL 7.1 04/15/2022    ALT 25 04/15/2022    AST 24 04/15/2022    ALKPHOS 105 04/15/2022    BILITOTAL 0.5 04/15/2022     OTHER:   Lab Results   Component Value Date    CASSI 8.7 05/09/2022    MAG 2.3 12/04/2020    TSH 6.68 (H) 01/26/2022    T4 1.61 (H) 09/15/2020       Anesthesia Plan    ASA Status:  3   NPO status:: 0500 black coffee.   Anesthesia Type: MAC.     - Reason for MAC: straight local not clinically adequate   Induction: Intravenous, Propofol.   Maintenance: Balanced.        Consents    Anesthesia Plan(s) and associated risks, benefits, and realistic alternatives discussed. Questions answered and patient/representative(s) expressed understanding.    - Discussed:     - Discussed with:  Patient         Postoperative Care            Comments:    Other Comments: HP 9/9/22  Patient preop bp 191/104. Patient asymptomatic. Gave PO inderal, normally takes in morning. DId not today. Clinic BPs all WNL. Will monitor.   Risks and benefits of MAC anesthetic discussed including dental damage, aspiration, loss of airway, conversion to general  anesthetic, CV complications, MI, stroke, death. Pt wishes to proceed.             KEO Babb CRNA

## 2022-09-08 NOTE — H&P (VIEW-ONLY)
[facility]  [address]  [address]  [place] [address]  Phone: [phone]  Primary Provider: Gerald Becerril  Pre-op Performing Provider: GERALD BECERRIL      PREOPERATIVE EVALUATION:  Today's date: 9/9/2022    Ileana Davis is a 82 year old female who presents for a preoperative evaluation.    Surgical Information:  Surgery/Procedure: Wide local excision of left posterior leg  Surgery Location: [facility]  Surgeon: Dr. Kai Huynh  Surgery Date: 9/13/22  Time of Surgery: TBD  Where patient plans to recover: At home with family  Fax number for surgical facility: Note does not need to be faxed, will be available electronically in Epic.    Type of Anesthesia Anticipated: to be determined    Assessment & Plan     The proposed surgical procedure is considered INTERMEDIATE risk.      ICD-10-CM    1. Pre-operative general physical examination  Z01.818 CBC with platelets     Basic metabolic panel     EKG 12-lead complete w/read - (Clinic Performed)     CBC with platelets     Basic metabolic panel   2. Basal cell carcinoma (BCC) of skin of left lower extremity including hip  C44.719    3. Mild episode of recurrent major depressive disorder (H)  F33.0    4. Generalized anxiety disorder  F41.1    5. Benign essential hypertension  I10 Basic metabolic panel     Basic metabolic panel   6. Hypothyroidism, unspecified type  E03.9    7. Stage 3b chronic kidney disease (H)  N18.32    8. Autoimmune hepatitis (H)  K75.4 Hepatic function panel     Patient does tend to develop elevated blood pressure in pre-op and in post-op recovery.  I would recommend aggressive BP management - outpatient medications are not adjusted as BP is normal in clinic and at home.    Possible Sleep Apnea: Monitor symptoms     Risks and Recommendations:  The patient has the following additional risks and recommendations for perioperative complications:   - No identified additional risk factors  other than previously addressed    Medication Instructions:   - ACE/ARB: May be continued on the day of surgery.    - Beta Blockers: Continue taking on the day of surgery.    RECOMMENDATION:  APPROVAL GIVEN to proceed with proposed procedure, without further diagnostic evaluation.        Subjective     HPI related to upcoming procedure: Basal cell carcinoma      Preop Questions 9/9/2022   1. Have you ever had a heart attack or stroke? No   2. Have you ever had surgery on your heart or blood vessels, such as a stent placement, a coronary artery bypass, or surgery on an artery in your head, neck, heart, or legs? No   3. Do you have chest pain with activity? No   4. Do you have a history of  heart failure? No   5. Do you currently have a cold, bronchitis or symptoms of other infection? No   6. Do you have a cough, shortness of breath, or wheezing? No   7. Do you or anyone in your family have previous history of blood clots? No   8. Do you or does anyone in your family have a serious bleeding problem such as prolonged bleeding following surgeries or cuts? No   9. Have you ever had problems with anemia or been told to take iron pills? YES - history of anemia, recently normal   10. Have you had any abnormal blood loss such as black, tarry or bloody stools, or abnormal vaginal bleeding? No   11. Have you ever had a blood transfusion? YES - remote history of transfusion   11a. Have you ever had a transfusion reaction? No   12. Are you willing to have a blood transfusion if it is medically needed before, during, or after your surgery? Yes   13. Have you or any of your relatives ever had problems with anesthesia? No   14. Do you have sleep apnea, excessive snoring or daytime drowsiness? YES - snoring   14a. Do you have a CPAP machine? No   15. Do you have any artifical heart valves or other implanted medical devices like a pacemaker, defibrillator, or continuous glucose monitor? No   16. Do you have artificial joints? YES -  bilateral knees   17. Are you allergic to latex? YES: very sensitive to adhesives, tape, etc, very fragile skin     Health Care Directive:  Patient has a Health Care Directive on file      Preoperative Review of :   reviewed - no record of controlled substances prescribed.      Status of Chronic Conditions:  DEPRESSION - Patient has a long history of Depression of moderate severity requiring medication for control with recent symptoms being stable..Current symptoms of depression include none.     HYPERTENSION - Patient has longstanding history of HTN , currently denies any symptoms referable to elevated blood pressure. Specifically denies chest pain, palpitations, dyspnea, orthopnea, PND or peripheral edema. Blood pressure readings have been in normal range. Current medication regimen is as listed below. Patient denies any side effects of medication.     HYPOTHYROIDISM - Patient has a longstanding history of chronic Hypothyroidism. Patient has been doing well, noting no tremor, insomnia, hair loss or changes in skin texture. Continues to take medications as directed, without adverse reactions or side effects. Last TSH   Lab Results   Component Value Date    TSH 6.68 (H) 01/26/2022   .      RENAL INSUFFICIENCY - Patient has a longstanding history of moderate-severe chronic renal insufficiency. Last Cr 1.14.       Review of Systems  Constitutional, neuro, ENT, endocrine, pulmonary, cardiac, gastrointestinal, genitourinary, musculoskeletal, integument and psychiatric systems are negative, except as otherwise noted.    Patient Active Problem List    Diagnosis Date Noted     Basal cell carcinoma (BCC) of skin of left lower extremity including hip 09/09/2022     Priority: Medium     Overactive bladder 03/16/2022     Priority: Medium     Chalazion of right upper eyelid 09/02/2021     Priority: Medium     Chronic kidney disease, stage 3 (H) 06/22/2021     Priority: Medium     S/p nephrectomy 05/14/2021     Priority:  Medium     Esodeviation 12/17/2020     Priority: Medium     Hallux valgus of left foot 09/08/2020     Priority: Medium     Left foot pain 09/08/2020     Priority: Medium     Ametropia 09/01/2020     Priority: Medium     Decreased vision of right eye 09/01/2020     Priority: Medium     Dry eye syndrome of both eyes 09/01/2020     Priority: Medium     PCO (posterior capsular opacification), left 09/01/2020     Priority: Medium     Ptosis of both eyelids 09/01/2020     Priority: Medium     suspect lev deh RT greater than LT       Mild major depression (H) 04/26/2018     Priority: Medium     Morbid obesity (H) 08/28/2017     Priority: Medium     Autoimmune hepatitis (H) 06/02/2016     Priority: Medium     Anemia 08/20/2015     Priority: Medium     Degeneration of lumbar or lumbosacral intervertebral disc 01/30/2015     Priority: Medium     Open wound of knee, leg, and ankle      Priority: Medium     Hypothyroidism 03/27/2013     Priority: Medium     Generalized anxiety disorder 03/27/2013     Priority: Medium     Benign essential hypertension 03/27/2013     Priority: Medium     GERD (gastroesophageal reflux disease) 03/27/2013     Priority: Medium     Hepatitis 03/27/2013     Priority: Medium     Contact dermatitis 03/27/2013     Priority: Medium     Rosacea 03/27/2013     Priority: Medium     Osteoarthritis 03/27/2013     Priority: Medium     Fibromyalgia 03/27/2013     Priority: Medium      Past Medical History:   Diagnosis Date     Anemia 8/20/2015     Autoimmune hepatitis (H) 6/2/2016     Benign paroxysmal positional vertigo 10/7/2010     Contact dermatitis and other eczema, due to unspecified cause 10/7/2010     Esophageal reflux 10/7/2010     Generalized anxiety disorder 10/7/2010     Generalized osteoarthrosis, involving multiple sites 10/7/2010     Hepatitis, unspecified 10/7/2010     Infected wound      Myalgia and myositis, unspecified 10/7/2010    fibromyalgia     Nonallopathic lesion of cervical region, not  elsewhere classified 12/5/2001     Nonallopathic lesion of thoracic region, not elsewhere classified 3/31/2005     Open wound of knee, leg, and ankle      Rosacea 10/7/2010     Unspecified essential hypertension 10/7/2010     Unspecified hypothyroidism 10/7/2010     Past Surgical History:   Procedure Laterality Date     APPENDECTOMY       ARTHROPLASTY TOE(S) Left 9/21/2020    Procedure: Left  Bethea Arthroplasty with interpositional arthroplasty using graft.;  Surgeon: Gina Rodriguez DPM;  Location: [facility]     BACK SURGERY  2015    lumbar epidural injection     CARDIAC SURGERY  10/2017    angioplasty     CHOLECYSTECTOMY       COLONOSCOPY  07/27/2017    [facility]     COLONOSCOPY - HIM SCAN  05/15/2001    Small internal hemorrhoids; otherwise normal;[facility]     COLONOSCOPY - HIM SCAN  12/14/2006    [facility], [facility]     ENT SURGERY      tonsillectomy     ESOPHAGOGASTRODUODENOSCOPY  07/27/2017    [facility]     EXCISE LESION LOWER EXTREMITY Left 4/19/2022    Procedure: Excision Left Medial Lower Leg Ulceration;  Surgeon: Kai Huynh MD;  Location: [facility]     EXCISE LESION UPPER EXTREMITY Right 4/19/2022    Procedure: Excision lesion right upper extremity;  Surgeon: Kai Huynh MD;  Location: [facility]     EYE SURGERY      bilateral cataract removal     GI SURGERY      anal fistula     GYN SURGERY  1985    [facility]     GYN SURGERY      cessarian x 2     GYN SURGERY      tubal sterilazation     NEPHRECTOMY Right 05/02/2019     ORTHOPEDIC SURGERY      right knee     ORTHOPEDIC SURGERY  1986    plantar fascia release     ORTHOPEDIC SURGERY      left knee     ORTHOPEDIC SURGERY      right trigger finger release     ORTHOPEDIC SURGERY  2013    Right great toe MTPJ fusion, adductor tenotomy,correction of hammer toe     ORTHOPEDIC SURGERY  [id number]    multiple procedures left forefoot     Current Outpatient Medications   Medication Sig Dispense Refill     conjugated estrogens (PREMARIN) 0.625 MG/GM vaginal cream  Place 0.5 g vaginally twice a week 30 g 3     cyclobenzaprine (FLEXERIL) 10 MG tablet TAKE 1/2 TO 1 TABLET (5-10 MG) BY MOUTH DAILY AS NEEDED FOR MUSCLE SPASMS 30 tablet 0     ferrous sulfate (FEROSUL) 325 (65 Fe) MG tablet Take 325 mg by mouth daily (with breakfast)       fluticasone (FLONASE) 50 MCG/ACT nasal spray SPRAY 2 SPRAYS INTO BOTH NOSTRILS DAILY AS NEEDED 16 g 1     hydrocortisone 2.5 % cream APPLY TWICE TIMES DAILY TO ACTIVE ECZEMA ON THE HANDS, FOLLOW WITH MOISTURIZING CREAM.       levothyroxine (SYNTHROID/LEVOTHROID) 100 MCG tablet TAKE 1 TABLET (100 MCG) BY MOUTH DAILY 90 tablet 0     losartan (COZAAR) 25 MG tablet TAKE 1 TABLET (25 MG) BY MOUTH DAILY 90 tablet 0     melatonin 1 MG TABS tablet Take by mouth nightly as needed for sleep        MIRALAX 17 GM/SCOOP powder TAKE 1 DOSE (17G) BY MOUTH DAILY FOR 5 DAYS STARTING ON 3/26 AS PART OF BOWEL PREP, MIX IN 8OZ WATER OR JUICE PRIOR TO ADMINISTRATION       NYAMYC 371093 UNIT/GM external powder APPLY TO AFFECTED AREA NIGHTLY AS NEEDED FOR RASH 60 g 1     nystatin (MYCOSTATIN) 686975 UNIT/GM external cream APPLY TOPICALLY 2 TIMES DAILY AS NEEDED FOR DRY SKIN 30 g 2     omeprazole (PRILOSEC) 40 MG capsule Take by mouth daily Before the same meal daily       propranolol (INDERAL) 10 MG tablet Take 1 tablet (10 mg) by mouth daily 90 tablet 1     propylene glycol (SYSTANE BALANCE) 0.6 % SOLN ophthalmic solution Inject 1-2 drops into the eye       sennosides (SENOKOT) 8.6 MG tablet TAKE 1 TABLET BY MOUTH ONE TIME A DAY AT BEDTIME       sulfamethoxazole-trimethoprim (BACTRIM DS) 800-160 MG tablet Take 1 tablet by mouth 2 times daily 7 tablet 0       Allergies   Allergen Reactions     Fentanyl Other (See Comments)     Severe agitation when used during angiogram     Codeine Sulfate Other (See Comments)     hallucinations     Fentanyl And Related      Other reaction(s): Confusion     Morphine Other (See Comments)     Hallucinations with iv therapy     Tape  "[Adhesive Tape]      Tramadol Other (See Comments)     hallucination        Social History     Tobacco Use     Smoking status: Never Smoker     Smokeless tobacco: Never Used   Substance Use Topics     Alcohol use: No     Family History   Problem Relation Age of Onset     Arthritis Mother         rheumatoid     Heart Disease Mother         CHF     Alcohol/Drug Father         alcoholism     Allergies Father      Thyroid Disease Other      Diabetes No family hx of      Asthma No family hx of      History   Drug Use No         Objective     /86 (BP Location: Right arm, Patient Position: Sitting, Cuff Size: Adult Regular)   Pulse 62   Temp 97.8  F (36.6  C) (Tympanic)   Ht 1.575 m (5' 2\")   Wt 90.3 kg (199 lb)   SpO2 97%   BMI 36.40 kg/m      Physical Exam    GENERAL APPEARANCE: healthy, alert and no distress     EYES: EOMI, PERRL     HENT: nose and mouth without ulcers or lesions     NECK: no adenopathy     RESP: lungs clear to auscultation - no rales, rhonchi or wheezes     CV: regular rates and rhythm and no murmur, click or rub     SKIN: no suspicious lesions or rashes     NEURO: Normal strength and tone, sensory exam grossly normal, mentation intact and speech normal     PSYCH: mentation appears normal. and affect normal/bright    Recent Labs   Lab Test 05/09/22  0135 02/24/22  1044   HGB 13.3 12.7    203    136   POTASSIUM 4.0 4.0   CR 1.14* 1.15*        Diagnostics:  Recent Results (from the past 24 hour(s))   CBC with platelets    Collection Time: 09/09/22 10:49 AM   Result Value Ref Range    WBC Count 8.0 4.0 - 11.0 10e3/uL    RBC Count 4.23 3.80 - 5.20 10e6/uL    Hemoglobin 13.5 11.7 - 15.7 g/dL    Hematocrit 38.8 35.0 - 47.0 %    MCV 92 78 - 100 fL    MCH 31.9 26.5 - 33.0 pg    MCHC 34.8 31.5 - 36.5 g/dL    RDW 12.3 10.0 - 15.0 %    Platelet Count 225 150 - 450 10e3/uL   Basic metabolic panel    Collection Time: 09/09/22 10:49 AM   Result Value Ref Range    Sodium 137 133 - 144 mmol/L "    Potassium 3.9 3.4 - 5.3 mmol/L    Chloride 106 94 - 109 mmol/L    Carbon Dioxide (CO2) 26 20 - 32 mmol/L    Anion Gap 5 3 - 14 mmol/L    Urea Nitrogen 23 7 - 30 mg/dL    Creatinine 1.24 (H) 0.52 - 1.04 mg/dL    Calcium 8.6 8.5 - 10.1 mg/dL    Glucose 70 70 - 99 mg/dL    GFR Estimate 43 (L) >60 mL/min/1.73m2      EKG: appears normal, NSR, normal axis, normal intervals, no acute ST/T changes c/w ischemia, no LVH by voltage criteria, unchanged from previous tracings    Revised Cardiac Risk Index (RCRI):  The patient has the following serious cardiovascular risks for perioperative complications:   - No serious cardiac risks = 0 points     RCRI Interpretation: 0 points: Class I (very low risk - 0.4% complication rate)           Signed Electronically by: Iris Becerril MD  Copy of this evaluation report is provided to requesting physician.

## 2022-09-08 NOTE — PROGRESS NOTES
[facility]  [address]  [place] [address]  Phone: [phone]  Primary Provider: Gerald Becerril  Pre-op Performing Provider: GERALD BECERRIL      PREOPERATIVE EVALUATION:  Today's date: 9/9/2022    Ileana Davis is a 82 year old female who presents for a preoperative evaluation.    Surgical Information:  Surgery/Procedure: Wide local excision of left posterior leg  Surgery Location: [facility]  Surgeon: Dr. Kai Huynh  Surgery Date: 9/13/22  Time of Surgery: TBD  Where patient plans to recover: At home with family  Fax number for surgical facility: Note does not need to be faxed, will be available electronically in Epic.    Type of Anesthesia Anticipated: to be determined    Assessment & Plan     The proposed surgical procedure is considered INTERMEDIATE risk.      ICD-10-CM    1. Pre-operative general physical examination  Z01.818 CBC with platelets     Basic metabolic panel     EKG 12-lead complete w/read - (Clinic Performed)     CBC with platelets     Basic metabolic panel   2. Basal cell carcinoma (BCC) of skin of left lower extremity including hip  C44.719    3. Mild episode of recurrent major depressive disorder (H)  F33.0    4. Generalized anxiety disorder  F41.1    5. Benign essential hypertension  I10 Basic metabolic panel     Basic metabolic panel   6. Hypothyroidism, unspecified type  E03.9    7. Stage 3b chronic kidney disease (H)  N18.32    8. Autoimmune hepatitis (H)  K75.4 Hepatic function panel     Patient does tend to develop elevated blood pressure in pre-op and in post-op recovery.  I would recommend aggressive BP management - outpatient medications are not adjusted as BP is normal in clinic and at home.    Possible Sleep Apnea: Monitor symptoms     Risks and Recommendations:  The patient has the following additional risks and recommendations for perioperative complications:   - No identified additional risk factors  other than previously addressed    Medication Instructions:   - ACE/ARB: May be continued on the day of surgery.    - Beta Blockers: Continue taking on the day of surgery.    RECOMMENDATION:  APPROVAL GIVEN to proceed with proposed procedure, without further diagnostic evaluation.        Subjective     HPI related to upcoming procedure: Basal cell carcinoma      Preop Questions 9/9/2022   1. Have you ever had a heart attack or stroke? No   2. Have you ever had surgery on your heart or blood vessels, such as a stent placement, a coronary artery bypass, or surgery on an artery in your head, neck, heart, or legs? No   3. Do you have chest pain with activity? No   4. Do you have a history of  heart failure? No   5. Do you currently have a cold, bronchitis or symptoms of other infection? No   6. Do you have a cough, shortness of breath, or wheezing? No   7. Do you or anyone in your family have previous history of blood clots? No   8. Do you or does anyone in your family have a serious bleeding problem such as prolonged bleeding following surgeries or cuts? No   9. Have you ever had problems with anemia or been told to take iron pills? YES - history of anemia, recently normal   10. Have you had any abnormal blood loss such as black, tarry or bloody stools, or abnormal vaginal bleeding? No   11. Have you ever had a blood transfusion? YES - remote history of transfusion   11a. Have you ever had a transfusion reaction? No   12. Are you willing to have a blood transfusion if it is medically needed before, during, or after your surgery? Yes   13. Have you or any of your relatives ever had problems with anesthesia? No   14. Do you have sleep apnea, excessive snoring or daytime drowsiness? YES - snoring   14a. Do you have a CPAP machine? No   15. Do you have any artifical heart valves or other implanted medical devices like a pacemaker, defibrillator, or continuous glucose monitor? No   16. Do you have artificial joints? YES -  bilateral knees   17. Are you allergic to latex? YES: very sensitive to adhesives, tape, etc, very fragile skin     Health Care Directive:  Patient has a Health Care Directive on file      Preoperative Review of :   reviewed - no record of controlled substances prescribed.      Status of Chronic Conditions:  DEPRESSION - Patient has a long history of Depression of moderate severity requiring medication for control with recent symptoms being stable..Current symptoms of depression include none.     HYPERTENSION - Patient has longstanding history of HTN , currently denies any symptoms referable to elevated blood pressure. Specifically denies chest pain, palpitations, dyspnea, orthopnea, PND or peripheral edema. Blood pressure readings have been in normal range. Current medication regimen is as listed below. Patient denies any side effects of medication.     HYPOTHYROIDISM - Patient has a longstanding history of chronic Hypothyroidism. Patient has been doing well, noting no tremor, insomnia, hair loss or changes in skin texture. Continues to take medications as directed, without adverse reactions or side effects. Last TSH   Lab Results   Component Value Date    TSH 6.68 (H) 01/26/2022   .      RENAL INSUFFICIENCY - Patient has a longstanding history of moderate-severe chronic renal insufficiency. Last Cr 1.14.       Review of Systems  Constitutional, neuro, ENT, endocrine, pulmonary, cardiac, gastrointestinal, genitourinary, musculoskeletal, integument and psychiatric systems are negative, except as otherwise noted.    Patient Active Problem List    Diagnosis Date Noted     Basal cell carcinoma (BCC) of skin of left lower extremity including hip 09/09/2022     Priority: Medium     Overactive bladder 03/16/2022     Priority: Medium     Chalazion of right upper eyelid 09/02/2021     Priority: Medium     Chronic kidney disease, stage 3 (H) 06/22/2021     Priority: Medium     S/p nephrectomy 05/14/2021     Priority:  Medium     Esodeviation 12/17/2020     Priority: Medium     Hallux valgus of left foot 09/08/2020     Priority: Medium     Left foot pain 09/08/2020     Priority: Medium     Ametropia 09/01/2020     Priority: Medium     Decreased vision of right eye 09/01/2020     Priority: Medium     Dry eye syndrome of both eyes 09/01/2020     Priority: Medium     PCO (posterior capsular opacification), left 09/01/2020     Priority: Medium     Ptosis of both eyelids 09/01/2020     Priority: Medium     suspect lev deh RT greater than LT       Mild major depression (H) 04/26/2018     Priority: Medium     Morbid obesity (H) 08/28/2017     Priority: Medium     Autoimmune hepatitis (H) 06/02/2016     Priority: Medium     Anemia 08/20/2015     Priority: Medium     Degeneration of lumbar or lumbosacral intervertebral disc 01/30/2015     Priority: Medium     Open wound of knee, leg, and ankle      Priority: Medium     Hypothyroidism 03/27/2013     Priority: Medium     Generalized anxiety disorder 03/27/2013     Priority: Medium     Benign essential hypertension 03/27/2013     Priority: Medium     GERD (gastroesophageal reflux disease) 03/27/2013     Priority: Medium     Hepatitis 03/27/2013     Priority: Medium     Contact dermatitis 03/27/2013     Priority: Medium     Rosacea 03/27/2013     Priority: Medium     Osteoarthritis 03/27/2013     Priority: Medium     Fibromyalgia 03/27/2013     Priority: Medium      Past Medical History:   Diagnosis Date     Anemia 8/20/2015     Autoimmune hepatitis (H) 6/2/2016     Benign paroxysmal positional vertigo 10/7/2010     Contact dermatitis and other eczema, due to unspecified cause 10/7/2010     Esophageal reflux 10/7/2010     Generalized anxiety disorder 10/7/2010     Generalized osteoarthrosis, involving multiple sites 10/7/2010     Hepatitis, unspecified 10/7/2010     Infected wound      Myalgia and myositis, unspecified 10/7/2010    fibromyalgia     Nonallopathic lesion of cervical region, not  elsewhere classified 12/5/2001     Nonallopathic lesion of thoracic region, not elsewhere classified 3/31/2005     Open wound of knee, leg, and ankle      Rosacea 10/7/2010     Unspecified essential hypertension 10/7/2010     Unspecified hypothyroidism 10/7/2010     Past Surgical History:   Procedure Laterality Date     APPENDECTOMY       ARTHROPLASTY TOE(S) Left 9/21/2020    Procedure: Left  Bethea Arthroplasty with interpositional arthroplasty using graft.;  Surgeon: Gina Rodriguez DPM;  Location: [facility] OR     BACK SURGERY  2015    lumbar epidural injection     CARDIAC SURGERY  10/2017    angioplasty     CHOLECYSTECTOMY       COLONOSCOPY  07/27/2017    [facility]     COLONOSCOPY - HIM SCAN  05/15/2001    Small internal hemorrhoids; otherwise normal;EB     COLONOSCOPY - HIM SCAN  12/14/2006    Colonoscopy, [facility], [facility]     ENT SURGERY      tonsillectomy     ESOPHAGOGASTRODUODENOSCOPY  07/27/2017    [facility]     EXCISE LESION LOWER EXTREMITY Left 4/19/2022    Procedure: Excision Left Medial Lower Leg Ulceration;  Surgeon: Kai Huynh MD;  Location: [facility] OR     EXCISE LESION UPPER EXTREMITY Right 4/19/2022    Procedure: Excision lesion right upper extremity;  Surgeon: Kai Huynh MD;  Location: [facility] OR     EYE SURGERY      bilateral cataract removal     GI SURGERY      anal fistula     GYN SURGERY  1985    [facility]     GYN SURGERY      cessarian x 2     GYN SURGERY      tubal sterilazation     NEPHRECTOMY Right 05/02/2019     ORTHOPEDIC SURGERY      right knee     ORTHOPEDIC SURGERY  1986    plantar fascia release     ORTHOPEDIC SURGERY      left knee     ORTHOPEDIC SURGERY      right trigger finger release     ORTHOPEDIC SURGERY  2013    Right great toe MTPJ fusion, adductor tenotomy,correction of hammer toe     ORTHOPEDIC SURGERY  [id number]    multiple procedures left forefoot     Current Outpatient Medications   Medication Sig Dispense Refill     conjugated estrogens (PREMARIN) 0.625 MG/GM vaginal cream  Place 0.5 g vaginally twice a week 30 g 3     cyclobenzaprine (FLEXERIL) 10 MG tablet TAKE 1/2 TO 1 TABLET (5-10 MG) BY MOUTH DAILY AS NEEDED FOR MUSCLE SPASMS 30 tablet 0     ferrous sulfate (FEROSUL) 325 (65 Fe) MG tablet Take 325 mg by mouth daily (with breakfast)       fluticasone (FLONASE) 50 MCG/ACT nasal spray SPRAY 2 SPRAYS INTO BOTH NOSTRILS DAILY AS NEEDED 16 g 1     hydrocortisone 2.5 % cream APPLY TWICE TIMES DAILY TO ACTIVE ECZEMA ON THE HANDS, FOLLOW WITH MOISTURIZING CREAM.       levothyroxine (SYNTHROID/LEVOTHROID) 100 MCG tablet TAKE 1 TABLET (100 MCG) BY MOUTH DAILY 90 tablet 0     losartan (COZAAR) 25 MG tablet TAKE 1 TABLET (25 MG) BY MOUTH DAILY 90 tablet 0     melatonin 1 MG TABS tablet Take by mouth nightly as needed for sleep        MIRALAX 17 GM/SCOOP powder TAKE 1 DOSE (17G) BY MOUTH DAILY FOR 5 DAYS STARTING ON 3/26 AS PART OF BOWEL PREP, MIX IN 8OZ WATER OR JUICE PRIOR TO ADMINISTRATION       NYAMYC 899607 UNIT/GM external powder APPLY TO AFFECTED AREA NIGHTLY AS NEEDED FOR RASH 60 g 1     nystatin (MYCOSTATIN) 846921 UNIT/GM external cream APPLY TOPICALLY 2 TIMES DAILY AS NEEDED FOR DRY SKIN 30 g 2     omeprazole (PRILOSEC) 40 MG capsule Take by mouth daily Before the same meal daily       propranolol (INDERAL) 10 MG tablet Take 1 tablet (10 mg) by mouth daily 90 tablet 1     propylene glycol (SYSTANE BALANCE) 0.6 % SOLN ophthalmic solution Inject 1-2 drops into the eye       sennosides (SENOKOT) 8.6 MG tablet TAKE 1 TABLET BY MOUTH ONE TIME A DAY AT BEDTIME       sulfamethoxazole-trimethoprim (BACTRIM DS) 800-160 MG tablet Take 1 tablet by mouth 2 times daily 7 tablet 0       Allergies   Allergen Reactions     Fentanyl Other (See Comments)     Severe agitation when used during angiogram     Codeine Sulfate Other (See Comments)     hallucinations     Fentanyl And Related      Other reaction(s): Confusion     Morphine Other (See Comments)     Hallucinations with iv therapy     Tape  "[Adhesive Tape]      Tramadol Other (See Comments)     hallucination        Social History     Tobacco Use     Smoking status: Never Smoker     Smokeless tobacco: Never Used   Substance Use Topics     Alcohol use: No     Family History   Problem Relation Age of Onset     Arthritis Mother         rheumatoid     Heart Disease Mother         CHF     Alcohol/Drug Father         alcoholism     Allergies Father      Thyroid Disease Other      Diabetes No family hx of      Asthma No family hx of      History   Drug Use No         Objective     /86 (BP Location: Right arm, Patient Position: Sitting, Cuff Size: Adult Regular)   Pulse 62   Temp 97.8  F (36.6  C) (Tympanic)   Ht 1.575 m (5' 2\")   Wt 90.3 kg (199 lb)   SpO2 97%   BMI 36.40 kg/m      Physical Exam    GENERAL APPEARANCE: healthy, alert and no distress     EYES: EOMI, PERRL     HENT: nose and mouth without ulcers or lesions     NECK: no adenopathy     RESP: lungs clear to auscultation - no rales, rhonchi or wheezes     CV: regular rates and rhythm and no murmur, click or rub     SKIN: no suspicious lesions or rashes     NEURO: Normal strength and tone, sensory exam grossly normal, mentation intact and speech normal     PSYCH: mentation appears normal. and affect normal/bright    Recent Labs   Lab Test 05/09/22  0135 02/24/22  1044   HGB 13.3 12.7    203    136   POTASSIUM 4.0 4.0   CR 1.14* 1.15*        Diagnostics:  Recent Results (from the past 24 hour(s))   CBC with platelets    Collection Time: 09/09/22 10:49 AM   Result Value Ref Range    WBC Count 8.0 4.0 - 11.0 10e3/uL    RBC Count 4.23 3.80 - 5.20 10e6/uL    Hemoglobin 13.5 11.7 - 15.7 g/dL    Hematocrit 38.8 35.0 - 47.0 %    MCV 92 78 - 100 fL    MCH 31.9 26.5 - 33.0 pg    MCHC 34.8 31.5 - 36.5 g/dL    RDW 12.3 10.0 - 15.0 %    Platelet Count 225 150 - 450 10e3/uL   Basic metabolic panel    Collection Time: 09/09/22 10:49 AM   Result Value Ref Range    Sodium 137 133 - 144 mmol/L "    Potassium 3.9 3.4 - 5.3 mmol/L    Chloride 106 94 - 109 mmol/L    Carbon Dioxide (CO2) 26 20 - 32 mmol/L    Anion Gap 5 3 - 14 mmol/L    Urea Nitrogen 23 7 - 30 mg/dL    Creatinine 1.24 (H) 0.52 - 1.04 mg/dL    Calcium 8.6 8.5 - 10.1 mg/dL    Glucose 70 70 - 99 mg/dL    GFR Estimate 43 (L) >60 mL/min/1.73m2      EKG: appears normal, NSR, normal axis, normal intervals, no acute ST/T changes c/w ischemia, no LVH by voltage criteria, unchanged from previous tracings    Revised Cardiac Risk Index (RCRI):  The patient has the following serious cardiovascular risks for perioperative complications:   - No serious cardiac risks = 0 points     RCRI Interpretation: 0 points: Class I (very low risk - 0.4% complication rate)           Signed Electronically by: Iris Becerril MD  Copy of this evaluation report is provided to requesting physician.

## 2022-09-09 ENCOUNTER — OFFICE VISIT (OUTPATIENT)
Dept: FAMILY MEDICINE | Facility: OTHER | Age: 82
End: 2022-09-09
Attending: FAMILY MEDICINE
Payer: MEDICARE

## 2022-09-09 VITALS
OXYGEN SATURATION: 97 % | SYSTOLIC BLOOD PRESSURE: 136 MMHG | DIASTOLIC BLOOD PRESSURE: 86 MMHG | TEMPERATURE: 97.8 F | HEART RATE: 62 BPM | WEIGHT: 199 LBS | HEIGHT: 62 IN | BODY MASS INDEX: 36.62 KG/M2

## 2022-09-09 DIAGNOSIS — F33.0 MILD EPISODE OF RECURRENT MAJOR DEPRESSIVE DISORDER (H): ICD-10-CM

## 2022-09-09 DIAGNOSIS — F41.1 GENERALIZED ANXIETY DISORDER: ICD-10-CM

## 2022-09-09 DIAGNOSIS — C44.719 BASAL CELL CARCINOMA (BCC) OF SKIN OF LEFT LOWER EXTREMITY INCLUDING HIP: ICD-10-CM

## 2022-09-09 DIAGNOSIS — E03.9 HYPOTHYROIDISM, UNSPECIFIED TYPE: ICD-10-CM

## 2022-09-09 DIAGNOSIS — Z01.818 PRE-OPERATIVE GENERAL PHYSICAL EXAMINATION: Primary | ICD-10-CM

## 2022-09-09 DIAGNOSIS — N18.32 STAGE 3B CHRONIC KIDNEY DISEASE (H): ICD-10-CM

## 2022-09-09 DIAGNOSIS — Z01.818 PREOP TESTING: ICD-10-CM

## 2022-09-09 DIAGNOSIS — I10 BENIGN ESSENTIAL HYPERTENSION: ICD-10-CM

## 2022-09-09 DIAGNOSIS — K75.4 AUTOIMMUNE HEPATITIS (H): ICD-10-CM

## 2022-09-09 LAB
ALBUMIN SERPL-MCNC: 3.5 G/DL (ref 3.4–5)
ALP SERPL-CCNC: 108 U/L (ref 40–150)
ALT SERPL W P-5'-P-CCNC: 21 U/L (ref 0–50)
ANION GAP SERPL CALCULATED.3IONS-SCNC: 5 MMOL/L (ref 3–14)
AST SERPL W P-5'-P-CCNC: 20 U/L (ref 0–45)
BILIRUB DIRECT SERPL-MCNC: 0.1 MG/DL (ref 0–0.2)
BILIRUB SERPL-MCNC: 0.7 MG/DL (ref 0.2–1.3)
BUN SERPL-MCNC: 23 MG/DL (ref 7–30)
CALCIUM SERPL-MCNC: 8.6 MG/DL (ref 8.5–10.1)
CHLORIDE BLD-SCNC: 106 MMOL/L (ref 94–109)
CO2 SERPL-SCNC: 26 MMOL/L (ref 20–32)
CREAT SERPL-MCNC: 1.24 MG/DL (ref 0.52–1.04)
ERYTHROCYTE [DISTWIDTH] IN BLOOD BY AUTOMATED COUNT: 12.3 % (ref 10–15)
GFR SERPL CREATININE-BSD FRML MDRD: 43 ML/MIN/1.73M2
GLUCOSE BLD-MCNC: 70 MG/DL (ref 70–99)
HCT VFR BLD AUTO: 38.8 % (ref 35–47)
HGB BLD-MCNC: 13.5 G/DL (ref 11.7–15.7)
MCH RBC QN AUTO: 31.9 PG (ref 26.5–33)
MCHC RBC AUTO-ENTMCNC: 34.8 G/DL (ref 31.5–36.5)
MCV RBC AUTO: 92 FL (ref 78–100)
PLATELET # BLD AUTO: 225 10E3/UL (ref 150–450)
POTASSIUM BLD-SCNC: 3.9 MMOL/L (ref 3.4–5.3)
PROT SERPL-MCNC: 6.9 G/DL (ref 6.8–8.8)
RBC # BLD AUTO: 4.23 10E6/UL (ref 3.8–5.2)
SODIUM SERPL-SCNC: 137 MMOL/L (ref 133–144)
WBC # BLD AUTO: 8 10E3/UL (ref 4–11)

## 2022-09-09 PROCEDURE — G0463 HOSPITAL OUTPT CLINIC VISIT: HCPCS | Mod: 25 | Performed by: FAMILY MEDICINE

## 2022-09-09 PROCEDURE — 99214 OFFICE O/P EST MOD 30 MIN: CPT | Performed by: FAMILY MEDICINE

## 2022-09-09 PROCEDURE — 85027 COMPLETE CBC AUTOMATED: CPT | Mod: ZL | Performed by: FAMILY MEDICINE

## 2022-09-09 PROCEDURE — 82310 ASSAY OF CALCIUM: CPT | Mod: ZL | Performed by: FAMILY MEDICINE

## 2022-09-09 PROCEDURE — 82248 BILIRUBIN DIRECT: CPT | Mod: ZL | Performed by: FAMILY MEDICINE

## 2022-09-09 PROCEDURE — 93005 ELECTROCARDIOGRAM TRACING: CPT | Performed by: FAMILY MEDICINE

## 2022-09-09 PROCEDURE — U0005 INFEC AGEN DETEC AMPLI PROBE: HCPCS | Mod: ZL | Performed by: FAMILY MEDICINE

## 2022-09-09 PROCEDURE — 36415 COLL VENOUS BLD VENIPUNCTURE: CPT | Mod: ZL | Performed by: FAMILY MEDICINE

## 2022-09-09 PROCEDURE — 93010 ELECTROCARDIOGRAM REPORT: CPT | Performed by: INTERNAL MEDICINE

## 2022-09-09 ASSESSMENT — ANXIETY QUESTIONNAIRES
2. NOT BEING ABLE TO STOP OR CONTROL WORRYING: NOT AT ALL
IF YOU CHECKED OFF ANY PROBLEMS ON THIS QUESTIONNAIRE, HOW DIFFICULT HAVE THESE PROBLEMS MADE IT FOR YOU TO DO YOUR WORK, TAKE CARE OF THINGS AT HOME, OR GET ALONG WITH OTHER PEOPLE: NOT DIFFICULT AT ALL
GAD7 TOTAL SCORE: 0
GAD7 TOTAL SCORE: 0
6. BECOMING EASILY ANNOYED OR IRRITABLE: NOT AT ALL
1. FEELING NERVOUS, ANXIOUS, OR ON EDGE: NOT AT ALL
3. WORRYING TOO MUCH ABOUT DIFFERENT THINGS: NOT AT ALL
5. BEING SO RESTLESS THAT IT IS HARD TO SIT STILL: NOT AT ALL
4. TROUBLE RELAXING: NOT AT ALL
7. FEELING AFRAID AS IF SOMETHING AWFUL MIGHT HAPPEN: NOT AT ALL

## 2022-09-09 ASSESSMENT — PATIENT HEALTH QUESTIONNAIRE - PHQ9: SUM OF ALL RESPONSES TO PHQ QUESTIONS 1-9: 5

## 2022-09-09 ASSESSMENT — PAIN SCALES - GENERAL: PAINLEVEL: NO PAIN (0)

## 2022-09-09 NOTE — NURSING NOTE
"Chief Complaint   Patient presents with     Pre-Op Exam       Initial /86 (BP Location: Right arm, Patient Position: Sitting, Cuff Size: Adult Regular)   Pulse 62   Temp 97.8  F (36.6  C) (Tympanic)   Ht 1.575 m (5' 2\")   Wt 90.3 kg (199 lb)   SpO2 97%   BMI 36.40 kg/m   Estimated body mass index is 36.4 kg/m  as calculated from the following:    Height as of this encounter: 1.575 m (5' 2\").    Weight as of this encounter: 90.3 kg (199 lb).  Medication Reconciliation: complete  Myla Evans MA  "

## 2022-09-10 LAB — SARS-COV-2 RNA RESP QL NAA+PROBE: NEGATIVE

## 2022-09-13 ENCOUNTER — HOSPITAL ENCOUNTER (OUTPATIENT)
Facility: HOSPITAL | Age: 82
Discharge: HOME | End: 2022-09-13
Attending: SURGERY | Admitting: SURGERY
Payer: MEDICARE

## 2022-09-13 ENCOUNTER — MEDICAL CORRESPONDENCE (OUTPATIENT)
Dept: HEALTH INFORMATION MANAGEMENT | Facility: HOSPITAL | Age: 82
End: 2022-09-13

## 2022-09-13 ENCOUNTER — ANESTHESIA (OUTPATIENT)
Dept: SURGERY | Facility: HOSPITAL | Age: 82
End: 2022-09-13
Payer: MEDICARE

## 2022-09-13 VITALS
SYSTOLIC BLOOD PRESSURE: 150 MMHG | WEIGHT: 189.2 LBS | DIASTOLIC BLOOD PRESSURE: 81 MMHG | HEART RATE: 64 BPM | TEMPERATURE: 97.8 F | HEIGHT: 62 IN | RESPIRATION RATE: 16 BRPM | BODY MASS INDEX: 34.82 KG/M2 | OXYGEN SATURATION: 98 %

## 2022-09-13 DIAGNOSIS — G89.18 ACUTE POST-OPERATIVE PAIN: Primary | ICD-10-CM

## 2022-09-13 LAB — GLUCOSE BLDC GLUCOMTR-MCNC: 80 MG/DL (ref 70–99)

## 2022-09-13 PROCEDURE — 250N000009 HC RX 250: Performed by: NURSE ANESTHETIST, CERTIFIED REGISTERED

## 2022-09-13 PROCEDURE — 250N000011 HC RX IP 250 OP 636: Performed by: SURGERY

## 2022-09-13 PROCEDURE — 272N000001 HC OR GENERAL SUPPLY STERILE: Performed by: SURGERY

## 2022-09-13 PROCEDURE — 250N000013 HC RX MED GY IP 250 OP 250 PS 637: Performed by: SURGERY

## 2022-09-13 PROCEDURE — 82962 GLUCOSE BLOOD TEST: CPT

## 2022-09-13 PROCEDURE — 250N000011 HC RX IP 250 OP 636: Performed by: NURSE ANESTHETIST, CERTIFIED REGISTERED

## 2022-09-13 PROCEDURE — 360N000075 HC SURGERY LEVEL 2, PER MIN: Performed by: SURGERY

## 2022-09-13 PROCEDURE — 370N000017 HC ANESTHESIA TECHNICAL FEE, PER MIN: Performed by: SURGERY

## 2022-09-13 PROCEDURE — 99100 ANES PT EXTEME AGE<1 YR&>70: CPT | Performed by: NURSE ANESTHETIST, CERTIFIED REGISTERED

## 2022-09-13 PROCEDURE — 258N000003 HC RX IP 258 OP 636: Performed by: NURSE ANESTHETIST, CERTIFIED REGISTERED

## 2022-09-13 PROCEDURE — 710N000012 HC RECOVERY PHASE 2, PER MINUTE: Performed by: SURGERY

## 2022-09-13 PROCEDURE — 11604 EXC TR-EXT MAL+MARG 3.1-4 CM: CPT | Mod: LT | Performed by: SURGERY

## 2022-09-13 PROCEDURE — 12032 INTMD RPR S/A/T/EXT 2.6-7.5: CPT | Mod: LT | Performed by: SURGERY

## 2022-09-13 PROCEDURE — 11406 EXC TR-EXT B9+MARG >4.0 CM: CPT | Performed by: NURSE ANESTHETIST, CERTIFIED REGISTERED

## 2022-09-13 PROCEDURE — 88305 TISSUE EXAM BY PATHOLOGIST: CPT | Mod: TC | Performed by: SURGERY

## 2022-09-13 PROCEDURE — 999N000141 HC STATISTIC PRE-PROCEDURE NURSING ASSESSMENT: Performed by: SURGERY

## 2022-09-13 PROCEDURE — 250N000013 HC RX MED GY IP 250 OP 250 PS 637: Performed by: NURSE ANESTHETIST, CERTIFIED REGISTERED

## 2022-09-13 RX ORDER — CEFAZOLIN SODIUM/WATER 2 G/20 ML
2 SYRINGE (ML) INTRAVENOUS
Status: COMPLETED | OUTPATIENT
Start: 2022-09-13 | End: 2022-09-13

## 2022-09-13 RX ORDER — SODIUM CHLORIDE, SODIUM LACTATE, POTASSIUM CHLORIDE, CALCIUM CHLORIDE 600; 310; 30; 20 MG/100ML; MG/100ML; MG/100ML; MG/100ML
INJECTION, SOLUTION INTRAVENOUS CONTINUOUS
Status: DISCONTINUED | OUTPATIENT
Start: 2022-09-13 | End: 2022-09-13 | Stop reason: HOSPADM

## 2022-09-13 RX ORDER — FENTANYL CITRATE 50 UG/ML
50 INJECTION, SOLUTION INTRAMUSCULAR; INTRAVENOUS EVERY 5 MIN PRN
Status: DISCONTINUED | OUTPATIENT
Start: 2022-09-13 | End: 2022-09-13 | Stop reason: HOSPADM

## 2022-09-13 RX ORDER — PROPOFOL 10 MG/ML
INJECTION, EMULSION INTRAVENOUS PRN
Status: DISCONTINUED | OUTPATIENT
Start: 2022-09-13 | End: 2022-09-13

## 2022-09-13 RX ORDER — CEFAZOLIN SODIUM/WATER 2 G/20 ML
2 SYRINGE (ML) INTRAVENOUS SEE ADMIN INSTRUCTIONS
Status: DISCONTINUED | OUTPATIENT
Start: 2022-09-13 | End: 2022-09-13 | Stop reason: HOSPADM

## 2022-09-13 RX ORDER — HYDRALAZINE HYDROCHLORIDE 20 MG/ML
10 INJECTION INTRAMUSCULAR; INTRAVENOUS ONCE
Status: COMPLETED | OUTPATIENT
Start: 2022-09-13 | End: 2022-09-13

## 2022-09-13 RX ORDER — NALOXONE HYDROCHLORIDE 0.4 MG/ML
0.4 INJECTION, SOLUTION INTRAMUSCULAR; INTRAVENOUS; SUBCUTANEOUS
Status: DISCONTINUED | OUTPATIENT
Start: 2022-09-13 | End: 2022-09-13 | Stop reason: HOSPADM

## 2022-09-13 RX ORDER — BUPIVACAINE HYDROCHLORIDE 2.5 MG/ML
INJECTION, SOLUTION INFILTRATION; PERINEURAL PRN
Status: DISCONTINUED | OUTPATIENT
Start: 2022-09-13 | End: 2022-09-13 | Stop reason: HOSPADM

## 2022-09-13 RX ORDER — ONDANSETRON 4 MG/1
4 TABLET, ORALLY DISINTEGRATING ORAL EVERY 30 MIN PRN
Status: DISCONTINUED | OUTPATIENT
Start: 2022-09-13 | End: 2022-09-13 | Stop reason: HOSPADM

## 2022-09-13 RX ORDER — BUPIVACAINE HYDROCHLORIDE 2.5 MG/ML
INJECTION, SOLUTION EPIDURAL; INFILTRATION; INTRACAUDAL
Status: DISCONTINUED
Start: 2022-09-13 | End: 2022-09-13 | Stop reason: HOSPADM

## 2022-09-13 RX ORDER — OXYCODONE HYDROCHLORIDE 5 MG/1
5 TABLET ORAL ONCE
Status: COMPLETED | OUTPATIENT
Start: 2022-09-13 | End: 2022-09-13

## 2022-09-13 RX ORDER — LIDOCAINE 40 MG/G
CREAM TOPICAL
Status: DISCONTINUED | OUTPATIENT
Start: 2022-09-13 | End: 2022-09-13 | Stop reason: HOSPADM

## 2022-09-13 RX ORDER — OXYCODONE HYDROCHLORIDE 5 MG/1
5 TABLET ORAL EVERY 6 HOURS PRN
Qty: 5 TABLET | Refills: 0 | Status: SHIPPED | OUTPATIENT
Start: 2022-09-13 | End: 2022-09-16

## 2022-09-13 RX ORDER — FENTANYL CITRATE 50 UG/ML
25 INJECTION, SOLUTION INTRAMUSCULAR; INTRAVENOUS
Status: DISCONTINUED | OUTPATIENT
Start: 2022-09-13 | End: 2022-09-13 | Stop reason: HOSPADM

## 2022-09-13 RX ORDER — ONDANSETRON 2 MG/ML
4 INJECTION INTRAMUSCULAR; INTRAVENOUS EVERY 30 MIN PRN
Status: DISCONTINUED | OUTPATIENT
Start: 2022-09-13 | End: 2022-09-13 | Stop reason: HOSPADM

## 2022-09-13 RX ORDER — NALOXONE HYDROCHLORIDE 0.4 MG/ML
0.2 INJECTION, SOLUTION INTRAMUSCULAR; INTRAVENOUS; SUBCUTANEOUS
Status: DISCONTINUED | OUTPATIENT
Start: 2022-09-13 | End: 2022-09-13 | Stop reason: HOSPADM

## 2022-09-13 RX ORDER — HYDRALAZINE HYDROCHLORIDE 20 MG/ML
2.5-5 INJECTION INTRAMUSCULAR; INTRAVENOUS EVERY 10 MIN PRN
Status: DISCONTINUED | OUTPATIENT
Start: 2022-09-13 | End: 2022-09-13 | Stop reason: HOSPADM

## 2022-09-13 RX ORDER — LIDOCAINE HYDROCHLORIDE 20 MG/ML
INJECTION, SOLUTION INFILTRATION; PERINEURAL PRN
Status: DISCONTINUED | OUTPATIENT
Start: 2022-09-13 | End: 2022-09-13

## 2022-09-13 RX ORDER — PROPRANOLOL HYDROCHLORIDE 10 MG/1
10 TABLET ORAL ONCE
Status: COMPLETED | OUTPATIENT
Start: 2022-09-13 | End: 2022-09-13

## 2022-09-13 RX ADMIN — PROPOFOL 30 MG: 10 INJECTION, EMULSION INTRAVENOUS at 13:05

## 2022-09-13 RX ADMIN — PROPOFOL 20 MG: 10 INJECTION, EMULSION INTRAVENOUS at 13:08

## 2022-09-13 RX ADMIN — PROPOFOL 30 MG: 10 INJECTION, EMULSION INTRAVENOUS at 13:15

## 2022-09-13 RX ADMIN — PROPRANOLOL HYDROCHLORIDE 10 MG: 10 TABLET ORAL at 11:14

## 2022-09-13 RX ADMIN — PROPOFOL 50 MG: 10 INJECTION, EMULSION INTRAVENOUS at 13:02

## 2022-09-13 RX ADMIN — HYDRALAZINE HYDROCHLORIDE 10 MG: 20 INJECTION INTRAMUSCULAR; INTRAVENOUS at 12:16

## 2022-09-13 RX ADMIN — SODIUM CHLORIDE, POTASSIUM CHLORIDE, SODIUM LACTATE AND CALCIUM CHLORIDE: 600; 310; 30; 20 INJECTION, SOLUTION INTRAVENOUS at 11:16

## 2022-09-13 RX ADMIN — LIDOCAINE HYDROCHLORIDE 100 MG: 20 INJECTION, SOLUTION INFILTRATION; PERINEURAL at 13:02

## 2022-09-13 RX ADMIN — PROPOFOL 20 MG: 10 INJECTION, EMULSION INTRAVENOUS at 13:18

## 2022-09-13 RX ADMIN — PROPOFOL 50 MG: 10 INJECTION, EMULSION INTRAVENOUS at 13:12

## 2022-09-13 RX ADMIN — Medication 2 G: at 12:51

## 2022-09-13 RX ADMIN — OXYCODONE HYDROCHLORIDE 5 MG: 5 TABLET ORAL at 14:11

## 2022-09-13 ASSESSMENT — ACTIVITIES OF DAILY LIVING (ADL)
ADLS_ACUITY_SCORE: 35
ADLS_ACUITY_SCORE: 35

## 2022-09-13 NOTE — ANESTHESIA POSTPROCEDURE EVALUATION
Patient: Ileana Davis    Procedure: Procedure(s):  Wide local excision of left posterior leg       Anesthesia Type:  MAC    Note:  Disposition: Outpatient   Postop Pain Control: Uneventful            Sign Out: Well controlled pain   PONV: No   Neuro/Psych: Uneventful            Sign Out: Acceptable/Baseline neuro status   Airway/Respiratory: Uneventful            Sign Out: Acceptable/Baseline resp. status   CV/Hemodynamics: Uneventful            Sign Out: Acceptable CV status; No obvious hypovolemia; No obvious fluid overload   Other NRE: NONE   DID A NON-ROUTINE EVENT OCCUR? No           Last vitals:  Vitals Value Taken Time   /81 09/13/22 1345   Temp     Pulse 64 09/13/22 1345   Resp     SpO2 97 % 09/13/22 1347   Vitals shown include unvalidated device data.    Electronically Signed By: KEO Wells CRNA  September 13, 2022  1:49 PM

## 2022-09-13 NOTE — OR NURSING
Patient and responsible adult given discharge instructions with no questions regarding instructions. Les score 19. Pain level 5/10.  Discharged from unit via wheel cahir. Patient discharged to home.

## 2022-09-13 NOTE — INTERVAL H&P NOTE
"I have reviewed the surgical (or preoperative) H&P that is linked to this encounter, and examined the patient. There are no significant changes    Clinical Conditions Present on Arrival:  Clinically Significant Risk Factors Present on Admission                   # Obesity: Estimated body mass index is 34.61 kg/m  as calculated from the following:    Height as of this encounter: 1.575 m (5' 2\").    Weight as of this encounter: 85.8 kg (189 lb 3.2 oz).       "

## 2022-09-13 NOTE — OP NOTE
[facility] Surgery      Preoperative diagnosis:  Basal cell carcinoma     Postoperative diagnosis:  Same     Procedure:  Excisional biopsy left posterior leg     Anesthesia:  Local-MAC    History: See clinic notes     Findings:  Prior lesion was narrowly excised     Tissue to pathology:    3.5cm x 1 cm skin ellipse     Details:   The requisite time out pause observed during which the patient confirmed their identity, date of birth, side and site of the requested excision.  The region was prepped and draped sterilely; local anesthesia was obtained with infiltration of 20 cc 0.25% marcaine. An elliptical incision was made to incorporate prior biopsy site aprox 3.5 x 1 cm and carried through full thickness skin.  Local skin flaps were raised and  Skin was closed in layers with interrupted 3-0 Vicryl in the subcutaneous tissue.  The skin was reapproximated with 3-0 nylon. Sterile dressings were applied.   The procedure was well tolerated and the patient was discharged with wound care instructions and followup appointment.       MD Naomy Ortega actively first assisted during this operation providing necessary retraction and exposure as well as maintaining hemostasis and a clear operative field. This was helpful in allowing the operation to proceed in a safe and expeditious manner. She was present for the entire duration of the operation.

## 2022-09-13 NOTE — ANESTHESIA CARE TRANSFER NOTE
Patient: Ileeta I Polloander    Procedure: Procedure(s):  Wide local excision of left posterior leg       Diagnosis: Skin cancer, basal cell [C44.91]  Diagnosis Additional Information: No value filed.    Anesthesia Type:   MAC     Note:      Level of Consciousness: awake  Oxygen Supplementation: room air    Independent Airway: airway patency satisfactory and stable  Dentition: dentition unchanged  Vital Signs Stable: post-procedure vital signs reviewed and stable  Report to RN Given: handoff report given  Patient transferred to: Phase II    Handoff Report: Identifed the Patient, Identified the Reponsible Provider, Reviewed the pertinent medical history, Discussed the surgical course, Reviewed Intra-OP anesthesia mangement and issues during anesthesia, Set expectations for post-procedure period and Allowed opportunity for questions and acknowledgement of understanding      Vitals:  Vitals Value Taken Time   BP     Temp     Pulse     Resp     SpO2         Electronically Signed By: KEO Wells CRNA  September 13, 2022  1:30 PM

## 2022-09-15 PROCEDURE — 88305 TISSUE EXAM BY PATHOLOGIST: CPT | Mod: 26 | Performed by: PATHOLOGY

## 2022-09-20 DIAGNOSIS — E03.9 HYPOTHYROIDISM, UNSPECIFIED TYPE: ICD-10-CM

## 2022-09-21 NOTE — TELEPHONE ENCOUNTER
levothyroxine      Last Written Prescription Date:  8/11/22  Last Fill Quantity: 90,   # refills: 0  Last Office Visit: 9/9/22  Future Office visit:

## 2022-09-22 RX ORDER — LEVOTHYROXINE SODIUM 100 UG/1
100 TABLET ORAL DAILY
Qty: 90 TABLET | Refills: 1 | Status: SHIPPED | OUTPATIENT
Start: 2022-09-22 | End: 2023-01-26

## 2022-09-24 DIAGNOSIS — I10 BENIGN ESSENTIAL HYPERTENSION: ICD-10-CM

## 2022-09-28 ENCOUNTER — TELEPHONE (OUTPATIENT)
Dept: FAMILY MEDICINE | Facility: OTHER | Age: 82
End: 2022-09-28

## 2022-09-28 DIAGNOSIS — M54.30 SCIATICA, UNSPECIFIED LATERALITY: Primary | ICD-10-CM

## 2022-09-28 NOTE — TELEPHONE ENCOUNTER
4:09 PM    Reason for Call: Phone Call    Description: pt is needing a referral to [facility] in [place] for Ortho department for Dr. Mcneill. For pt sciatica    Was an appointment offered for this call? No  If yes : Appointment type              Date    Preferred method for responding to this message: Telephone Call  What is your phone number ? [phone]    If we cannot reach you directly, may we leave a detailed response at the number you provided? Yes    Can this message wait until your PCP/provider returns, if available today? YES    Saida Rubalcava

## 2022-09-29 ENCOUNTER — OFFICE VISIT (OUTPATIENT)
Dept: SURGERY | Facility: OTHER | Age: 82
End: 2022-09-29
Attending: CLINICAL NURSE SPECIALIST
Payer: MEDICARE

## 2022-09-29 VITALS
BODY MASS INDEX: 34.78 KG/M2 | HEIGHT: 62 IN | SYSTOLIC BLOOD PRESSURE: 132 MMHG | WEIGHT: 189 LBS | OXYGEN SATURATION: 99 % | DIASTOLIC BLOOD PRESSURE: 92 MMHG | HEART RATE: 57 BPM | TEMPERATURE: 97.6 F

## 2022-09-29 DIAGNOSIS — C44.91 SKIN CANCER, BASAL CELL: Primary | ICD-10-CM

## 2022-09-29 DIAGNOSIS — Z98.890 H/O LOCAL EXCISION OF SKIN LESION: ICD-10-CM

## 2022-09-29 PROCEDURE — 99213 OFFICE O/P EST LOW 20 MIN: CPT | Performed by: CLINICAL NURSE SPECIALIST

## 2022-09-29 PROCEDURE — G0463 HOSPITAL OUTPT CLINIC VISIT: HCPCS

## 2022-09-29 RX ORDER — PROPRANOLOL HYDROCHLORIDE 10 MG/1
10 TABLET ORAL DAILY
Qty: 90 TABLET | Refills: 1 | Status: SHIPPED | OUTPATIENT
Start: 2022-09-29 | End: 2022-12-08

## 2022-09-29 ASSESSMENT — PAIN SCALES - GENERAL: PAINLEVEL: NO PAIN (0)

## 2022-09-29 NOTE — TELEPHONE ENCOUNTER
PROPRANOLOL 10MG TABLET      Last Written Prescription Date:  6/10/22  Last Fill Quantity: 90,   # refills: 1  Last Office Visit: 9/9/22  Future Office visit:       Routing refill request to provider for review/approval because:    Beta-Blockers Protocol Failed 09/24/2022 02:47 PM   Protocol Details  Blood pressure under 140/90 in past 12 months       BP Readings from Last 3 Encounters:   09/29/22 (!) 132/92   09/13/22 150/81   09/09/22 136/86

## 2022-09-29 NOTE — PROGRESS NOTES
"S:  Ileeta returns two weeks after excisional biopsy of the left posterior leg by Dr. Huynh for basal cell carcinoma. Doing well post operatively, tolerating a general diet, having regular bowel movements, passing flatus, no diarrhea, doesn't take narcotics regularly. Specifically denies fevers, chills, nausea, vomiting, shortness of breath.     O:  BP (!) 132/92   Pulse 57   Temp 97.6  F (36.4  C)   Ht 1.575 m (5' 2\")   Wt 85.7 kg (189 lb)   SpO2 99%   BMI 34.57 kg/m    G: alert oriented, no acute distress   ENT: sclera non-icteric   Pulm: no respiratory distress   CVS: RRR  ABD:  Soft, ND/NT no rebound, no guarding  Wound: Sutures removed without difficulty, steri strips applied.    Ext: WWP      A/P  #1 S/P Excisional biopsy of left posterior leg     She looks wonderful. She has made a satisfactory recovery from surgery. I do not need to see her again unless she has any issues in regard to this surgery. I've provided office phone numbers and told her not to hesitate to call with any questions, comments or concerns. I'd like a call if she has any fevers over 101.3, nausea/vomiting, pain out of control.      Adela Charles CNS  Surgery and Wound Care  [place] [place]   "

## 2022-10-10 DIAGNOSIS — R52 PAIN: ICD-10-CM

## 2022-10-10 DIAGNOSIS — M62.838 MUSCLE SPASM: ICD-10-CM

## 2022-10-10 DIAGNOSIS — I10 BENIGN ESSENTIAL HYPERTENSION: ICD-10-CM

## 2022-10-10 RX ORDER — LOSARTAN POTASSIUM 25 MG/1
25 TABLET ORAL DAILY
Qty: 90 TABLET | Refills: 3 | Status: SHIPPED | OUTPATIENT
Start: 2022-10-10 | End: 2023-08-17

## 2022-10-10 NOTE — TELEPHONE ENCOUNTER
losartan      Last Written Prescription Date:  7/18/22  Last Fill Quantity: 90,   # refills: 0  Last Office Visit: 9/29/22  Future Office visit:       Routing refill request to provider for review/approval because:

## 2022-10-13 RX ORDER — CYCLOBENZAPRINE HCL 10 MG
TABLET ORAL
Qty: 30 TABLET | Refills: 0 | Status: SHIPPED | OUTPATIENT
Start: 2022-10-13 | End: 2022-12-21

## 2022-10-13 NOTE — TELEPHONE ENCOUNTER
Flexeril      Last Written Prescription Date:  8.25.22  Last Fill Quantity: #30,   # refills: 0  Last Office Visit: 9.9.22  Future Office visit:       Routing refill request to provider for review/approval because:  Drug not on the [facility], P or [facility] refill protocol or controlled substance

## 2022-12-07 DIAGNOSIS — I10 BENIGN ESSENTIAL HYPERTENSION: ICD-10-CM

## 2022-12-08 RX ORDER — PROPRANOLOL HYDROCHLORIDE 10 MG/1
10 TABLET ORAL DAILY
Qty: 90 TABLET | Refills: 1 | Status: SHIPPED | OUTPATIENT
Start: 2022-12-08 | End: 2023-06-27

## 2022-12-08 NOTE — TELEPHONE ENCOUNTER
propranolol      Last Written Prescription Date:  9/29/22  Last Fill Quantity: 90,   # refills: 1  Last Office Visit: 11/30/22  Future Office visit:       Routing refill request to provider for review/approval because:

## 2022-12-10 DIAGNOSIS — I10 BENIGN ESSENTIAL HYPERTENSION: ICD-10-CM

## 2022-12-13 RX ORDER — PROPRANOLOL HYDROCHLORIDE 10 MG/1
10 TABLET ORAL DAILY
Qty: 90 TABLET | Refills: 1 | OUTPATIENT
Start: 2022-12-13

## 2022-12-19 DIAGNOSIS — R52 PAIN: ICD-10-CM

## 2022-12-19 DIAGNOSIS — M62.838 MUSCLE SPASM: ICD-10-CM

## 2022-12-20 NOTE — TELEPHONE ENCOUNTER
Flexeril      Last Written Prescription Date:  10.13.22  Last Fill Quantity: #30,   # refills: 0  Last Office Visit: 9.9.22  Future Office visit:       Routing refill request to provider for review/approval because:  Drug not on the [facility], [facility] or [facility] refill protocol or controlled substance

## 2022-12-21 ENCOUNTER — TRANSFERRED RECORDS (OUTPATIENT)
Dept: HEALTH INFORMATION MANAGEMENT | Facility: CLINIC | Age: 82
End: 2022-12-21

## 2022-12-21 RX ORDER — CYCLOBENZAPRINE HCL 10 MG
TABLET ORAL
Qty: 30 TABLET | Refills: 0 | Status: SHIPPED | OUTPATIENT
Start: 2022-12-21 | End: 2023-03-02

## 2023-02-02 ENCOUNTER — NURSE TRIAGE (OUTPATIENT)
Dept: FAMILY MEDICINE | Facility: OTHER | Age: 83
End: 2023-02-02

## 2023-02-02 NOTE — TELEPHONE ENCOUNTER
Pt calling and canceled tomorrows appt for bladder infection s/s and med review.    She has been having pressure in the bladder and dripping urine a few drops sometimes and then going more at other times.    No fever, no N/V,flank pain of blood in the urine.    These s/s have been going on for a couple days.    Advised she go to  today.    She declines this and asks to just come in for a lab on Monday or next week for this or an appt next week if she could get in?    Strongly advised .      [phone]    Please advise      Thelma Bridges RN

## 2023-02-03 NOTE — TELEPHONE ENCOUNTER
Next 5 appointments (look out 90 days)    Feb 07, 2023  2:45 PM  (Arrive by 2:30 PM)  SHORT with Iris Becerril MD  [facility] ([facility] ) [address]  [place] [address]  [phone]        Pt updated on below and she verbalized understanding.    Thelma Bridges RN     Statement Selected

## 2023-02-07 ENCOUNTER — OFFICE VISIT (OUTPATIENT)
Dept: FAMILY MEDICINE | Facility: OTHER | Age: 83
End: 2023-02-07
Attending: FAMILY MEDICINE
Payer: MEDICARE

## 2023-02-07 VITALS
BODY MASS INDEX: 33.13 KG/M2 | TEMPERATURE: 97.4 F | HEART RATE: 70 BPM | SYSTOLIC BLOOD PRESSURE: 136 MMHG | HEIGHT: 62 IN | OXYGEN SATURATION: 99 % | DIASTOLIC BLOOD PRESSURE: 80 MMHG | WEIGHT: 180 LBS | RESPIRATION RATE: 16 BRPM

## 2023-02-07 DIAGNOSIS — F33.0 MILD EPISODE OF RECURRENT MAJOR DEPRESSIVE DISORDER (H): ICD-10-CM

## 2023-02-07 DIAGNOSIS — N18.32 STAGE 3B CHRONIC KIDNEY DISEASE (H): ICD-10-CM

## 2023-02-07 DIAGNOSIS — I10 BENIGN ESSENTIAL HYPERTENSION: Primary | ICD-10-CM

## 2023-02-07 DIAGNOSIS — N32.81 OVERACTIVE BLADDER: ICD-10-CM

## 2023-02-07 DIAGNOSIS — E03.9 HYPOTHYROIDISM, UNSPECIFIED TYPE: ICD-10-CM

## 2023-02-07 PROCEDURE — 84443 ASSAY THYROID STIM HORMONE: CPT | Mod: ZL | Performed by: FAMILY MEDICINE

## 2023-02-07 PROCEDURE — 36415 COLL VENOUS BLD VENIPUNCTURE: CPT | Mod: ZL | Performed by: FAMILY MEDICINE

## 2023-02-07 PROCEDURE — G0463 HOSPITAL OUTPT CLINIC VISIT: HCPCS | Mod: 25

## 2023-02-07 PROCEDURE — G0463 HOSPITAL OUTPT CLINIC VISIT: HCPCS

## 2023-02-07 PROCEDURE — 84439 ASSAY OF FREE THYROXINE: CPT | Mod: ZL | Performed by: FAMILY MEDICINE

## 2023-02-07 PROCEDURE — 99214 OFFICE O/P EST MOD 30 MIN: CPT | Performed by: FAMILY MEDICINE

## 2023-02-07 RX ORDER — SOLIFENACIN SUCCINATE 5 MG/1
5 TABLET, FILM COATED ORAL DAILY
Qty: 30 TABLET | Refills: 1 | Status: SHIPPED | OUTPATIENT
Start: 2023-02-07 | End: 2023-04-11

## 2023-02-07 ASSESSMENT — PATIENT HEALTH QUESTIONNAIRE - PHQ9: SUM OF ALL RESPONSES TO PHQ QUESTIONS 1-9: 11

## 2023-02-07 ASSESSMENT — ANXIETY QUESTIONNAIRES
7. FEELING AFRAID AS IF SOMETHING AWFUL MIGHT HAPPEN: NOT AT ALL
5. BEING SO RESTLESS THAT IT IS HARD TO SIT STILL: NOT AT ALL
4. TROUBLE RELAXING: NOT AT ALL
GAD7 TOTAL SCORE: 7
IF YOU CHECKED OFF ANY PROBLEMS ON THIS QUESTIONNAIRE, HOW DIFFICULT HAVE THESE PROBLEMS MADE IT FOR YOU TO DO YOUR WORK, TAKE CARE OF THINGS AT HOME, OR GET ALONG WITH OTHER PEOPLE: SOMEWHAT DIFFICULT
3. WORRYING TOO MUCH ABOUT DIFFERENT THINGS: MORE THAN HALF THE DAYS
1. FEELING NERVOUS, ANXIOUS, OR ON EDGE: NEARLY EVERY DAY
2. NOT BEING ABLE TO STOP OR CONTROL WORRYING: SEVERAL DAYS
6. BECOMING EASILY ANNOYED OR IRRITABLE: SEVERAL DAYS
GAD7 TOTAL SCORE: 7

## 2023-02-07 ASSESSMENT — PAIN SCALES - GENERAL: PAINLEVEL: EXTREME PAIN (9)

## 2023-02-07 NOTE — LETTER
February 14, 2023      Ileana DENIS Davis  [address] [address]        Dear ,    We are writing to inform you of your test results.  Overall thyroid function is fine, no adjustments needed.         Resulted Orders   TSH with free T4 reflex   Result Value Ref Range    TSH 5.02 (H) 0.30 - 4.20 uIU/mL   T4 free   Result Value Ref Range    Free T4 1.39 0.90 - 1.70 ng/dL       If you have any questions or concerns, please call the clinic at the number listed above.       Sincerely,      Iris Becerril MD

## 2023-02-07 NOTE — PROGRESS NOTES
"  Assessment & Plan     1. Benign essential hypertension  Labs were recently updated, reviewed.  No changes recommended to current medications.  Follow-up in six months, sooner as needed.    2. Mild episode of recurrent major depressive disorder (H)  No changes recommended.    3. Stage 3b chronic kidney disease (H)  No changes    4. Hypothyroidism, unspecified type  Labs updated  - TSH with free T4 reflex; Future  - TSH with free T4 reflex  - T4 free    5. Overactive bladder  Will try slightly different medication (close to Myrbetriq) and see if this is tolerated better.  - solifenacin (VESICARE) 5 MG tablet; Take 1 tablet (5 mg) by mouth daily  Dispense: 30 tablet; Refill: 1        BMI:   Estimated body mass index is 32.92 kg/m  as calculated from the following:    Height as of this encounter: 1.575 m (5' 2\").    Weight as of this encounter: 81.6 kg (180 lb).     Return in about 6 months (around 8/7/2023) for Chronic Disease Management, Medication review.    Iris Becerril MD  [facility] is a 82 year old presenting for the following health issues:  Hypertension and Depression      HPI     Hypertension Follow-up      Do you check your blood pressure regularly outside of the clinic? Yes     Are you following a low salt diet? No    Are your blood pressures ever more than 140 on the top number (systolic) OR more   than 90 on the bottom number (diastolic), for example 140/90? No    Depression Followup    How are you doing with your depression since your last visit? No change    Are you having other symptoms that might be associated with depression? No    Have you had a significant life event?  Health Concerns     Are you feeling anxious or having panic attacks?   No    Do you have any concerns with your use of alcohol or other drugs? No    Social History     Tobacco Use     Smoking status: Never     Smokeless tobacco: Never   Substance Use Topics     Alcohol use: No     " Drug use: No     PHQ 10/29/2021 9/9/2022 2/7/2023   PHQ-9 Total Score 0 5 11   Q9: Thoughts of better off dead/self-harm past 2 weeks Not at all Not at all Not at all   F/U: Thoughts of suicide or self-harm - - -   F/U: Safety concerns - - -     DESTINEE-7 SCORE 10/29/2021 9/9/2022 2/7/2023   Total Score 0 0 7     Last PHQ-9 2/7/2023   1.  Little interest or pleasure in doing things 1   2.  Feeling down, depressed, or hopeless 0   3.  Trouble falling or staying asleep, or sleeping too much 3   4.  Feeling tired or having little energy 3   5.  Poor appetite or overeating 3   6.  Feeling bad about yourself 1   7.  Trouble concentrating 0   8.  Moving slowly or restless 0   Q9: Thoughts of better off dead/self-harm past 2 weeks 0   PHQ-9 Total Score 11   Difficulty at work, home, or with people Not difficult at all   In the past two weeks have you had thoughts of suicide or self harm? -   Do you have concerns about your personal safety or the safety of others? -     DESTINEE-7  2/7/2023   1. Feeling nervous, anxious, or on edge 3   2. Not being able to stop or control worrying 1   3. Worrying too much about different things 2   4. Trouble relaxing 0   5. Being so restless that it is hard to sit still 0   6. Becoming easily annoyed or irritable 1   7. Feeling afraid, as if something awful might happen 0   DESTINEE-7 Total Score 7   If you checked any problems, how difficult have they made it for you to do your work, take care of things at home, or get along with other people? Somewhat difficult       Suicide Assessment Five-step Evaluation and Treatment (SAFE-T)    Chronic Kidney Disease Follow-up    Do you take any over the counter pain medicine?: No    Hypothyroidism Follow-up      Since last visit, patient describes the following symptoms: weight loss of 20 lbs    Patient does have concerns about bladder leakage.  She has been tried on Detrol and Myrbetriq in the past, and did not tolerate those medications.  She would really like  "to try something, she does not care to wear pads all the time and states they are hard on her skin.      Review of Systems   Constitutional, HEENT, cardiovascular, pulmonary, gi and gu systems are negative, except as otherwise noted.      Objective    /80 (BP Location: Left arm, Patient Position: Sitting, Cuff Size: Adult Regular)   Pulse 70   Temp 97.4  F (36.3  C) (Tympanic)   Resp 16   Ht 1.575 m (5' 2\")   Wt 81.6 kg (180 lb)   SpO2 99%   BMI 32.92 kg/m    Body mass index is 32.92 kg/m .  Physical Exam   GENERAL: healthy, alert and no distress  PSYCH: mentation appears normal, affect normal/bright                "

## 2023-02-08 LAB
T4 FREE SERPL-MCNC: 1.39 NG/DL (ref 0.9–1.7)
TSH SERPL DL<=0.005 MIU/L-ACNC: 5.02 UIU/ML (ref 0.3–4.2)

## 2023-02-16 DIAGNOSIS — J30.2 SEASONAL ALLERGIC RHINITIS, UNSPECIFIED TRIGGER: ICD-10-CM

## 2023-02-16 RX ORDER — FLUTICASONE PROPIONATE 50 MCG
SPRAY, SUSPENSION (ML) NASAL
Qty: 16 G | Refills: 0 | Status: SHIPPED | OUTPATIENT
Start: 2023-02-16 | End: 2023-05-05

## 2023-02-17 ENCOUNTER — TRANSFERRED RECORDS (OUTPATIENT)
Dept: HEALTH INFORMATION MANAGEMENT | Facility: CLINIC | Age: 83
End: 2023-02-17

## 2023-02-27 DIAGNOSIS — R52 PAIN: ICD-10-CM

## 2023-02-27 DIAGNOSIS — M62.838 MUSCLE SPASM: ICD-10-CM

## 2023-03-02 RX ORDER — CYCLOBENZAPRINE HCL 10 MG
TABLET ORAL
Qty: 30 TABLET | Refills: 0 | Status: SHIPPED | OUTPATIENT
Start: 2023-03-02 | End: 2023-05-15

## 2023-03-02 NOTE — TELEPHONE ENCOUNTER
Cyclobenzaprine 10 mg      Last Written Prescription Date:  12-21-22  Last Fill Quantity: 30,   # refills: 0  Last Office Visit: 2-7-23

## 2023-03-06 DIAGNOSIS — B37.2 YEAST DERMATITIS: ICD-10-CM

## 2023-03-06 NOTE — TELEPHONE ENCOUNTER
Nystatin       Last Written Prescription Date:  7/11/22  Last Fill Quantity: 30g,   # refills: 2  Last Office Visit: 2/7/23  Future Office visit:

## 2023-03-07 RX ORDER — NYSTATIN 100000 U/G
CREAM TOPICAL 2 TIMES DAILY PRN
Qty: 30 G | Refills: 2 | Status: SHIPPED | OUTPATIENT
Start: 2023-03-07 | End: 2024-07-15

## 2023-04-07 DIAGNOSIS — N32.81 OVERACTIVE BLADDER: ICD-10-CM

## 2023-04-07 NOTE — TELEPHONE ENCOUNTER
solifenacin (VESICARE) 5 MG tablet  Last Written Prescription Date:  2/7/2023  Last Fill Quantity: 30,   # refills: 1  Last Office Visit: 2/7/2023  Future Office visit:

## 2023-04-11 RX ORDER — SOLIFENACIN SUCCINATE 5 MG/1
TABLET, FILM COATED ORAL
Qty: 30 TABLET | Refills: 9 | Status: SHIPPED | OUTPATIENT
Start: 2023-04-11 | End: 2023-10-12

## 2023-04-17 ENCOUNTER — TELEPHONE (OUTPATIENT)
Dept: FAMILY MEDICINE | Facility: OTHER | Age: 83
End: 2023-04-17

## 2023-04-17 NOTE — TELEPHONE ENCOUNTER
1:55 PM    Reason for Call: OVERBOOK    Patient is having the following symptoms: Possible UTI    The patient is requesting an appointment for ANYTIME THIS WEEK with DR Becerril.    Was an appointment offered for this call? No    Preferred method for responding to this message: Telephone Call  What is your phone number ?[phone]    If we cannot reach you directly, may we leave a detailed response at the number you provided? Yes    Can this message wait until your PCP/provider returns, if unavailable today? Not applicable    Joyce Prakash

## 2023-04-18 ENCOUNTER — OFFICE VISIT (OUTPATIENT)
Dept: FAMILY MEDICINE | Facility: OTHER | Age: 83
End: 2023-04-18
Attending: FAMILY MEDICINE
Payer: MEDICARE

## 2023-04-18 VITALS
TEMPERATURE: 97.2 F | OXYGEN SATURATION: 98 % | WEIGHT: 180 LBS | DIASTOLIC BLOOD PRESSURE: 76 MMHG | RESPIRATION RATE: 16 BRPM | HEIGHT: 62 IN | BODY MASS INDEX: 33.13 KG/M2 | SYSTOLIC BLOOD PRESSURE: 132 MMHG | HEART RATE: 61 BPM

## 2023-04-18 DIAGNOSIS — R35.0 URINARY FREQUENCY: ICD-10-CM

## 2023-04-18 DIAGNOSIS — N39.0 ACUTE UTI: Primary | ICD-10-CM

## 2023-04-18 LAB
ALBUMIN UR-MCNC: 100 MG/DL
APPEARANCE UR: ABNORMAL
BACTERIA #/AREA URNS HPF: ABNORMAL /HPF
BILIRUB UR QL STRIP: NEGATIVE
COLOR UR AUTO: YELLOW
GLUCOSE UR STRIP-MCNC: NEGATIVE MG/DL
HGB UR QL STRIP: ABNORMAL
KETONES UR STRIP-MCNC: NEGATIVE MG/DL
LEUKOCYTE ESTERASE UR QL STRIP: ABNORMAL
NITRATE UR QL: POSITIVE
PH UR STRIP: 6 [PH] (ref 5–7)
RBC #/AREA URNS AUTO: ABNORMAL /HPF
SP GR UR STRIP: 1.01 (ref 1–1.03)
SQUAMOUS #/AREA URNS AUTO: ABNORMAL /LPF
UROBILINOGEN UR STRIP-ACNC: 0.2 E.U./DL
WBC #/AREA URNS AUTO: >100 /HPF

## 2023-04-18 PROCEDURE — G0463 HOSPITAL OUTPT CLINIC VISIT: HCPCS

## 2023-04-18 PROCEDURE — 81001 URINALYSIS AUTO W/SCOPE: CPT | Mod: ZL | Performed by: FAMILY MEDICINE

## 2023-04-18 PROCEDURE — 81003 URINALYSIS AUTO W/O SCOPE: CPT | Mod: ZL | Performed by: FAMILY MEDICINE

## 2023-04-18 PROCEDURE — 99214 OFFICE O/P EST MOD 30 MIN: CPT | Performed by: FAMILY MEDICINE

## 2023-04-18 PROCEDURE — 87088 URINE BACTERIA CULTURE: CPT | Mod: ZL | Performed by: FAMILY MEDICINE

## 2023-04-18 RX ORDER — SULFAMETHOXAZOLE/TRIMETHOPRIM 800-160 MG
1 TABLET ORAL 2 TIMES DAILY
Qty: 10 TABLET | Refills: 0 | Status: SHIPPED | OUTPATIENT
Start: 2023-04-18 | End: 2023-04-23

## 2023-04-18 ASSESSMENT — PAIN SCALES - GENERAL: PAINLEVEL: NO PAIN (0)

## 2023-04-18 ASSESSMENT — PATIENT HEALTH QUESTIONNAIRE - PHQ9: SUM OF ALL RESPONSES TO PHQ QUESTIONS 1-9: 4

## 2023-04-18 NOTE — PROGRESS NOTES
"  Assessment & Plan     1. Acute UTI  Bactrim prescribed, symptomatic cares recommended.  Fiber supplement, like Benefiber, recommended.  Follow-up if no improvement noted.  - sulfamethoxazole-trimethoprim (BACTRIM DS) 800-160 MG tablet; Take 1 tablet by mouth 2 times daily for 5 days  Dispense: 10 tablet; Refill: 0    2. Urinary frequency  - *UA reflex to Microscopic and Culture - [facility]; Future  - *UA reflex to Microscopic and Culture - [facility]  - Urine Microscopic Exam  - Urine Culture        BMI:   Estimated body mass index is 32.92 kg/m  as calculated from the following:    Height as of this encounter: 1.575 m (5' 2\").    Weight as of this encounter: 81.6 kg (180 lb).     Return if symptoms worsen or fail to improve.    Iris Becerril MD  [facility] - [place] BLANCA Tejeda is a 82 year old, presenting for the following health issues:  UTI      HPI     Genitourinary - Female  Onset/Duration: 4 days  Description:   Painful urination (Dysuria): YES           Frequency: YES  Blood in urine (Hematuria): No  Delay in urine (Hesitency): YES  Intensity: mild  Progression of Symptoms:  worsening  Accompanying Signs & Symptoms:  Fever/chills: No  Flank pain: YES  Nausea and vomiting: No  Vaginal symptoms: none  Abdominal/Pelvic Pain: YES  History:   History of frequent UTI s: YES  History of kidney stones: No  Sexually Active: No  Possibility of pregnancy: No  Precipitating or alleviating factors: None  Therapies tried and outcome: Increase fluid intake          Review of Systems   Constitutional, HEENT, cardiovascular, pulmonary, gi and gu systems are negative, except as otherwise noted.      Objective    /76 (BP Location: Left arm, Patient Position: Sitting, Cuff Size: Adult Regular)   Pulse 61   Temp 97.2  F (36.2  C) (Tympanic)   Resp 16   Ht 1.575 m (5' 2\")   Wt 81.6 kg (180 lb)   SpO2 98%   BMI 32.92 kg/m    Body mass index is 32.92 kg/m .  Physical Exam "   GENERAL: healthy, alert and no distress  PSYCH: mentation appears normal, affect normal/bright    Results for orders placed or performed in visit on 04/18/23 (from the past 24 hour(s))   *UA reflex to Microscopic and Culture - [facility]/[place]    Specimen: Urine, Midstream   Result Value Ref Range    Color Urine Yellow Colorless, Straw, Light Yellow, Yellow    Appearance Urine Cloudy (A) Clear    Glucose Urine Negative Negative mg/dL    Bilirubin Urine Negative Negative    Ketones Urine Negative Negative mg/dL    Specific Gravity Urine 1.015 1.003 - 1.035    Blood Urine Moderate (A) Negative    pH Urine 6.0 5.0 - 7.0    Protein Albumin Urine 100 (A) Negative mg/dL    Urobilinogen Urine 0.2 0.2, 1.0 E.U./dL    Nitrite Urine Positive (A) Negative    Leukocyte Esterase Urine Moderate (A) Negative   Urine Microscopic Exam   Result Value Ref Range    Bacteria Urine Moderate (A) None Seen /HPF    RBC Urine 0-2 0-2 /HPF /HPF    WBC Urine >100 (A) 0-5 /HPF /HPF    Squamous Epithelials Urine Few (A) None Seen /LPF

## 2023-04-20 LAB — BACTERIA UR CULT: ABNORMAL

## 2023-05-05 DIAGNOSIS — J30.2 SEASONAL ALLERGIC RHINITIS, UNSPECIFIED TRIGGER: ICD-10-CM

## 2023-05-05 RX ORDER — FLUTICASONE PROPIONATE 50 MCG
SPRAY, SUSPENSION (ML) NASAL
Qty: 16 G | Refills: 11 | Status: SHIPPED | OUTPATIENT
Start: 2023-05-05

## 2023-05-12 DIAGNOSIS — M62.838 MUSCLE SPASM: ICD-10-CM

## 2023-05-12 DIAGNOSIS — R52 PAIN: ICD-10-CM

## 2023-05-15 ENCOUNTER — TRANSFERRED RECORDS (OUTPATIENT)
Dept: HEALTH INFORMATION MANAGEMENT | Facility: CLINIC | Age: 83
End: 2023-05-15

## 2023-05-15 RX ORDER — CYCLOBENZAPRINE HCL 10 MG
TABLET ORAL
Qty: 30 TABLET | Refills: 0 | Status: SHIPPED | OUTPATIENT
Start: 2023-05-15 | End: 2023-07-07

## 2023-05-15 NOTE — TELEPHONE ENCOUNTER
Flexeril      Last Written Prescription Date:  3.3.23  Last Fill Quantity: #30,   # refills: 0  Last Office Visit: 4.18.23  Future Office visit:       Routing refill request to provider for review/approval because:  Drug not on the [facility], P or [facility] refill protocol or controlled substance

## 2023-05-22 ENCOUNTER — NURSE TRIAGE (OUTPATIENT)
Dept: FAMILY MEDICINE | Facility: OTHER | Age: 83
End: 2023-05-22

## 2023-05-22 NOTE — TELEPHONE ENCOUNTER
Though it is possible some of the black stool and vomit is from pepto, with all her other symptoms and her age, I would feel better if she was evaluated in the ER

## 2023-05-22 NOTE — TELEPHONE ENCOUNTER
Disposition: Be seen in office today    Patient called with history of chronic constipation with change in symptoms the last week. Patient reports intermittent left sided abdominal pain that correlates with constipation. Patient states she vomited 2 times in the past week with last episode occurring on Saturday. that was a brown and pink color. Patient states brown color is mixed with sputum and pink is from Pepto Bismol. Patient's last bowel movement was yesterday and was black in color. Patient reports las taking Pepto Bismol Friday or Saturday. Patient denies any worsening dizziness or lightheadedness from baseline. Patient also report left sided lower back pain.    Per protocol patient to be seen in clinic today. Request for possible overbook due to provider schedule being full.    Patient informed to be evaluated in UC/ED with any new or worsening symptoms. Patient verbalized understanding.      Reason for Disposition    Patient wants to be seen    Known cirrhosis of the liver (or history of liver failure or ascites)    Additional Information    Negative: Passed out (i.e., fainted, collapsed and was not responding)    Negative: Shock suspected (e.g., cold/pale/clammy skin, too weak to stand, low BP, rapid pulse)    Negative: Vomiting red blood or black (coffee ground) material    Negative: Sounds like a life-threatening emergency to the triager    Negative: Diarrhea is main symptom    Negative: Rectal symptoms    Negative: SEVERE rectal bleeding (large blood clots; constant or on and off bleeding)    Negative: SEVERE dizziness (e.g., unable to stand, requires support to walk, feels like passing out now)    Negative: MODERATE rectal bleeding (small blood clots, passing blood without stool, or toilet water turns red) more than once a day    Negative: Bloody, black, or tarry bowel movements  (Exception: Chronic-unchanged black-grey bowel movements and is taking iron pills or Pepto-Bismol.)    Negative: High-risk  "adult (e.g., prior surgery on aorta, abdominal aortic aneurysm)    Negative: Rectal foreign body (inserted or swallowed)    Negative: SEVERE abdominal pain (e.g., excruciating)    Negative: Constant abdominal pain lasting > 2 hours    Negative: Pale skin (pallor) of new-onset or worsening    Negative: Patient sounds very sick or weak to the triager    Negative: MODERATE rectal bleeding (small blood clots, passing blood without stool, or toilet water turns red)    Negative: Taking Coumadin (warfarin) or other strong blood thinner, or known bleeding disorder (e.g., thrombocytopenia)    Negative: Colonoscopy in past 72 hours    Answer Assessment - Initial Assessment Questions  1. APPEARANCE of BLOOD: \"What color is it?\" \"Is it passed separately, on the surface of the stool, or mix2ed in with the stool?\"       Black stool  2. AMOUNT: \"How much blood was passed?\"       Unsure. Black stool  3. FREQUENCY: \"How many times has blood been passed with the stools?\"      One large amount of stool  4. ONSET: \"When was the blood first seen in the stools?\" (Days or weeks)       yesterday  5. DIARRHEA: \"Is there also some diarrhea?\" If Yes, ask: \"How many diarrhea stools in the past 24 hours?\"       no  6. CONSTIPATION: \"Do you have constipation?\" If Yes, ask: \"How bad is it?\"      Patient has had bowel movement every day the last week  7. RECURRENT SYMPTOMS: \"Have you had blood in your stools before?\" If Yes, ask: \"When was the last time?\" and \"What happened that time?\"       no  8. BLOOD THINNERS: \"Do you take any blood thinners?\" (e.g., Coumadin/warfarin, Pradaxa/dabigatran, aspirin)      no  9. OTHER SYMPTOMS: \"Do you have any other symptoms?\"  (e.g., abdomen pain, vomiting, dizziness, fever)      Left sided lower back pain.  10. PREGNANCY: \"Is there any chance you are pregnant?\" \"When was your last menstrual period?\"        no    Protocols used: RECTAL BLEEDING-A-[place]      "

## 2023-06-22 DIAGNOSIS — B37.2 YEAST DERMATITIS: ICD-10-CM

## 2023-06-23 RX ORDER — NYSTATIN 100000 [USP'U]/G
POWDER TOPICAL
Qty: 60 G | Refills: 1 | Status: SHIPPED | OUTPATIENT
Start: 2023-06-23 | End: 2023-10-30

## 2023-06-23 NOTE — PROGRESS NOTES
"  Assessment & Plan   Acute cystitis with hematuria  - sulfamethoxazole-trimethoprim (BACTRIM DS) 800-160 MG tablet; Take 1 tablet by mouth 2 times daily for 5 days    Urinary symptom or sign  - UA Macroscopic with reflex to Microscopic and Culture; Future      Ordering of each unique test  Prescription drug management     BMI:   Estimated body mass index is 33.65 kg/m  as calculated from the following:    Height as of 4/18/23: 1.575 m (5' 2\").    Weight as of this encounter: 83.5 kg (184 lb).       Return if symptoms worsen or fail to improve.    Dayan Hines, CNP  [facility] - [facility]    Camilo Tejeda is a 82 year old, presenting for the following health issues:  Urinary Problem         No data to display              HPI     Genitourinary - Female  Onset/Duration: x 1 1/2 weeks ago   Description:   Painful urination (Dysuria): No           Frequency: YES  Blood in urine (Hematuria): No  Delay in urine (Hesitency): YES  Intensity: mild  Progression of Symptoms:  same  Accompanying Signs & Symptoms:  Fever/chills: No  Flank pain: YES  Nausea and vomiting: No  Vaginal symptoms: none  Abdominal/Pelvic Pain: YES- pressure   History:   History of frequent UTI s: YES  History of kidney stones: No  Sexually Active: No  Possibility of pregnancy: No  Precipitating or alleviating factors: None  Therapies tried and outcome: Cranberry juice prn (contraindicated in Coumadin patients) and  aleeve          Review of Systems   Constitutional, HEENT, cardiovascular, pulmonary, gi and gu systems are negative, except as otherwise noted.      Objective    /86 (BP Location: Right arm, Patient Position: Sitting, Cuff Size: Adult Regular)   Pulse 77   Temp 97.3  F (36.3  C) (Tympanic)   Resp 20   Wt 83.5 kg (184 lb)   SpO2 96%   BMI 33.65 kg/m    Body mass index is 33.65 kg/m .     Physical Exam   GENERAL: healthy, alert and no distress  RESP: lungs clear to auscultation - no rales, rhonchi or " wheezes  CV: regular rate and rhythm, normal S1 S2, no S3 or S4, no murmur, click or rub, no peripheral edema and peripheral pulses strong  NEURO: Normal strength and tone, mentation intact and speech normal  BACK: no CVA tenderness, no paralumbar tenderness  Component      Latest Ref Rng 6/26/2023  12:52 PM   Color Urine      Colorless, Straw, Light Yellow, Yellow  Yellow    Appearance Urine      Clear  Clear    Glucose Urine      Negative mg/dL Negative    Bilirubin Urine      Negative  Negative    Ketones Urine      Negative mg/dL Negative    Specific Gravity Urine      1.003 - 1.035  1.010    Blood Urine      Negative  Trace !    pH Urine      5.0 - 7.0  7.5 (H)    Protein Albumin Urine      Negative mg/dL Negative    Urobilinogen Urine      0.2, 1.0 E.U./dL 0.2    Nitrite Urine      Negative  Negative    Leukocyte Esterase Urine      Negative  Large !    Bacteria Urine      None Seen /HPF Many !    RBC Urine      0-2 /HPF /HPF 5-10 !    WBC Urine      0-5 /HPF /HPF  !    Squamous Epithelial /LPF Urine      None Seen /LPF Few !    Mucus Urine      None Seen /LPF Present !       Legend:  ! Abnormal  (H) High

## 2023-06-23 NOTE — TELEPHONE ENCOUNTER
8:55 AM    Reason for Call: Phone Call    Description: Patient called stating she is having some ongoing bladder issues and may possible needs antibodies, states provider is aware of the situation and will discuss further when she receives a call.    Was an appointment offered for this call? No  If yes : Appointment type              Date    Preferred method for responding to this message: Telephone Call  What is your phone number ?[phone]    If we cannot reach you directly, may we leave a detailed response at the number you provided? Yes    Can this message wait until your PCP/provider returns, if available today?  Provider is in today    Nathaniel Chun

## 2023-06-26 ENCOUNTER — OFFICE VISIT (OUTPATIENT)
Dept: FAMILY MEDICINE | Facility: OTHER | Age: 83
End: 2023-06-26
Attending: NURSE PRACTITIONER
Payer: MEDICARE

## 2023-06-26 ENCOUNTER — LAB (OUTPATIENT)
Dept: LAB | Facility: OTHER | Age: 83
End: 2023-06-26
Attending: NURSE PRACTITIONER
Payer: MEDICARE

## 2023-06-26 VITALS
RESPIRATION RATE: 20 BRPM | OXYGEN SATURATION: 96 % | HEART RATE: 77 BPM | WEIGHT: 184 LBS | DIASTOLIC BLOOD PRESSURE: 86 MMHG | BODY MASS INDEX: 33.65 KG/M2 | SYSTOLIC BLOOD PRESSURE: 132 MMHG | TEMPERATURE: 97.3 F

## 2023-06-26 DIAGNOSIS — R39.9 URINARY SYMPTOM OR SIGN: Primary | ICD-10-CM

## 2023-06-26 DIAGNOSIS — N30.01 ACUTE CYSTITIS WITH HEMATURIA: ICD-10-CM

## 2023-06-26 DIAGNOSIS — R32 URINARY INCONTINENCE, UNSPECIFIED TYPE: Primary | ICD-10-CM

## 2023-06-26 LAB
ALBUMIN UR-MCNC: NEGATIVE MG/DL
APPEARANCE UR: CLEAR
BACTERIA #/AREA URNS HPF: ABNORMAL /HPF
BILIRUB UR QL STRIP: NEGATIVE
COLOR UR AUTO: YELLOW
GLUCOSE UR STRIP-MCNC: NEGATIVE MG/DL
HGB UR QL STRIP: ABNORMAL
KETONES UR STRIP-MCNC: NEGATIVE MG/DL
LEUKOCYTE ESTERASE UR QL STRIP: ABNORMAL
MUCOUS THREADS #/AREA URNS LPF: PRESENT /LPF
NITRATE UR QL: NEGATIVE
PH UR STRIP: 7.5 [PH] (ref 5–7)
RBC #/AREA URNS AUTO: ABNORMAL /HPF
SP GR UR STRIP: 1.01 (ref 1–1.03)
SQUAMOUS #/AREA URNS AUTO: ABNORMAL /LPF
UROBILINOGEN UR STRIP-ACNC: 0.2 E.U./DL
WBC #/AREA URNS AUTO: ABNORMAL /HPF

## 2023-06-26 PROCEDURE — 99213 OFFICE O/P EST LOW 20 MIN: CPT | Performed by: NURSE PRACTITIONER

## 2023-06-26 PROCEDURE — G0463 HOSPITAL OUTPT CLINIC VISIT: HCPCS

## 2023-06-26 PROCEDURE — 81003 URINALYSIS AUTO W/O SCOPE: CPT | Mod: ZL

## 2023-06-26 PROCEDURE — 81001 URINALYSIS AUTO W/SCOPE: CPT | Mod: ZL

## 2023-06-26 PROCEDURE — 87086 URINE CULTURE/COLONY COUNT: CPT | Mod: ZL

## 2023-06-26 RX ORDER — SULFAMETHOXAZOLE/TRIMETHOPRIM 800-160 MG
1 TABLET ORAL 2 TIMES DAILY
Qty: 10 TABLET | Refills: 0 | Status: SHIPPED | OUTPATIENT
Start: 2023-06-26 | End: 2023-07-01

## 2023-06-26 ASSESSMENT — PAIN SCALES - GENERAL: PAINLEVEL: EXTREME PAIN (9)

## 2023-06-26 NOTE — PATIENT INSTRUCTIONS
Start using zinc based barrier cream after you use the bathroom.   Start antibiotic  Continue probiotic.    Start Doculax daily per package instructions if this has helped you in the past.

## 2023-06-27 DIAGNOSIS — I10 BENIGN ESSENTIAL HYPERTENSION: ICD-10-CM

## 2023-06-27 LAB — BACTERIA UR CULT: ABNORMAL

## 2023-06-27 RX ORDER — PROPRANOLOL HYDROCHLORIDE 10 MG/1
10 TABLET ORAL DAILY
Qty: 90 TABLET | Refills: 0 | Status: SHIPPED | OUTPATIENT
Start: 2023-06-27 | End: 2023-09-18

## 2023-07-06 DIAGNOSIS — M62.838 MUSCLE SPASM: ICD-10-CM

## 2023-07-06 DIAGNOSIS — R52 PAIN: ICD-10-CM

## 2023-07-07 RX ORDER — CYCLOBENZAPRINE HCL 10 MG
TABLET ORAL
Qty: 30 TABLET | Refills: 0 | Status: SHIPPED | OUTPATIENT
Start: 2023-07-07 | End: 2023-08-24

## 2023-07-07 NOTE — TELEPHONE ENCOUNTER
Flexeril      Last Written Prescription Date:  5.15.23  Last Fill Quantity: #30,   # refills: 0  Last Office Visit: 4.18.23  Future Office visit:       Routing refill request to provider for review/approval because:  Drug not on the [facility], P or [facility] refill protocol or controlled substance

## 2023-07-19 ENCOUNTER — TRANSFERRED RECORDS (OUTPATIENT)
Dept: HEALTH INFORMATION MANAGEMENT | Facility: CLINIC | Age: 83
End: 2023-07-19
Payer: MEDICARE

## 2023-07-25 ENCOUNTER — TELEPHONE (OUTPATIENT)
Dept: FAMILY MEDICINE | Facility: OTHER | Age: 83
End: 2023-07-25

## 2023-07-25 ENCOUNTER — VIRTUAL VISIT (OUTPATIENT)
Dept: FAMILY MEDICINE | Facility: OTHER | Age: 83
End: 2023-07-25
Attending: FAMILY MEDICINE
Payer: MEDICARE

## 2023-07-25 ENCOUNTER — LAB (OUTPATIENT)
Dept: LAB | Facility: OTHER | Age: 83
End: 2023-07-25
Attending: FAMILY MEDICINE
Payer: MEDICARE

## 2023-07-25 DIAGNOSIS — N39.0 RECURRENT UTI: ICD-10-CM

## 2023-07-25 DIAGNOSIS — N39.0 ACUTE UTI: Primary | ICD-10-CM

## 2023-07-25 DIAGNOSIS — R35.0 URINARY FREQUENCY: ICD-10-CM

## 2023-07-25 DIAGNOSIS — R07.9 EXERTIONAL CHEST PAIN: ICD-10-CM

## 2023-07-25 LAB
ALBUMIN UR-MCNC: NEGATIVE MG/DL
APPEARANCE UR: ABNORMAL
BACTERIA #/AREA URNS HPF: ABNORMAL /HPF
BILIRUB UR QL STRIP: NEGATIVE
COLOR UR AUTO: YELLOW
GLUCOSE UR STRIP-MCNC: NEGATIVE MG/DL
HGB UR QL STRIP: ABNORMAL
KETONES UR STRIP-MCNC: NEGATIVE MG/DL
LEUKOCYTE ESTERASE UR QL STRIP: ABNORMAL
NITRATE UR QL: NEGATIVE
PH UR STRIP: 7 [PH] (ref 5–7)
RBC #/AREA URNS AUTO: ABNORMAL /HPF
SP GR UR STRIP: 1.01 (ref 1–1.03)
SQUAMOUS #/AREA URNS AUTO: ABNORMAL /LPF
UROBILINOGEN UR STRIP-ACNC: 0.2 E.U./DL
WBC #/AREA URNS AUTO: >100 /HPF
WBC CLUMPS #/AREA URNS HPF: PRESENT /HPF

## 2023-07-25 PROCEDURE — 81003 URINALYSIS AUTO W/O SCOPE: CPT | Mod: ZL

## 2023-07-25 PROCEDURE — 81001 URINALYSIS AUTO W/SCOPE: CPT | Mod: ZL

## 2023-07-25 PROCEDURE — 99214 OFFICE O/P EST MOD 30 MIN: CPT | Performed by: FAMILY MEDICINE

## 2023-07-25 PROCEDURE — 87086 URINE CULTURE/COLONY COUNT: CPT | Mod: ZL

## 2023-07-25 PROCEDURE — 93005 ELECTROCARDIOGRAM TRACING: CPT | Performed by: FAMILY MEDICINE

## 2023-07-25 PROCEDURE — 93010 ELECTROCARDIOGRAM REPORT: CPT | Mod: 77 | Performed by: INTERNAL MEDICINE

## 2023-07-25 RX ORDER — AMOXICILLIN 500 MG/1
500 CAPSULE ORAL 2 TIMES DAILY
Qty: 14 CAPSULE | Refills: 0 | Status: SHIPPED | OUTPATIENT
Start: 2023-07-25 | End: 2023-08-01

## 2023-07-25 NOTE — TELEPHONE ENCOUNTER
8:38 AM    Reason for Call: Phone Call    Description: Patient called in stating that she has another bladder infection. Please call patient back.    Was an appointment offered for this call? No  If yes : Appointment type              Date    Preferred method for responding to this message: Telephone Call - patient will be home until 9:50 and then after 1 as she is going to a  this morning  What is your phone number ? [phone]     If we cannot reach you directly, may we leave a detailed response at the number you provided? Yes    Can this message wait until your PCP/provider returns, if available today? Not applicable, provider in clinic     Kerri Tavares

## 2023-07-25 NOTE — TELEPHONE ENCOUNTER
Left message for return call, please put her on as a telephone visit in my Dr. Only at the end of the day if pt calls back.

## 2023-07-25 NOTE — PROGRESS NOTES
"  Assessment & Plan     1. Acute UTI  Amoxicillin sent based on last culture.  Referral ordered as requested.  Follow-up as needed.  - amoxicillin (AMOXIL) 500 MG capsule; Take 1 capsule (500 mg) by mouth 2 times daily for 7 days  Dispense: 14 capsule; Refill: 0  - Adult Urology  Referral; Future    2. Urinary frequency  - UA Macroscopic with reflex to Microscopic and Culture; Future    3. Recurrent UTI  - Adult Urology  Referral; Future    4. Exertional chest pain  EKG does not show acute changes, but with symptoms suggestive of cardiac disease, will order stress test.  Follow-up with results when available.  - EKG 12-lead complete w/read - (Clinic Performed)  - NM MPI with Lexiscan        BMI:   Estimated body mass index is 33.65 kg/m  as calculated from the following:    Height as of 4/18/23: 1.575 m (5' 2\").    Weight as of 6/26/23: 83.5 kg (184 lb).     Return if symptoms worsen or fail to improve.    Iris Becerril MD  [facility] - [facility] is a 82 year old, presenting for the following health issues:  UTI         HPI     Genitourinary - Female  Onset/Duration: 07/21/23  Description:   Painful urination (Dysuria): No           Frequency: YES  Blood in urine (Hematuria): No  Delay in urine (Hesitency): No  Intensity: moderate  Progression of Symptoms:  worsening  Accompanying Signs & Symptoms:  Fever/chills: No  Flank pain: YES  Nausea and vomiting: No  Vaginal symptoms: none  Abdominal/Pelvic Pain: YES  History:   History of frequent UTI s: YES  History of kidney stones: No  Sexually Active: No  Possibility of pregnancy: No  Precipitating or alleviating factors: None  Therapies tried and outcome: Cranberry juice prn (contraindicated in Coumadin patients) and Increase fluid intake  Patient would like a referral to a female Urologist if she does have another UTI     Patient then brings up that she occasionally has chest pressure and shortness of breath " with activity.  She states she is having issues with even doing some housework without having symptoms.        Review of Systems   Constitutional, HEENT, cardiovascular, pulmonary, gi and gu systems are negative, except as otherwise noted.      Objective    There were no vitals taken for this visit.  There is no height or weight on file to calculate BMI.  Physical Exam   GENERAL: healthy, alert and no distress  PSYCH: mentation appears normal, affect normal/bright    EKG - Reviewed and interpreted by me appears normal, NSR, normal axis, normal intervals, no acute ST/T changes c/w ischemia, no LVH by voltage criteria, unchanged from previous tracings  Results for orders placed or performed in visit on 07/25/23   UA Macroscopic with reflex to Microscopic and Culture     Status: Abnormal    Specimen: Urine, Midstream   Result Value Ref Range    Color Urine Yellow Colorless, Straw, Light Yellow, Yellow    Appearance Urine Slightly Cloudy (A) Clear    Glucose Urine Negative Negative mg/dL    Bilirubin Urine Negative Negative    Ketones Urine Negative Negative mg/dL    Specific Gravity Urine 1.010 1.003 - 1.035    Blood Urine Trace (A) Negative    pH Urine 7.0 5.0 - 7.0    Protein Albumin Urine Negative Negative mg/dL    Urobilinogen Urine 0.2 0.2, 1.0 E.U./dL    Nitrite Urine Negative Negative    Leukocyte Esterase Urine Large (A) Negative   Urine Microscopic Exam     Status: Abnormal   Result Value Ref Range    Bacteria Urine Many (A) None Seen /HPF    RBC Urine 2-5 (A) 0-2 /HPF /HPF    WBC Urine >100 (A) 0-5 /HPF /HPF    Squamous Epithelials Urine Moderate (A) None Seen /LPF    WBC Clumps Urine Present (A) None Seen /HPF   Urine Culture     Status: Abnormal    Specimen: Urine, Midstream   Result Value Ref Range    Culture (A)      >100,000 CFU/mL Streptococcus agalactiae (Group B Streptococcus)                    No

## 2023-07-26 LAB — BACTERIA UR CULT: ABNORMAL

## 2023-08-02 ENCOUNTER — HOSPITAL ENCOUNTER (OUTPATIENT)
Dept: NUCLEAR MEDICINE | Facility: HOSPITAL | Age: 83
Setting detail: NUCLEAR MEDICINE
Discharge: HOME | End: 2023-08-02
Attending: FAMILY MEDICINE
Payer: MEDICARE

## 2023-08-02 ENCOUNTER — HOSPITAL ENCOUNTER (OUTPATIENT)
Dept: CARDIOLOGY | Facility: HOSPITAL | Age: 83
Setting detail: NUCLEAR MEDICINE
Discharge: HOME | End: 2023-08-02
Attending: FAMILY MEDICINE
Payer: MEDICARE

## 2023-08-02 DIAGNOSIS — R07.9 EXERTIONAL CHEST PAIN: ICD-10-CM

## 2023-08-02 LAB
CV BLOOD PRESSURE: 87 MMHG
CV STRESS MAX HR HE: 79
NUC STRESS EJECTION FRACTION: 79 %
RATE PRESSURE PRODUCT: NORMAL
STRESS ECHO BASELINE DIASTOLIC HE: 90
STRESS ECHO BASELINE HR: 59 BPM
STRESS ECHO BASELINE SYSTOLIC BP: 146
STRESS ECHO CALCULATED PERCENT HR: 58 %
STRESS ECHO LAST STRESS DIASTOLIC BP: 86
STRESS ECHO LAST STRESS SYSTOLIC BP: 142
STRESS ECHO TARGET HR: 137

## 2023-08-02 PROCEDURE — 343N000001 HC RX 343: Performed by: RADIOLOGY

## 2023-08-02 PROCEDURE — 93018 CV STRESS TEST I&R ONLY: CPT | Performed by: INTERNAL MEDICINE

## 2023-08-02 PROCEDURE — A9500 TC99M SESTAMIBI: HCPCS | Performed by: RADIOLOGY

## 2023-08-02 PROCEDURE — 93016 CV STRESS TEST SUPVJ ONLY: CPT | Performed by: INTERNAL MEDICINE

## 2023-08-02 PROCEDURE — 78452 HT MUSCLE IMAGE SPECT MULT: CPT

## 2023-08-02 PROCEDURE — 250N000011 HC RX IP 250 OP 636: Mod: JZ | Performed by: INTERNAL MEDICINE

## 2023-08-02 PROCEDURE — 93017 CV STRESS TEST TRACING ONLY: CPT

## 2023-08-02 RX ORDER — REGADENOSON 0.08 MG/ML
0.4 INJECTION, SOLUTION INTRAVENOUS ONCE
Status: COMPLETED | OUTPATIENT
Start: 2023-08-02 | End: 2023-08-02

## 2023-08-02 RX ORDER — AMINOPHYLLINE 25 MG/ML
INJECTION, SOLUTION INTRAVENOUS
Status: DISCONTINUED
Start: 2023-08-02 | End: 2023-08-02 | Stop reason: WASHOUT

## 2023-08-02 RX ADMIN — REGADENOSON 0.4 MG: 0.08 INJECTION, SOLUTION INTRAVENOUS at 08:50

## 2023-08-02 RX ADMIN — Medication 10.8 MILLICURIE: at 06:47

## 2023-08-02 RX ADMIN — Medication 32.6 MILLICURIE: at 08:50

## 2023-08-04 DIAGNOSIS — R94.39 ABNORMAL CARDIOVASCULAR STRESS TEST: Primary | ICD-10-CM

## 2023-08-04 DIAGNOSIS — R07.9 EXERTIONAL CHEST PAIN: ICD-10-CM

## 2023-08-16 DIAGNOSIS — E03.9 HYPOTHYROIDISM, UNSPECIFIED TYPE: ICD-10-CM

## 2023-08-16 DIAGNOSIS — I10 BENIGN ESSENTIAL HYPERTENSION: ICD-10-CM

## 2023-08-17 RX ORDER — LOSARTAN POTASSIUM 25 MG/1
25 TABLET ORAL DAILY
Qty: 90 TABLET | Refills: 3 | Status: SHIPPED | OUTPATIENT
Start: 2023-08-17 | End: 2023-10-12

## 2023-08-17 RX ORDER — LEVOTHYROXINE SODIUM 100 UG/1
100 TABLET ORAL DAILY
Qty: 90 TABLET | Refills: 0 | Status: SHIPPED | OUTPATIENT
Start: 2023-08-17 | End: 2023-11-02

## 2023-08-17 NOTE — TELEPHONE ENCOUNTER
Cozaar, Synthroid      Last Written Prescription Date:  6.12.23, 4.5.23  Last Fill Quantity: #90, #90,   # refills: 0  Last Office Visit: 7.25.23  Future Office visit:       Routing refill request to provider for review/approval because:

## 2023-08-21 DIAGNOSIS — R52 PAIN: ICD-10-CM

## 2023-08-21 DIAGNOSIS — M62.838 MUSCLE SPASM: ICD-10-CM

## 2023-08-23 NOTE — TELEPHONE ENCOUNTER
Flexeril      Last Written Prescription Date:  7.7.23  Last Fill Quantity: #30,   # refills: 0  Last Office Visit: 7.25.23  Future Office visit:       Routing refill request to provider for review/approval because:  Drug not on the [facility], [facility] or [facility] refill protocol or controlled substance

## 2023-08-24 RX ORDER — CYCLOBENZAPRINE HCL 10 MG
TABLET ORAL
Qty: 30 TABLET | Refills: 0 | Status: SHIPPED | OUTPATIENT
Start: 2023-08-24 | End: 2024-03-14

## 2023-09-05 ENCOUNTER — TELEPHONE (OUTPATIENT)
Dept: FAMILY MEDICINE | Facility: OTHER | Age: 83
End: 2023-09-05

## 2023-09-05 ENCOUNTER — OFFICE VISIT (OUTPATIENT)
Dept: CARDIOLOGY | Facility: OTHER | Age: 83
End: 2023-09-05
Attending: NURSE PRACTITIONER
Payer: MEDICARE

## 2023-09-05 VITALS
HEART RATE: 70 BPM | DIASTOLIC BLOOD PRESSURE: 88 MMHG | OXYGEN SATURATION: 98 % | BODY MASS INDEX: 34.4 KG/M2 | SYSTOLIC BLOOD PRESSURE: 134 MMHG | WEIGHT: 188.06 LBS

## 2023-09-05 DIAGNOSIS — R94.39 ABNORMAL STRESS TEST: Primary | ICD-10-CM

## 2023-09-05 DIAGNOSIS — N39.0 RECURRENT UTI: ICD-10-CM

## 2023-09-05 DIAGNOSIS — R30.0 DYSURIA: ICD-10-CM

## 2023-09-05 DIAGNOSIS — R30.0 DYSURIA: Primary | ICD-10-CM

## 2023-09-05 DIAGNOSIS — R07.9 EXERTIONAL CHEST PAIN: ICD-10-CM

## 2023-09-05 DIAGNOSIS — R60.0 BILATERAL LEG EDEMA: ICD-10-CM

## 2023-09-05 DIAGNOSIS — R06.09 DYSPNEA ON EXERTION: ICD-10-CM

## 2023-09-05 PROBLEM — M62.81 MUSCLE WEAKNESS: Status: ACTIVE | Noted: 2022-10-20

## 2023-09-05 PROBLEM — M54.41 ACUTE RIGHT-SIDED LOW BACK PAIN WITH RIGHT-SIDED SCIATICA: Status: ACTIVE | Noted: 2022-10-20

## 2023-09-05 PROBLEM — M25.69 BACK STIFFNESS: Status: ACTIVE | Noted: 2022-10-20

## 2023-09-05 LAB
ALBUMIN SERPL BCG-MCNC: 3.8 G/DL (ref 3.5–5.2)
ALBUMIN UR-MCNC: 30 MG/DL
ALP SERPL-CCNC: 95 U/L (ref 35–104)
ALT SERPL W P-5'-P-CCNC: 14 U/L (ref 0–50)
ANION GAP SERPL CALCULATED.3IONS-SCNC: 14 MMOL/L (ref 7–15)
APPEARANCE UR: ABNORMAL
AST SERPL W P-5'-P-CCNC: 26 U/L (ref 0–45)
BACTERIA #/AREA URNS HPF: ABNORMAL /HPF
BILIRUB SERPL-MCNC: 0.6 MG/DL
BILIRUB UR QL STRIP: NEGATIVE
BUN SERPL-MCNC: 15.4 MG/DL (ref 8–23)
CALCIUM SERPL-MCNC: 9.3 MG/DL (ref 8.8–10.2)
CHLORIDE SERPL-SCNC: 97 MMOL/L (ref 98–107)
CHOLEST SERPL-MCNC: 183 MG/DL
COLOR UR AUTO: YELLOW
CREAT SERPL-MCNC: 1.42 MG/DL (ref 0.51–0.95)
DEPRECATED HCO3 PLAS-SCNC: 23 MMOL/L (ref 22–29)
GFR SERPL CREATININE-BSD FRML MDRD: 37 ML/MIN/1.73M2
GLUCOSE SERPL-MCNC: 94 MG/DL (ref 70–99)
GLUCOSE UR STRIP-MCNC: NEGATIVE MG/DL
HDLC SERPL-MCNC: 42 MG/DL
HGB UR QL STRIP: ABNORMAL
HOLD SPECIMEN: NORMAL
KETONES UR STRIP-MCNC: NEGATIVE MG/DL
LDLC SERPL CALC-MCNC: 111 MG/DL
LEUKOCYTE ESTERASE UR QL STRIP: ABNORMAL
MAGNESIUM SERPL-MCNC: 2.2 MG/DL (ref 1.7–2.3)
NITRATE UR QL: NEGATIVE
NONHDLC SERPL-MCNC: 141 MG/DL
NT-PROBNP SERPL-MCNC: 601 PG/ML (ref 0–1800)
PH UR STRIP: 8 [PH] (ref 5–7)
POTASSIUM SERPL-SCNC: 4.6 MMOL/L (ref 3.4–5.3)
PROT SERPL-MCNC: 7.1 G/DL (ref 6.4–8.3)
RBC #/AREA URNS AUTO: ABNORMAL /HPF
SODIUM SERPL-SCNC: 134 MMOL/L (ref 136–145)
SP GR UR STRIP: 1.01 (ref 1–1.03)
SQUAMOUS #/AREA URNS AUTO: ABNORMAL /LPF
T4 FREE SERPL-MCNC: 1.39 NG/DL (ref 0.9–1.7)
TRIGL SERPL-MCNC: 151 MG/DL
TSH SERPL DL<=0.005 MIU/L-ACNC: 15 UIU/ML (ref 0.3–4.2)
UROBILINOGEN UR STRIP-ACNC: 0.2 E.U./DL
WBC #/AREA URNS AUTO: >100 /HPF

## 2023-09-05 PROCEDURE — 81003 URINALYSIS AUTO W/O SCOPE: CPT | Mod: ZL | Performed by: NURSE PRACTITIONER

## 2023-09-05 PROCEDURE — 84439 ASSAY OF FREE THYROXINE: CPT | Mod: ZL | Performed by: NURSE PRACTITIONER

## 2023-09-05 PROCEDURE — 81001 URINALYSIS AUTO W/SCOPE: CPT | Mod: ZL | Performed by: NURSE PRACTITIONER

## 2023-09-05 PROCEDURE — 83735 ASSAY OF MAGNESIUM: CPT | Mod: ZL | Performed by: NURSE PRACTITIONER

## 2023-09-05 PROCEDURE — 84443 ASSAY THYROID STIM HORMONE: CPT | Mod: ZL | Performed by: NURSE PRACTITIONER

## 2023-09-05 PROCEDURE — 80061 LIPID PANEL: CPT | Mod: ZL | Performed by: NURSE PRACTITIONER

## 2023-09-05 PROCEDURE — G0463 HOSPITAL OUTPT CLINIC VISIT: HCPCS

## 2023-09-05 PROCEDURE — 83880 ASSAY OF NATRIURETIC PEPTIDE: CPT | Mod: ZL | Performed by: NURSE PRACTITIONER

## 2023-09-05 PROCEDURE — 87088 URINE BACTERIA CULTURE: CPT | Mod: ZL | Performed by: NURSE PRACTITIONER

## 2023-09-05 PROCEDURE — 36415 COLL VENOUS BLD VENIPUNCTURE: CPT | Mod: ZL | Performed by: NURSE PRACTITIONER

## 2023-09-05 PROCEDURE — 80053 COMPREHEN METABOLIC PANEL: CPT | Mod: ZL | Performed by: NURSE PRACTITIONER

## 2023-09-05 PROCEDURE — 93010 ELECTROCARDIOGRAM REPORT: CPT | Mod: 77 | Performed by: INTERNAL MEDICINE

## 2023-09-05 PROCEDURE — 99214 OFFICE O/P EST MOD 30 MIN: CPT | Performed by: NURSE PRACTITIONER

## 2023-09-05 RX ORDER — FUROSEMIDE 20 MG
20 TABLET ORAL EVERY MORNING
Qty: 90 TABLET | Refills: 1 | Status: SHIPPED | OUTPATIENT
Start: 2023-09-05 | End: 2023-10-12

## 2023-09-05 RX ORDER — POLYETHYLENE GLYCOL 3350 17 G/17G
1 POWDER, FOR SOLUTION ORAL DAILY
COMMUNITY

## 2023-09-05 ASSESSMENT — PAIN SCALES - GENERAL: PAINLEVEL: NO PAIN (0)

## 2023-09-05 NOTE — PROGRESS NOTES
"[facility] HEART CARE   CARDIOLOGY CONSULT     Ileana Davis   [address]   [facility] [place] [address]      [facility]     Chief Complaint   Patient presents with    Heart Problem     Abnormal stress test        HPI:   Ileana Davis is a pleasant 82-year old female who presents for cardiology consult regarding dyspnea on exertion and abnormal stress test. She has a cardiac history including hypertension.  She has a non-cardiac history including recurrent urinary tract infections, chronic lumbago, renal cell carcinoma status post right nephrectomy, chronic kidney disease, hypothyroidism.       Per Ileana and her son she has had dyspnea, dyspnea on exertion for at least the last six months. He has also noticed some harder breathing while just sitting at rest. In the morning she makes her bed, showers every other morning, gets dressed, does her hair. She is dyspneic and fatigued doing these activities. This has worsened over the last 6 months. Her son has also noticed that \"just walking out to the car\" she is dyspneic. She does tell me that she feels \"a slight pressure\" in her chest. This has been occurring often throughout the day with exertion. Her chest discomfort resolves with rest and dyspnea improves. No racing/skipping/fluttering sensation in her chest. She has had issues with bilateral LE edema for many years. She wears compression stockings and elevates her legs which is helpful. Increased LE edema correlates with salt intake and fluid intake.     Ani brought concerns regarding her dyspnea and chest discomfort to her primary care provider.  She underwent stress testing on August 2, 2023 which showed a small area of mild ischemia in the inferior lateral segments of the left ventricle.  She did have a cardiac catheterization in 2017 at [facility] showing nonobstructive coronary artery disease with no significant lesions.        Smoking History: Lifelong non-smoker    Alcohol History: " "None    Substance Abuse History: None    Sleep History: Sleeps in bed. Lies supine with 1 pillow under her neck and 1 behind her knees. She does snore. No witnessed apnea. She is awake frequently throughout the night due to urinary frequency.     Family History: Mother- heart disease \"enlarged heart and took nitroglycerin;       UTI symptoms:    She has had symptoms of low back ache the last 4 days, generalized weakness in her legs, dysuria, pressure. She has had these symptoms for the last 4 days.         RELEVANT TESTING:  Nuclear med Lexiscan stress test August 2023 at [facility]    The nuclear stress test is abnormal.    There is a small area of mild ischemia in the inferolateral segment(s)  of the left ventricle.    The left ventricular ejection fraction at rest is 87%.  The left  ventricular ejection fraction at stress is 79%.    There is no prior study for comparison.      ECG Summary    ECG Baseline electrocardiogram demonstrates sinus rhythm.   Patient did not develop any acute EKG changes or arrhythmias during the Lexiscan portion of the study.       Cardiac catheterization October 2017 at [facility]  CARDIAC FINDINGS:   DOMINANCE:   Right Dominant   LEFT MAIN:   is angiographically normal (0% Stenosis)   LEFT ANTERIOR DESCENDING :   Mid Segment 20-30 % Stenosis   CIRCUMFLEX:   is angiographically normal (0% Stenosis)   RIGHT CORONARY ARTERY:   is angiographically normal (0% Stenosis)   COLLATERALS:   No collateral flow     PAST MEDICAL HISTORY:   Past Medical History:   Diagnosis Date    Anemia 8/20/2015    Autoimmune hepatitis (H) 6/2/2016    Benign paroxysmal positional vertigo 10/7/2010    Contact dermatitis and other eczema, due to unspecified cause 10/7/2010    Esophageal reflux 10/7/2010    Generalized anxiety disorder 10/7/2010    Generalized osteoarthrosis, involving multiple sites 10/7/2010    Hepatitis, unspecified 10/7/2010    Infected wound     Myalgia and myositis, unspecified " 10/7/2010    fibromyalgia    Nonallopathic lesion of cervical region, not elsewhere classified 12/5/2001    Nonallopathic lesion of thoracic region, not elsewhere classified 3/31/2005    Open wound of knee, leg, and ankle     Rosacea 10/7/2010    Unspecified essential hypertension 10/7/2010    Unspecified hypothyroidism 10/7/2010          FAMILY HISTORY:   Family History   Problem Relation Age of Onset    Arthritis Mother         rheumatoid    Heart Disease Mother         CHF    Alcohol/Drug Father         alcoholism    Allergies Father     Thyroid Disease Other     Diabetes No family hx of     Asthma No family hx of           PAST SURGICAL HISTORY:   Past Surgical History:   Procedure Laterality Date    APPENDECTOMY      ARTHROPLASTY TOE(S) Left 9/21/2020    Procedure: Left  Bethea Arthroplasty with interpositional arthroplasty using graft.;  Surgeon: Gina Rodriguez DPM;  Location: [facility] OR    BACK SURGERY  2015    lumbar epidural injection    CARDIAC SURGERY  10/2017    angioplasty    CHOLECYSTECTOMY      COLONOSCOPY  07/27/2017    [facility]    COLONOSCOPY - HIM SCAN  05/15/2001    Small internal hemorrhoids; otherwise normal;[facility]    COLONOSCOPY - HIM SCAN  12/14/2006    Colonoscopy, REMV JESUSITA, SNARE    ENT SURGERY      tonsillectomy    ESOPHAGOGASTRODUODENOSCOPY  07/27/2017    [facility]    EXCISE LESION LOWER EXTREMITY Left 4/19/2022    Procedure: Excision Left Medial Lower Leg Ulceration;  Surgeon: Kai Huynh MD;  Location: [facility] OR    EXCISE LESION LOWER EXTREMITY Left 9/13/2022    Procedure: Wide local excision of left posterior leg;  Surgeon: Kai Huynh MD;  Location: [facility]    EXCISE LESION UPPER EXTREMITY Right 4/19/2022    Procedure: Excision lesion right upper extremity;  Surgeon: Kai Huynh MD;  Location: [facility]    EYE SURGERY      bilateral cataract removal    GI SURGERY      anal fistula    GYN SURGERY  1985    NICHOLAS BSO    GYN SURGERY      cessarian x 2    GYN SURGERY      tubal  sterilazation    NEPHRECTOMY Right 05/02/2019    ORTHOPEDIC SURGERY      right knee    ORTHOPEDIC SURGERY  1986    plantar fascia release    ORTHOPEDIC SURGERY      left knee    ORTHOPEDIC SURGERY      right trigger finger release    ORTHOPEDIC SURGERY  2013    Right great toe MTPJ fusion, adductor tenotomy,correction of hammer toe    ORTHOPEDIC SURGERY  [id number]    multiple procedures left forefoot          SOCIAL HISTORY:   Social History     Socioeconomic History    Marital status:    Tobacco Use    Smoking status: Never    Smokeless tobacco: Never   Vaping Use    Vaping Use: Never used   Substance and Sexual Activity    Alcohol use: No    Drug use: No    Sexual activity: Never   Other Topics Concern    Parent/sibling w/ CABG, MI or angioplasty before 65F 55M? No    Blood Transfusions Yes    Caffeine Concern Yes     Comment: soda rare          CURRENT MEDICATIONS:   Current Outpatient Medications   Medication    cephALEXin (KEFLEX) 250 MG capsule    conjugated estrogens (PREMARIN) 0.625 MG/GM vaginal cream    CRANBERRY-VITAMIN C-D MANNOSE PO    cyclobenzaprine (FLEXERIL) 10 MG tablet    ferrous sulfate (FEROSUL) 325 (65 Fe) MG tablet    fluticasone (FLONASE) 50 MCG/ACT nasal spray    furosemide (LASIX) 20 MG tablet    hydrocortisone 2.5 % cream    levothyroxine (SYNTHROID/LEVOTHROID) 100 MCG tablet    losartan (COZAAR) 25 MG tablet    melatonin 1 MG TABS tablet    nystatin (MYCOSTATIN) 487582 UNIT/GM external cream    nystatin (MYCOSTATIN) 262273 UNIT/GM external powder    omeprazole (PRILOSEC) 40 MG capsule    polyethylene glycol (MIRALAX) 17 g packet    propranolol (INDERAL) 10 MG tablet    sennosides (SENOKOT) 8.6 MG tablet    solifenacin (VESICARE) 5 MG tablet     No current facility-administered medications for this visit.            ALLERGIES:   Allergies   Allergen Reactions    Fentanyl Other (See Comments)     Severe agitation when used during angiogram    Codeine Sulfate Other (See Comments)      hallucinations    Fentanyl And Related      Other reaction(s): Confusion    Morphine Other (See Comments)     Hallucinations with iv therapy    Tape [Adhesive Tape]     Tramadol Other (See Comments)     hallucination          ROS:   CONSTITUTIONAL: No reported fever or chills. No changes in weight.  ENT: No visual disturbance, ear ache, epistaxis or sore throat.   CARDIOVASCULAR: See HPI  RESPIRATORY: See HPI  GI: No abdominal pain. No nausea, vomiting or diarrhea. No melena or hematochezia. No changes in bowel habits.   : No hematuria or dysuria. No hesitancy or incontinence.   NEUROLOGICAL: No headache, lightheadedness, dizziness, syncope, ataxia, paresthesias or weakness.   HEMATOLOGIC: No bleeding or excessive bruising.   MUSCULOSKELETAL: No joint pain or swelling, no muscle pain.  ENDOCRINOLOGIC: No temperature intolerance. No hair or skin changes.  SKIN: No abnormal rashes or sores, no unusual itching.  PSYCHIATRIC: No changes in mood, feeling down or anxious.      PHYSICAL EXAM:     Vitals: /88   Pulse 70   Wt 85.3 kg (188 lb 1 oz)   SpO2 98%   BMI 34.40 kg/m    BMI= Body mass index is 34.4 kg/m .    GENERAL: The patient is a well-developed, well-nourished, in no apparent distress.  HEENT: Head is normocephalic and atraumatic.   NECK: Supple. No adenopathy, asymmetry or masses. Carotid upstroke are normal bilaterally, no cervical bruits, JVP not visible.   CHEST/ LUNGS: Lungs clear to auscultation, no rales, rhonchi or wheezes, no use of accessory muscles, no retractions, respirations unlabored and normal respiratory rate.   CARDIO: Regular rate and rhythm normal with S1 and S2, no S3 or S4 and no murmur, click or rub. Precordium quiet with normal PMI.    ABD: Abdomen is soft and nontender, nondistended. no palpable masses,  no abdominal bruits heard.   EXTREMITIES: + Bilateral lower extremity edema  VASC: Radial, femoral, dorsalis pedis and posterior tibialis pulses normal and symmetric with no  vascular bruits heard.  MUSCULOSKELETAL: No joint swelling.   NEUROLOGIC: Alert and oriented X3. Normal speech, gait and affect. No focal neurologic deficits.   SKIN: No jaundice. No rashes or visible skin lesions present. No ecchymosis.            EKG Interpretation:      Rhythm: Normal sinus   Rate: Normal  Axis: Left Axis Deviation- left anterior fascicular block  Ectopy: None  Conduction: normal ANGLE 148 ms, normal QRS duration 94 ms, normal  ms, normal QTc 460 ms  ST Segments/ T Waves: No acute ischemic changes  Q Waves: None      LAB RESULTS:   Orders placed or performed in visit on 09/05/23    polyethylene glycol (MIRALAX) 17 g packet    cephALEXin (KEFLEX) 250 MG capsule    furosemide (LASIX) 20 MG tablet          ASSESSMENT:   Ileana Davis is a pleasant 82-year old female who presents for cardiology consult regarding dyspnea on exertion and abnormal stress test. She has a cardiac history including hypertension.  She has a non-cardiac history including recurrent urinary tract infections, chronic lumbago, renal cell carcinoma status post right nephrectomy, chronic kidney disease, hypothyroidism.       Ms. Davis has been having worsening dyspnea on exertion and exertional chest discomfort over the last 6 months.  This has started to affect her daily activities and she has been limiting her level of activity due to symptoms.  She also noticed that she fatigues very easily with minimal exertion.  She heart concerns primary care provider and underwent stress testing which showed ischemia.      PLAN:   Exertional chest discomfort  Dyspnea on exertion  Abnormal stress test  History of nonobstructive coronary artery disease including catheterization in 2017 [facility]  Recent dyspnea on exertion, exertional chest discomfort abnormal stress test at [place] August 2023.  Plan for referral to . [facility] for cardiac catheterization given her symptoms, abnormal stress test and cardiovascular risk  factors.  Plan for transthoracic echocardiogram to evaluate heart structure and function  She does have some lower extremity edema.  She will start furosemide 20 mg once daily and see if there is change in chest discomfort, dyspnea on exertion, lower extremity edema.  Recommend she watch her sodium intake and restrict her less than 2500 mg daily, also recommend she keep track of her daily weights with starting the furosemide.    Essential hypertension, controlled  Continue losartan 25 mg once daily  Continue propanolol 10 mg once daily.  She tells me she has been on this for hypertension for many years.  She denies any history of palpitations.  She  Heart healthy lifestyle encouraged    BP Readings from Last 6 Encounters:   09/05/23 134/88   06/26/23 132/86   04/18/23 132/76   02/07/23 136/80   09/29/22 (!) 132/92   09/13/22 150/81       Nonobstructive coronary artery disease  Dyslipidemia  Heart healthy lifestyle encouraged  We could consider statin medication and aspirin 81 mg once daily.  We will wait and see what cardiac catheterization reveals.  Recent Labs   Lab Test 09/05/23  1434 09/12/19  0912   CHOL 183 156   HDL 42* 51   * 80   TRIG 151* 123         Hypothyroidism  Continue management by primary care provider  Recent TSH levels have been elevated but T4 levels have been normal  Lab Results   Component Value Date    TSH 15.00 09/05/2023    TSH 6.68 01/26/2022    TSH 3.25 05/14/2021     T4 Free   Date Value Ref Range Status   09/15/2020 1.61 (H) 0.76 - 1.46 ng/dL Final     Free T4   Date Value Ref Range Status   09/05/2023 1.39 0.90 - 1.70 ng/dL Final         Recurrent urinary tract infection  She has had recent symptoms as well as positive UA.  Reviewed prior cultures.  We will start cephalexin 250 mg 3 times daily for 7 days pending results of culture.      Follow-up with cardiology after completion of testing, certainly sooner with acute concerns    Thank you for allowing me to participate in the  care of your patient. Please do not hesitate to contact me if you have any questions.     Gill Mcgill, CNP

## 2023-09-05 NOTE — PATIENT INSTRUCTIONS
Plan for transthoracic echocardiogram to evaluate cardiac structure and function with dyspnea on exertion, LE edema, chest pressure. Someone will call to get this scheduled at [facility] since this is closer for you.     Plan for cardiac catheterization at [facility] for abnormal stress test, exertional chest pressure, dyspnea on exertion to evaluate possible coronary artery disease contributing to symptoms. Consider starting aspirin 81 mg once daily with food. Someone will call to get this scheduled.     Labs today    Start furosemide 20 mg once daily in the morning    Keep track of daily weights. I would expect some weight loss with starting furosemide. Monitor daily salt intake (restrict to 2,500 mg daily).     Follow-up after completion of testing.     Gill Mcgill, CNP

## 2023-09-05 NOTE — TELEPHONE ENCOUNTER
Patient is at her appointment and she has been to lab and Gill Mcgill is seeing her for this issue included in her cardiology appointment.

## 2023-09-05 NOTE — TELEPHONE ENCOUNTER
8:36 AM    Reason for Call: Phone Call    Description: Patient called in stating that she has a possible UTI and would like to get in around her 1:00 cardiology appointment today 9-5-23. Please call patient back.     Was an appointment offered for this call? No  If yes : Appointment type              Date    Preferred method for responding to this message: Telephone Call  What is your phone number ? [phone]     If we cannot reach you directly, may we leave a detailed response at the number you provided? Yes    Can this message wait until your PCP/provider returns, if available today? Tasha Tavares

## 2023-09-08 LAB
BACTERIA UR CULT: ABNORMAL
BACTERIA UR CULT: ABNORMAL

## 2023-09-13 ENCOUNTER — TRANSFERRED RECORDS (OUTPATIENT)
Dept: HEALTH INFORMATION MANAGEMENT | Facility: CLINIC | Age: 83
End: 2023-09-13

## 2023-09-13 LAB — EJECTION FRACTION: 49.8 %

## 2023-09-16 DIAGNOSIS — I10 BENIGN ESSENTIAL HYPERTENSION: ICD-10-CM

## 2023-09-18 ENCOUNTER — MEDICAL CORRESPONDENCE (OUTPATIENT)
Dept: HEALTH INFORMATION MANAGEMENT | Facility: CLINIC | Age: 83
End: 2023-09-18
Payer: MEDICARE

## 2023-09-18 DIAGNOSIS — R94.39 ABNORMAL BALLISTOCARDIOGRAM: Primary | ICD-10-CM

## 2023-09-18 RX ORDER — PROPRANOLOL HYDROCHLORIDE 10 MG/1
10 TABLET ORAL DAILY
Qty: 90 TABLET | Refills: 2 | Status: SHIPPED | OUTPATIENT
Start: 2023-09-18 | End: 2023-12-19

## 2023-09-18 NOTE — TELEPHONE ENCOUNTER
Propranolol      Last Written Prescription Date:  6/27/23  Last Fill Quantity: 90,   # refills: 0  Last Office Visit: 7/25/23  Future Office visit:       Routing refill request to provider for review/approval because:

## 2023-09-21 ENCOUNTER — LAB (OUTPATIENT)
Dept: LAB | Facility: OTHER | Age: 83
End: 2023-09-21
Payer: MEDICARE

## 2023-09-21 DIAGNOSIS — R94.39 ABNORMAL BALLISTOCARDIOGRAM: ICD-10-CM

## 2023-09-21 LAB
ANION GAP SERPL CALCULATED.3IONS-SCNC: 11 MMOL/L (ref 7–15)
BASOPHILS # BLD AUTO: 0.1 10E3/UL (ref 0–0.2)
BASOPHILS NFR BLD AUTO: 1 %
BUN SERPL-MCNC: 18.3 MG/DL (ref 8–23)
CALCIUM SERPL-MCNC: 9.1 MG/DL (ref 8.8–10.2)
CHLORIDE SERPL-SCNC: 99 MMOL/L (ref 98–107)
CREAT SERPL-MCNC: 1.45 MG/DL (ref 0.51–0.95)
DEPRECATED HCO3 PLAS-SCNC: 24 MMOL/L (ref 22–29)
EGFRCR SERPLBLD CKD-EPI 2021: 35 ML/MIN/1.73M2
EOSINOPHIL # BLD AUTO: 0.9 10E3/UL (ref 0–0.7)
EOSINOPHIL NFR BLD AUTO: 12 %
ERYTHROCYTE [DISTWIDTH] IN BLOOD BY AUTOMATED COUNT: 12.5 % (ref 10–15)
GLUCOSE SERPL-MCNC: 98 MG/DL (ref 70–99)
HCT VFR BLD AUTO: 38.6 % (ref 35–47)
HGB BLD-MCNC: 13 G/DL (ref 11.7–15.7)
IMM GRANULOCYTES # BLD: 0 10E3/UL
IMM GRANULOCYTES NFR BLD: 0 %
LYMPHOCYTES # BLD AUTO: 1.4 10E3/UL (ref 0.8–5.3)
LYMPHOCYTES NFR BLD AUTO: 19 %
MCH RBC QN AUTO: 30.4 PG (ref 26.5–33)
MCHC RBC AUTO-ENTMCNC: 33.7 G/DL (ref 31.5–36.5)
MCV RBC AUTO: 90 FL (ref 78–100)
MONOCYTES # BLD AUTO: 1 10E3/UL (ref 0–1.3)
MONOCYTES NFR BLD AUTO: 14 %
NEUTROPHILS # BLD AUTO: 3.9 10E3/UL (ref 1.6–8.3)
NEUTROPHILS NFR BLD AUTO: 54 %
PLATELET # BLD AUTO: 263 10E3/UL (ref 150–450)
POTASSIUM SERPL-SCNC: 4.3 MMOL/L (ref 3.4–5.3)
RBC # BLD AUTO: 4.28 10E6/UL (ref 3.8–5.2)
SODIUM SERPL-SCNC: 134 MMOL/L (ref 136–145)
WBC # BLD AUTO: 7.3 10E3/UL (ref 4–11)

## 2023-09-21 PROCEDURE — 36415 COLL VENOUS BLD VENIPUNCTURE: CPT | Mod: ZL

## 2023-09-21 PROCEDURE — 80048 BASIC METABOLIC PNL TOTAL CA: CPT | Mod: ZL

## 2023-09-21 PROCEDURE — 85014 HEMATOCRIT: CPT | Mod: ZL

## 2023-09-25 ENCOUNTER — TELEPHONE (OUTPATIENT)
Dept: FAMILY MEDICINE | Facility: OTHER | Age: 83
End: 2023-09-25

## 2023-09-25 NOTE — TELEPHONE ENCOUNTER
Pt states she is unable to control her bowels.  She has diarrhea all he time.  What do you recommend to help make her stools more solid so that she can get her cath placed on Thursday.  Recommendations?

## 2023-09-25 NOTE — TELEPHONE ENCOUNTER
Restart fiber, can use imodium now, she needs to keep appointment for angiogram (cardiac catheterization).

## 2023-09-25 NOTE — TELEPHONE ENCOUNTER
The procedure is to look for any blockages of the blood vessels of the heart, trying to prevent a heart attack.  It is important to proceed.

## 2023-09-25 NOTE — TELEPHONE ENCOUNTER
10:34 AM    Reason for Call: Phone Call    Description: Patient is requesting to speak with nurse regarding some bowel concerns. Please return her call     Was an appointment offered for this call? No  If yes : Appointment type              Date    Preferred method for responding to this message: Telephone Call  What is your phone number ?[phone]     If we cannot reach you directly, may we leave a detailed response at the number you provided? Yes    Can this message wait until your PCP/provider returns, if available today? Not applicable    Joyce Prakash

## 2023-09-28 ENCOUNTER — TRANSFERRED RECORDS (OUTPATIENT)
Dept: HEALTH INFORMATION MANAGEMENT | Facility: CLINIC | Age: 83
End: 2023-09-28
Payer: MEDICARE

## 2023-10-03 ENCOUNTER — TELEPHONE (OUTPATIENT)
Dept: FAMILY MEDICINE | Facility: OTHER | Age: 83
End: 2023-10-03

## 2023-10-03 NOTE — TELEPHONE ENCOUNTER
11:26 AM    Reason for Call: OVERBOOK    Patient is having the following symptoms: Stomach pain, passing gas and having fecal accidents before getting to the bathroom for several weeks.    The patient is requesting an appointment for ASAP with [facility].    Was an appointment offered for this call? Yes  Short appt   10/13    Preferred method for responding to this message: Telephone Call  What is your phone number Francis (Son) [phone]    If we cannot reach you directly, may we leave a detailed response at the number you provided? Yes    Can this message wait until your PCP/provider returns, if unavailable today? Not applicable    Amanda Winston

## 2023-10-04 NOTE — TELEPHONE ENCOUNTER
Patient son call and wanted to let provider know patient can keep original appointment as patient went and seen gastro today, so an overbook is not needed and patient son states will call if needed.

## 2023-10-05 DIAGNOSIS — R19.7 DIARRHEA: Primary | ICD-10-CM

## 2023-10-06 ENCOUNTER — OFFICE VISIT (OUTPATIENT)
Dept: FAMILY MEDICINE | Facility: OTHER | Age: 83
End: 2023-10-06
Attending: FAMILY MEDICINE
Payer: MEDICARE

## 2023-10-06 ENCOUNTER — TELEPHONE (OUTPATIENT)
Dept: FAMILY MEDICINE | Facility: OTHER | Age: 83
End: 2023-10-06

## 2023-10-06 ENCOUNTER — LAB (OUTPATIENT)
Dept: LAB | Facility: OTHER | Age: 83
End: 2023-10-06
Attending: FAMILY MEDICINE
Payer: MEDICARE

## 2023-10-06 VITALS
SYSTOLIC BLOOD PRESSURE: 148 MMHG | DIASTOLIC BLOOD PRESSURE: 88 MMHG | HEIGHT: 62 IN | OXYGEN SATURATION: 100 % | TEMPERATURE: 96.8 F | RESPIRATION RATE: 18 BRPM | WEIGHT: 188 LBS | HEART RATE: 65 BPM | BODY MASS INDEX: 34.6 KG/M2

## 2023-10-06 DIAGNOSIS — R19.7 DIARRHEA: ICD-10-CM

## 2023-10-06 DIAGNOSIS — R39.198 DIFFICULTY URINATING: Primary | ICD-10-CM

## 2023-10-06 DIAGNOSIS — R35.0 URINARY FREQUENCY: ICD-10-CM

## 2023-10-06 PROCEDURE — G0463 HOSPITAL OUTPT CLINIC VISIT: HCPCS

## 2023-10-06 PROCEDURE — 99207 PR NO CHARGE LOS: CPT | Performed by: FAMILY MEDICINE

## 2023-10-06 ASSESSMENT — PAIN SCALES - GENERAL: PAINLEVEL: NO PAIN (0)

## 2023-10-06 NOTE — TELEPHONE ENCOUNTER
Future Appointments 10/6/2023 - 4/3/2024        Date Visit Type Length Department Provider     10/6/2023  1:45 PM LAB 15 min [facility]    Location Instructions:     From [place] - follow [address]nbsp; Take a right at the intersection across from [employer].&nbsp; The clinic will be on your right.  From [place] - follow [address]nbsp; Take a right onto [address]nbsp; Take a left at the intersection across from [employer].&nbsp; The clinic will be on your right.  From [place] - follow [address]nbsp; Take a left onto [address].&nbsp; Take a left at the intersection across from [employer].&nbsp; The clinic will be on your right.              10/6/2023  2:00 PM SHORT 30 min [facility] Iris Becerril MD    Location Instructions:     From [place] - follow [address]nbsp; Take a right at the intersection across from [employer].&nbsp; The clinic will be on your right.  From [place] - follow [address]nbsp; Take a right onto [address].&nbsp; Take a left at the intersection across from [employer].&nbsp; The clinic will be on your right.  From [place] - follow [address]nbsp; Take a left onto [address].&nbsp; Take a left at the intersection across from [employer].&nbsp; The clinic will be on your right.              10/10/2023  1:30 PM RETURN 30 min [facility] Gill Mcgill CNP    Location Instructions:     From [place] - follow [address]nbsp; Take a right at the intersection across from [employer].&nbsp; The clinic will be on your right.  From [place] - follow [address]nbsp; Take a right onto [address].&nbsp; Take a left at the intersection across from [employer].&nbsp; The clinic will be on your right.  From [place] - follow [address]nbsp; Take a left onto [address].&nbsp; Take a left at the intersection across from [employer].&nbsp; The clinic will be on your right.              10/13/2023 11:30 AM SHORT 30 min  [facility]    Location Instructions:     From [place] - follow [address]nbsp; Take a right at the intersection across from [employer].&nbsp; The clinic will be on your right.  From [place] - follow [address]nbsp; Take a right onto [address]nbsp; Take a left at the intersection across from [employer].&nbsp; The clinic will be on your right.  From [place] - follow [address]nbsp; Take a left onto [address].&nbsp; Take a left at the intersection across from [employer].&nbsp; The clinic will be on your right.

## 2023-10-06 NOTE — PROGRESS NOTES
"  Assessment & Plan     1. Difficulty urinating  At this point in time, I'm not sure if she is passing urine when she is having a watery explosive bowel movements and she just can't tell, or if she really isn't passing her urine.  If I had the capability, I would be checking a bladder scan and considering a urinary catheter to drain urine if needed.  I recommended her son bring her to the ER for those possible evaluations/treatments.  Report is called to Dr. London at [facility].  - UA Macroscopic with reflex to Microscopic and Culture; Future  - UA Macroscopic with reflex to Microscopic and Culture; Future          BMI:   Estimated body mass index is 34.39 kg/m  as calculated from the following:    Height as of this encounter: 1.575 m (5' 2\").    Weight as of this encounter: 85.3 kg (188 lb).     Return if symptoms worsen or fail to improve.    Iris Becerril MD  [facility]      Camilo Tejeda is a 83 year old, presenting for the following health issues:  Urinary Problem and Imm/Inj (Flu Shot)      HPI     Genitourinary - Female  Onset/Duration: 2 days  Description:   Painful urination (Dysuria): YES           Frequency: No  Blood in urine (Hematuria): No  Delay in urine (Hesitency): YES  Intensity: mild  Progression of Symptoms:  worsening  Accompanying Signs & Symptoms:  Fever/chills: No  Flank pain: YES  Nausea and vomiting: No  Vaginal symptoms: none  Abdominal/Pelvic Pain: YES  History:   History of frequent UTI s: YES  History of kidney stones: No  Sexually Active: No  Possibility of pregnancy: No  Precipitating or alleviating factors: None  Therapies tried and outcome: Increase fluid intake    Patient has drank about 5 bottles of water from the time she left the house until the time I saw her in the exam room.  She states she has not urinated more than a few drips.  She did have a stomach cramp while in the exam room, which usually means she is about to have watery " "diarrhea, so she did go to the bathroom, and I am unclear if she actually had a bowel movement or not, she kind of goes back and forth.  Her son is very worried as he states she has really only urinated once since yesterday.    She is undergoing work-up of ongoing constipation with likely overflow diarrhea.  She is having explosive watery diarrhea, which is thought to be overflow, but she does have a c diff toxin and calprotectin feces ordered by GI.        Review of Systems   Constitutional, HEENT, cardiovascular, pulmonary, gi and gu systems are negative, except as otherwise noted.      Objective    BP (!) 148/88 (BP Location: Right arm, Patient Position: Sitting, Cuff Size: Adult Regular)   Pulse 65   Temp 96.8  F (36  C) (Tympanic)   Resp 18   Ht 1.575 m (5' 2\")   Wt 85.3 kg (188 lb)   SpO2 100%   BMI 34.39 kg/m    Body mass index is 34.39 kg/m .  Physical Exam   GENERAL: healthy, alert and no distress  PSYCH: affect normal/bright                  "

## 2023-10-06 NOTE — TELEPHONE ENCOUNTER
9:28 AM    Reason for Call: Phone Call    Description: Patient is wondering if she can come in for LAB only - she thinks she may have a UTI. Please let her know if orders are placed     Was an appointment offered for this call? No  If yes : Appointment type              Date    Preferred method for responding to this message: Telephone Call  What is your phone number ?[phone]     If we cannot reach you directly, may we leave a detailed response at the number you provided? Yes    Can this message wait until your PCP/provider returns, if available today? Not applicable    Joyce Prakash

## 2023-10-10 ENCOUNTER — APPOINTMENT (OUTPATIENT)
Dept: LAB | Facility: OTHER | Age: 83
End: 2023-10-10
Payer: MEDICARE

## 2023-10-10 LAB — C DIFF TOX B STL QL: NEGATIVE

## 2023-10-10 PROCEDURE — 83993 ASSAY FOR CALPROTECTIN FECAL: CPT | Mod: ZL

## 2023-10-10 PROCEDURE — 87493 C DIFF AMPLIFIED PROBE: CPT | Mod: ZL

## 2023-10-11 LAB — CALPROTECTIN STL-MCNT: 423 MG/KG (ref 0–49.9)

## 2023-10-12 ENCOUNTER — OFFICE VISIT (OUTPATIENT)
Dept: CARDIOLOGY | Facility: OTHER | Age: 83
End: 2023-10-12
Attending: NURSE PRACTITIONER
Payer: MEDICARE

## 2023-10-12 ENCOUNTER — LAB (OUTPATIENT)
Dept: LAB | Facility: OTHER | Age: 83
End: 2023-10-12
Payer: MEDICARE

## 2023-10-12 VITALS
HEIGHT: 62 IN | DIASTOLIC BLOOD PRESSURE: 92 MMHG | BODY MASS INDEX: 34.04 KG/M2 | SYSTOLIC BLOOD PRESSURE: 140 MMHG | OXYGEN SATURATION: 95 % | WEIGHT: 185 LBS | HEART RATE: 71 BPM | RESPIRATION RATE: 16 BRPM

## 2023-10-12 DIAGNOSIS — N17.9 AKI (ACUTE KIDNEY INJURY) (H): Primary | ICD-10-CM

## 2023-10-12 DIAGNOSIS — Z98.890 S/P CORONARY ANGIOGRAM: ICD-10-CM

## 2023-10-12 DIAGNOSIS — R07.9 EXERTIONAL CHEST PAIN: ICD-10-CM

## 2023-10-12 DIAGNOSIS — I25.10 CORONARY ARTERY DISEASE INVOLVING NATIVE CORONARY ARTERY OF NATIVE HEART WITHOUT ANGINA PECTORIS: ICD-10-CM

## 2023-10-12 DIAGNOSIS — E78.5 DYSLIPIDEMIA: ICD-10-CM

## 2023-10-12 DIAGNOSIS — R06.09 DYSPNEA ON EXERTION: ICD-10-CM

## 2023-10-12 DIAGNOSIS — I50.30 NYHA CLASS 3 HEART FAILURE WITH PRESERVED EJECTION FRACTION (H): ICD-10-CM

## 2023-10-12 DIAGNOSIS — I10 BENIGN ESSENTIAL HYPERTENSION: ICD-10-CM

## 2023-10-12 DIAGNOSIS — R39.198 DIFFICULTY URINATING: ICD-10-CM

## 2023-10-12 DIAGNOSIS — N18.32 STAGE 3B CHRONIC KIDNEY DISEASE (H): ICD-10-CM

## 2023-10-12 DIAGNOSIS — E03.9 HYPOTHYROIDISM, UNSPECIFIED TYPE: ICD-10-CM

## 2023-10-12 LAB
ANION GAP SERPL CALCULATED.3IONS-SCNC: 10 MMOL/L (ref 7–15)
BUN SERPL-MCNC: 13.7 MG/DL (ref 8–23)
CALCIUM SERPL-MCNC: 9.5 MG/DL (ref 8.8–10.2)
CHLORIDE SERPL-SCNC: 97 MMOL/L (ref 98–107)
CREAT SERPL-MCNC: 1.39 MG/DL (ref 0.51–0.95)
DEPRECATED HCO3 PLAS-SCNC: 23 MMOL/L (ref 22–29)
EGFRCR SERPLBLD CKD-EPI 2021: 37 ML/MIN/1.73M2
GLUCOSE SERPL-MCNC: 96 MG/DL (ref 70–99)
POTASSIUM SERPL-SCNC: 4.8 MMOL/L (ref 3.4–5.3)
SODIUM SERPL-SCNC: 130 MMOL/L (ref 135–145)

## 2023-10-12 PROCEDURE — 36415 COLL VENOUS BLD VENIPUNCTURE: CPT | Mod: ZL

## 2023-10-12 PROCEDURE — G0463 HOSPITAL OUTPT CLINIC VISIT: HCPCS

## 2023-10-12 PROCEDURE — 99215 OFFICE O/P EST HI 40 MIN: CPT | Performed by: NURSE PRACTITIONER

## 2023-10-12 PROCEDURE — 80048 BASIC METABOLIC PNL TOTAL CA: CPT | Mod: ZL

## 2023-10-12 RX ORDER — LOSARTAN POTASSIUM 50 MG/1
50 TABLET ORAL DAILY
Qty: 90 TABLET | Refills: 1 | Status: SHIPPED | OUTPATIENT
Start: 2023-10-12 | End: 2023-12-19

## 2023-10-12 ASSESSMENT — PAIN SCALES - GENERAL: PAINLEVEL: NO PAIN (0)

## 2023-10-12 NOTE — PROGRESS NOTES
[facility] HEART [facility]   CARDIOLOGY PROGRESS NOTE     Chief Complaint   Patient presents with    Follow Up    Hypertension          Diagnosis:    ICD-10-CM    1. JERZY (acute kidney injury) (H24)  N17.9 Basic metabolic panel      2. Exertional chest pain  R07.9       3. Dyspnea on exertion  R06.09       4. Hypertension with blood pressure goal less than 130/80  I10 losartan (COZAAR) 50 MG tablet      5. Coronary artery disease involving native coronary artery of native heart without angina pectoris  I25.10       6. Dyslipidemia with LDl goal less than 100  E78.5       7. S/P coronary angiogram 9/29/2023 at [facility]  Z98.890       8. NYHA class 3 heart failure with preserved ejection fraction (H)  I50.30       9. Hypothyroidism, unspecified type  E03.9       10. Stage 3b chronic kidney disease (H)  N18.32             Assessment/Plan:    Exertional chest discomfort  Dyspnea on exertion  Abnormal stress test  History of nonobstructive coronary artery disease including catheterization in 2017 [facility]  Recent dyspnea on exertion, exertional chest discomfort abnormal stress test at [place] August 2023.  Referral to [facility] for coronary angiogram.   S/p coronary angiogram at [facility] 9/29/2023   non obstructive coronary artery disease, elevated LVEDP  Transthoracic echocardiogram at [facility] in [place] 9/13/2023  Borderline systolic LV function with LVEF 50-55%  Diastolic dysfunction  No significant valvular pathology      Essential hypertension with blood pressure goal less than 130/80, controlled  Increase losartan 25 mg once daily to losartan 50 mg once daily  Continue propanolol 10 mg once daily.  She tells me she has been on this for hypertension for many years.  She denies any history of palpitations.    Heart healthy lifestyle encouraged    BP Readings from Last 6 Encounters:   10/12/23 (!) 140/92   10/06/23 (!) 148/88   09/05/23 134/88   06/26/23 132/86   04/18/23 132/76   02/07/23 136/80        Nonobstructive coronary artery disease  S/p coronary angiogram 9/29/2023  Dyslipidemia with LDL goal lass than 100  Heart healthy lifestyle encouraged    Recent Labs   Lab Test 09/05/23  1434 09/12/19  0912   CHOL 183 156   HDL 42* 51   * 80   TRIG 151* 123         Heart Failure with Preserved ejection Fraction (HFpEF)  Diastolic dysfunction on TTE 9/2023- [facility]  Elevated LVEDP on cardiac cath 9/29/2023- [facility]   Chronic HFpEF (EF 50-55%). Risk factors include female gender, age, HTN, and obesity  Stage C. NYHA Class III.      BP:   Uncontrolled  Management as above  Fluid status   Continues with bilateral +1 lower extremity edema. Some improvement lately  Recent hospitalization due to hypovolemic hyponatremia- suspect this was more likely due to the diarrhea she has been having versus diuretic. She continues with diarrhea so we will continue to hold furosemide for now  Sodium restriction of 2,500 mg daily  Fluid restriction of 1.5-2 liters daily  Aldosterone antagonist:   Chronic kidney disease with recent JERZY  Consider in the future  SGLT-2 Inhibitor:   Chronic UTIs, CKD with recent JERZY  Could consider in the futuer  Ischemia evaluation:   Coronary angiogram 9/29/2023 at [facility] showing non obstructive CAD  ACEi/ARB/ARNI:   n/a, no evidence for use in HFpEF  BB:   n/a, no evidence for use in HFpEF   SCD prophylaxis:   n/a, no evidence for use in HFpEF  NSAID use:   Contraindicated  Sleep apnea evaluation:   Declines    Wt Readings from Last 5 Encounters:   10/12/23 83.9 kg (185 lb)   10/06/23 85.3 kg (188 lb)   09/05/23 85.3 kg (188 lb 1 oz)   06/26/23 83.5 kg (184 lb)   04/18/23 81.6 kg (180 lb)     Chronic kidney disease  She tells me that she has always been told to avoid Tylenol due to her kidney disease so she has been taking Aleve. Reviewed with her today that she should be taking Tylenol and avoid NSAIDs such as Aleve, Advil  Blood pressure control  She has been using salt  alternative- reviewed importance of not overdoing salt alternative since they often have potassium chloride given her CKD  Recent JERZY with hypovolemic hyponatremia. BMP today shows sodium of 130, improved renal functioning. We will hold furosemide since she continues with diarrhea.       Hypothyroidism  Continue management by primary care provider  Recent TSH levels have been elevated but T4 levels have been normal  Lab Results   Component Value Date    TSH 15.00 09/05/2023    TSH 6.68 01/26/2022    TSH 3.25 05/14/2021     T4 Free   Date Value Ref Range Status   09/15/2020 1.61 (H) 0.76 - 1.46 ng/dL Final     Free T4   Date Value Ref Range Status   09/05/2023 1.39 0.90 - 1.70 ng/dL Final         Follow-up with cardiology in 6 months, certainly sooner with acute concerns          Interval history:  Ileana Davis is a pleasant 82-year old female who presents for cardiology follow-up regarding dyspnea on exertion and abnormal stress test. Recall, she has a cardiac history including hypertension.  She has a non-cardiac history including recurrent urinary tract infections, chronic lumbago, renal cell carcinoma status post right nephrectomy, chronic kidney disease, hypothyroidism.       Ms. Davis had coronary angiogram for abnormal stress test and dyspnea on exertion on 9/29/2023 at [facility] in [place]. Coronary angiogram revealed non-obstructive coronary artery disease, elevated LVEDP. Her losartan was increased due to hypertension even under sedation. She also had TTE done at [facility] showing LVEF 50-55%, diastolic dysfunction.     Today, Ms. Davis endorses that she continues with dyspnea, dyspnea on exertion as well as LE edema. She does not think symptoms improved with furosemide or recent hypovolemia with several episodes of diarrhea daily. No orthopnea or PND. No chest pain or pressure. No episodes of lightheadedness, near-syncope or syncope. She was recently hospitalized for hypovolemic  "hyponatremia but she is feeling frustrated that she continues with diarrhea and does not have any answers regarding why she is having diarrhea or how to improve it. She tells me that she has had five weeks of diarrhea. Episodes 4-5 times per day at least. Pure liquid. She was having frequent episodes of bowel incontinence due to this.       Smoking History: Lifelong non-smoker. She grew up around heavy second hand smoke    Alcohol History: None    Substance Abuse History: None    Sleep History: Sleeps in bed. Lies supine with 1 pillow under her neck and 1 behind her knees. She does snore. No witnessed apnea. She is awake frequently throughout the night due to urinary frequency.     Family History: Mother- heart disease \"enlarged heart and took nitroglycerin;       HPI:    Ileana Davis is a pleasant 82-year old female who presents for cardiology consult regarding dyspnea on exertion and abnormal stress test. She has a cardiac history including hypertension.  She has a non-cardiac history including recurrent urinary tract infections, chronic lumbago, renal cell carcinoma status post right nephrectomy, chronic kidney disease, hypothyroidism.       Per Ileana and her son she has had dyspnea, dyspnea on exertion for at least the last six months. He has also noticed some harder breathing while just sitting at rest. In the morning she makes her bed, showers every other morning, gets dressed, does her hair. She is dyspneic and fatigued doing these activities. This has worsened over the last 6 months. Her son has also noticed that \"just walking out to the car\" she is dyspneic. She does tell me that she feels \"a slight pressure\" in her chest. This has been occurring often throughout the day with exertion. Her chest discomfort resolves with rest and dyspnea improves. No racing/skipping/fluttering sensation in her chest. She has had issues with bilateral LE edema for many years. She wears compression stockings and elevates " "her legs which is helpful. Increased LE edema correlates with salt intake and fluid intake.     Ani brought concerns regarding her dyspnea and chest discomfort to her primary care provider.  She underwent stress testing on August 2, 2023 which showed a small area of mild ischemia in the inferior lateral segments of the left ventricle.  She did have a cardiac catheterization in 2017 at [facility] showing nonobstructive coronary artery disease with no significant lesions.        Smoking History: Lifelong non-smoker. She grew up around heavy second hand smoke    Alcohol History: None    Substance Abuse History: None    Sleep History: Sleeps in bed. Lies supine with 1 pillow under her neck and 1 behind her knees. She does snore. No witnessed apnea. She is awake frequently throughout the night due to urinary frequency.     Family History: Mother- heart disease \"enlarged heart and took nitroglycerin;       UTI symptoms:    She has had symptoms of low back ache the last 4 days, generalized weakness in her legs, dysuria, pressure. She has had these symptoms for the last 4 days.         RELEVANT TESTING:  Nuclear med Lexiscan stress test August 2023 at [facility]    The nuclear stress test is abnormal.    There is a small area of mild ischemia in the inferolateral segment(s)  of the left ventricle.    The left ventricular ejection fraction at rest is 87%.  The left  ventricular ejection fraction at stress is 79%.    There is no prior study for comparison.      ECG Summary    ECG Baseline electrocardiogram demonstrates sinus rhythm.   Patient did not develop any acute EKG changes or arrhythmias during the Lexiscan portion of the study.       Cardiac catheterization October 2017 at [facility]  CARDIAC FINDINGS:   DOMINANCE:   Right Dominant   LEFT MAIN:   is angiographically normal (0% Stenosis)   LEFT ANTERIOR DESCENDING :   Mid Segment 20-30 % Stenosis   CIRCUMFLEX:   is angiographically normal (0% " Stenosis)   RIGHT CORONARY ARTERY:   is angiographically normal (0% Stenosis)   COLLATERALS:   No collateral flow       Past Medical History:   Diagnosis Date    Anemia 8/20/2015    Autoimmune hepatitis (H) 6/2/2016    Benign paroxysmal positional vertigo 10/7/2010    Contact dermatitis and other eczema, due to unspecified cause 10/7/2010    Esophageal reflux 10/7/2010    Generalized anxiety disorder 10/7/2010    Generalized osteoarthrosis, involving multiple sites 10/7/2010    Hepatitis, unspecified 10/7/2010    Infected wound     Myalgia and myositis, unspecified 10/7/2010    fibromyalgia    Nonallopathic lesion of cervical region, not elsewhere classified 12/5/2001    Nonallopathic lesion of thoracic region, not elsewhere classified 3/31/2005    Open wound of knee, leg, and ankle     Rosacea 10/7/2010    Unspecified essential hypertension 10/7/2010    Unspecified hypothyroidism 10/7/2010       Past Surgical History:   Procedure Laterality Date    APPENDECTOMY      ARTHROPLASTY TOE(S) Left 9/21/2020    Procedure: Left  Bethea Arthroplasty with interpositional arthroplasty using graft.;  Surgeon: Gina Rodriguez DPM;  Location: [facility] OR    BACK SURGERY  2015    lumbar epidural injection    CARDIAC SURGERY  10/2017    angioplasty    CHOLECYSTECTOMY      COLONOSCOPY  07/27/2017    [facility]    COLONOSCOPY - HIM SCAN  05/15/2001    Small internal hemorrhoids; otherwise normal;EB    COLONOSCOPY - HIM SCAN  12/14/2006    Colonoscopy, [facility]    ENT SURGERY      tonsillectomy    ESOPHAGOGASTRODUODENOSCOPY  07/27/2017    [facility]    EXCISE LESION LOWER EXTREMITY Left 4/19/2022    Procedure: Excision Left Medial Lower Leg Ulceration;  Surgeon: Kai Huynh MD;  Location: [facility] OR    EXCISE LESION LOWER EXTREMITY Left 9/13/2022    Procedure: Wide local excision of left posterior leg;  Surgeon: Kai Huynh MD;  Location: [facility]    EXCISE LESION UPPER EXTREMITY Right 4/19/2022    Procedure: Excision lesion  right upper extremity;  Surgeon: Kai Huynh MD;  Location: [facility]    EYE SURGERY      bilateral cataract removal    GI SURGERY      anal fistula    GYN SURGERY  1985    [facility]    GYN SURGERY      cessarian x 2    GYN SURGERY      tubal sterilazation    NEPHRECTOMY Right 05/02/2019    ORTHOPEDIC SURGERY      right knee    ORTHOPEDIC SURGERY  1986    plantar fascia release    ORTHOPEDIC SURGERY      left knee    ORTHOPEDIC SURGERY      right trigger finger release    ORTHOPEDIC SURGERY  2013    Right great toe MTPJ fusion, adductor tenotomy,correction of hammer toe    ORTHOPEDIC SURGERY  [id number]    multiple procedures left forefoot       Allergies   Allergen Reactions    Fentanyl Other (See Comments)     Severe agitation when used during angiogram    Codeine Sulfate Other (See Comments)     hallucinations    Fentanyl And Related      Other reaction(s): Confusion    Morphine Other (See Comments)     Hallucinations with iv therapy    Tape [Adhesive Tape]     Tramadol Other (See Comments)     hallucination       Current Outpatient Medications   Medication Sig Dispense Refill    conjugated estrogens (PREMARIN) 0.625 MG/GM vaginal cream Place 0.5 g vaginally twice a week 30 g 3    cyclobenzaprine (FLEXERIL) 10 MG tablet TAKE 1/2 TO 1 TABLET (5-10 MG) BY MOUTH DAILY AS NEEDED FOR MUSCLE SPASMS 30 tablet 0    ferrous sulfate (FEROSUL) 325 (65 Fe) MG tablet Take 325 mg by mouth daily (with breakfast)      fluticasone (FLONASE) 50 MCG/ACT nasal spray SPRAY 2 SPRAYS INTO BOTH NOSTRILS DAILY AS NEEDED 16 g 11    hydrocortisone 2.5 % cream APPLY TWICE TIMES DAILY TO ACTIVE ECZEMA ON THE HANDS, FOLLOW WITH MOISTURIZING CREAM.      levothyroxine (SYNTHROID/LEVOTHROID) 100 MCG tablet TAKE 1 TABLET (100 MCG) BY MOUTH DAILY 90 tablet 0    losartan (COZAAR) 50 MG tablet Take 1 tablet (50 mg) by mouth daily 90 tablet 1    melatonin 1 MG TABS tablet Take 10 mg by mouth nightly as needed for sleep      nystatin (MYCOSTATIN) 041859  UNIT/GM external cream Apply topically 2 times daily as needed for dry skin 30 g 2    nystatin (MYCOSTATIN) 863632 UNIT/GM external powder APPLY TO AFFECTED AREA NIGHTLY AS NEEDED FOR RASH 60 g 1    omeprazole (PRILOSEC) 40 MG capsule Take by mouth daily Before the same meal daily      polyethylene glycol (MIRALAX) 17 g packet Take 1 packet by mouth daily      propranolol (INDERAL) 10 MG tablet TAKE 1 TABLET (10 MG) BY MOUTH DAILY 90 tablet 2    sennosides (SENOKOT) 8.6 MG tablet          Social History     Socioeconomic History    Marital status:      Spouse name: Not on file    Number of children: Not on file    Years of education: Not on file    Highest education level: Not on file   Occupational History    Not on file   Tobacco Use    Smoking status: Never    Smokeless tobacco: Never   Vaping Use    Vaping Use: Never used   Substance and Sexual Activity    Alcohol use: No    Drug use: No    Sexual activity: Never   Other Topics Concern    Parent/sibling w/ CABG, MI or angioplasty before 65F 55M? No     Service Not Asked    Blood Transfusions Yes    Caffeine Concern Yes     Comment: soda rare    Occupational Exposure Not Asked    Hobby Hazards Not Asked    Sleep Concern Not Asked    Stress Concern Not Asked    Weight Concern Not Asked    Special Diet Not Asked    Back Care Not Asked    Exercise Not Asked    Bike Helmet Not Asked    Seat Belt Not Asked    Self-Exams Not Asked   Social History Narrative    Not on file     Social Determinants of Health     Financial Resource Strain: Not on file   Food Insecurity: Not on file   Transportation Needs: Not on file   Physical Activity: Not on file   Stress: Not on file   Social Connections: Not on file   Interpersonal Safety: Low Risk  (10/6/2023)    Interpersonal Safety     Do you feel physically and emotionally safe where you currently live?: Yes     Within the past 12 months, have you been hit, slapped, kicked or otherwise physically hurt by someone?:  "No     Within the past 12 months, have you been humiliated or emotionally abused in other ways by your partner or ex-partner?: No   Housing Stability: Not on file       LAB RESULTS:   Orders placed or performed in visit on 10/12/23    losartan (COZAAR) 50 MG tablet         Review of systems: Negative except that which was noted in the HPI.    Physical examination:    Vitals: BP (!) 140/92 (BP Location: Right arm, Patient Position: Sitting, Cuff Size: Adult Large)   Pulse 71   Resp 16   Ht 1.575 m (5' 2\")   Wt 83.9 kg (185 lb)   SpO2 95%   BMI 33.84 kg/m    BMI= Body mass index is 33.84 kg/m .      GENERAL APPEARANCE: healthy, alert and no distress  HEENT: no icterus, no xanthelasmas, normal pupil size and reaction, no cyanosis.  NECK: no adenopathy, no asymmetry, masses.  CHEST: lungs clear to auscultation - no rales, rhonchi or wheezes, no use of accessory muscles, no retractions, respirations are unlabored, normal respiratory rate  CARDIOVASCULAR: regular rhythm, normal S1 with physiologic split S2, no S3 or S4 and no murmur, click or rub  EXTREMITIES: Trace bilateral lower extremity edema   NEURO: alert and oriented normal speech, and affect  VASC: No vascular bruits heard.  SKIN: no ecchymoses, no rashes        Thank you for allowing me to participate in the care of your patient. Please do not hesitate to contact me if you have any questions.       Gill Mcgill CNP      Total time spent on day of visit, including review of tests, obtaining/reviewing separately obtained history, ordering medications/tests/procedures, communicating with PCP/consultants, and documenting in electronic medical record: 45 minutes.     "

## 2023-10-12 NOTE — PATIENT INSTRUCTIONS
Thank you for allowing Gill Mcgill CNP and our  team to participate in your care. Please call our office at [phone] with scheduling questions or if you need to cancel or change your appointment. With any other questions or concerns you may call cardiology nurse at [phone] or [phone].       If you experience chest pain, chest pressure, chest tightness, shortness of breath, fainting, lightheadedness, nausea, vomiting, or other concerning symptoms, please report to the Emergency Department or call 911. These symptoms may be emergent, and best treated in the Emergency Department.      Increase losartan 25 mg once daily to 50 mg once daily  Minimize salt alternative since these often have potassium chloride. Given your weak kidneys taking in too much potassium can cause issues. Consider alternative such as Ms. Dash  Restrict sodium to 2,500 mg daily- read labels  Limit fluids to less than 2 liters daily  We will stop furosemide for now with ongoing diarrhea, hypovolemia hyponatremia. If concerning symptoms of increased LE edema, orthopnea, PND- could consider re-starting  Consider non-cardiac causes/work-up of dyspnea with PCP    Follow-up with cardiology in 6 months, certainly sooner with acute concerns    Gill Mcgill CNP

## 2023-10-13 ENCOUNTER — TELEPHONE (OUTPATIENT)
Dept: FAMILY MEDICINE | Facility: OTHER | Age: 83
End: 2023-10-13

## 2023-10-13 ENCOUNTER — LAB (OUTPATIENT)
Dept: LAB | Facility: OTHER | Age: 83
End: 2023-10-13
Payer: MEDICARE

## 2023-10-13 DIAGNOSIS — R19.7 DIARRHEA: ICD-10-CM

## 2023-10-13 DIAGNOSIS — R39.198 DIFFICULTY URINATING: ICD-10-CM

## 2023-10-13 DIAGNOSIS — N39.0 ACUTE UTI: Primary | ICD-10-CM

## 2023-10-13 DIAGNOSIS — N17.9 AKI (ACUTE KIDNEY INJURY) (H): ICD-10-CM

## 2023-10-13 DIAGNOSIS — R30.0 DYSURIA: Primary | ICD-10-CM

## 2023-10-13 DIAGNOSIS — N39.0 ACUTE UTI: ICD-10-CM

## 2023-10-13 LAB
ALBUMIN UR-MCNC: NEGATIVE MG/DL
APPEARANCE UR: ABNORMAL
BACTERIA #/AREA URNS HPF: ABNORMAL /HPF
BILIRUB UR QL STRIP: NEGATIVE
COLOR UR AUTO: YELLOW
GLUCOSE UR STRIP-MCNC: NEGATIVE MG/DL
HGB UR QL STRIP: ABNORMAL
KETONES UR STRIP-MCNC: NEGATIVE MG/DL
LEUKOCYTE ESTERASE UR QL STRIP: ABNORMAL
NITRATE UR QL: NEGATIVE
PH UR STRIP: 7.5 [PH] (ref 5–7)
RBC #/AREA URNS AUTO: ABNORMAL /HPF
SP GR UR STRIP: <=1.005 (ref 1–1.03)
SQUAMOUS #/AREA URNS AUTO: ABNORMAL /LPF
UROBILINOGEN UR STRIP-ACNC: 0.2 E.U./DL
WBC #/AREA URNS AUTO: >100 /HPF
WBC CLUMPS #/AREA URNS HPF: PRESENT /HPF

## 2023-10-13 PROCEDURE — 81003 URINALYSIS AUTO W/O SCOPE: CPT | Mod: ZL

## 2023-10-13 PROCEDURE — 87086 URINE CULTURE/COLONY COUNT: CPT | Mod: ZL

## 2023-10-13 PROCEDURE — 36415 COLL VENOUS BLD VENIPUNCTURE: CPT | Mod: ZL

## 2023-10-13 PROCEDURE — 81001 URINALYSIS AUTO W/SCOPE: CPT | Mod: ZL

## 2023-10-13 PROCEDURE — 83789 MASS SPECTROMETRY QUAL/QUAN: CPT | Mod: ZL

## 2023-10-13 RX ORDER — NITROFURANTOIN 25; 75 MG/1; MG/1
100 CAPSULE ORAL 2 TIMES DAILY
Qty: 14 CAPSULE | Refills: 0 | Status: SHIPPED | OUTPATIENT
Start: 2023-10-13 | End: 2023-10-20

## 2023-10-14 LAB — BACTERIA UR CULT: NORMAL

## 2023-10-18 LAB
BILE AC SERPL-SCNC: 5.8 UMOL/L
CDCAE SERPL-SCNC: 4.5 UMOL/L
CHOLATE SERPL-SCNC: 0.5 UMOL/L
DO-CHOLATE SERPL-SCNC: 0.5 UMOL/L
URSODEOXYCHOLATE SERPL-SCNC: 0.3 UMOL/L

## 2023-10-19 DIAGNOSIS — R19.5 ELEVATED FECAL CALPROTECTIN: Primary | ICD-10-CM

## 2023-10-19 DIAGNOSIS — R19.7 DIARRHEA, UNSPECIFIED: ICD-10-CM

## 2023-10-19 DIAGNOSIS — R10.30 LOWER ABDOMINAL PAIN, UNSPECIFIED: ICD-10-CM

## 2023-10-22 PROCEDURE — 87086 URINE CULTURE/COLONY COUNT: CPT | Mod: ZL

## 2023-10-22 PROCEDURE — 81001 URINALYSIS AUTO W/SCOPE: CPT | Mod: ZL

## 2023-10-22 PROCEDURE — 87507 IADNA-DNA/RNA PROBE TQ 12-25: CPT | Mod: ZL

## 2023-10-23 ENCOUNTER — LAB (OUTPATIENT)
Dept: LAB | Facility: OTHER | Age: 83
End: 2023-10-23
Payer: MEDICARE

## 2023-10-23 DIAGNOSIS — R30.0 DYSURIA: ICD-10-CM

## 2023-10-23 DIAGNOSIS — R10.30 LOWER ABDOMINAL PAIN, UNSPECIFIED: ICD-10-CM

## 2023-10-23 DIAGNOSIS — R19.5 ELEVATED FECAL CALPROTECTIN: ICD-10-CM

## 2023-10-23 DIAGNOSIS — R19.7 DIARRHEA, UNSPECIFIED: ICD-10-CM

## 2023-10-23 LAB
ADV 40+41 DNA STL QL NAA+NON-PROBE: NEGATIVE
ALBUMIN UR-MCNC: NEGATIVE MG/DL
APPEARANCE UR: ABNORMAL
ASTRO TYP 1-8 RNA STL QL NAA+NON-PROBE: NEGATIVE
BACTERIA #/AREA URNS HPF: ABNORMAL /HPF
BILIRUB UR QL STRIP: NEGATIVE
C CAYETANENSIS DNA STL QL NAA+NON-PROBE: NEGATIVE
CAMPYLOBACTER DNA SPEC NAA+PROBE: NEGATIVE
COLOR UR AUTO: YELLOW
CRYPTOSP DNA STL QL NAA+NON-PROBE: NEGATIVE
E COLI O157 DNA STL QL NAA+NON-PROBE: NORMAL
E HISTOLYT DNA STL QL NAA+NON-PROBE: NEGATIVE
EAEC ASTA GENE ISLT QL NAA+PROBE: NEGATIVE
EC STX1+STX2 GENES STL QL NAA+NON-PROBE: NEGATIVE
EPEC EAE GENE STL QL NAA+NON-PROBE: NEGATIVE
ETEC LTA+ST1A+ST1B TOX ST NAA+NON-PROBE: NEGATIVE
G LAMBLIA DNA STL QL NAA+NON-PROBE: NEGATIVE
GLUCOSE UR STRIP-MCNC: NEGATIVE MG/DL
HGB UR QL STRIP: ABNORMAL
KETONES UR STRIP-MCNC: NEGATIVE MG/DL
LEUKOCYTE ESTERASE UR QL STRIP: ABNORMAL
MUCOUS THREADS #/AREA URNS LPF: PRESENT /LPF
NITRATE UR QL: NEGATIVE
NOROVIRUS GI+II RNA STL QL NAA+NON-PROBE: NEGATIVE
P SHIGELLOIDES DNA STL QL NAA+NON-PROBE: NEGATIVE
PH UR STRIP: 6 [PH] (ref 5–7)
RBC #/AREA URNS AUTO: ABNORMAL /HPF
RVA RNA STL QL NAA+NON-PROBE: NEGATIVE
SALMONELLA SP RPOD STL QL NAA+PROBE: NEGATIVE
SAPO I+II+IV+V RNA STL QL NAA+NON-PROBE: NEGATIVE
SHIGELLA SP+EIEC IPAH ST NAA+NON-PROBE: NEGATIVE
SP GR UR STRIP: 1.01 (ref 1–1.03)
SQUAMOUS #/AREA URNS AUTO: ABNORMAL /LPF
UROBILINOGEN UR STRIP-ACNC: 0.2 E.U./DL
V CHOLERAE DNA SPEC QL NAA+PROBE: NEGATIVE
VIBRIO DNA SPEC NAA+PROBE: NEGATIVE
WBC #/AREA URNS AUTO: >100 /HPF
WBC CLUMPS #/AREA URNS HPF: PRESENT /HPF
Y ENTEROCOL DNA STL QL NAA+PROBE: NEGATIVE

## 2023-10-24 DIAGNOSIS — N39.0 ACUTE UTI: Primary | ICD-10-CM

## 2023-10-24 LAB — BACTERIA UR CULT: ABNORMAL

## 2023-10-24 RX ORDER — AMOXICILLIN 500 MG/1
500 CAPSULE ORAL 3 TIMES DAILY
Qty: 15 CAPSULE | Refills: 0 | Status: SHIPPED | OUTPATIENT
Start: 2023-10-24 | End: 2023-10-29

## 2023-10-27 DIAGNOSIS — B37.2 YEAST DERMATITIS: ICD-10-CM

## 2023-10-30 RX ORDER — NYSTATIN 100000 [USP'U]/G
POWDER TOPICAL
Qty: 60 G | Refills: 3 | Status: SHIPPED | OUTPATIENT
Start: 2023-10-30

## 2023-10-30 NOTE — TELEPHONE ENCOUNTER
Nystatin       Last Written Prescription Date:  6/23/2023  Last Fill Quantity: 60g,   # refills: 1  Last Office Visit: 10/06/2023  Future Office visit:    Next 5 appointments (look out 90 days)      Jan 02, 2024  2:00 PM  (Arrive by 1:45 PM)  Return Visit with SACHIN Arroyo MD  [facility] ([facility] ) [address] [place] [address] [phone]

## 2023-11-02 DIAGNOSIS — E03.9 HYPOTHYROIDISM, UNSPECIFIED TYPE: ICD-10-CM

## 2023-11-02 RX ORDER — LEVOTHYROXINE SODIUM 100 UG/1
100 TABLET ORAL DAILY
Qty: 90 TABLET | Refills: 0 | Status: SHIPPED | OUTPATIENT
Start: 2023-11-02 | End: 2024-02-01

## 2023-11-02 NOTE — TELEPHONE ENCOUNTER
"Synthroid      Last Written Prescription Date:  8.17.23  Last Fill Quantity: #90,   # refills: 0  Last Office Visit: 10.6.23  Future Office visit:    Next 5 appointments (look out 90 days)      Jan 02, 2024  2:00 PM  (Arrive by 1:45 PM)  Return Visit with SACHIN Arroyo MD  [facility] ([facility] ) [address] [place] [address]  [phone]             Routing refill request to provider for review/approval because:      Requested Prescriptions   Pending Prescriptions Disp Refills    levothyroxine (SYNTHROID/LEVOTHROID) 100 MCG tablet [Pharmacy Med Name: LEVOTHYROXINE 100MCG TABLET] 90 tablet 0     Sig: TAKE 1 TABLET (100 MCG) BY MOUTH DAILY       Thyroid Protocol Failed - 11/2/2023  1:10 AM        Failed - Normal TSH on file in past 12 months     Recent Labs   Lab Test 09/05/23  1434   TSH 15.00*              Passed - Patient is 12 years or older        Passed - Recent (12 mo) or future (30 days) visit within the authorizing provider's specialty     Patient has had an office visit with the authorizing provider or a provider within the authorizing providers department within the previous 12 mos or has a future within next 30 days. See \"Patient Info\" tab in inbasket, or \"Choose Columns\" in Meds & Orders section of the refill encounter.              Passed - Medication is active on med list        Passed - No active pregnancy on record     If patient is pregnant or has had a positive pregnancy test, please check TSH.          Passed - No positive pregnancy test in past 12 months     If patient is pregnant or has had a positive pregnancy test, please check TSH.                 "

## 2023-11-03 ENCOUNTER — OFFICE VISIT (OUTPATIENT)
Dept: FAMILY MEDICINE | Facility: OTHER | Age: 83
End: 2023-11-03
Attending: NURSE PRACTITIONER
Payer: MEDICARE

## 2023-11-03 VITALS
TEMPERATURE: 97.6 F | HEART RATE: 61 BPM | DIASTOLIC BLOOD PRESSURE: 82 MMHG | RESPIRATION RATE: 20 BRPM | SYSTOLIC BLOOD PRESSURE: 134 MMHG | BODY MASS INDEX: 34.39 KG/M2 | OXYGEN SATURATION: 99 % | WEIGHT: 188 LBS

## 2023-11-03 DIAGNOSIS — N39.0 RECURRENT UTI: Primary | ICD-10-CM

## 2023-11-03 DIAGNOSIS — N18.32 STAGE 3B CHRONIC KIDNEY DISEASE (H): ICD-10-CM

## 2023-11-03 DIAGNOSIS — R39.9 URINARY SYMPTOM OR SIGN: ICD-10-CM

## 2023-11-03 LAB
ALBUMIN SERPL BCG-MCNC: 4.1 G/DL (ref 3.5–5.2)
ALBUMIN UR-MCNC: NEGATIVE MG/DL
ANION GAP SERPL CALCULATED.3IONS-SCNC: 14 MMOL/L (ref 7–15)
APPEARANCE UR: ABNORMAL
BACTERIA #/AREA URNS HPF: ABNORMAL /HPF
BILIRUB UR QL STRIP: NEGATIVE
BUN SERPL-MCNC: 11.3 MG/DL (ref 8–23)
CALCIUM SERPL-MCNC: 8.8 MG/DL (ref 8.8–10.2)
CHLORIDE SERPL-SCNC: 91 MMOL/L (ref 98–107)
COLOR UR AUTO: YELLOW
CREAT SERPL-MCNC: 1.24 MG/DL (ref 0.51–0.95)
DEPRECATED HCO3 PLAS-SCNC: 21 MMOL/L (ref 22–29)
EGFRCR SERPLBLD CKD-EPI 2021: 43 ML/MIN/1.73M2
GLUCOSE SERPL-MCNC: 97 MG/DL (ref 70–99)
GLUCOSE UR STRIP-MCNC: NEGATIVE MG/DL
HGB UR QL STRIP: ABNORMAL
HOLD SPECIMEN: NORMAL
KETONES UR STRIP-MCNC: NEGATIVE MG/DL
LEUKOCYTE ESTERASE UR QL STRIP: ABNORMAL
NITRATE UR QL: NEGATIVE
PH UR STRIP: 6.5 [PH] (ref 5–7)
PHOSPHATE SERPL-MCNC: 2.9 MG/DL (ref 2.5–4.5)
POTASSIUM SERPL-SCNC: 3.8 MMOL/L (ref 3.4–5.3)
RBC #/AREA URNS AUTO: ABNORMAL /HPF
SODIUM SERPL-SCNC: 126 MMOL/L (ref 135–145)
SP GR UR STRIP: <=1.005 (ref 1–1.03)
SQUAMOUS #/AREA URNS AUTO: ABNORMAL /LPF
UROBILINOGEN UR STRIP-ACNC: 0.2 E.U./DL
WBC #/AREA URNS AUTO: >100 /HPF

## 2023-11-03 PROCEDURE — 99213 OFFICE O/P EST LOW 20 MIN: CPT | Performed by: NURSE PRACTITIONER

## 2023-11-03 PROCEDURE — 81001 URINALYSIS AUTO W/SCOPE: CPT | Mod: ZL | Performed by: NURSE PRACTITIONER

## 2023-11-03 PROCEDURE — 80069 RENAL FUNCTION PANEL: CPT | Mod: ZL | Performed by: NURSE PRACTITIONER

## 2023-11-03 PROCEDURE — 87086 URINE CULTURE/COLONY COUNT: CPT | Mod: ZL | Performed by: NURSE PRACTITIONER

## 2023-11-03 PROCEDURE — 36415 COLL VENOUS BLD VENIPUNCTURE: CPT | Mod: ZL | Performed by: NURSE PRACTITIONER

## 2023-11-03 PROCEDURE — 81003 URINALYSIS AUTO W/O SCOPE: CPT | Mod: ZL | Performed by: NURSE PRACTITIONER

## 2023-11-03 PROCEDURE — G0463 HOSPITAL OUTPT CLINIC VISIT: HCPCS

## 2023-11-03 RX ORDER — AMOXICILLIN 500 MG/1
500 CAPSULE ORAL 2 TIMES DAILY
Qty: 10 CAPSULE | Refills: 0 | Status: SHIPPED | OUTPATIENT
Start: 2023-11-03 | End: 2023-11-08

## 2023-11-03 ASSESSMENT — PAIN SCALES - GENERAL: PAINLEVEL: MILD PAIN (2)

## 2023-11-03 NOTE — PROGRESS NOTES
"  Assessment & Plan     Recurrent UTI  - amoxicillin (AMOXIL) 500 MG capsule; Take 1 capsule (500 mg) by mouth 2 times daily for 5 days    Post visit update 11/7/23- culture reviewed. Since Ileana did not want Macrobid, switched to keflex based on culture.     Stage 3b chronic kidney disease (H)  - Renal panel (Alb, BUN, Ca, Cl, CO2, Creat, Gluc, Phos, K, Na); Future  - Renal panel (Alb, BUN, Ca, Cl, CO2, Creat, Gluc, Phos, K, Na)    Urinary symptom or sign  - UA Macroscopic with reflex to Microscopic and Culture; Future  - Renal panel (Alb, BUN, Ca, Cl, CO2, Creat, Gluc, Phos, K, Na); Future  - UA Macroscopic with reflex to Microscopic and Culture  - Urine Microscopic Exam  - Urine Culture  - Renal panel (Alb, BUN, Ca, Cl, CO2, Creat, Gluc, Phos, K, Na)    Ordering of each unique test  Prescription drug management  No LOS data to display   Time spent by me doing chart review, history and exam, documentation and further activities per the note       BMI:   Estimated body mass index is 34.39 kg/m  as calculated from the following:    Height as of 10/12/23: 1.575 m (5' 2\").    Weight as of this encounter: 85.3 kg (188 lb).           No follow-ups on file.    Dayan Hines, CNP  [facility]   Ileana is a 83 year old, presenting for the following health issues:  Urinary Problem        11/3/2023    12:51 PM   Additional Questions   Roomed by Britney MUJICA RN CC   Accompanied by        HPI     Genitourinary - Female  Onset/Duration: x 3 weeks. Patient reports prior antibiotic tx, UTI does not seem to go away.   Description:   Painful urination (Dysuria): YES           Frequency: YES  Blood in urine (Hematuria): No  Delay in urine (Hesitency): YES  Intensity: severe  Progression of Symptoms:  worsening  Accompanying Signs & Symptoms:  Fever/chills: No  Flank pain: No  Nausea and vomiting: No  Vaginal symptoms: none  Abdominal/Pelvic Pain: YES- lower mid abdomen   History: " "  History of frequent UTI s: YES  History of kidney stones: No  Sexually Active: No  Possibility of pregnancy: No  Precipitating or alleviating factors: amoxicillin seems to help   Therapies tried and outcome:  amoxicillin\" and increasing fluids.           Review of Systems   Constitutional, HEENT, cardiovascular, pulmonary, gi and gu systems are negative, except as otherwise noted.      Objective    /82 (BP Location: Right arm, Patient Position: Sitting, Cuff Size: Adult Large)   Pulse 61   Temp 97.6  F (36.4  C) (Tympanic)   Resp 20   Wt 85.3 kg (188 lb)   SpO2 99%   BMI 34.39 kg/m    Body mass index is 34.39 kg/m .    Physical Exam   GENERAL: healthy, alert and no distress  EYES: Eyes grossly normal to inspection, PERRL and conjunctivae and sclerae normal  RESP: lungs clear to auscultation - no rales, rhonchi or wheezes  CV: regular rate and rhythm, normal S1 S2, no S3 or S4, no murmur, click or rub, no peripheral edema and peripheral pulses strong  BACK: no CVA tenderness, no paralumbar tenderness  NEURO: Alert and oriented x 4. Appropriate mentation     Results for orders placed or performed in visit on 11/03/23   UA Macroscopic with reflex to Microscopic and Culture     Status: Abnormal    Specimen: Urine, Midstream   Result Value Ref Range    Color Urine Yellow Colorless, Straw, Light Yellow, Yellow    Appearance Urine Cloudy (A) Clear    Glucose Urine Negative Negative mg/dL    Bilirubin Urine Negative Negative    Ketones Urine Negative Negative mg/dL    Specific Gravity Urine <=1.005 1.003 - 1.035    Blood Urine Moderate (A) Negative    pH Urine 6.5 5.0 - 7.0    Protein Albumin Urine Negative Negative mg/dL    Urobilinogen Urine 0.2 0.2, 1.0 E.U./dL    Nitrite Urine Negative Negative    Leukocyte Esterase Urine Large (A) Negative   Urine Microscopic Exam     Status: Abnormal   Result Value Ref Range    Bacteria Urine Moderate (A) None Seen /HPF    RBC Urine 5-10 (A) 0-2 /HPF /HPF    WBC Urine " >100 (A) 0-5 /HPF /HPF    Squamous Epithelials Urine Few (A) None Seen /LPF   Renal panel (Alb, BUN, Ca, Cl, CO2, Creat, Gluc, Phos, K, Na)     Status: Abnormal   Result Value Ref Range    Sodium 126 (L) 135 - 145 mmol/L    Potassium 3.8 3.4 - 5.3 mmol/L    Chloride 91 (L) 98 - 107 mmol/L    Carbon Dioxide (CO2) 21 (L) 22 - 29 mmol/L    Anion Gap 14 7 - 15 mmol/L    Glucose 97 70 - 99 mg/dL    Urea Nitrogen 11.3 8.0 - 23.0 mg/dL    Creatinine 1.24 (H) 0.51 - 0.95 mg/dL    GFR Estimate 43 (L) >60 mL/min/1.73m2    Calcium 8.8 8.8 - 10.2 mg/dL    Albumin 4.1 3.5 - 5.2 g/dL    Phosphorus 2.9 2.5 - 4.5 mg/dL   Extra Tube     Status: None    Narrative    The following orders were created for panel order Extra Tube.  Procedure                               Abnormality         Status                     ---------                               -----------         ------                     Extra Purple Top Tube[[id number]]                            Final result                 Please view results for these tests on the individual orders.   Extra Purple Top Tube     Status: None   Result Value Ref Range    Hold Specimen [facility]    Urine Culture     Status: Abnormal    Specimen: Urine, Midstream   Result Value Ref Range    Culture 10,000-50,000 CFU/mL Escherichia coli (A)        Susceptibility    Escherichia coli - MAIK     Ampicillin >=32 Resistant      Cefazolin <=4 Susceptible      Cefepime <=1 Susceptible      Ceftazidime <=1 Susceptible      Ceftriaxone <=1 Susceptible      Ciprofloxacin <=0.25 Susceptible      Ertapenem <=0.5 Susceptible      Gentamicin <=1 Susceptible      Imipenem <=0.25 Susceptible      Levofloxacin 1 Intermediate      Nitrofurantoin <=16 Susceptible      Tobramycin <=1 Susceptible      Trimethoprim/Sulfamethoxazole <=1/19 Susceptible      Ampicillin/ Sulbactam <=2 Susceptible      Piperacillin/Tazobactam <=4 Susceptible

## 2023-11-03 NOTE — PATIENT INSTRUCTIONS
Restart D-Mannose as recommended by your urologist. Please follow up with urology at [facility] if you can, sooner than February.

## 2023-11-05 LAB — BACTERIA UR CULT: ABNORMAL

## 2023-11-07 DIAGNOSIS — N39.0 RECURRENT UTI: Primary | ICD-10-CM

## 2023-11-07 RX ORDER — CEPHALEXIN 500 MG/1
500 CAPSULE ORAL 3 TIMES DAILY
Qty: 15 CAPSULE | Refills: 0 | Status: SHIPPED | OUTPATIENT
Start: 2023-11-07 | End: 2023-11-12

## 2023-11-07 NOTE — RESULT ENCOUNTER NOTE
Patient previously stated she did not want Macrobid due to concerns for possible side effects. Has tolerated Keflex well in past. Resistant to penicillin group. Stop the amoxicillin and start the keflex.

## 2023-12-09 ENCOUNTER — HEALTH MAINTENANCE LETTER (OUTPATIENT)
Age: 83
End: 2023-12-09

## 2023-12-12 ENCOUNTER — TRANSFERRED RECORDS (OUTPATIENT)
Dept: HEALTH INFORMATION MANAGEMENT | Facility: CLINIC | Age: 83
End: 2023-12-12
Payer: MEDICARE

## 2023-12-15 ENCOUNTER — NURSE TRIAGE (OUTPATIENT)
Dept: FAMILY MEDICINE | Facility: OTHER | Age: 83
End: 2023-12-15

## 2023-12-15 NOTE — TELEPHONE ENCOUNTER
Patient notified of provider's recommendation and verbalized understanding.   Patient is scheduled next week with PCP.  Next 5 appointments (look out 90 days)      Dec 19, 2023  1:30 PM  (Arrive by 1:15 PM)  SHORT with Iris Becerril MD  [facility] ([facility] ) [address]  [facility] [address]  [phone]     Jan 02, 2024  2:00 PM  (Arrive by 1:45 PM)  Return Visit with SACHIN Arroyo MD  [facility] ([facility] ) [address] [place] [id number] [phone]

## 2023-12-15 NOTE — TELEPHONE ENCOUNTER
She can increase her losartan to 100 mg daily over the weekend and we can see her early next week.  Make sure she is not taking her Flexeril more than once a day

## 2023-12-15 NOTE — TELEPHONE ENCOUNTER
Protocol advises patient to be seen within 3 days.  Patient reports seeing the urologist, Dr. Hong on 12/12.  Patient states she was prescribed Myrbetriq and started medication on 12/13.   Patient reports BP has been elevated since starting this medication.   Reports BP was 12/13 121/77, 12/14 159/94, 12/15 213/113 and 163/92 with recheck.   Patient denies chest pain, difficulty breathing, headache, weakness or numbness, difficulty walking or difficulty talking.   Patient advised to be seen in ED for new or worsening symptoms.  Patient verbalized understanding.   Please advise, thank you.     Reason for Disposition   Systolic BP  >= 160 OR Diastolic >= 100    Additional Information   Negative: Difficult to awaken or acting confused (e.g., disoriented, slurred speech)   Negative: Severe difficulty breathing (e.g., struggling for each breath, speaks in single words)   Negative: [1] Weakness of the face, arm or leg on one side of the body AND [2] new onset   Negative: [1] Numbness (i.e., loss of sensation) of the face, arm or leg on one side of the body AND [2] new onset   Negative: [1] Chest pain lasts > 5 minutes AND [2] history of heart disease  (i.e., heart attack, bypass surgery, angina, angioplasty, CHF)   Negative: [1] Chest pain AND [2] took nitrogylcerin AND [3] pain was not relieved   Negative: Sounds like a life-threatening emergency to the triager   Negative: Symptom is main concern  (e.g., headache, chest pain)   Negative: Low blood pressure is main concern   Negative: [1] Systolic BP  >= 160 OR Diastolic >= 100 AND [2] cardiac or neurologic symptoms (e.g., chest pain, difficulty breathing, unsteady gait, blurred vision)   Negative: [1] Pregnant > 20 weeks (or postpartum < 6 weeks) AND [2] new hand or face swelling   Negative: [1] Pregnant > 20 weeks AND [2] BP Systolic BP  >= 140 OR Diastolic >= 90   Negative: [1] Systolic BP  >= 200 OR Diastolic >= 120  AND [2] having NO cardiac or neurologic symptoms    "Negative: [1] Postpartum < 6 weeks AND [2] BP Systolic BP  >= 140 OR Diastolic >= 90   Negative: [1] Systolic BP  >= 180 OR Diastolic >= 110 AND [2] missed most recent dose of blood pressure medication   Negative: Systolic BP  >= 180 OR Diastolic >= 110   Negative: Ran out of BP medications    Answer Assessment - Initial Assessment Questions  1. BLOOD PRESSURE: \"What is the blood pressure?\" \"Did you take at least two measurements 5 minutes apart?\"  163/92 on right arm and then 159/94 on left arm  2. ONSET: \"When did you take your blood pressure?\"      This morning around 8 AM  3. HOW: \"How did you obtain the blood pressure?\" (e.g., visiting nurse, automatic home BP monitor)      At home BP monitor  4. HISTORY: \"Do you have a history of high blood pressure?\"      yes  5. MEDICATIONS: \"Are you taking any medications for blood pressure?\" \"Have you missed any doses recently?\"      Losartan and propranolol  6. OTHER SYMPTOMS: \"Do you have any symptoms?\" (e.g., headache, chest pain, blurred vision, difficulty breathing, weakness)      No   7. PREGNANCY: \"Is there any chance you are pregnant?\" \"When was your last menstrual period?\"      No    Protocols used: High Blood Pressure-A-AH    "

## 2023-12-18 NOTE — PROGRESS NOTES
"  Assessment & Plan     1. Benign essential hypertension  Will keep losartan at 100 mg daily, and will increase propranolol to twice daily.  Follow-up in about 2 weeks for BP recheck.  - losartan (COZAAR) 100 MG tablet; Take 1 tablet (100 mg) by mouth daily  Dispense: 90 tablet; Refill: 3  - propranolol (INDERAL) 10 MG tablet; Take 1 tablet (10 mg) by mouth 2 times daily  Dispense: 180 tablet; Refill: 1    2. Chronic systolic congestive heart failure (H)    3. Hypertension with blood pressure goal less than 130/80  - losartan (COZAAR) 100 MG tablet; Take 1 tablet (100 mg) by mouth daily  Dispense: 90 tablet; Refill: 3  - propranolol (INDERAL) 10 MG tablet; Take 1 tablet (10 mg) by mouth 2 times daily  Dispense: 180 tablet; Refill: 1        BMI:   Estimated body mass index is 32.85 kg/m  as calculated from the following:    Height as of this encounter: 1.575 m (5' 2\").    Weight as of this encounter: 81.5 kg (179 lb 9.6 oz).     Return in about 2 weeks (around 1/2/2024) for Medication review.    Iris Becerril MD  [facility] [place] BLANCA Tejeda is a 83 year old, presenting for the following health issues:  Hypertension        12/19/2023     1:01 PM   Additional Questions   Roomed by juanita   Accompanied by Son Francis       MACHO     Hypertension Follow-up    Do you check your blood pressure regularly outside of the clinic? Yes   Are you following a low salt diet? Yes  Are your blood pressures ever more than 140 on the top number (systolic) OR more   than 90 on the bottom number (diastolic), for example 140/90? Yes    148/87  152/92  162/88  164/91        Review of Systems   Constitutional, HEENT, cardiovascular, pulmonary, gi and gu systems are negative, except as otherwise noted.      Objective    BP (!) 154/98 (BP Location: Right arm, Patient Position: Chair, Cuff Size: Adult Large)   Pulse 65   Temp 97  F (36.1  C) (Tympanic)   Resp 18   Ht 1.575 m (5' 2\")   Wt 81.5 kg (179 lb 9.6 oz)  "  SpO2 99%   BMI 32.85 kg/m    Body mass index is 32.85 kg/m .  Physical Exam   GENERAL: healthy, alert and no distress  PSYCH: mentation appears normal, affect normal/bright

## 2023-12-19 ENCOUNTER — OFFICE VISIT (OUTPATIENT)
Dept: FAMILY MEDICINE | Facility: OTHER | Age: 83
End: 2023-12-19
Attending: FAMILY MEDICINE
Payer: MEDICARE

## 2023-12-19 VITALS
HEART RATE: 65 BPM | DIASTOLIC BLOOD PRESSURE: 98 MMHG | WEIGHT: 179.6 LBS | BODY MASS INDEX: 33.05 KG/M2 | RESPIRATION RATE: 18 BRPM | SYSTOLIC BLOOD PRESSURE: 154 MMHG | TEMPERATURE: 97 F | HEIGHT: 62 IN | OXYGEN SATURATION: 99 %

## 2023-12-19 DIAGNOSIS — I10 BENIGN ESSENTIAL HYPERTENSION: Primary | ICD-10-CM

## 2023-12-19 DIAGNOSIS — I50.22 CHRONIC SYSTOLIC CONGESTIVE HEART FAILURE (H): ICD-10-CM

## 2023-12-19 DIAGNOSIS — I10 BENIGN ESSENTIAL HYPERTENSION: ICD-10-CM

## 2023-12-19 PROBLEM — I50.9 CHF (CONGESTIVE HEART FAILURE) (H): Status: ACTIVE | Noted: 2023-12-19

## 2023-12-19 PROCEDURE — 99214 OFFICE O/P EST MOD 30 MIN: CPT | Performed by: FAMILY MEDICINE

## 2023-12-19 PROCEDURE — G0463 HOSPITAL OUTPT CLINIC VISIT: HCPCS

## 2023-12-19 RX ORDER — PROPRANOLOL HYDROCHLORIDE 10 MG/1
10 TABLET ORAL 2 TIMES DAILY
Qty: 180 TABLET | Refills: 1 | Status: SHIPPED | OUTPATIENT
Start: 2023-12-19 | End: 2024-04-30

## 2023-12-19 RX ORDER — LOSARTAN POTASSIUM 100 MG/1
100 TABLET ORAL DAILY
Qty: 90 TABLET | Refills: 3 | Status: SHIPPED | OUTPATIENT
Start: 2023-12-19

## 2023-12-19 RX ORDER — VIBEGRON 75 MG/1
75 TABLET, FILM COATED ORAL DAILY
COMMUNITY
Start: 2023-12-18 | End: 2024-01-30

## 2023-12-19 RX ORDER — BUDESONIDE 3 MG/1
9 CAPSULE, COATED PELLETS ORAL EVERY MORNING
COMMUNITY
Start: 2023-11-10 | End: 2024-04-30

## 2023-12-19 RX ORDER — MIRABEGRON 50 MG/1
1 TABLET, FILM COATED, EXTENDED RELEASE ORAL
COMMUNITY
Start: 2023-12-12 | End: 2024-01-04

## 2023-12-19 ASSESSMENT — ANXIETY QUESTIONNAIRES
5. BEING SO RESTLESS THAT IT IS HARD TO SIT STILL: NOT AT ALL
1. FEELING NERVOUS, ANXIOUS, OR ON EDGE: NOT AT ALL
6. BECOMING EASILY ANNOYED OR IRRITABLE: NOT AT ALL
2. NOT BEING ABLE TO STOP OR CONTROL WORRYING: NOT AT ALL
GAD7 TOTAL SCORE: 0
4. TROUBLE RELAXING: NOT AT ALL
GAD7 TOTAL SCORE: 0
3. WORRYING TOO MUCH ABOUT DIFFERENT THINGS: NOT AT ALL
IF YOU CHECKED OFF ANY PROBLEMS ON THIS QUESTIONNAIRE, HOW DIFFICULT HAVE THESE PROBLEMS MADE IT FOR YOU TO DO YOUR WORK, TAKE CARE OF THINGS AT HOME, OR GET ALONG WITH OTHER PEOPLE: NOT DIFFICULT AT ALL
7. FEELING AFRAID AS IF SOMETHING AWFUL MIGHT HAPPEN: NOT AT ALL

## 2023-12-19 ASSESSMENT — PAIN SCALES - GENERAL: PAINLEVEL: NO PAIN (0)

## 2023-12-19 ASSESSMENT — PATIENT HEALTH QUESTIONNAIRE - PHQ9: SUM OF ALL RESPONSES TO PHQ QUESTIONS 1-9: 0

## 2024-01-02 ENCOUNTER — OFFICE VISIT (OUTPATIENT)
Dept: DERMATOLOGY | Facility: OTHER | Age: 84
End: 2024-01-02
Attending: DERMATOLOGY
Payer: MEDICARE

## 2024-01-02 VITALS
SYSTOLIC BLOOD PRESSURE: 132 MMHG | OXYGEN SATURATION: 100 % | TEMPERATURE: 97.2 F | DIASTOLIC BLOOD PRESSURE: 86 MMHG | HEART RATE: 62 BPM

## 2024-01-02 DIAGNOSIS — Z12.83 SCREENING FOR SKIN CANCER: ICD-10-CM

## 2024-01-02 DIAGNOSIS — L82.1 SEBORRHEIC KERATOSES: Primary | ICD-10-CM

## 2024-01-02 PROCEDURE — G0463 HOSPITAL OUTPT CLINIC VISIT: HCPCS

## 2024-01-02 PROCEDURE — 99212 OFFICE O/P EST SF 10 MIN: CPT | Performed by: DERMATOLOGY

## 2024-01-02 RX ORDER — ASPIRIN 81 MG/1
81 TABLET, CHEWABLE ORAL DAILY
COMMUNITY

## 2024-01-02 NOTE — LETTER
1/2/2024       RE: Ileana Davis  [address] [place] [address] [address]  [facility] [address]     Dear Colleague,    Thank you for referring your patient, Ileana Davis, to the [facility]. Please see a copy of my visit note below.    S: This nice lady returns because of a lesion that she recently noted on her lower back.  We had seen her in the past for a general skin check at which time she had a viral wart and several seborrheic keratoses.    O: Present on her left back is a 1.5 cm classic seborrheic keratosis that is dry and exhibits  keratotic material.  Elsewhere she showed a few more seborrheic keratoses.    A: Seborrheic keratosis    P: Reassured - she did not wish it removed.  We checked her back upper chest and face for other lesions but found nothing of concern.  Return as needed    10 minutes spent in chart review, exam, discussion, and charting.       Again, thank you for allowing me to participate in the care of your patient.      Sincerely,    SACHIN Arroyo MD

## 2024-01-02 NOTE — PROGRESS NOTES
S: This nice lady returns because of a lesion that she recently noted on her lower back.  We had seen her in the past for a general skin check at which time she had a viral wart and several seborrheic keratoses.    O: Present on her left back is a 1.5 cm classic seborrheic keratosis that is dry and exhibits  keratotic material.  Elsewhere she showed a few more seborrheic keratoses.    A: Seborrheic keratosis    P: Reassured - she did not wish it removed.  We checked her back upper chest and face for other lesions but found nothing of concern.  Return as needed    10 minutes spent in chart review, exam, discussion, and charting.

## 2024-01-02 NOTE — PROGRESS NOTES
"  Assessment & Plan     1. Benign essential hypertension  Labs updated, BP in acceptable range overall.  - Basic metabolic panel; Future    2. Mild episode of recurrent major depressive disorder (H24)  No changes to current medication, will refill when due.    3. Generalized anxiety disorder  As above.    4. Recurrent UTI  Medication refilled, will check UA today as patient feels she may have an infection.  - MYRBETRIQ 50 MG 24 hr tablet; Take 1 tablet (50 mg) by mouth daily at 2 pm  Dispense: 90 tablet; Refill: 1  - UA Macroscopic with reflex to Microscopic and Culture; Future          BMI:   Estimated body mass index is 33.98 kg/m  as calculated from the following:    Height as of 12/19/23: 1.575 m (5' 2\").    Weight as of this encounter: 84.3 kg (185 lb 12.8 oz).     Return in about 3 months (around 4/4/2024) for Chronic Disease Management, Medication review.    Iris Becerril MD  [facility] [place] Tejeda is a 83 year old, presenting for the following health issues:  Depression, Anxiety, and Hypertension        1/4/2024     1:13 PM   Additional Questions   Roomed by Marcie LOMELI   Accompanied by none       HPI     Hypertension Follow-up    Do you check your blood pressure regularly outside of the clinic? Yes   Are you following a low salt diet? Yes  Are your blood pressures ever more than 140 on the top number (systolic) OR more   than 90 on the bottom number (diastolic), for example 140/90? Yes    Depression and Anxiety Follow-Up  How are you doing with your depression since your last visit? No change  How are you doing with your anxiety since your last visit?  Improved   Are you having other symptoms that might be associated with depression or anxiety? No  Have you had a significant life event? Health Concerns   Do you have any concerns with your use of alcohol or other drugs? No    Social History     Tobacco Use    Smoking status: Never     Passive exposure: Never    Smokeless " tobacco: Never   Vaping Use    Vaping Use: Never used   Substance Use Topics    Alcohol use: No    Drug use: No         4/18/2023     8:50 AM 12/19/2023     1:12 PM 1/4/2024     1:23 PM   PHQ   PHQ-9 Total Score 4 0 5   Q9: Thoughts of better off dead/self-harm past 2 weeks Not at all Not at all Not at all         2/7/2023     2:00 PM 12/19/2023     1:12 PM 1/4/2024     1:24 PM   DESTINEE-7 SCORE   Total Score   2 (minimal anxiety)   Total Score 7 0 2         1/4/2024     1:23 PM   Last PHQ-9   1.  Little interest or pleasure in doing things 0   2.  Feeling down, depressed, or hopeless 0   3.  Trouble falling or staying asleep, or sleeping too much 2   4.  Feeling tired or having little energy 3   5.  Poor appetite or overeating 0   6.  Feeling bad about yourself 0   7.  Trouble concentrating 0   8.  Moving slowly or restless 0   Q9: Thoughts of better off dead/self-harm past 2 weeks 0   PHQ-9 Total Score 5         1/4/2024     1:24 PM   DESTINEE-7    1. Feeling nervous, anxious, or on edge 1   2. Not being able to stop or control worrying 0   3. Worrying too much about different things 1   4. Trouble relaxing 0   5. Being so restless that it is hard to sit still 0   6. Becoming easily annoyed or irritable 0   7. Feeling afraid, as if something awful might happen 0   DESTINEE-7 Total Score 2   If you checked any problems, how difficult have they made it for you to do your work, take care of things at home, or get along with other people? Not difficult at all       Suicide Assessment Five-step Evaluation and Treatment (SAFE-T)    Hypothyroidism Follow-up  Since last visit, patient describes the following symptoms: Weight stable, no hair loss, no skin changes      Review of Systems   Constitutional, HEENT, cardiovascular, pulmonary, gi and gu systems are negative, except as otherwise noted.      Objective    /82 (BP Location: Left arm, Patient Position: Sitting, Cuff Size: Adult Regular)   Pulse 66   Temp 98.3  F (36.8  C)  (Tympanic)   Wt 84.3 kg (185 lb 12.8 oz)   SpO2 97%   BMI 33.98 kg/m    Body mass index is 33.98 kg/m .  Physical Exam   GENERAL: healthy, alert and no distress  PSYCH: mentation appears normal, affect normal/bright            Answers submitted by the patient for this visit:  Patient Health Questionnaire (Submitted on 1/4/2024)  If you checked off any problems, how difficult have these problems made it for you to do your work, take care of things at home, or get along with other people?: Not difficult at all  PHQ9 TOTAL SCORE: 5  DESTINEE-7 (Submitted on 1/4/2024)  DESTINEE 7 TOTAL SCORE: 2

## 2024-01-04 ENCOUNTER — OFFICE VISIT (OUTPATIENT)
Dept: FAMILY MEDICINE | Facility: OTHER | Age: 84
End: 2024-01-04
Attending: FAMILY MEDICINE
Payer: MEDICARE

## 2024-01-04 VITALS
TEMPERATURE: 98.3 F | WEIGHT: 185.8 LBS | DIASTOLIC BLOOD PRESSURE: 82 MMHG | BODY MASS INDEX: 33.98 KG/M2 | OXYGEN SATURATION: 97 % | SYSTOLIC BLOOD PRESSURE: 136 MMHG | HEART RATE: 66 BPM

## 2024-01-04 DIAGNOSIS — I10 BENIGN ESSENTIAL HYPERTENSION: Primary | ICD-10-CM

## 2024-01-04 DIAGNOSIS — F41.1 GENERALIZED ANXIETY DISORDER: ICD-10-CM

## 2024-01-04 DIAGNOSIS — N39.0 RECURRENT UTI: ICD-10-CM

## 2024-01-04 DIAGNOSIS — F33.0 MILD EPISODE OF RECURRENT MAJOR DEPRESSIVE DISORDER (H): ICD-10-CM

## 2024-01-04 LAB
ALBUMIN UR-MCNC: NEGATIVE MG/DL
ANION GAP SERPL CALCULATED.3IONS-SCNC: 14 MMOL/L (ref 7–15)
APPEARANCE UR: CLEAR
BACTERIA #/AREA URNS HPF: ABNORMAL /HPF
BILIRUB UR QL STRIP: NEGATIVE
BUN SERPL-MCNC: 16.4 MG/DL (ref 8–23)
CALCIUM SERPL-MCNC: 9.1 MG/DL (ref 8.8–10.2)
CHLORIDE SERPL-SCNC: 98 MMOL/L (ref 98–107)
COLOR UR AUTO: YELLOW
CREAT SERPL-MCNC: 1.15 MG/DL (ref 0.51–0.95)
DEPRECATED HCO3 PLAS-SCNC: 22 MMOL/L (ref 22–29)
EGFRCR SERPLBLD CKD-EPI 2021: 47 ML/MIN/1.73M2
GLUCOSE SERPL-MCNC: 90 MG/DL (ref 70–99)
GLUCOSE UR STRIP-MCNC: NEGATIVE MG/DL
HGB UR QL STRIP: NEGATIVE
HOLD SPECIMEN: NORMAL
KETONES UR STRIP-MCNC: NEGATIVE MG/DL
LEUKOCYTE ESTERASE UR QL STRIP: ABNORMAL
NITRATE UR QL: NEGATIVE
PH UR STRIP: 6.5 [PH] (ref 5–7)
POTASSIUM SERPL-SCNC: 4.4 MMOL/L (ref 3.4–5.3)
RBC #/AREA URNS AUTO: ABNORMAL /HPF
SODIUM SERPL-SCNC: 134 MMOL/L (ref 135–145)
SP GR UR STRIP: <=1.005 (ref 1–1.03)
SQUAMOUS #/AREA URNS AUTO: ABNORMAL /LPF
UROBILINOGEN UR STRIP-ACNC: 0.2 E.U./DL
WBC #/AREA URNS AUTO: >100 /HPF
WBC CLUMPS #/AREA URNS HPF: PRESENT /HPF

## 2024-01-04 PROCEDURE — G0463 HOSPITAL OUTPT CLINIC VISIT: HCPCS | Performed by: FAMILY MEDICINE

## 2024-01-04 PROCEDURE — 87186 SC STD MICRODIL/AGAR DIL: CPT | Mod: ZL | Performed by: FAMILY MEDICINE

## 2024-01-04 PROCEDURE — 36415 COLL VENOUS BLD VENIPUNCTURE: CPT | Mod: ZL | Performed by: FAMILY MEDICINE

## 2024-01-04 PROCEDURE — 81001 URINALYSIS AUTO W/SCOPE: CPT | Mod: ZL | Performed by: FAMILY MEDICINE

## 2024-01-04 PROCEDURE — 87086 URINE CULTURE/COLONY COUNT: CPT | Mod: ZL | Performed by: FAMILY MEDICINE

## 2024-01-04 PROCEDURE — 80048 BASIC METABOLIC PNL TOTAL CA: CPT | Mod: ZL | Performed by: FAMILY MEDICINE

## 2024-01-04 PROCEDURE — 81003 URINALYSIS AUTO W/O SCOPE: CPT | Mod: ZL | Performed by: FAMILY MEDICINE

## 2024-01-04 PROCEDURE — 99214 OFFICE O/P EST MOD 30 MIN: CPT | Performed by: FAMILY MEDICINE

## 2024-01-04 RX ORDER — MIRABEGRON 50 MG/1
50 TABLET, FILM COATED, EXTENDED RELEASE ORAL
Qty: 90 TABLET | Refills: 1 | Status: SHIPPED | OUTPATIENT
Start: 2024-01-04 | End: 2024-01-15

## 2024-01-04 ASSESSMENT — ANXIETY QUESTIONNAIRES
3. WORRYING TOO MUCH ABOUT DIFFERENT THINGS: SEVERAL DAYS
8. IF YOU CHECKED OFF ANY PROBLEMS, HOW DIFFICULT HAVE THESE MADE IT FOR YOU TO DO YOUR WORK, TAKE CARE OF THINGS AT HOME, OR GET ALONG WITH OTHER PEOPLE?: NOT DIFFICULT AT ALL
7. FEELING AFRAID AS IF SOMETHING AWFUL MIGHT HAPPEN: NOT AT ALL
IF YOU CHECKED OFF ANY PROBLEMS ON THIS QUESTIONNAIRE, HOW DIFFICULT HAVE THESE PROBLEMS MADE IT FOR YOU TO DO YOUR WORK, TAKE CARE OF THINGS AT HOME, OR GET ALONG WITH OTHER PEOPLE: NOT DIFFICULT AT ALL
GAD7 TOTAL SCORE: 2
5. BEING SO RESTLESS THAT IT IS HARD TO SIT STILL: NOT AT ALL
4. TROUBLE RELAXING: NOT AT ALL
6. BECOMING EASILY ANNOYED OR IRRITABLE: NOT AT ALL
2. NOT BEING ABLE TO STOP OR CONTROL WORRYING: NOT AT ALL
GAD7 TOTAL SCORE: 2
GAD7 TOTAL SCORE: 2
7. FEELING AFRAID AS IF SOMETHING AWFUL MIGHT HAPPEN: NOT AT ALL
1. FEELING NERVOUS, ANXIOUS, OR ON EDGE: SEVERAL DAYS

## 2024-01-04 ASSESSMENT — PAIN SCALES - GENERAL: PAINLEVEL: NO PAIN (0)

## 2024-01-04 ASSESSMENT — PATIENT HEALTH QUESTIONNAIRE - PHQ9
SUM OF ALL RESPONSES TO PHQ QUESTIONS 1-9: 5
SUM OF ALL RESPONSES TO PHQ QUESTIONS 1-9: 5
10. IF YOU CHECKED OFF ANY PROBLEMS, HOW DIFFICULT HAVE THESE PROBLEMS MADE IT FOR YOU TO DO YOUR WORK, TAKE CARE OF THINGS AT HOME, OR GET ALONG WITH OTHER PEOPLE: NOT DIFFICULT AT ALL

## 2024-01-04 NOTE — COMMUNITY RESOURCES LIST (ENGLISH)
01/04/2024   [facility]  N/A  For questions about this resource list or additional care needs, please contact your primary care clinic or care manager.  Phone: [phone]   Email: N/A   Address: [address] [place] [address]   Hours: N/A        Food and Nutrition       SNAP application assistance  1  [facility] [employer]  [place] Distance: 18.14 miles      Phone/Virtual   [address] [place] [address]  Language: English  Hours: Mon - Fri 8:00 AM - 4:30 PM  Fees: Free   Phone: (133) 599-8792 Website: https://www.[facility] [place].gov/aolrdlbbyka-g-d/[facility]-human-services/economic-services-supports     2  [facility] - [employer] Services [employer] Supp[employer] - [place] Distance: 24.77 miles      In-Person, Phone/Virtual   [address] [place] [address]  Language: English  Hours: Mon - Fri 8:00 AM - 4:30 PM  Fees: Free   Phone: (348) 606-8918 Email: financialassistance@[facility] Website: https://www.[facility] [place].gov/rfahhsmqmwy-a-q/[facility]-human-services/economic-services-supports          Important Numbers & Websites       Emergency Services   911  [facility]   311  Poison Control   (666) 615-5436  Suicide Prevention Lifeline   (396) 452-5752 (TALK)  Child Abuse Hotline   (849) 616-5137 (4-A-Child)  Sexual Assault Hotline   (855) 741-7329 (HOPE)  National Runaway Safeline   (689) 305-9622 (RUNAWAY)  All-Options Talkline   (396) 472-6054  Substance Abuse Referral   (866) 846-8900 (HELP)

## 2024-01-05 DIAGNOSIS — N39.0 ACUTE UTI: Primary | ICD-10-CM

## 2024-01-05 RX ORDER — CEPHALEXIN 500 MG/1
500 CAPSULE ORAL 3 TIMES DAILY
Qty: 21 CAPSULE | Refills: 0 | Status: SHIPPED | OUTPATIENT
Start: 2024-01-05 | End: 2024-01-12

## 2024-01-06 LAB — BACTERIA UR CULT: ABNORMAL

## 2024-01-15 ENCOUNTER — OFFICE VISIT (OUTPATIENT)
Dept: PODIATRY | Facility: OTHER | Age: 84
End: 2024-01-15
Attending: PODIATRIST
Payer: MEDICARE

## 2024-01-15 VITALS
DIASTOLIC BLOOD PRESSURE: 90 MMHG | RESPIRATION RATE: 18 BRPM | HEART RATE: 58 BPM | TEMPERATURE: 97.3 F | SYSTOLIC BLOOD PRESSURE: 152 MMHG | OXYGEN SATURATION: 99 %

## 2024-01-15 DIAGNOSIS — M79.672 LEFT FOOT PAIN: ICD-10-CM

## 2024-01-15 DIAGNOSIS — G57.62 LESION OF LEFT PLANTAR NERVE: ICD-10-CM

## 2024-01-15 DIAGNOSIS — M79.671 RIGHT FOOT PAIN: ICD-10-CM

## 2024-01-15 DIAGNOSIS — G57.61 LESION OF RIGHT PLANTAR NERVE: ICD-10-CM

## 2024-01-15 DIAGNOSIS — M20.5X2 ACQUIRED CLAW TOE OF LEFT FOOT: Primary | ICD-10-CM

## 2024-01-15 DIAGNOSIS — R20.8 BURNING SENSATION OF TOE AND FOOT: ICD-10-CM

## 2024-01-15 PROCEDURE — 99215 OFFICE O/P EST HI 40 MIN: CPT | Mod: 25 | Performed by: PODIATRIST

## 2024-01-15 PROCEDURE — 64455 NJX AA&/STRD PLTR COM DG NRV: CPT | Performed by: PODIATRIST

## 2024-01-15 PROCEDURE — 250N000011 HC RX IP 250 OP 636: Performed by: PODIATRIST

## 2024-01-15 PROCEDURE — G0463 HOSPITAL OUTPT CLINIC VISIT: HCPCS | Mod: 25 | Performed by: PODIATRIST

## 2024-01-15 RX ORDER — CEFAZOLIN SODIUM 2 G/100ML
2 INJECTION, SOLUTION INTRAVENOUS SEE ADMIN INSTRUCTIONS
Status: CANCELLED | OUTPATIENT
Start: 2024-01-15

## 2024-01-15 RX ORDER — DEXAMETHASONE SODIUM PHOSPHATE 10 MG/ML
10 INJECTION INTRAMUSCULAR; INTRAVENOUS ONCE
Status: COMPLETED | OUTPATIENT
Start: 2024-01-15 | End: 2024-01-15

## 2024-01-15 RX ORDER — CEFAZOLIN SODIUM 2 G/100ML
2 INJECTION, SOLUTION INTRAVENOUS
Status: CANCELLED | OUTPATIENT
Start: 2024-01-15

## 2024-01-15 RX ADMIN — DEXAMETHASONE SODIUM PHOSPHATE 10 MG: 10 INJECTION INTRAMUSCULAR; INTRAVENOUS at 14:36

## 2024-01-15 ASSESSMENT — PAIN SCALES - GENERAL: PAINLEVEL: EXTREME PAIN (8)

## 2024-01-15 NOTE — PROGRESS NOTES
Chief complaint: Patient presents with:  Wound Check: Recheck Bunion site      History of Present Illness: This 83 year old female is seen at the request of No ref. provider found for evaluation and suggestions of management of bilateral foot pain. She had a sudden, electricity pain at night and she couldn't get it to go away. It hurt to touch the foot and it was only at night. She had two pain killers remaining from a previous surgery but it did not make the pain go away. The pain went on all night but it completely resolved in the morning. This only occurred in one night a couple weeks ago in early January, 2024 and it has not occurred since.    She is also requesting injections for her bilateral neuromas because they have been giving her pain for a long time.    She has CMOs from [facility] in 2021, but she is not wearing them often because she has multiple pairs of shoes she wears around the house and outside the house. She does not think it is helping a lot.    Her primary pain, however, has been from the plantar LEFT fourth toe. The fourth toe is curled and stuck in a curled position. The toe bends into the ground and often causes blistering or open wounds on the plantar lateral fourth toe. She has pain on a daily basis from the pressure on the plantar toe. She has tried wearing toe sleeves and padding the toe with cotton balls, but she has consistently, daily pain in the toe. She would like it surgically corrected and she would like to review treatment options today.    No further pedal complaints today.       Historically, patient had a LEFT foot Bethea bunionectomy with interpositional graft in the 1st MTPJ (DOS 09/21/2020).      Patient also had a RIGHT foot 1st MTPJ fusion, Hammertoe correction digits 2-5, and two neuroma removals in 2014 by another provider.      BP (!) 154/96 (BP Location: Left arm, Patient Position: Sitting)   Pulse 58   Temp 97.3  F (36.3  C) (Tympanic)    Resp 18   SpO2 99%     Patient Active Problem List   Diagnosis    Hypothyroidism    Generalized anxiety disorder    Benign essential hypertension    GERD (gastroesophageal reflux disease)    Hepatitis    Contact dermatitis    Rosacea    Osteoarthritis    Fibromyalgia    Open wound of knee, leg, and ankle    Degeneration of lumbar or lumbosacral intervertebral disc    Anemia    Autoimmune hepatitis (H)    Morbid obesity (H)    Mild major depression (H)    Hallux valgus of left foot    Left foot pain    Ametropia    Decreased vision of right eye    Dry eye syndrome of both eyes    PCO (posterior capsular opacification), left    Ptosis of both eyelids    Esodeviation    S/p nephrectomy    Stage 3b chronic kidney disease (H)    Overactive bladder    Chalazion of right upper eyelid    Basal cell carcinoma (BCC) of skin of left lower extremity including hip    Mild episode of recurrent major depressive disorder (H24)    Acute right-sided low back pain with right-sided sciatica    Back stiffness    Muscle weakness    Chronic systolic congestive heart failure (H)       Past Surgical History:   Procedure Laterality Date    APPENDECTOMY      ARTHROPLASTY TOE(S) Left 9/21/2020    Procedure: Left  Bethea Arthroplasty with interpositional arthroplasty using graft.;  Surgeon: Gina Rodriguez DPM;  Location: [facility]    BACK SURGERY  2015    lumbar epidural injection    CARDIAC SURGERY  10/2017    angioplasty    CHOLECYSTECTOMY      COLONOSCOPY  07/27/2017    [facility]    COLONOSCOPY - HIM SCAN  05/15/2001    Small internal hemorrhoids; otherwise normal;[facility]    COLONOSCOPY - HIM SCAN  12/14/2006    Colonoscopy, [facility], [facility]AYLON    ENT SURGERY      tonsillectomy    ESOPHAGOGASTRODUODENOSCOPY  07/27/2017    [facility]    EXCISE LESION LOWER EXTREMITY Left 4/19/2022    Procedure: Excision Left Medial Lower Leg Ulceration;  Surgeon: Kai Huynh MD;  Location: [facility]    EXCISE LESION LOWER EXTREMITY Left 9/13/2022    Procedure:  Wide local excision of left posterior leg;  Surgeon: Kai Huynh MD;  Location: [facility]    EXCISE LESION UPPER EXTREMITY Right 4/19/2022    Procedure: Excision lesion right upper extremity;  Surgeon: Kai Huynh MD;  Location: [facility]    EYE SURGERY      bilateral cataract removal    GI SURGERY      anal fistula    GYN SURGERY  1985    [facility]    GYN SURGERY      cessarian x 2    GYN SURGERY      tubal sterilazation    NEPHRECTOMY Right 05/02/2019    ORTHOPEDIC SURGERY      right knee    ORTHOPEDIC SURGERY  1986    plantar fascia release    ORTHOPEDIC SURGERY      left knee    ORTHOPEDIC SURGERY      right trigger finger release    ORTHOPEDIC SURGERY  2013    Right great toe MTPJ fusion, adductor tenotomy,correction of hammer toe    ORTHOPEDIC SURGERY  [id number]    multiple procedures left forefoot       Current Outpatient Medications   Medication    aspirin (ASA) 81 MG chewable tablet    conjugated estrogens (PREMARIN) 0.625 MG/GM vaginal cream    cyclobenzaprine (FLEXERIL) 10 MG tablet    fluticasone (FLONASE) 50 MCG/ACT nasal spray    GEMTESA 75 MG TABS tablet    hydrocortisone 2.5 % cream    levothyroxine (SYNTHROID/LEVOTHROID) 100 MCG tablet    losartan (COZAAR) 100 MG tablet    melatonin 1 MG TABS tablet    nystatin (MYCOSTATIN) 426483 UNIT/GM external cream    nystatin (MYCOSTATIN) 755436 UNIT/GM external powder    omeprazole (PRILOSEC) 40 MG capsule    polyethylene glycol (MIRALAX) 17 g packet    propranolol (INDERAL) 10 MG tablet    budesonide (ENTOCORT EC) 3 MG EC capsule     No current facility-administered medications for this visit.          Allergies   Allergen Reactions    Fentanyl Other (See Comments)     Severe agitation when used during angiogram    Codeine Sulfate Other (See Comments)     hallucinations    Fentanyl And Related      Other reaction(s): Confusion    Morphine Other (See Comments)     Hallucinations with iv therapy    Tape [Adhesive Tape]     Tramadol Other (See Comments)      hallucination       Family History   Problem Relation Age of Onset    Arthritis Mother         rheumatoid    Heart Disease Mother         CHF    Alcohol/Drug Father         alcoholism    Allergies Father     Thyroid Disease Other     Diabetes No family hx of     Asthma No family hx of        Social History     Socioeconomic History    Marital status:      Spouse name: None    Number of children: None    Years of education: None    Highest education level: None   Tobacco Use    Smoking status: Never     Passive exposure: Never    Smokeless tobacco: Never   Vaping Use    Vaping Use: Never used   Substance and Sexual Activity    Alcohol use: No    Drug use: No    Sexual activity: Never   Other Topics Concern    Parent/sibling w/ CABG, MI or angioplasty before 65F 55M? No    Blood Transfusions Yes    Caffeine Concern Yes     Comment: soda rare     Social Determinants of Health     Financial Resource Strain: Low Risk  (1/4/2024)    Financial Resource Strain     Within the past 12 months, have you or your family members you live with been unable to get utilities (heat, electricity) when it was really needed?: No   Food Insecurity: High Risk (1/4/2024)    Food Insecurity     Within the past 12 months, did you worry that your food would run out before you got money to buy more?: Yes     Within the past 12 months, did the food you bought just not last and you didn t have money to get more?: Yes   Transportation Needs: Low Risk  (1/4/2024)    Transportation Needs     Within the past 12 months, has lack of transportation kept you from medical appointments, getting your medicines, non-medical meetings or appointments, work, or from getting things that you need?: No   Interpersonal Safety: Low Risk  (10/6/2023)    Interpersonal Safety     Do you feel physically and emotionally safe where you currently live?: Yes     Within the past 12 months, have you been hit, slapped, kicked or otherwise physically hurt by someone?: No      Within the past 12 months, have you been humiliated or emotionally abused in other ways by your partner or ex-partner?: No   Housing Stability: Low Risk  (1/4/2024)    Housing Stability     Do you have housing? : Yes     Are you worried about losing your housing?: No       ROS: 10 point ROS neg other than the symptoms noted above in the HPI.  EXAM  Constitutional: healthy, alert, and no distress    Psychiatric: mentation appears normal and affect normal/bright    VASCULAR:  -Dorsalis pedis pulse +2/4 bilaterally   -Posterior tibial pulse +1/4 bilaterally   -Moderate 2+ non-pitting edema to ankle and dorsum of foot bilaterally   NEURO:  -Light touch sensation intact to b/l plantar forefoot  DERM:  -Cicatrix to the bilateral dorsal first ray and LEFT 2nd and 3rd digits, LEFT foot  -Mild, blanchable erythema to the L distal plantar fourth toe0     MSK:  -Partially rigid flexion contracture of the LEFT fourth toe PIPJ and DIPJ    -Pain on palpation to the LEFT distal lateral fourth toe  -Pain on palpation to the bilateral second and third intermetatarsal interspace    -Mild lateral deviation of LEFT hallux with the hallux rubbing against the medial 2nd digit but not overlapping the digit  -Multiple medial and lateral deviations at the IPJs of the lesser digits  -LEFT 2nd and 3rd digit with no ROM at PIPJ (previous fusions)    -Moderate decrease in arch height while patient is NWB     ============================================================    ASSESSMENT:  (M20.5X2) Acquired claw toe of left foot  (primary encounter diagnosis)  (G57.61) Lesion of right plantar nerve  (G57.62) Lesion of left plantar nerve  (M79.672) Left foot pain  (M79.671) Right foot pain  (R20.8) Burning sensation of toe and foot      PLAN:  -Patient evaluated and examined. Treatment options discussed with no educational barriers noted.  -Neuropathy pain education:   ---Patient's description of foot pain in the RIGHT medial 1st ray is more a  description of nerve pain than musculoskeletal pain. Explained and educated the patient on the difference between musculoskeletal pain and neuropathy pain. Msk pain is usually caused from more muscle fatigue and tendon and ligament strains in the foot. Nerve pain can be caused my a multitude of factors. These factors are not limited to the following but can include include diabetes, back injuries, and certain medications. This can be more challenging to treat since it is not an injury or musculoskeletal issue that may respond better to an insert or injection.  ---Will attempt Capsaicin for the feet, but if he does not notice any improvement, he is advised to discuss neuropathy medication management options with the patient's PCP.   -Capsaicin cream can be purchased OTC.  ---Only apply this with gloves on your hands  ---The cream will burn so so start with a small amount  ---Try to use this for 2-3 weeks before deciding if it does or does not work. If it works well, you may continue to use this  ---Apply socks immediately after applying the cream to your feet   ---Wash hands thoroughly after applying the cream  ---if cream is not decreasing the night pain, see your primary care provider to discuss medications to help with that night pain  Claw toe:  ---Discussed etiology and treatment options for claw toes.  ---Discussed how this deformity can progress and what can be done to treat the deformity. Biomechanics such as flat feet, a tight Achilles tendons, pronation, supination, and other factors can lead to the formation of hammertoes as tendons become stronger on one side of the toe over the other side of the toe.  ---Conservative treatment options consist of wider shoe gear / shoes with more depth, and padding/splinting to accommodate the painful toe (such as toe sleeves or crest pads).  --Size small silicone toe sleeves can reduce rubbing on the tops and tips of the toes. Patient may find these at [employer] or [employer].  "Patient should not wear the toe sleeve(s) at night, but only wear the sleeve in shoes and/or socks during the day. The sleeves are re-usable and hand washable until they wear out.    ---Discussed surgical treatment options including risks and benefits and post-op periods. Hammertoes can be straightened with an implant and/or K-wire. A K-wire is sometimes needed to hold the toe in a plantarflexed position so it does not lift at the MTPJ. The wire may need to be in the foot for up to six weeks and requires offloading of the surgical foot. Patient cannot drive if the surgery is on the RIGHT foot until the pins are removed.   ---In cases where there is a severe hammertoe causing pain and offloading is not an option, a toe amputation or partial toe amputation may be considered. However, multiple conservative treatment options are available and should be exhausted before proceeding with surgery.     ---At this time, patient would like to proceed with a claw toe correction by straightening the toe. Explained how surgically correcting a claw toe can cause a very stiff, rigid, toe because both joint spaces are fused. She would like to have this performed because the bending of the toe causes her moderate discomfort.    -Potential surgical complications with hammertoes: Toe surgeries can include multiple complications: One tendon splits and attaches to all the lesser digits, so other toes can curl more or become more prominent hammertoes if only one toe is surgically corrected; the hammertoe may be fixed and another joint space may later curl (such as the PIPJ may be fused and the DIPJ later bends); the toe can be considered \"too straight\" by some people since toes often have a mild natural bend a the IPJs, so the surgical toe will look much more straight than the other toes; there can be contractures at the MTPJ of the digit that is corrected; the fusion site at the joint may not fully fuse and the toe can become bent again " or become a more floppy toe; hardware complications such as a k-wire may be pushed too far into the toe if the toe hits an object while the wire is in the toe; post-op infection including around the pin site (any infection can be life threatening or lead to the need for amputations).     -Discussed conservative and treatment options with patient. The patient has exhausted conservative treatment including toe sleeves and cotton balls and would like to proceed with surgery.  ---Discussed risks and benefits of a claw toe procedure. Benefits include decreased pain for a more functional lifestyle.     -Discussed potential risks of surgery. Reviewed with patient that the follow potential risks of surgery are not all extremely common risks of foot surgery. However, there are potential risks associated with any surgery.  -Potential risks could include but are not limited to:   ----Post-op infection (severe bone infections can lead to amputations and can be life threatening)  ---Malunion of bone(s)  ---Non-union of bone(s)  ---Displaced hardware or hardware complications (breaking of hardware with either temporary fixation during the surgery or any hardware left in the foot after surgery)  ---Painful hardware  ---Overcorrection of deformity leading to a new deformity of the foot  ---Continued foot pain after healing from the surgery  ---The potential need for a revision  ---Redness and/or swelling and/or infection of surgical site(s)  ---Failure of incisions and / or bones to heal  ---Transfer lesions or calluses or transfer pain to other areas of the foot  ---Recurrence of the deformity  ---Painful or large scars  ---Condition / disability may get worse  ---Fracture / break or dislocation of bone  ---Swollen toe / stiff toe / lifted toe  ---Difficulty walking / wearing shoes / performing daily acitivites  ---Damage to nerves (burning, tingling, and numbness) -- may be temporary or permanent  ---Loss of toe, foot, limb or  life  ---Permanent swelling / enlargement of toe, foot or leg  ---More treatment or surgery may be needed in the future  ---Significant or permanent pain (such as CRPS)  ---Blood clots (especially in the legs or lungs which can cause death)  ---Allergic reactions from implants or sutures or dressing materials  ---Complications from anesthesia (will be reviewed with anesthesia the day of surgery)  ---UTI, heart attack, stroke, or death.     -Patient will be offloaded with a short leg CAM boot post surgery   -Patient will need an H&P by his PCP within 30 days of the surgery and a COVID test four days before surgery  -Percocet  5/325mg will be ordered the day of surgery. Pain medication will not be refilled past two weeks post-surgery.    -Surgery pre-op orders placed on 01/15/2024    Surgery: LEFT fourth claw toe correction  DOS: 02/12/2024  Reason: LEFT fourth claw toe  Anesthesia: MAC with local  Time: 30 minutes  Equipment: K-wire, C-arm     LEFT foot radiographs ordered on 01/15/2024 -- patient to obtain prior to surgery   Total time spent preparing to see the patient, review of chart, obtaining history and physical examination, review of x-rays,  treatment options, education, discussion with patient and documenting in Epic / EMR was 43 minutes. This total time did not include the 7 minutes spent performing the separately reportable procedure. All time involved was spent on the day of service for the patient (the same day as the patient's appointment).  ----------------------------------------------------------------  -Discussed neuroma pain and treatment options:  ---Etiology usually starts with the biomechanics of the patient's foot. Patient has hammering of the digits which contributes to the intermetatarsal nerves being pinched which can then develop into a neuroma. Tight shoe gear can also contribute to this pain. Pointed out on the patient's foot how this can worsen neuromas and pain caused by them. She  would like to proceed with a steroid injection.    -Steroid injection x 4 to the bilateral second and third intermetatarsal head interspace:   -Obtained written and verbal consent from patient for a steroid injection into the above location(s). Reviewed risks and benefits of the injection. Informed patient that the injection may decrease pain long-term, short-term, or not at all. A steroid injection can potentially weaken the surrounding structures of the injected site such as weaken the insertion of nearby tendons and cartilage or thin cartilage. The goal of the injection, however, is to reduce the inflammation to stop the chronic inflammatory cycle. The injection in combination with other treatment options may help reduce pain. Some people develop an increased flare of pain within a few days of the injection which usually resolves. Patient understands risks and benefits of the injection and is in agreement with an injection today in an effort to slow or reverse the chronic inflammatory process and pain in the foot.   ---Patient signed informed consent and a time-out was performed and there was verification of correct patient, procedure and laterality.  ---Prepped the injection site with an alcohol swab.  ---Injected a total of 5mg Dexamethasone and 0.25mL of 2% Lidocaine plain in the RIGHT second intermetatarsal head interspace.  ---Injected a total of 5mg Dexamethasone and 0.25mL of 2% Lidocaine plain in the RIGHT third intermetatarsal head interspace.  ---Injected a total of 5mg Dexamethasone and 0.25mL of 2% Lidocaine plain in the LEFT second intermetatarsal head interspace.  ---Injected a total of 5mg Dexamethasone and 0.25mL of 2% Lidocaine plain in the LEFT third intermetatarsal head interspace.  . Patient tolerated the injection(s) well.    ------------------------------------------------------------------------    -Patient and her  are in agreement with the above plan and all questions were answered  to satisfaction.        RTC as needed per patient request -- patient will call if her pain worsens      Gina Rodriguez DPM

## 2024-01-18 ENCOUNTER — TELEPHONE (OUTPATIENT)
Dept: FAMILY MEDICINE | Facility: OTHER | Age: 84
End: 2024-01-18

## 2024-01-18 DIAGNOSIS — N39.0 ACUTE UTI: Primary | ICD-10-CM

## 2024-01-18 NOTE — TELEPHONE ENCOUNTER
11:08 AM    Reason for Call: Phone Call    Description: Patient called in stating that she finished the medication for a UTI on Sunday and is still having symptoms. Please call patient back.     Was an appointment offered for this call? No  If yes : Appointment type              Date    Preferred method for responding to this message: Telephone Call  What is your phone number ? [phone]     If we cannot reach you directly, may we leave a detailed response at the number you provided? Yes    Can this message wait until your PCP/provider returns, if available today? Not applicable, PROVIDER IN CLINIC    Kerri Tavares

## 2024-01-19 ENCOUNTER — LAB (OUTPATIENT)
Dept: LAB | Facility: OTHER | Age: 84
End: 2024-01-19
Payer: MEDICARE

## 2024-01-19 DIAGNOSIS — N39.0 ACUTE UTI: ICD-10-CM

## 2024-01-19 LAB
ALBUMIN UR-MCNC: NEGATIVE MG/DL
APPEARANCE UR: ABNORMAL
BACTERIA #/AREA URNS HPF: ABNORMAL /HPF
BILIRUB UR QL STRIP: NEGATIVE
COLOR UR AUTO: YELLOW
GLUCOSE UR STRIP-MCNC: NEGATIVE MG/DL
HGB UR QL STRIP: ABNORMAL
KETONES UR STRIP-MCNC: NEGATIVE MG/DL
LEUKOCYTE ESTERASE UR QL STRIP: ABNORMAL
NITRATE UR QL: NEGATIVE
PH UR STRIP: 7 [PH] (ref 5–7)
RBC #/AREA URNS AUTO: ABNORMAL /HPF
SP GR UR STRIP: 1.01 (ref 1–1.03)
SQUAMOUS #/AREA URNS AUTO: ABNORMAL /LPF
UROBILINOGEN UR STRIP-ACNC: 0.2 E.U./DL
WBC #/AREA URNS AUTO: ABNORMAL /HPF

## 2024-01-19 PROCEDURE — 87086 URINE CULTURE/COLONY COUNT: CPT | Mod: ZL

## 2024-01-19 PROCEDURE — 81003 URINALYSIS AUTO W/O SCOPE: CPT | Mod: ZL

## 2024-01-19 PROCEDURE — 81001 URINALYSIS AUTO W/SCOPE: CPT | Mod: ZL

## 2024-01-19 PROCEDURE — 87186 SC STD MICRODIL/AGAR DIL: CPT | Mod: ZL

## 2024-01-21 LAB — BACTERIA UR CULT: ABNORMAL

## 2024-01-22 DIAGNOSIS — N39.0 ACUTE UTI: Primary | ICD-10-CM

## 2024-01-22 RX ORDER — CEPHALEXIN 500 MG/1
500 CAPSULE ORAL 3 TIMES DAILY
Qty: 30 CAPSULE | Refills: 0 | Status: SHIPPED | OUTPATIENT
Start: 2024-01-22 | End: 2024-02-01

## 2024-01-30 ENCOUNTER — OFFICE VISIT (OUTPATIENT)
Dept: FAMILY MEDICINE | Facility: OTHER | Age: 84
End: 2024-01-30
Attending: FAMILY MEDICINE
Payer: MEDICARE

## 2024-01-30 VITALS
SYSTOLIC BLOOD PRESSURE: 134 MMHG | BODY MASS INDEX: 33.6 KG/M2 | HEART RATE: 66 BPM | TEMPERATURE: 97.1 F | DIASTOLIC BLOOD PRESSURE: 78 MMHG | OXYGEN SATURATION: 97 % | RESPIRATION RATE: 18 BRPM | HEIGHT: 62 IN | WEIGHT: 182.6 LBS

## 2024-01-30 DIAGNOSIS — E03.9 HYPOTHYROIDISM, UNSPECIFIED TYPE: ICD-10-CM

## 2024-01-30 DIAGNOSIS — Z01.818 PRE-OPERATIVE GENERAL PHYSICAL EXAMINATION: Primary | ICD-10-CM

## 2024-01-30 DIAGNOSIS — I50.22 CHRONIC SYSTOLIC CONGESTIVE HEART FAILURE (H): ICD-10-CM

## 2024-01-30 DIAGNOSIS — I10 BENIGN ESSENTIAL HYPERTENSION: ICD-10-CM

## 2024-01-30 DIAGNOSIS — F41.1 GENERALIZED ANXIETY DISORDER: ICD-10-CM

## 2024-01-30 DIAGNOSIS — F33.0 MILD EPISODE OF RECURRENT MAJOR DEPRESSIVE DISORDER (H): ICD-10-CM

## 2024-01-30 DIAGNOSIS — M20.5X2 CLAW TOE, LEFT: ICD-10-CM

## 2024-01-30 DIAGNOSIS — N18.32 STAGE 3B CHRONIC KIDNEY DISEASE (H): ICD-10-CM

## 2024-01-30 LAB
ANION GAP SERPL CALCULATED.3IONS-SCNC: 14 MMOL/L (ref 7–15)
ATRIAL RATE - MUSE: NORMAL BPM
BUN SERPL-MCNC: 16.3 MG/DL (ref 8–23)
CALCIUM SERPL-MCNC: 9 MG/DL (ref 8.8–10.2)
CHLORIDE SERPL-SCNC: 91 MMOL/L (ref 98–107)
CREAT SERPL-MCNC: 1.14 MG/DL (ref 0.51–0.95)
DEPRECATED HCO3 PLAS-SCNC: 24 MMOL/L (ref 22–29)
DIASTOLIC BLOOD PRESSURE - MUSE: NORMAL MMHG
EGFRCR SERPLBLD CKD-EPI 2021: 47 ML/MIN/1.73M2
ERYTHROCYTE [DISTWIDTH] IN BLOOD BY AUTOMATED COUNT: 13.1 % (ref 10–15)
GLUCOSE SERPL-MCNC: 89 MG/DL (ref 70–99)
HCT VFR BLD AUTO: 40.6 % (ref 35–47)
HGB BLD-MCNC: 14 G/DL (ref 11.7–15.7)
INTERPRETATION ECG - MUSE: NORMAL
MCH RBC QN AUTO: 30.8 PG (ref 26.5–33)
MCHC RBC AUTO-ENTMCNC: 34.5 G/DL (ref 31.5–36.5)
MCV RBC AUTO: 89 FL (ref 78–100)
P AXIS - MUSE: NORMAL DEGREES
PLATELET # BLD AUTO: 204 10E3/UL (ref 150–450)
POTASSIUM SERPL-SCNC: 4.6 MMOL/L (ref 3.4–5.3)
PR INTERVAL - MUSE: NORMAL MS
QRS DURATION - MUSE: 100 MS
QT - MUSE: 690 MS
QTC - MUSE: 706 MS
R AXIS - MUSE: -49 DEGREES
RBC # BLD AUTO: 4.55 10E6/UL (ref 3.8–5.2)
SODIUM SERPL-SCNC: 129 MMOL/L (ref 135–145)
SYSTOLIC BLOOD PRESSURE - MUSE: NORMAL MMHG
T AXIS - MUSE: -38 DEGREES
T4 FREE SERPL-MCNC: 1.34 NG/DL (ref 0.9–1.7)
TSH SERPL DL<=0.005 MIU/L-ACNC: 17.59 UIU/ML (ref 0.3–4.2)
VENTRICULAR RATE- MUSE: 63 BPM
WBC # BLD AUTO: 10.7 10E3/UL (ref 4–11)

## 2024-01-30 PROCEDURE — G0463 HOSPITAL OUTPT CLINIC VISIT: HCPCS | Mod: 25

## 2024-01-30 PROCEDURE — 84439 ASSAY OF FREE THYROXINE: CPT | Mod: ZL | Performed by: FAMILY MEDICINE

## 2024-01-30 PROCEDURE — 84443 ASSAY THYROID STIM HORMONE: CPT | Mod: ZL | Performed by: FAMILY MEDICINE

## 2024-01-30 PROCEDURE — 80048 BASIC METABOLIC PNL TOTAL CA: CPT | Mod: ZL | Performed by: FAMILY MEDICINE

## 2024-01-30 PROCEDURE — 99214 OFFICE O/P EST MOD 30 MIN: CPT | Performed by: FAMILY MEDICINE

## 2024-01-30 PROCEDURE — 93010 ELECTROCARDIOGRAM REPORT: CPT | Mod: 77 | Performed by: INTERNAL MEDICINE

## 2024-01-30 PROCEDURE — 36415 COLL VENOUS BLD VENIPUNCTURE: CPT | Mod: ZL | Performed by: FAMILY MEDICINE

## 2024-01-30 PROCEDURE — 93005 ELECTROCARDIOGRAM TRACING: CPT | Performed by: FAMILY MEDICINE

## 2024-01-30 PROCEDURE — G0463 HOSPITAL OUTPT CLINIC VISIT: HCPCS

## 2024-01-30 PROCEDURE — 85027 COMPLETE CBC AUTOMATED: CPT | Mod: ZL | Performed by: FAMILY MEDICINE

## 2024-01-30 ASSESSMENT — PATIENT HEALTH QUESTIONNAIRE - PHQ9: SUM OF ALL RESPONSES TO PHQ QUESTIONS 1-9: 0

## 2024-01-30 ASSESSMENT — PAIN SCALES - GENERAL: PAINLEVEL: MILD PAIN (2)

## 2024-01-30 NOTE — PROGRESS NOTES
Preoperative Evaluation  [facility]  [address]  [place] [address]  Phone: [phone]  Primary Provider: Gerald Becerril  Pre-op Performing Provider: GERALD BECERRIL  Jan 30, 2024       Ileana is a 83 year old, presenting for the following:  Pre-Op Exam      Surgical Information  Surgery/Procedure: Left Fourth Claw Toe Correction   Surgery Location: [facility]  Surgeon: Dr. Jania Rodriguez  Surgery Date: 2/12/24  Time of Surgery: TBD  Where patient plans to recover: At home with family  Fax number for surgical facility: Note does not need to be faxed, will be available electronically in Epic.    Assessment & Plan     The proposed surgical procedure is considered INTERMEDIATE risk.      ICD-10-CM    1. Pre-operative general physical examination  Z01.818 EKG 12-lead complete w/read - (Clinic Performed)     CBC with platelets     Basic metabolic panel     CBC with platelets     Basic metabolic panel      2. Claw toe, left  M20.5X2       3. Chronic systolic congestive heart failure (H)  I50.22       4. Mild episode of recurrent major depressive disorder (H24)  F33.0       5. Generalized anxiety disorder  F41.1       6. Benign essential hypertension  I10       7. Hypothyroidism, unspecified type  E03.9 TSH with free T4 reflex     TSH with free T4 reflex     T4 free      8. Stage 3b chronic kidney disease (H)  N18.32                    - No identified additional risk factors other than previously addressed    Antiplatelet or Anticoagulation Medication Instructions  Hold all ASA/NSAIDs/Vitamins/Supplements 7-10 days prior to procedure     Additional Medication Instructions   - ACE/ARB: Continue without modification (e.g., MAC anesthesia, neurosurgery, spine surgery, heart failure, or labile hypertension with risk of hypertension).   - Beta Blockers: Continue taking on the day of surgery.    Recommendation  APPROVAL GIVEN to proceed with proposed  procedure, without further diagnostic evaluation.        Subjective       HPI related to upcoming procedure: Pain and recurrent wounds of left 4th toe          1/30/2024     1:12 PM   Preop Questions   1. Have you ever had a heart attack or stroke? No   2. Have you ever had surgery on your heart or blood vessels, such as a stent placement, a coronary artery bypass, or surgery on an artery in your head, neck, heart, or legs? No   3. Do you have chest pain with activity? No   4. Do you have a history of  heart failure? No   5. Do you currently have a cold, bronchitis or symptoms of other infection? No   6. Do you have a cough, shortness of breath, or wheezing? No   7. Do you or anyone in your family have previous history of blood clots? No   8. Do you or does anyone in your family have a serious bleeding problem such as prolonged bleeding following surgeries or cuts? No   9. Have you ever had problems with anemia or been told to take iron pills? No   10. Have you had any abnormal blood loss such as black, tarry or bloody stools, or abnormal vaginal bleeding? No   11. Have you ever had a blood transfusion? YES - during pregnancy    11a. Have you ever had a transfusion reaction? No   12. Are you willing to have a blood transfusion if it is medically needed before, during, or after your surgery? Yes   13. Have you or any of your relatives ever had problems with anesthesia? No   14. Do you have sleep apnea, excessive snoring or daytime drowsiness? No   15. Do you have any artifical heart valves or other implanted medical devices like a pacemaker, defibrillator, or continuous glucose monitor? No   16. Do you have artificial joints? YES - bilateral knees   17. Are you allergic to latex? No     Health Care Directive  Patient has a Health Care Directive on file      Preoperative Review of    reviewed - no record of controlled substances prescribed.      Status of Chronic Conditions:  CHF - Patient has a longstanding  history of moderate-severe CHF. Exacerbating conditions include hypertension. Currently the patient's condition is same. Current treatment regimen includes Angiotensin 2 receptor blocker and ASA. The patient denies chest pain, edema, orthopnea, SOB or recent weight gain.     DEPRESSION - Patient has a long history of Depression of moderate severity with recent symptoms being stable..Current symptoms of depression include none.     HYPERTENSION - Patient has longstanding history of HTN , currently denies any symptoms referable to elevated blood pressure. Specifically denies chest pain, palpitations, dyspnea, orthopnea, PND or peripheral edema. Blood pressure readings have been in normal range. Current medication regimen is as listed below. Patient denies any side effects of medication.     HYPOTHYROIDISM - Patient has a longstanding history of chronic Hypothyroidism. Patient has been doing well, noting no tremor, insomnia, hair loss or changes in skin texture. Continues to take medications as directed, without adverse reactions or side effects. Last TSH   Lab Results   Component Value Date    TSH 17.59 (H) 01/30/2024   .  TSH was rechecked in October, normal level (2.80)    RENAL INSUFFICIENCY - Patient has a longstanding history of moderate-severe chronic renal insufficiency. Last Cr 1.15.     Patient Active Problem List    Diagnosis Date Noted    Chronic systolic congestive heart failure (H) 12/19/2023     Priority: Medium    Mild episode of recurrent major depressive disorder (H24) 02/07/2023     Priority: Medium    Acute right-sided low back pain with right-sided sciatica 10/20/2022     Priority: Medium    Back stiffness 10/20/2022     Priority: Medium    Muscle weakness 10/20/2022     Priority: Medium    Basal cell carcinoma (BCC) of skin of left lower extremity including hip 09/09/2022     Priority: Medium    Overactive bladder 03/16/2022     Priority: Medium    Chalazion of right upper eyelid 09/02/2021      Priority: Medium    Stage 3b chronic kidney disease (H) 06/22/2021     Priority: Medium    S/p nephrectomy 05/14/2021     Priority: Medium    Esodeviation 12/17/2020     Priority: Medium    Hallux valgus of left foot 09/08/2020     Priority: Medium    Left foot pain 09/08/2020     Priority: Medium    Ametropia 09/01/2020     Priority: Medium    Decreased vision of right eye 09/01/2020     Priority: Medium    Dry eye syndrome of both eyes 09/01/2020     Priority: Medium    PCO (posterior capsular opacification), left 09/01/2020     Priority: Medium    Ptosis of both eyelids 09/01/2020     Priority: Medium     suspect lev deh RT greater than LT      Mild major depression (H) 04/26/2018     Priority: Medium    Morbid obesity (H) 08/28/2017     Priority: Medium    Autoimmune hepatitis (H) 06/02/2016     Priority: Medium    Anemia 08/20/2015     Priority: Medium    Degeneration of lumbar or lumbosacral intervertebral disc 01/30/2015     Priority: Medium    Open wound of knee, leg, and ankle      Priority: Medium    Hypothyroidism 03/27/2013     Priority: Medium    Generalized anxiety disorder 03/27/2013     Priority: Medium    Benign essential hypertension 03/27/2013     Priority: Medium    GERD (gastroesophageal reflux disease) 03/27/2013     Priority: Medium    Hepatitis 03/27/2013     Priority: Medium    Contact dermatitis 03/27/2013     Priority: Medium    Rosacea 03/27/2013     Priority: Medium    Osteoarthritis 03/27/2013     Priority: Medium    Fibromyalgia 03/27/2013     Priority: Medium      Past Medical History:   Diagnosis Date    Anemia 8/20/2015    Autoimmune hepatitis (H) 6/2/2016    Benign paroxysmal positional vertigo 10/7/2010    Contact dermatitis and other eczema, due to unspecified cause 10/7/2010    Esophageal reflux 10/7/2010    Generalized anxiety disorder 10/7/2010    Generalized osteoarthrosis, involving multiple sites 10/7/2010    Hepatitis, unspecified 10/7/2010    Infected wound     Myalgia  and myositis, unspecified 10/7/2010    fibromyalgia    Nonallopathic lesion of cervical region, not elsewhere classified 12/5/2001    Nonallopathic lesion of thoracic region, not elsewhere classified 3/31/2005    Open wound of knee, leg, and ankle     Rosacea 10/7/2010    Unspecified essential hypertension 10/7/2010    Unspecified hypothyroidism 10/7/2010     Past Surgical History:   Procedure Laterality Date    APPENDECTOMY      ARTHROPLASTY TOE(S) Left 9/21/2020    Procedure: Left  Bethea Arthroplasty with interpositional arthroplasty using graft.;  Surgeon: Gina Rodriguez DPM;  Location: [facility]    BACK SURGERY  2015    lumbar epidural injection    CARDIAC SURGERY  10/2017    angioplasty    CHOLECYSTECTOMY      COLONOSCOPY  07/27/2017    [facility]    COLONOSCOPY - HIM SCAN  05/15/2001    Small internal hemorrhoids; otherwise normal;[facility]    COLONOSCOPY - HIM SCAN  12/14/2006    Colonoscopy, [facility], [facility]    ENT SURGERY      tonsillectomy    ESOPHAGOGASTRODUODENOSCOPY  07/27/2017    [facility]    EXCISE LESION LOWER EXTREMITY Left 4/19/2022    Procedure: Excision Left Medial Lower Leg Ulceration;  Surgeon: Kai Huynh MD;  Location: [facility]    EXCISE LESION LOWER EXTREMITY Left 9/13/2022    Procedure: Wide local excision of left posterior leg;  Surgeon: Kai Huynh MD;  Location: [facility]    EXCISE LESION UPPER EXTREMITY Right 4/19/2022    Procedure: Excision lesion right upper extremity;  Surgeon: Kai Huynh MD;  Location: [facility]    EYE SURGERY      bilateral cataract removal    GI SURGERY      anal fistula    GYN SURGERY  1985    [facility]    GYN SURGERY      cessarian x 2    GYN SURGERY      tubal sterilazation    NEPHRECTOMY Right 05/02/2019    ORTHOPEDIC SURGERY      right knee    ORTHOPEDIC SURGERY  1986    plantar fascia release    ORTHOPEDIC SURGERY      left knee    ORTHOPEDIC SURGERY      right trigger finger release    ORTHOPEDIC SURGERY  2013    Right great toe MTPJ fusion, adductor  tenotomy,correction of hammer toe    ORTHOPEDIC SURGERY  [id number]    multiple procedures left forefoot     Current Outpatient Medications   Medication Sig Dispense Refill    aspirin (ASA) 81 MG chewable tablet Take 81 mg by mouth daily      conjugated estrogens (PREMARIN) 0.625 MG/GM vaginal cream Place 0.5 g vaginally twice a week 30 g 3    cyclobenzaprine (FLEXERIL) 10 MG tablet TAKE 1/2 TO 1 TABLET (5-10 MG) BY MOUTH DAILY AS NEEDED FOR MUSCLE SPASMS 30 tablet 0    fluticasone (FLONASE) 50 MCG/ACT nasal spray SPRAY 2 SPRAYS INTO BOTH NOSTRILS DAILY AS NEEDED 16 g 11    hydrocortisone 2.5 % cream APPLY TWICE TIMES DAILY TO ACTIVE ECZEMA ON THE HANDS, FOLLOW WITH MOISTURIZING CREAM.      losartan (COZAAR) 100 MG tablet Take 1 tablet (100 mg) by mouth daily 90 tablet 3    melatonin 1 MG TABS tablet Take 10 mg by mouth at bedtime      nystatin (MYCOSTATIN) 128250 UNIT/GM external cream Apply topically 2 times daily as needed for dry skin 30 g 2    nystatin (MYCOSTATIN) 169579 UNIT/GM external powder APPLY TO AFFECTED AREA NIGHTLY AS NEEDED FOR RASH 60 g 3    omeprazole (PRILOSEC) 40 MG capsule Take by mouth daily Before the same meal daily      polyethylene glycol (MIRALAX) 17 g packet Take 1 packet by mouth daily      propranolol (INDERAL) 10 MG tablet Take 1 tablet (10 mg) by mouth 2 times daily 180 tablet 1    budesonide (ENTOCORT EC) 3 MG EC capsule Take 9 mg by mouth      levothyroxine (SYNTHROID/LEVOTHROID) 112 MCG tablet Take 1 tablet (112 mcg) by mouth daily 30 tablet 3       Allergies   Allergen Reactions    Fentanyl Other (See Comments)     Severe agitation when used during angiogram    Codeine Sulfate Other (See Comments)     hallucinations    Fentanyl And Related      Other reaction(s): Confusion    Morphine Other (See Comments)     Hallucinations with iv therapy    Tape [Adhesive Tape]     Tramadol Other (See Comments)     hallucination        Social History     Tobacco Use    Smoking status: Never      "Passive exposure: Never    Smokeless tobacco: Never   Substance Use Topics    Alcohol use: No     Family History   Problem Relation Age of Onset    Arthritis Mother         rheumatoid    Heart Disease Mother         CHF    Alcohol/Drug Father         alcoholism    Allergies Father     Thyroid Disease Other     Diabetes No family hx of     Asthma No family hx of      History   Drug Use No         Review of Systems    Review of Systems  Constitutional, HEENT, cardiovascular, pulmonary, gi and gu systems are negative, except as otherwise noted.  Objective    /78 (BP Location: Right arm, Patient Position: Chair, Cuff Size: Adult Large)   Pulse 66   Temp 97.1  F (36.2  C) (Tympanic)   Resp 18   Ht 1.575 m (5' 2\")   Wt 82.8 kg (182 lb 9.6 oz)   SpO2 97%   BMI 33.40 kg/m     Estimated body mass index is 33.4 kg/m  as calculated from the following:    Height as of this encounter: 1.575 m (5' 2\").    Weight as of this encounter: 82.8 kg (182 lb 9.6 oz).  Physical Exam  GENERAL: alert and no distress  EYES: Eyes grossly normal to inspection, PERRL and conjunctivae and sclerae normal  HENT: ear canals and TM's normal, nose and mouth without ulcers or lesions  RESP: lungs clear to auscultation - no rales, rhonchi or wheezes  CV: regular rates and rhythm, normal S1 S2, no S3 or S4, and no murmur, click or rub  SKIN: no suspicious lesions or rashes  NEURO: Normal strength and tone, mentation intact and speech normal  PSYCH: mentation appears normal, affect normal/bright    Recent Labs   Lab Test 01/04/24  1400 11/03/23  1348 10/12/23  1342 09/21/23  1249 09/05/23  1434 09/09/22  1049   HGB  --   --   --  13.0  --  13.5   PLT  --   --   --  263  --  225   * 126*   < > 134*   < > 137   POTASSIUM 4.4 3.8   < > 4.3   < > 3.9   CR 1.15* 1.24*   < > 1.45*   < > 1.24*    < > = values in this interval not displayed.        Diagnostics  Recent Results (from the past 168 hour(s))   EKG 12-lead complete w/read - (Clinic " Performed)    Collection Time: 01/30/24  1:29 PM   Result Value Ref Range    Systolic Blood Pressure  mmHg    Diastolic Blood Pressure  mmHg    Ventricular Rate 63 BPM    Atrial Rate  BPM    MT Interval  ms    QRS Duration 100 ms     ms    QTc 706 ms    P Axis  degrees    R AXIS -49 degrees    T Axis -38 degrees    Interpretation ECG       Junctional rhythm with occasional Premature ventricular complexes  Appears there may be underlying sinus rhythm with significant artifact, repeat indicated.   Left anterior fascicular block  Moderate voltage criteria for LVH, may be normal variant ( R in aVL , [facility] product )  Prolonged QT  Abnormal ECG  No previous ECGs available  Confirmed by MD Campos Anthony ([id number]) on 1/30/2024 3:05:30 PM     CBC with platelets    Collection Time: 01/30/24  1:49 PM   Result Value Ref Range    WBC Count 10.7 4.0 - 11.0 10e3/uL    RBC Count 4.55 3.80 - 5.20 10e6/uL    Hemoglobin 14.0 11.7 - 15.7 g/dL    Hematocrit 40.6 35.0 - 47.0 %    MCV 89 78 - 100 fL    MCH 30.8 26.5 - 33.0 pg    MCHC 34.5 31.5 - 36.5 g/dL    RDW 13.1 10.0 - 15.0 %    Platelet Count 204 150 - 450 10e3/uL   Basic metabolic panel    Collection Time: 01/30/24  1:49 PM   Result Value Ref Range    Sodium 129 (L) 135 - 145 mmol/L    Potassium 4.6 3.4 - 5.3 mmol/L    Chloride 91 (L) 98 - 107 mmol/L    Carbon Dioxide (CO2) 24 22 - 29 mmol/L    Anion Gap 14 7 - 15 mmol/L    Urea Nitrogen 16.3 8.0 - 23.0 mg/dL    Creatinine 1.14 (H) 0.51 - 0.95 mg/dL    GFR Estimate 47 (L) >60 mL/min/1.73m2    Calcium 9.0 8.8 - 10.2 mg/dL    Glucose 89 70 - 99 mg/dL   TSH with free T4 reflex    Collection Time: 01/30/24  1:49 PM   Result Value Ref Range    TSH 17.59 (H) 0.30 - 4.20 uIU/mL   T4 free    Collection Time: 01/30/24  1:49 PM   Result Value Ref Range    Free T4 1.34 0.90 - 1.70 ng/dL      EKG: appears normal, NSR, normal axis, normal intervals, no acute ST/T changes c/w ischemia, no LVH by voltage criteria, unchanged from  previous tracings    Revised Cardiac Risk Index (RCRI)  The patient has the following serious cardiovascular risks for perioperative complications:   - No serious cardiac risks = 0 points     RCRI Interpretation: 0 points: Class I (very low risk - 0.4% complication rate)         Signed Electronically by: Iris Becerril MD  Copy of this evaluation report is provided to requesting physician.

## 2024-01-30 NOTE — H&P (VIEW-ONLY)
Preoperative Evaluation  [facility]  [address]  [place] [address]  Phone: [phone]  Primary Provider: Gerald Becerril  Pre-op Performing Provider: GERALD BECERRIL  Jan 30, 2024       Ileana is a 83 year old, presenting for the following:  Pre-Op Exam      Surgical Information  Surgery/Procedure: Left Fourth Claw Toe Correction   Surgery Location: [facility]  Surgeon: Dr. Jania Rodriguez  Surgery Date: 2/12/24  Time of Surgery: TBD  Where patient plans to recover: At home with family  Fax number for surgical facility: Note does not need to be faxed, will be available electronically in Epic.    Assessment & Plan     The proposed surgical procedure is considered INTERMEDIATE risk.      ICD-10-CM    1. Pre-operative general physical examination  Z01.818 EKG 12-lead complete w/read - (Clinic Performed)     CBC with platelets     Basic metabolic panel     CBC with platelets     Basic metabolic panel      2. Claw toe, left  M20.5X2       3. Chronic systolic congestive heart failure (H)  I50.22       4. Mild episode of recurrent major depressive disorder (H24)  F33.0       5. Generalized anxiety disorder  F41.1       6. Benign essential hypertension  I10       7. Hypothyroidism, unspecified type  E03.9 TSH with free T4 reflex     TSH with free T4 reflex     T4 free      8. Stage 3b chronic kidney disease (H)  N18.32                    - No identified additional risk factors other than previously addressed    Antiplatelet or Anticoagulation Medication Instructions  Hold all ASA/NSAIDs/Vitamins/Supplements 7-10 days prior to procedure     Additional Medication Instructions   - ACE/ARB: Continue without modification (e.g., MAC anesthesia, neurosurgery, spine surgery, heart failure, or labile hypertension with risk of hypertension).   - Beta Blockers: Continue taking on the day of surgery.    Recommendation  APPROVAL GIVEN to proceed with proposed  procedure, without further diagnostic evaluation.        Subjective       HPI related to upcoming procedure: Pain and recurrent wounds of left 4th toe          1/30/2024     1:12 PM   Preop Questions   1. Have you ever had a heart attack or stroke? No   2. Have you ever had surgery on your heart or blood vessels, such as a stent placement, a coronary artery bypass, or surgery on an artery in your head, neck, heart, or legs? No   3. Do you have chest pain with activity? No   4. Do you have a history of  heart failure? No   5. Do you currently have a cold, bronchitis or symptoms of other infection? No   6. Do you have a cough, shortness of breath, or wheezing? No   7. Do you or anyone in your family have previous history of blood clots? No   8. Do you or does anyone in your family have a serious bleeding problem such as prolonged bleeding following surgeries or cuts? No   9. Have you ever had problems with anemia or been told to take iron pills? No   10. Have you had any abnormal blood loss such as black, tarry or bloody stools, or abnormal vaginal bleeding? No   11. Have you ever had a blood transfusion? YES - during pregnancy    11a. Have you ever had a transfusion reaction? No   12. Are you willing to have a blood transfusion if it is medically needed before, during, or after your surgery? Yes   13. Have you or any of your relatives ever had problems with anesthesia? No   14. Do you have sleep apnea, excessive snoring or daytime drowsiness? No   15. Do you have any artifical heart valves or other implanted medical devices like a pacemaker, defibrillator, or continuous glucose monitor? No   16. Do you have artificial joints? YES - bilateral knees   17. Are you allergic to latex? No     Health Care Directive  Patient has a Health Care Directive on file      Preoperative Review of    reviewed - no record of controlled substances prescribed.      Status of Chronic Conditions:  CHF - Patient has a longstanding  history of moderate-severe CHF. Exacerbating conditions include hypertension. Currently the patient's condition is same. Current treatment regimen includes Angiotensin 2 receptor blocker and ASA. The patient denies chest pain, edema, orthopnea, SOB or recent weight gain.     DEPRESSION - Patient has a long history of Depression of moderate severity with recent symptoms being stable..Current symptoms of depression include none.     HYPERTENSION - Patient has longstanding history of HTN , currently denies any symptoms referable to elevated blood pressure. Specifically denies chest pain, palpitations, dyspnea, orthopnea, PND or peripheral edema. Blood pressure readings have been in normal range. Current medication regimen is as listed below. Patient denies any side effects of medication.     HYPOTHYROIDISM - Patient has a longstanding history of chronic Hypothyroidism. Patient has been doing well, noting no tremor, insomnia, hair loss or changes in skin texture. Continues to take medications as directed, without adverse reactions or side effects. Last TSH   Lab Results   Component Value Date    TSH 17.59 (H) 01/30/2024   .  TSH was rechecked in October, normal level (2.80)    RENAL INSUFFICIENCY - Patient has a longstanding history of moderate-severe chronic renal insufficiency. Last Cr 1.15.     Patient Active Problem List    Diagnosis Date Noted    Chronic systolic congestive heart failure (H) 12/19/2023     Priority: Medium    Mild episode of recurrent major depressive disorder (H24) 02/07/2023     Priority: Medium    Acute right-sided low back pain with right-sided sciatica 10/20/2022     Priority: Medium    Back stiffness 10/20/2022     Priority: Medium    Muscle weakness 10/20/2022     Priority: Medium    Basal cell carcinoma (BCC) of skin of left lower extremity including hip 09/09/2022     Priority: Medium    Overactive bladder 03/16/2022     Priority: Medium    Chalazion of right upper eyelid 09/02/2021      Priority: Medium    Stage 3b chronic kidney disease (H) 06/22/2021     Priority: Medium    S/p nephrectomy 05/14/2021     Priority: Medium    Esodeviation 12/17/2020     Priority: Medium    Hallux valgus of left foot 09/08/2020     Priority: Medium    Left foot pain 09/08/2020     Priority: Medium    Ametropia 09/01/2020     Priority: Medium    Decreased vision of right eye 09/01/2020     Priority: Medium    Dry eye syndrome of both eyes 09/01/2020     Priority: Medium    PCO (posterior capsular opacification), left 09/01/2020     Priority: Medium    Ptosis of both eyelids 09/01/2020     Priority: Medium     suspect lev deh RT greater than LT      Mild major depression (H) 04/26/2018     Priority: Medium    Morbid obesity (H) 08/28/2017     Priority: Medium    Autoimmune hepatitis (H) 06/02/2016     Priority: Medium    Anemia 08/20/2015     Priority: Medium    Degeneration of lumbar or lumbosacral intervertebral disc 01/30/2015     Priority: Medium    Open wound of knee, leg, and ankle      Priority: Medium    Hypothyroidism 03/27/2013     Priority: Medium    Generalized anxiety disorder 03/27/2013     Priority: Medium    Benign essential hypertension 03/27/2013     Priority: Medium    GERD (gastroesophageal reflux disease) 03/27/2013     Priority: Medium    Hepatitis 03/27/2013     Priority: Medium    Contact dermatitis 03/27/2013     Priority: Medium    Rosacea 03/27/2013     Priority: Medium    Osteoarthritis 03/27/2013     Priority: Medium    Fibromyalgia 03/27/2013     Priority: Medium      Past Medical History:   Diagnosis Date    Anemia 8/20/2015    Autoimmune hepatitis (H) 6/2/2016    Benign paroxysmal positional vertigo 10/7/2010    Contact dermatitis and other eczema, due to unspecified cause 10/7/2010    Esophageal reflux 10/7/2010    Generalized anxiety disorder 10/7/2010    Generalized osteoarthrosis, involving multiple sites 10/7/2010    Hepatitis, unspecified 10/7/2010    Infected wound     Myalgia  and myositis, unspecified 10/7/2010    fibromyalgia    Nonallopathic lesion of cervical region, not elsewhere classified 12/5/2001    Nonallopathic lesion of thoracic region, not elsewhere classified 3/31/2005    Open wound of knee, leg, and ankle     Rosacea 10/7/2010    Unspecified essential hypertension 10/7/2010    Unspecified hypothyroidism 10/7/2010     Past Surgical History:   Procedure Laterality Date    APPENDECTOMY      ARTHROPLASTY TOE(S) Left 9/21/2020    Procedure: Left  Bethea Arthroplasty with interpositional arthroplasty using graft.;  Surgeon: Gina Rodriguez DPM;  Location: [facility]    BACK SURGERY  2015    lumbar epidural injection    CARDIAC SURGERY  10/2017    angioplasty    CHOLECYSTECTOMY      COLONOSCOPY  07/27/2017    [facility]    COLONOSCOPY - HIM SCAN  05/15/2001    Small internal hemorrhoids; otherwise normal;[facility]    COLONOSCOPY - HIM SCAN  12/14/2006    Colonoscopy, [facility], [facility]    ENT SURGERY      tonsillectomy    ESOPHAGOGASTRODUODENOSCOPY  07/27/2017    [facility]    EXCISE LESION LOWER EXTREMITY Left 4/19/2022    Procedure: Excision Left Medial Lower Leg Ulceration;  Surgeon: Kai Huynh MD;  Location: [facility]    EXCISE LESION LOWER EXTREMITY Left 9/13/2022    Procedure: Wide local excision of left posterior leg;  Surgeon: Kai Huynh MD;  Location: [facility]    EXCISE LESION UPPER EXTREMITY Right 4/19/2022    Procedure: Excision lesion right upper extremity;  Surgeon: Kai Huynh MD;  Location: [facility]    EYE SURGERY      bilateral cataract removal    GI SURGERY      anal fistula    GYN SURGERY  1985    [facility]    GYN SURGERY      cessarian x 2    GYN SURGERY      tubal sterilazation    NEPHRECTOMY Right 05/02/2019    ORTHOPEDIC SURGERY      right knee    ORTHOPEDIC SURGERY  1986    plantar fascia release    ORTHOPEDIC SURGERY      left knee    ORTHOPEDIC SURGERY      right trigger finger release    ORTHOPEDIC SURGERY  2013    Right great toe MTPJ fusion, adductor  tenotomy,correction of hammer toe    ORTHOPEDIC SURGERY  [id number]    multiple procedures left forefoot     Current Outpatient Medications   Medication Sig Dispense Refill    aspirin (ASA) 81 MG chewable tablet Take 81 mg by mouth daily      conjugated estrogens (PREMARIN) 0.625 MG/GM vaginal cream Place 0.5 g vaginally twice a week 30 g 3    cyclobenzaprine (FLEXERIL) 10 MG tablet TAKE 1/2 TO 1 TABLET (5-10 MG) BY MOUTH DAILY AS NEEDED FOR MUSCLE SPASMS 30 tablet 0    fluticasone (FLONASE) 50 MCG/ACT nasal spray SPRAY 2 SPRAYS INTO BOTH NOSTRILS DAILY AS NEEDED 16 g 11    hydrocortisone 2.5 % cream APPLY TWICE TIMES DAILY TO ACTIVE ECZEMA ON THE HANDS, FOLLOW WITH MOISTURIZING CREAM.      losartan (COZAAR) 100 MG tablet Take 1 tablet (100 mg) by mouth daily 90 tablet 3    melatonin 1 MG TABS tablet Take 10 mg by mouth at bedtime      nystatin (MYCOSTATIN) 561270 UNIT/GM external cream Apply topically 2 times daily as needed for dry skin 30 g 2    nystatin (MYCOSTATIN) 948701 UNIT/GM external powder APPLY TO AFFECTED AREA NIGHTLY AS NEEDED FOR RASH 60 g 3    omeprazole (PRILOSEC) 40 MG capsule Take by mouth daily Before the same meal daily      polyethylene glycol (MIRALAX) 17 g packet Take 1 packet by mouth daily      propranolol (INDERAL) 10 MG tablet Take 1 tablet (10 mg) by mouth 2 times daily 180 tablet 1    budesonide (ENTOCORT EC) 3 MG EC capsule Take 9 mg by mouth      levothyroxine (SYNTHROID/LEVOTHROID) 112 MCG tablet Take 1 tablet (112 mcg) by mouth daily 30 tablet 3       Allergies   Allergen Reactions    Fentanyl Other (See Comments)     Severe agitation when used during angiogram    Codeine Sulfate Other (See Comments)     hallucinations    Fentanyl And Related      Other reaction(s): Confusion    Morphine Other (See Comments)     Hallucinations with iv therapy    Tape [Adhesive Tape]     Tramadol Other (See Comments)     hallucination        Social History     Tobacco Use    Smoking status: Never      "Passive exposure: Never    Smokeless tobacco: Never   Substance Use Topics    Alcohol use: No     Family History   Problem Relation Age of Onset    Arthritis Mother         rheumatoid    Heart Disease Mother         CHF    Alcohol/Drug Father         alcoholism    Allergies Father     Thyroid Disease Other     Diabetes No family hx of     Asthma No family hx of      History   Drug Use No         Review of Systems    Review of Systems  Constitutional, HEENT, cardiovascular, pulmonary, gi and gu systems are negative, except as otherwise noted.  Objective    /78 (BP Location: Right arm, Patient Position: Chair, Cuff Size: Adult Large)   Pulse 66   Temp 97.1  F (36.2  C) (Tympanic)   Resp 18   Ht 1.575 m (5' 2\")   Wt 82.8 kg (182 lb 9.6 oz)   SpO2 97%   BMI 33.40 kg/m     Estimated body mass index is 33.4 kg/m  as calculated from the following:    Height as of this encounter: 1.575 m (5' 2\").    Weight as of this encounter: 82.8 kg (182 lb 9.6 oz).  Physical Exam  GENERAL: alert and no distress  EYES: Eyes grossly normal to inspection, PERRL and conjunctivae and sclerae normal  HENT: ear canals and TM's normal, nose and mouth without ulcers or lesions  RESP: lungs clear to auscultation - no rales, rhonchi or wheezes  CV: regular rates and rhythm, normal S1 S2, no S3 or S4, and no murmur, click or rub  SKIN: no suspicious lesions or rashes  NEURO: Normal strength and tone, mentation intact and speech normal  PSYCH: mentation appears normal, affect normal/bright    Recent Labs   Lab Test 01/04/24  1400 11/03/23  1348 10/12/23  1342 09/21/23  1249 09/05/23  1434 09/09/22  1049   HGB  --   --   --  13.0  --  13.5   PLT  --   --   --  263  --  225   * 126*   < > 134*   < > 137   POTASSIUM 4.4 3.8   < > 4.3   < > 3.9   CR 1.15* 1.24*   < > 1.45*   < > 1.24*    < > = values in this interval not displayed.        Diagnostics  Recent Results (from the past 168 hour(s))   EKG 12-lead complete w/read - (Clinic " Performed)    Collection Time: 01/30/24  1:29 PM   Result Value Ref Range    Systolic Blood Pressure  mmHg    Diastolic Blood Pressure  mmHg    Ventricular Rate 63 BPM    Atrial Rate  BPM    MA Interval  ms    QRS Duration 100 ms     ms    QTc 706 ms    P Axis  degrees    R AXIS -49 degrees    T Axis -38 degrees    Interpretation ECG       Junctional rhythm with occasional Premature ventricular complexes  Appears there may be underlying sinus rhythm with significant artifact, repeat indicated.   Left anterior fascicular block  Moderate voltage criteria for LVH, may be normal variant ( R in aVL , [place] product )  Prolonged QT  Abnormal ECG  No previous ECGs available  Confirmed by MD Campos Anthony ([id number]) on 1/30/2024 3:05:30 PM     CBC with platelets    Collection Time: 01/30/24  1:49 PM   Result Value Ref Range    WBC Count 10.7 4.0 - 11.0 10e3/uL    RBC Count 4.55 3.80 - 5.20 10e6/uL    Hemoglobin 14.0 11.7 - 15.7 g/dL    Hematocrit 40.6 35.0 - 47.0 %    MCV 89 78 - 100 fL    MCH 30.8 26.5 - 33.0 pg    MCHC 34.5 31.5 - 36.5 g/dL    RDW 13.1 10.0 - 15.0 %    Platelet Count 204 150 - 450 10e3/uL   Basic metabolic panel    Collection Time: 01/30/24  1:49 PM   Result Value Ref Range    Sodium 129 (L) 135 - 145 mmol/L    Potassium 4.6 3.4 - 5.3 mmol/L    Chloride 91 (L) 98 - 107 mmol/L    Carbon Dioxide (CO2) 24 22 - 29 mmol/L    Anion Gap 14 7 - 15 mmol/L    Urea Nitrogen 16.3 8.0 - 23.0 mg/dL    Creatinine 1.14 (H) 0.51 - 0.95 mg/dL    GFR Estimate 47 (L) >60 mL/min/1.73m2    Calcium 9.0 8.8 - 10.2 mg/dL    Glucose 89 70 - 99 mg/dL   TSH with free T4 reflex    Collection Time: 01/30/24  1:49 PM   Result Value Ref Range    TSH 17.59 (H) 0.30 - 4.20 uIU/mL   T4 free    Collection Time: 01/30/24  1:49 PM   Result Value Ref Range    Free T4 1.34 0.90 - 1.70 ng/dL      EKG: appears normal, NSR, normal axis, normal intervals, no acute ST/T changes c/w ischemia, no LVH by voltage criteria, unchanged from  previous tracings    Revised Cardiac Risk Index (RCRI)  The patient has the following serious cardiovascular risks for perioperative complications:   - No serious cardiac risks = 0 points     RCRI Interpretation: 0 points: Class I (very low risk - 0.4% complication rate)         Signed Electronically by: Iris Becerril MD  Copy of this evaluation report is provided to requesting physician.

## 2024-02-01 DIAGNOSIS — E03.9 HYPOTHYROIDISM, UNSPECIFIED TYPE: ICD-10-CM

## 2024-02-01 RX ORDER — LEVOTHYROXINE SODIUM 112 UG/1
112 TABLET ORAL DAILY
Qty: 30 TABLET | Refills: 3 | Status: SHIPPED | OUTPATIENT
Start: 2024-02-01 | End: 2024-06-17

## 2024-02-02 ENCOUNTER — NURSE TRIAGE (OUTPATIENT)
Dept: FAMILY MEDICINE | Facility: OTHER | Age: 84
End: 2024-02-02

## 2024-02-02 NOTE — TELEPHONE ENCOUNTER
Nurse Triage SBAR    Is this a 2nd Level Triage? YES, LICENSED PRACTITIONER REVIEW IS REQUIRED    Situation: Patient calling in after triage with [facility].  Patient states she was advised to call her PCP as they suspected a prolapsed bladder.    Background: Patient recently treated for bladder infection on 1/22/24.  ED visit 1/22/24- received order for pyridium in which patient reports was very effective.  Patient seen by PCP on 1/30/24 for pre-op left fourth claw toe correction.  Patient states she did not think to say anything when she was in at her appointment but the pain and pressure is getting worse.  Reports feeling and egg shape bulge in martin-area.  Reports history of hysterectomy.  Denies retention, blood in urine.  10/10 pain.    Assessment: Patient with bulge in martin-area with 10/10 pain.  Patient is able to urinate.  No blood.  Patient wants to be seen but does not want a male doing exam.    Protocol Recommended Disposition:   Go To ED/UCC Now (Or To Office With PCP Approval), See More Appropriate Protocol    Recommendation:  PCP recommends evaluation at ED/UC as they have better equipment/tables to properly examine the area in question.    Spoke to provider in clinic for the above recommendation.    Does the patient meet one of the following criteria for ADS visit consideration? No    Reason for Disposition   Pain or burning with passing urine (urination) and pregnant   SEVERE pain with urination    Additional Information   Negative: Shock suspected (e.g., cold/pale/clammy skin, too weak to stand, low BP, rapid pulse)   Negative: Sounds like a life-threatening emergency to the triager   Negative: Followed a female genital area injury (e.g., labia, vagina, vulva)   Negative: Followed a male genital area injury (penis, scrotum)   Negative: Vaginal discharge   Negative: Pus (white, yellow) or bloody discharge from end of penis   Negative: Shock suspected (e.g., cold/pale/clammy skin, too weak to  "stand, low BP, rapid pulse)   Negative: Sounds like a life-threatening emergency to the triager   Negative: Followed a genital area injury (e.g., vagina, vulva)   Negative: Unable to urinate (or only a few drops) for > 4 hours and bladder feels very full (e.g., palpable bladder or strong urge to urinate)   Negative: Pregnant 23 or more weeks and baby is moving less today (e.g., kick count < 5 in 1 hour or < 10 in 2 hours)    Answer Assessment - Initial Assessment Questions  1. SYMPTOM: \"What's the main symptom you're concerned about?\" (e.g., frequency, incontinence)      Pain and pressure in pelvic area  2. ONSET: \"When did the  pressure  start?\"      A few days ago  3. PAIN: \"Is there any pain?\" If Yes, ask: \"How bad is it?\" (Scale: 1-10; mild, moderate, severe)      10/10  4. CAUSE: \"What do you think is causing the symptoms?\"      Possible prolapsed bladder  5. OTHER SYMPTOMS: \"Do you have any other symptoms?\" (e.g., blood in urine, fever, flank pain, pain with urination)      Feels like an egg when pressing from the outside  6. PREGNANCY: \"Is there any chance you are pregnant?\" \"When was your last menstrual period?\"      No    Protocols used: Urinary Symptoms-A-OH, Pregnancy - Urination Pain-A-OH    "

## 2024-02-05 ENCOUNTER — PREP FOR PROCEDURE (OUTPATIENT)
Dept: PODIATRY | Facility: OTHER | Age: 84
End: 2024-02-05

## 2024-02-05 DIAGNOSIS — M20.5X2 CLAW TOE, LEFT: Primary | ICD-10-CM

## 2024-02-06 ENCOUNTER — ANCILLARY PROCEDURE (OUTPATIENT)
Dept: GENERAL RADIOLOGY | Facility: OTHER | Age: 84
End: 2024-02-06
Attending: PODIATRIST
Payer: MEDICARE

## 2024-02-06 ENCOUNTER — ANESTHESIA EVENT (OUTPATIENT)
Dept: SURGERY | Facility: HOSPITAL | Age: 84
End: 2024-02-06
Payer: MEDICARE

## 2024-02-06 DIAGNOSIS — M20.5X2 ACQUIRED CLAW TOE OF LEFT FOOT: ICD-10-CM

## 2024-02-06 PROCEDURE — 73630 X-RAY EXAM OF FOOT: CPT | Mod: TC,LT,FY

## 2024-02-06 NOTE — ANESTHESIA PREPROCEDURE EVALUATION
Anesthesia Pre-Procedure Evaluation    Patient: Ileana Davis   MRN: [id number] : 1940        Procedure : Procedure(s):  LEFT fourth claw toe correction          Past Medical History:   Diagnosis Date    Anemia 2015    Autoimmune hepatitis (H) 2016    Benign paroxysmal positional vertigo 10/7/2010    Contact dermatitis and other eczema, due to unspecified cause 10/7/2010    Esophageal reflux 10/7/2010    Generalized anxiety disorder 10/7/2010    Generalized osteoarthrosis, involving multiple sites 10/7/2010    Hepatitis, unspecified 10/7/2010    Infected wound     Myalgia and myositis, unspecified 10/7/2010    fibromyalgia    Nonallopathic lesion of cervical region, not elsewhere classified 2001    Nonallopathic lesion of thoracic region, not elsewhere classified 3/31/2005    Open wound of knee, leg, and ankle     Rosacea 10/7/2010    Unspecified essential hypertension 10/7/2010    Unspecified hypothyroidism 10/7/2010      Past Surgical History:   Procedure Laterality Date    APPENDECTOMY      ARTHROPLASTY TOE(S) Left 2020    Procedure: Left  Bethea Arthroplasty with interpositional arthroplasty using graft.;  Surgeon: Gina Rodriguez DPM;  Location: [facility]    BACK SURGERY      lumbar epidural injection    CARDIAC SURGERY  10/2017    angioplasty    CHOLECYSTECTOMY      COLONOSCOPY  2017    [facility]    COLONOSCOPY - HIM SCAN  05/15/2001    Small internal hemorrhoids; otherwise normal;[facility]    COLONOSCOPY - HIM SCAN  2006    Colonoscopy, [facility]    ENT SURGERY      tonsillectomy    ESOPHAGOGASTRODUODENOSCOPY  2017    [facility]    EXCISE LESION LOWER EXTREMITY Left 2022    Procedure: Excision Left Medial Lower Leg Ulceration;  Surgeon: Kai Huynh MD;  Location: [facility]    EXCISE LESION LOWER EXTREMITY Left 2022    Procedure: Wide local excision of left posterior leg;  Surgeon: Kai Huynh MD;  Location: [facility]    EXCISE LESION UPPER  EXTREMITY Right 4/19/2022    Procedure: Excision lesion right upper extremity;  Surgeon: Kai Huynh MD;  Location: [facility]    EYE SURGERY      bilateral cataract removal    GI SURGERY      anal fistula    GYN SURGERY  1985    [facility]    GYN SURGERY      cessarian x 2    GYN SURGERY      tubal sterilazation    NEPHRECTOMY Right 05/02/2019    ORTHOPEDIC SURGERY      right knee    ORTHOPEDIC SURGERY  1986    plantar fascia release    ORTHOPEDIC SURGERY      left knee    ORTHOPEDIC SURGERY      right trigger finger release    ORTHOPEDIC SURGERY  2013    Right great toe MTPJ fusion, adductor tenotomy,correction of hammer toe    ORTHOPEDIC SURGERY  [id number]    multiple procedures left forefoot      Allergies   Allergen Reactions    Fentanyl Other (See Comments)     Severe agitation when used during angiogram    Codeine Sulfate Other (See Comments)     hallucinations    Fentanyl And Related      Other reaction(s): Confusion    Morphine Other (See Comments)     Hallucinations with iv therapy    Tape [Adhesive Tape]     Tramadol Other (See Comments)     hallucination      Social History     Tobacco Use    Smoking status: Never     Passive exposure: Never    Smokeless tobacco: Never   Substance Use Topics    Alcohol use: No      Wt Readings from Last 1 Encounters:   01/30/24 82.8 kg (182 lb 9.6 oz)        Anesthesia Evaluation   Pt has had prior anesthetic. Type: General and MAC.    History of anesthetic complications  - .  fentanyl causes agitation/pt gets combative.    ROS/MED HX  ENT/Pulmonary:     (+) sleep apnea, doesn't use CPAP,   AKI risk factors, snores loudly, hypertension, obese,                                Neurologic: Comment: Nonallopathic lesion of cervical region, not elsewhere classified      Cardiovascular: Comment: Walked in to preoperative area today without stopping to catch breath.  Pt states she gets out of breath easily.  She is able to do one flight of steps without stopping.  Has help with many  tasks around house as well as grocery shopping.  See recent ECHO & cath below.    /92 in preop.  Pt states her pressure is controlled well at home in the 130's-140's systolic.  Monitor throughout operative course and rx if needed.     (+)  hypertension-range: propanolol and losartan this am/ -   -  - -      CHF                           Previous cardiac testing   Echo: Date: 9/2023 Results:  LV normal size  Left systolic function low normal 50-55%  RV systolic function normal  No aortic valve stenosis  No pericardial effusion  Diastolic dysfunction (not graded)  Stress Test:  Date: Results:    ECG Reviewed:  Date: 1/2024 Results:    Interpretation ECG       Junctional rhythm with occasional Premature ventricular complexes  Appears there may be underlying sinus rhythm with significant artifact, repeat indicated.   Left anterior fascicular block  Moderate voltage criteria for LVH, may be normal variant ( R in aVL , Tutu product )  Prolonged QT  Abnormal ECG  No previous ECGs available  Confirmed by MD Campos Anthony ([id number]) on 1/30/2024 3:05:30 PM      Cath:  Date: 2023 Results:  Nonobstructive coronary atherosclerosis  Mild elevation of LV filling pressures, LVEDP 15-17  Refractory systemic hypertension     METS/Exercise Tolerance: 3 - Able to walk 1-2 blocks without stopping Comment: Uses cane afraid she is going to fall   Hematologic: Comments: Hyponatremia; last draw 1/4/24 134    (+)      anemia, history of blood transfusion, no previous transfusion reaction,        Musculoskeletal: Comment: Myalgia and myositis  Generalized osteoarthrosis  Acute right-sided low back pain with right-sided sciatica  Fibromyalgia; pt states she is sore all over the place    Pt states she fell a couple of days ago and has a hematoma on left hip area that is painful. Upon assessment skin is ecchymotic area approximately 4 inch by 12 inch area.  Pt states she did not seek medical care when she fell.  She states she did  not strike her head and remembers the entirety of it.  A nurse came and helped her who is her next door neighbor.     (+)  arthritis,             GI/Hepatic: Comment: Autoimmune hepatitis    (+) GERD (no symptoms today), Asymptomatic on medication,         hepatitis type Other, liver disease,       Renal/Genitourinary: Comment: S/p nephrectomy   Overactive bladder  Last creatinine 1.14    (+) renal disease (baseline creatinine 1.15; s/p nephrectomy), type: CRI,            Endo:     (+)          thyroid problem, hypothyroidism TSH 17.59 (H) 01/30/2024,    Obesity,       Psychiatric/Substance Use:     (+) psychiatric history anxiety and depression  H/O chronic opiod use  (hydrocodone; 2 in the last week).     Infectious Disease:  - neg infectious disease ROS     Malignancy:   (+) Malignancy, History of Other and Skin.Other CA skin cancer, s/p nephrectomy for cancer Remission status post Surgery.    Other: Comment: Rosacea     (+)  , H/O Chronic Pain,         Physical Exam    Airway        Mallampati: II   TM distance: > 3 FB   Neck ROM: limited   Mouth opening: > 3 cm    Respiratory Devices and Support         Dental     Comment: Upper and lower denture    (+) Edentulous and Removable bridges or other hardware      Cardiovascular          Rhythm and rate: regular and normal   (+) murmur (slight at RUSB and LUSB)       Pulmonary           breath sounds clear to auscultation         OUTSIDE LABS:  CBC:   Lab Results   Component Value Date    WBC 10.7 01/30/2024    WBC 7.3 09/21/2023    HGB 14.0 01/30/2024    HGB 13.0 09/21/2023    HCT 40.6 01/30/2024    HCT 38.6 09/21/2023     01/30/2024     09/21/2023     BMP:   Lab Results   Component Value Date     (L) 01/30/2024     (L) 01/04/2024    POTASSIUM 4.6 01/30/2024    POTASSIUM 4.4 01/04/2024    CHLORIDE 91 (L) 01/30/2024    CHLORIDE 98 01/04/2024    CO2 24 01/30/2024    CO2 22 01/04/2024    BUN 16.3 01/30/2024    BUN 16.4 01/04/2024    CR 1.14  "(H) 01/30/2024    CR 1.15 (H) 01/04/2024    GLC 89 01/30/2024    GLC 90 01/04/2024     COAGS:   Lab Results   Component Value Date    INR 1.3 (H) 07/31/2017     POC: No results found for: \"BGM\", \"HCG\", \"HCGS\"  HEPATIC:   Lab Results   Component Value Date    ALBUMIN 4.1 11/03/2023    PROTTOTAL 7.1 09/05/2023    ALT 14 09/05/2023    AST 26 09/05/2023    ALKPHOS 95 09/05/2023    BILITOTAL 0.6 09/05/2023     OTHER:   Lab Results   Component Value Date    CASSI 9.0 01/30/2024    PHOS 2.9 11/03/2023    MAG 2.2 09/05/2023    TSH 17.59 (H) 01/30/2024    T4 1.34 01/30/2024       Anesthesia Plan    ASA Status:  4    NPO Status:  NPO Appropriate (water 2350 last pm; 1700 supper yesterday)    Anesthesia Type: MAC.     - Reason for MAC: straight local not clinically adequate              Consents    Anesthesia Plan(s) and associated risks, benefits, and realistic alternatives discussed. Questions answered and patient/representative(s) expressed understanding.     - Discussed:     - Discussed with:  Patient      - Extended Intubation/Ventilatory Support Discussed: No.      - Patient is DNR/DNI Status: No     Use of blood products discussed: No .     Postoperative Care    Post procedure pain management: avoid opioids if at all possible.        Comments:    Other Comments: [facility] 2/2/24    Risks and benefits of MAC anesthetic discussed including dental damage, aspiration, loss of airway, conversion to general anesthetic, CV complications, MI, stroke, death. Pt wishes to proceed.                  KEO ESTES CRNA    I have reviewed the pertinent notes and labs in the chart from the past 30 days and (re)examined the patient.  Any updates or changes from those notes are reflected in this note.     # Hyponatremia: Lowest Na = 129 mmol/L in last 30 days, will monitor as appropriate          # Obesity: Estimated body mass index is 33.4 kg/m  as calculated from the following:    Height as of 1/30/24: 1.575 m (5' 2\").    Weight " as of 1/30/24: 82.8 kg (182 lb 9.6 oz).

## 2024-02-12 ENCOUNTER — APPOINTMENT (OUTPATIENT)
Dept: GENERAL RADIOLOGY | Facility: HOSPITAL | Age: 84
End: 2024-02-12
Attending: PODIATRIST
Payer: MEDICARE

## 2024-02-12 ENCOUNTER — ANESTHESIA (OUTPATIENT)
Dept: SURGERY | Facility: HOSPITAL | Age: 84
End: 2024-02-12
Payer: MEDICARE

## 2024-02-12 ENCOUNTER — HOSPITAL ENCOUNTER (OUTPATIENT)
Facility: HOSPITAL | Age: 84
Discharge: HOME | End: 2024-02-12
Attending: PODIATRIST | Admitting: PODIATRIST
Payer: MEDICARE

## 2024-02-12 VITALS
WEIGHT: 180 LBS | HEIGHT: 62 IN | OXYGEN SATURATION: 96 % | DIASTOLIC BLOOD PRESSURE: 95 MMHG | SYSTOLIC BLOOD PRESSURE: 179 MMHG | BODY MASS INDEX: 33.13 KG/M2 | HEART RATE: 59 BPM | TEMPERATURE: 97 F | RESPIRATION RATE: 16 BRPM

## 2024-02-12 DIAGNOSIS — Z98.890 STATUS POST LEFT FOOT SURGERY: Primary | ICD-10-CM

## 2024-02-12 PROCEDURE — 250N000009 HC RX 250: Performed by: PODIATRIST

## 2024-02-12 PROCEDURE — 272N000001 HC OR GENERAL SUPPLY STERILE: Performed by: PODIATRIST

## 2024-02-12 PROCEDURE — 258N000003 HC RX IP 258 OP 636: Performed by: NURSE ANESTHETIST, CERTIFIED REGISTERED

## 2024-02-12 PROCEDURE — 999N000141 HC STATISTIC PRE-PROCEDURE NURSING ASSESSMENT: Performed by: PODIATRIST

## 2024-02-12 PROCEDURE — 28285 REPAIR OF HAMMERTOE: CPT | Mod: T3 | Performed by: PODIATRIST

## 2024-02-12 PROCEDURE — 250N000013 HC RX MED GY IP 250 OP 250 PS 637: Performed by: PODIATRIST

## 2024-02-12 PROCEDURE — 999N000179 XR SURGERY CARM FLUORO LESS THAN 5 MIN W STILLS: Mod: TC

## 2024-02-12 PROCEDURE — 28285 REPAIR OF HAMMERTOE: CPT | Performed by: NURSE ANESTHETIST, CERTIFIED REGISTERED

## 2024-02-12 PROCEDURE — 99100 ANES PT EXTEME AGE<1 YR&>70: CPT | Performed by: NURSE ANESTHETIST, CERTIFIED REGISTERED

## 2024-02-12 PROCEDURE — 710N000012 HC RECOVERY PHASE 2, PER MINUTE: Performed by: PODIATRIST

## 2024-02-12 PROCEDURE — 250N000011 HC RX IP 250 OP 636: Performed by: NURSE ANESTHETIST, CERTIFIED REGISTERED

## 2024-02-12 PROCEDURE — 250N000011 HC RX IP 250 OP 636: Performed by: PODIATRIST

## 2024-02-12 PROCEDURE — 370N000017 HC ANESTHESIA TECHNICAL FEE, PER MIN: Performed by: PODIATRIST

## 2024-02-12 PROCEDURE — 250N000009 HC RX 250: Performed by: NURSE ANESTHETIST, CERTIFIED REGISTERED

## 2024-02-12 PROCEDURE — 360N000083 HC SURGERY LEVEL 3 W/ FLUORO, PER MIN: Performed by: PODIATRIST

## 2024-02-12 PROCEDURE — C1713 ANCHOR/SCREW BN/BN,TIS/BN: HCPCS | Performed by: PODIATRIST

## 2024-02-12 RX ORDER — OXYCODONE AND ACETAMINOPHEN 5; 325 MG/1; MG/1
1 TABLET ORAL EVERY 6 HOURS PRN
Qty: 8 TABLET | Refills: 0 | Status: SHIPPED | OUTPATIENT
Start: 2024-02-12 | End: 2024-02-15

## 2024-02-12 RX ORDER — ONDANSETRON 4 MG/1
4 TABLET, ORALLY DISINTEGRATING ORAL EVERY 30 MIN PRN
Status: DISCONTINUED | OUTPATIENT
Start: 2024-02-12 | End: 2024-02-12 | Stop reason: HOSPADM

## 2024-02-12 RX ORDER — ONDANSETRON 4 MG/1
4 TABLET, ORALLY DISINTEGRATING ORAL EVERY 6 HOURS PRN
Status: CANCELLED | OUTPATIENT
Start: 2024-02-12

## 2024-02-12 RX ORDER — ONDANSETRON 2 MG/ML
4 INJECTION INTRAMUSCULAR; INTRAVENOUS EVERY 6 HOURS PRN
Status: CANCELLED | OUTPATIENT
Start: 2024-02-12

## 2024-02-12 RX ORDER — LIDOCAINE HYDROCHLORIDE 20 MG/ML
INJECTION, SOLUTION INFILTRATION; PERINEURAL PRN
Status: DISCONTINUED | OUTPATIENT
Start: 2024-02-12 | End: 2024-02-12

## 2024-02-12 RX ORDER — CEFAZOLIN SODIUM/WATER 2 G/20 ML
2 SYRINGE (ML) INTRAVENOUS
Status: COMPLETED | OUTPATIENT
Start: 2024-02-12 | End: 2024-02-12

## 2024-02-12 RX ORDER — CEFAZOLIN SODIUM/WATER 2 G/20 ML
2 SYRINGE (ML) INTRAVENOUS SEE ADMIN INSTRUCTIONS
Status: DISCONTINUED | OUTPATIENT
Start: 2024-02-12 | End: 2024-02-12 | Stop reason: HOSPADM

## 2024-02-12 RX ORDER — LIDOCAINE 40 MG/G
CREAM TOPICAL
Status: DISCONTINUED | OUTPATIENT
Start: 2024-02-12 | End: 2024-02-12 | Stop reason: HOSPADM

## 2024-02-12 RX ORDER — PROPOFOL 10 MG/ML
INJECTION, EMULSION INTRAVENOUS CONTINUOUS PRN
Status: DISCONTINUED | OUTPATIENT
Start: 2024-02-12 | End: 2024-02-12

## 2024-02-12 RX ORDER — ONDANSETRON 2 MG/ML
4 INJECTION INTRAMUSCULAR; INTRAVENOUS EVERY 30 MIN PRN
Status: DISCONTINUED | OUTPATIENT
Start: 2024-02-12 | End: 2024-02-12 | Stop reason: HOSPADM

## 2024-02-12 RX ORDER — BUPIVACAINE HYDROCHLORIDE 5 MG/ML
INJECTION, SOLUTION PERINEURAL PRN
Status: DISCONTINUED | OUTPATIENT
Start: 2024-02-12 | End: 2024-02-12 | Stop reason: HOSPADM

## 2024-02-12 RX ORDER — ONDANSETRON 2 MG/ML
INJECTION INTRAMUSCULAR; INTRAVENOUS PRN
Status: DISCONTINUED | OUTPATIENT
Start: 2024-02-12 | End: 2024-02-12

## 2024-02-12 RX ORDER — KETAMINE HYDROCHLORIDE 10 MG/ML
INJECTION INTRAMUSCULAR; INTRAVENOUS PRN
Status: DISCONTINUED | OUTPATIENT
Start: 2024-02-12 | End: 2024-02-12

## 2024-02-12 RX ORDER — SODIUM CHLORIDE, SODIUM LACTATE, POTASSIUM CHLORIDE, CALCIUM CHLORIDE 600; 310; 30; 20 MG/100ML; MG/100ML; MG/100ML; MG/100ML
INJECTION, SOLUTION INTRAVENOUS CONTINUOUS
Status: DISCONTINUED | OUTPATIENT
Start: 2024-02-12 | End: 2024-02-12 | Stop reason: HOSPADM

## 2024-02-12 RX ORDER — OXYCODONE AND ACETAMINOPHEN 5; 325 MG/1; MG/1
1 TABLET ORAL ONCE
Status: COMPLETED | OUTPATIENT
Start: 2024-02-12 | End: 2024-02-12

## 2024-02-12 RX ORDER — PROCHLORPERAZINE MALEATE 5 MG
5 TABLET ORAL EVERY 6 HOURS PRN
Status: CANCELLED | OUTPATIENT
Start: 2024-02-12

## 2024-02-12 RX ORDER — PROPOFOL 10 MG/ML
INJECTION, EMULSION INTRAVENOUS PRN
Status: DISCONTINUED | OUTPATIENT
Start: 2024-02-12 | End: 2024-02-12

## 2024-02-12 RX ORDER — BISACODYL 10 MG
10 SUPPOSITORY, RECTAL RECTAL DAILY PRN
Status: CANCELLED | OUTPATIENT
Start: 2024-02-12

## 2024-02-12 RX ORDER — LIDOCAINE 40 MG/G
CREAM TOPICAL
Status: CANCELLED | OUTPATIENT
Start: 2024-02-12

## 2024-02-12 RX ORDER — ASPIRIN 81 MG/1
81 TABLET ORAL 2 TIMES DAILY
Status: CANCELLED | OUTPATIENT
Start: 2024-02-12

## 2024-02-12 RX ADMIN — Medication 5 MG: at 10:27

## 2024-02-12 RX ADMIN — OXYCODONE HYDROCHLORIDE AND ACETAMINOPHEN 1 TABLET: 5; 325 TABLET ORAL at 12:53

## 2024-02-12 RX ADMIN — HYDROMORPHONE HYDROCHLORIDE 0.2 MG: 1 INJECTION, SOLUTION INTRAMUSCULAR; INTRAVENOUS; SUBCUTANEOUS at 10:18

## 2024-02-12 RX ADMIN — PROPOFOL 30 MG: 10 INJECTION, EMULSION INTRAVENOUS at 10:13

## 2024-02-12 RX ADMIN — Medication 2 G: at 09:56

## 2024-02-12 RX ADMIN — Medication 10 MG: at 10:12

## 2024-02-12 RX ADMIN — PROPOFOL 150 MCG/KG/MIN: 10 INJECTION, EMULSION INTRAVENOUS at 10:13

## 2024-02-12 RX ADMIN — PROPOFOL 150 MCG/KG/MIN: 10 INJECTION, EMULSION INTRAVENOUS at 10:52

## 2024-02-12 RX ADMIN — MIDAZOLAM 1 MG: 1 INJECTION INTRAMUSCULAR; INTRAVENOUS at 10:07

## 2024-02-12 RX ADMIN — LIDOCAINE HYDROCHLORIDE 40 MG: 20 INJECTION, SOLUTION INFILTRATION; PERINEURAL at 10:12

## 2024-02-12 RX ADMIN — MIDAZOLAM 1 MG: 1 INJECTION INTRAMUSCULAR; INTRAVENOUS at 10:10

## 2024-02-12 RX ADMIN — ONDANSETRON 4 MG: 2 INJECTION INTRAMUSCULAR; INTRAVENOUS at 11:03

## 2024-02-12 RX ADMIN — SODIUM CHLORIDE, POTASSIUM CHLORIDE, SODIUM LACTATE AND CALCIUM CHLORIDE: 600; 310; 30; 20 INJECTION, SOLUTION INTRAVENOUS at 09:47

## 2024-02-12 RX ADMIN — HYDROMORPHONE HYDROCHLORIDE 0.3 MG: 1 INJECTION, SOLUTION INTRAMUSCULAR; INTRAVENOUS; SUBCUTANEOUS at 11:15

## 2024-02-12 ASSESSMENT — ACTIVITIES OF DAILY LIVING (ADL)
ADLS_ACUITY_SCORE: 30
ADLS_ACUITY_SCORE: 25
ADLS_ACUITY_SCORE: 28

## 2024-02-12 NOTE — OR NURSING
Patient and responsible adult given discharge instructions with no questions regarding instructions. Les score 19/20. Pain level 2/10.  Discharged from unit via wheelchair. Patient discharged to home with son.

## 2024-02-12 NOTE — OP NOTE
Operative Note    Pre-operative diagnosis: Claw toe, left [M20.5X2]   Post-operative diagnosis Left foot fourth claw toe   Procedure: Procedure(s):  LEFT fourth claw toe correction   Surgeon: Gina Rodriguez DPM   Assistants(s): None   Estimated blood loss: Minimal    Specimens: None   Findings: Same as post-op diagnosis      Indications for surgery: Patient has a LEFT fourth claw toe deformity. The toe has been causing her pain when she has been walking. She has exhausted conservative treatment options which have not reduced her pain. She has agreed to proceed with surgery to reduce the claw toe contracture.    PROCEDURE IN DETAIL:  Under mild sedation, the patient was brought into the OR and placed on the operating room table in a supine position.  A pneumatic ankle tourniquet was placed about the patient's left ankle.  Following IV sedation, local anesthesia was obtained about the entire latearal mid foot with a reverse Mattson block technique with 20  mL of a 1:1 ration of 0.5% Marcaine plain and 1% lidocaine plain.  The foot was then scrubbed, prepped and draped in the usual aseptic manner.  An Esmarch bandage was utilized to exsanguinate the patient's left and the tourniquet was inflated to 250 mmHg.       Attention was next directed to the fourth toe. The PIPJ and the DIPJ were in a rigid flexion contracture at both joint spaces and with a DORSIFLEXION at the MTPJ. A linear longitudinal incision was made through the skin just proximal to the fourth MTPJ and extended distally over the DIPJ of the 4th toe. A transverse tenotomy was created over the extensor tendon at the PIPJ. The collateral ligaments were transected and the the extensor tendon was carefully dissected proximally from the head of the proximal phalanx and distally from the middle phalanx until the base of the distal phalanx was reached.    The toe still sat in a dorsally contracted position a the MTPJ, so attention was directed to the 4th MTPJ.  "Blunt dissection was carried down to the capsule of the metatarsal head. The extensor tendons were retracted medially and a 15 blade was used through the dorsal capsule at the 4th MTPJ. The collaterals were left intact and a McGlamry was used to release the plantar soft tissue attachments of the 4th MTPJ.  The toe now sat in a more plantarflexed and proper position.     The base of the proximal phalanx was noted at the level of the cuticle beneath the toenail, so it was decided to perform an arthroplasty at the distal head of the middle phalanx for the DIPJ to avoid damaging the toenail. Minimal bone was removed with a bone saw at the distal head of the middle phalanx.     Next, a bone saw was used to resect the proximal aspect of the base of the middle phalanx with the most minimal amount of bone removed to remove the cartilage at the joint space. The bone saw was also unit(s) sed to resect the head of the proximal phalanx just below the flare of the proximal phalanx. There was no remaining cartilage at the PIPJ but the cartilage was kept intact at the base of the distal phalanx.    The bone was too small for an implant. Due to the patient's additional concern of not being able to remain 100% NWB post surgery, it was decided to use a 0.045\" Nitinol wire. The wire was advanced proximally down the proximal phalanx from the distal end to the proximal end, then the wire was removed. The length of the the from the base of the proximal phalanx to the distal end of the toe was measured and the wire was cut to the proper length. This would ensure the wire would would fit the length of the bone while being able to bend the end of the wire that did not have the Nitinol bending component at the end of the toe. The wire was then advanced from the proximal aspect of the middle phalanx and distally out the end of the toe. The proximal phalanx and middle phalanx were compacted and the k-wire was driven proximally through the " "proximal phalanx. The PIPJ and DIPJ were visualized and were noted to be compacted with a rectus alignment. The K-wire and correction of the toe were confirmed with fluoroscopy and the toe was noted to be in a more rectus alignment. The wire at the end of the toe was clamped with a Kocher and a needle  was used to bend the wire at approximally 70 degrees. The wire was cut and a Isaiah's ball was used to cover the sharp end of the wire.    A 2-3mm horizontal incision through the extensor tendon was made at the PIPJ so the tendon ends re-approximated with less overlap.     The incision was thoroughly flushed with normal, sterile saline.     A 4-0 Vicryl was used to suture the distal and proximal ends of the extensor tendon at the PIPJ. A 4-0 Vicryl suture was used for a deep subcutaneous tissue suture at the 4th MTPJ. The skin incisions were closed with 3-0 Prolene with a vertical mattress and simple suture technique.    The tourniquet was released after a total of 62 minutes. There was a prompt, hyperemic response to all digits of the LEFT foot with the exception of the most distal 2mm of the incision on the LEFT fourth toe. The vertical mattress suture was removed and replaced with a simple suture and there was then a prompt hyperemic response to the distal incision of the fourth toe. A healthy red coloration returned to the entire toe.    The operative site was then dressed with Xeroform gauze (including a square of Xeroform cut with a slit to fit around each k-wire) and a dry sterile dressing consisting of 4 x 4s, fluffs, Kerlix, and a 4\" ACE bandage. A short leg CAM boot was applied to the foot.    The patient tolerated the procedure and anesthesia well and was transferred to the recovery room with vital signs stable and vascular status intact to all toes of the left foot.  Following a period of postoperative monitoring, the patient was discharged home with written and oral postoperative instructions. The " patient has been instructed to remained 100% non-weightbearing with a CAM boot and crutches. The patient has Percocet (5/325mg) for pain and is to call the podiatry clinic with any concerns. The patient is to leave the sterile dressing clean, dry and intact, and is scheduled to present for the first post-operative appointment with podiatry in one week on 02/19/2024.

## 2024-02-12 NOTE — ANESTHESIA POSTPROCEDURE EVALUATION
Patient: Ileana Davis    Procedure: Procedure(s):  LEFT fourth claw toe correction       Anesthesia Type:  MAC    Note:  Disposition: Outpatient   Postop Pain Control: Uneventful            Sign Out: Well controlled pain   PONV: No   Neuro/Psych: Uneventful            Sign Out: Acceptable/Baseline neuro status   Airway/Respiratory: Uneventful            Sign Out: Acceptable/Baseline resp. status   CV/Hemodynamics: Uneventful            Sign Out: Acceptable CV status; No obvious hypovolemia; No obvious fluid overload   Other NRE: NONE   DID A NON-ROUTINE EVENT OCCUR? No         Last vitals:  Vitals Value Taken Time   /95 02/12/24 1230   Temp 97  F (36.1  C) 02/12/24 1140   Pulse 59 02/12/24 1253   Resp 16 02/12/24 1330   SpO2 96 % 02/12/24 1306   Vitals shown include unfiled device data.    Electronically Signed By: KEO Wells CRNA  February 12, 2024  2:22 PM

## 2024-02-12 NOTE — ANESTHESIA CARE TRANSFER NOTE
Patient: Ileeta I Polloander    Procedure: Procedure(s):  LEFT fourth claw toe correction       Diagnosis: Claw toe, left [M20.5X2]  Diagnosis Additional Information: No value filed.    Anesthesia Type:   MAC     Note:    Oropharynx: spontaneously breathing  Level of Consciousness: awake  Oxygen Supplementation: room air    Independent Airway: airway patency satisfactory and stable  Dentition: dentition unchanged  Vital Signs Stable: post-procedure vital signs reviewed and stable  Report to RN Given: handoff report given  Patient transferred to: Phase II    Handoff Report: Identifed the Patient, Identified the Reponsible Provider, Reviewed the pertinent medical history, Discussed the surgical course, Reviewed Intra-OP anesthesia mangement and issues during anesthesia, Set expectations for post-procedure period and Allowed opportunity for questions and acknowledgement of understanding  Vitals:  Vitals Value Taken Time   BP     Temp     Pulse     Resp     SpO2         Electronically Signed By: KEO Collier CRNA  February 12, 2024  11:37 AM  
Statement Selected

## 2024-02-12 NOTE — INTERVAL H&P NOTE
"I have reviewed the surgical (or preoperative) H&P that is linked to this encounter, and examined the patient. There are no significant changes. Patient had a UTI for which she was treated with antibiotics on 02/02/2024.    Clinical Conditions Present on Arrival:  Clinically Significant Risk Factors Present on Admission         # Hyponatremia: Lowest Na = 129 mmol/L in last 30 days, will monitor as appropriate         # Drug Induced Platelet Defect: home medication list includes an antiplatelet medication  # Obesity: Estimated body mass index is 32.92 kg/m  as calculated from the following:    Height as of this encounter: 1.575 m (5' 2\").    Weight as of this encounter: 81.6 kg (180 lb).       "

## 2024-02-12 NOTE — BRIEF OP NOTE
[facility]    Brief Operative Note    Pre-operative diagnosis: Claw toe, left [M20.5X2]  Post-operative diagnosis Left foot fourth claw toe    Procedure: LEFT fourth claw toe correction, Left - Foot    Surgeon: Surgeon(s) and Role:     * Gina Rodriguez DPM - Primary  Anesthesia: MAC with Local   Estimated Blood Loss: Minimal    Drains: None  Specimens: * No specimens in log *  Findings:   Same as post op diagnosis   Complications: None.  Implants:   Implant Name Type Inv. Item Serial No.  Lot No. LRB No. Used Action   Dynamite Flex Wire 1.1mm     [id number] Left 1 Implanted

## 2024-02-19 ENCOUNTER — OFFICE VISIT (OUTPATIENT)
Dept: PODIATRY | Facility: OTHER | Age: 84
End: 2024-02-19
Attending: PODIATRIST
Payer: MEDICARE

## 2024-02-19 ENCOUNTER — ANCILLARY PROCEDURE (OUTPATIENT)
Dept: GENERAL RADIOLOGY | Facility: OTHER | Age: 84
End: 2024-02-19
Attending: PODIATRIST
Payer: MEDICARE

## 2024-02-19 VITALS
TEMPERATURE: 97.2 F | DIASTOLIC BLOOD PRESSURE: 86 MMHG | HEART RATE: 54 BPM | OXYGEN SATURATION: 97 % | RESPIRATION RATE: 20 BRPM | SYSTOLIC BLOOD PRESSURE: 140 MMHG

## 2024-02-19 DIAGNOSIS — Z47.89 ENCOUNTER FOR OTHER ORTHOPEDIC AFTERCARE: Primary | ICD-10-CM

## 2024-02-19 PROCEDURE — 73630 X-RAY EXAM OF FOOT: CPT | Mod: TC,LT

## 2024-02-19 PROCEDURE — 99024 POSTOP FOLLOW-UP VISIT: CPT | Performed by: PODIATRIST

## 2024-02-19 PROCEDURE — G0463 HOSPITAL OUTPT CLINIC VISIT: HCPCS | Mod: 25

## 2024-02-19 ASSESSMENT — PAIN SCALES - GENERAL: PAINLEVEL: NO PAIN (1)

## 2024-02-19 NOTE — PROGRESS NOTES
Chief complaint: Patient presents with:  Surgical Followup: Left foot        History of Present Illness: This 83 year old female is seen for post-op management:    Post-op: LEFT fourth claw toe correction  DOS: 02/12/2024    She has been wearing a CAM boot and using her walker to offload her foot. She does not have pain in her toe.    No further pedal complaints today.       Historically, patient had a LEFT foot Bethea bunionectomy with interpositional graft in the 1st MTPJ (DOS 09/21/2020).      Patient also had a RIGHT foot 1st MTPJ fusion, Hammertoe correction digits 2-5, and two neuroma removals in 2014 by another provider.      BP (!) 140/86   Pulse 54   Temp 97.2  F (36.2  C)   Resp 20   SpO2 97%     Patient Active Problem List   Diagnosis    Hypothyroidism    Generalized anxiety disorder    Benign essential hypertension    GERD (gastroesophageal reflux disease)    Hepatitis    Contact dermatitis    Rosacea    Osteoarthritis    Fibromyalgia    Open wound of knee, leg, and ankle    Degeneration of lumbar or lumbosacral intervertebral disc    Anemia    Autoimmune hepatitis (H)    Morbid obesity (H)    Mild major depression (H)    Hallux valgus of left foot    Left foot pain    Ametropia    Decreased vision of right eye    Dry eye syndrome of both eyes    PCO (posterior capsular opacification), left    Ptosis of both eyelids    Esodeviation    S/p nephrectomy    Stage 3b chronic kidney disease (H)    Overactive bladder    Chalazion of right upper eyelid    Basal cell carcinoma (BCC) of skin of left lower extremity including hip    Mild episode of recurrent major depressive disorder (H24)    Acute right-sided low back pain with right-sided sciatica    Back stiffness    Muscle weakness    Chronic systolic congestive heart failure (H)       Past Surgical History:   Procedure Laterality Date    APPENDECTOMY      ARTHROPLASTY TOE(S) Left 9/21/2020    Procedure: Left  Bethea Arthroplasty with interpositional  arthroplasty using graft.;  Surgeon: Gina Rodriguez DPM;  Location: [facility]    BACK SURGERY  2015    lumbar epidural injection    CARDIAC SURGERY  10/2017    angioplasty    CHOLECYSTECTOMY      [facility]    COLONOSCOPY - HIM SCAN  05/15/2001    Small internal hemorrhoids; otherwise normal;EB    COLONOSCOPY - HIM SCAN  12/14/2006    Colonoscopy, [facility], [facility]AYLON    ENT SURGERY      tonsillectomy    ESOPHAGOGASTRODUODENOSCOPY  07/27/2017    [facility]    EXCISE LESION LOWER EXTREMITY Left 4/19/2022    Procedure: Excision Left Medial Lower Leg Ulceration;  Surgeon: Kai Huynh MD;  Location: [facility]    EXCISE LESION LOWER EXTREMITY Left 9/13/2022    Procedure: Wide local excision of left posterior leg;  Surgeon: Kai Huynh MD;  Location: [facility]    EXCISE LESION UPPER EXTREMITY Right 4/19/2022    Procedure: Excision lesion right upper extremity;  Surgeon: Kai Huynh MD;  Location: [facility]    EYE SURGERY      bilateral cataract removal    GI SURGERY      anal fistula    GYN SURGERY  1985    [facility]    GYN SURGERY      cessarian x 2    GYN SURGERY      tubal sterilazation    NEPHRECTOMY Right 05/02/2019    ORTHOPEDIC SURGERY      right knee    ORTHOPEDIC SURGERY  1986    plantar fascia release    ORTHOPEDIC SURGERY      left knee    ORTHOPEDIC SURGERY      right trigger finger release    ORTHOPEDIC SURGERY  2013    Right great toe MTPJ fusion, adductor tenotomy,correction of hammer toe    ORTHOPEDIC SURGERY  [id number]    multiple procedures left forefoot    REPAIR HAMMER TOE Left 2/12/2024    Procedure: LEFT fourth claw toe correction;  Surgeon: Gina Rodriguez DPM;  Location: [facility]       Current Outpatient Medications   Medication    aspirin (ASA) 81 MG chewable tablet    cyclobenzaprine (FLEXERIL) 10 MG tablet    fluticasone (FLONASE) 50 MCG/ACT nasal spray    hydrocortisone 2.5 % cream    levothyroxine (SYNTHROID/LEVOTHROID) 112 MCG tablet    losartan (COZAAR) 100 MG tablet     melatonin 1 MG TABS tablet    nystatin (MYCOSTATIN) 491619 UNIT/GM external cream    nystatin (MYCOSTATIN) 421356 UNIT/GM external powder    omeprazole (PRILOSEC) 40 MG capsule    polyethylene glycol (MIRALAX) 17 g packet    propranolol (INDERAL) 10 MG tablet    budesonide (ENTOCORT EC) 3 MG EC capsule     No current facility-administered medications for this visit.          Allergies   Allergen Reactions    Fentanyl Other (See Comments)     Severe agitation when used during angiogram    Codeine Sulfate Other (See Comments)     hallucinations    Fentanyl And Related      Other reaction(s): Confusion    Morphine Other (See Comments)     Hallucinations with iv therapy    Tape [Adhesive Tape]     Tramadol Other (See Comments)     hallucination       Family History   Problem Relation Age of Onset    Arthritis Mother         rheumatoid    Heart Disease Mother         CHF    Alcohol/Drug Father         alcoholism    Allergies Father     Thyroid Disease Other     Diabetes No family hx of     Asthma No family hx of        Social History     Socioeconomic History    Marital status:      Spouse name: None    Number of children: None    Years of education: None    Highest education level: None   Tobacco Use    Smoking status: Never     Passive exposure: Never    Smokeless tobacco: Never   Vaping Use    Vaping Use: Never used   Substance and Sexual Activity    Alcohol use: No    Drug use: No    Sexual activity: Never   Other Topics Concern    Parent/sibling w/ CABG, MI or angioplasty before 65F 55M? No    Blood Transfusions Yes    Caffeine Concern Yes     Comment: soda rare     Social Determinants of Health     Financial Resource Strain: Low Risk  (1/4/2024)    Financial Resource Strain     Within the past 12 months, have you or your family members you live with been unable to get utilities (heat, electricity) when it was really needed?: No   Food Insecurity: High Risk (1/4/2024)    Food Insecurity     Within the past  12 months, did you worry that your food would run out before you got money to buy more?: Yes     Within the past 12 months, did the food you bought just not last and you didn t have money to get more?: Yes   Transportation Needs: Low Risk  (1/4/2024)    Transportation Needs     Within the past 12 months, has lack of transportation kept you from medical appointments, getting your medicines, non-medical meetings or appointments, work, or from getting things that you need?: No   Interpersonal Safety: Low Risk  (10/6/2023)    Interpersonal Safety     Do you feel physically and emotionally safe where you currently live?: Yes     Within the past 12 months, have you been hit, slapped, kicked or otherwise physically hurt by someone?: No     Within the past 12 months, have you been humiliated or emotionally abused in other ways by your partner or ex-partner?: No   Housing Stability: Low Risk  (1/4/2024)    Housing Stability     Do you have housing? : Yes     Are you worried about losing your housing?: No       ROS: 10 point ROS neg other than the symptoms noted above in the HPI.  EXAM  Constitutional: healthy, alert, and no distress    Psychiatric: mentation appears normal and affect normal/bright    VASCULAR:  -Dorsalis pedis pulse +2/4 bilaterally   -Posterior tibial pulse +1/4 bilaterally   -Moderate 2+ non-pitting edema to ankle and dorsum of foot bilaterally   NEURO:  -Light touch sensation intact to b/l plantar forefoot  DERM:  -Skin along incision site(s) to the LEFT dorsal fourth toe is well approximated with no dehiscence.   ---Sutures intact.  ---No erythema  ---Moderate martin-incision edema and mild ecchymosis--par with post-operative course.   ---No dark or ascending erythema, no moderate calor, no malodor, no purulence, no other SOI.  MSK:  -Rectus position of toe of the LEFT fourth toe  ---Wire intact at the distal toe  ---No drainage  ---No severe erythema, no ascending erythema, no calor, no purulence, no  malodor, no other signs of infection.     LEFT FOOT RADIOGRAPHS 02/19/2024  IMPRESSION:   Fourth digit surgical changes and pinning without complication.  FORREST DAVIS MD   ============================================================    ASSESSMENT:  (Z47.89) Encounter for other orthopedic aftercare  (primary encounter diagnosis)      PLAN:  -Patient evaluated and examined. Treatment options discussed with no educational barriers noted.  Post-op: LEFT fourth claw toe correction  DOS: 02/12/2024  Post-op:  -The toe is in a rectus position with the K-wire intact at the distal end of the toe. There are no acute signs of infection.  -Applied a new post-op dressing with Xeroform to the incision sites, gauze, Conform and an ACE wrap. Patient is advised to gently apply gauze over the K-wire with each dressing, but do not cover with an ACE wrap to avoid accidentally applying pressure on the pin and have the pin become further lodged into the toe.   -Patient was given additional dressing supplies and the dressing should be changed every three days.  -Continue to monitor for SOI at home. Patient was instructed on how to look for SOI (redness, swelling, pain, purulence, fever, chills, nausea, vomiting) and to return to podiatry or the emergency department immediately if there are any signs of infection. She should also monitor the K-wire daily and look for drainage or redness at the end of the toe. She is in agreement with this plan.    -One week post-op radiographs ordered on 02/19/2024. Toe is in a rectus position with the K-wire intact. The wire appears to be crossing into the fourth metatarsal head, but the wire may be dorsal or plantar as the toe has good ROM at the MTPJ. This may also be because of the flexibility of the retinol wire. There are no concerns with the placement of the wire, however, at this time. Patient will be called with the results.    -Offloading: Patient is instructed to continue with being 100% NWB.  Wear the CAM boot and use crutches or a knee scooter for offloading.    -Patient and her  are in agreement with the above plan and all questions were answered to satisfaction.     RTC as needed per patient request -- patient will call if her pain worsens      Gina Rodriguez DPM

## 2024-02-20 ENCOUNTER — TELEPHONE (OUTPATIENT)
Dept: FAMILY MEDICINE | Facility: OTHER | Age: 84
End: 2024-02-20

## 2024-02-20 ENCOUNTER — OFFICE VISIT (OUTPATIENT)
Dept: FAMILY MEDICINE | Facility: OTHER | Age: 84
End: 2024-02-20
Attending: FAMILY MEDICINE
Payer: MEDICARE

## 2024-02-20 VITALS
HEIGHT: 62 IN | BODY MASS INDEX: 33.13 KG/M2 | TEMPERATURE: 97.8 F | DIASTOLIC BLOOD PRESSURE: 80 MMHG | OXYGEN SATURATION: 99 % | HEART RATE: 60 BPM | RESPIRATION RATE: 18 BRPM | WEIGHT: 180 LBS | SYSTOLIC BLOOD PRESSURE: 140 MMHG

## 2024-02-20 DIAGNOSIS — N32.81 OVERACTIVE BLADDER: Primary | ICD-10-CM

## 2024-02-20 DIAGNOSIS — R35.0 INCREASED FREQUENCY OF URINATION: ICD-10-CM

## 2024-02-20 LAB
BACTERIA #/AREA URNS HPF: ABNORMAL /HPF
RBC #/AREA URNS AUTO: ABNORMAL /HPF
SQUAMOUS #/AREA URNS AUTO: ABNORMAL /LPF
WBC #/AREA URNS AUTO: >100 /HPF

## 2024-02-20 PROCEDURE — 87086 URINE CULTURE/COLONY COUNT: CPT | Mod: ZL | Performed by: FAMILY MEDICINE

## 2024-02-20 PROCEDURE — G0463 HOSPITAL OUTPT CLINIC VISIT: HCPCS

## 2024-02-20 PROCEDURE — 87186 SC STD MICRODIL/AGAR DIL: CPT | Mod: ZL | Performed by: FAMILY MEDICINE

## 2024-02-20 PROCEDURE — 99214 OFFICE O/P EST MOD 30 MIN: CPT | Mod: 24 | Performed by: FAMILY MEDICINE

## 2024-02-20 PROCEDURE — 81015 MICROSCOPIC EXAM OF URINE: CPT | Mod: ZL | Performed by: FAMILY MEDICINE

## 2024-02-20 RX ORDER — PHENAZOPYRIDINE HYDROCHLORIDE 100 MG/1
100 TABLET, FILM COATED ORAL 3 TIMES DAILY PRN
Qty: 10 TABLET | Refills: 0 | Status: SHIPPED | OUTPATIENT
Start: 2024-02-20 | End: 2024-02-29

## 2024-02-20 RX ORDER — PHENAZOPYRIDINE HYDROCHLORIDE 200 MG/1
200 TABLET, FILM COATED ORAL 3 TIMES DAILY PRN
COMMUNITY
Start: 2024-01-23 | End: 2024-04-30 | Stop reason: ALTCHOICE

## 2024-02-20 RX ORDER — SOLIFENACIN SUCCINATE 5 MG/1
5 TABLET, FILM COATED ORAL DAILY
Qty: 30 TABLET | Refills: 2 | Status: SHIPPED | OUTPATIENT
Start: 2024-02-20 | End: 2024-04-26

## 2024-02-20 RX ORDER — VIBEGRON 75 MG/1
75 TABLET, FILM COATED ORAL DAILY
COMMUNITY
Start: 2024-02-06 | End: 2024-07-26

## 2024-02-20 RX ORDER — HYDROCODONE BITARTRATE AND ACETAMINOPHEN 5; 325 MG/1; MG/1
1 TABLET ORAL
COMMUNITY
Start: 2024-02-02 | End: 2024-04-30

## 2024-02-20 ASSESSMENT — PAIN SCALES - GENERAL: PAINLEVEL: EXTREME PAIN (8)

## 2024-02-20 NOTE — PROGRESS NOTES
"  Assessment & Plan     1. Overactive bladder  Will try different bladder medication.  She will keep a close eye on BP and will let us know right away if anything changes.  Follow-up in a few weeks for reevaluation of symptoms.  - solifenacin (VESICARE) 5 MG tablet; Take 1 tablet (5 mg) by mouth daily  Dispense: 30 tablet; Refill: 2    2. Increased frequency of urination  Will await culture to determine if treatment is needed.  Pyridium is refilled for pain.  - UA with Microscopic reflex to Culture - [facility]; Future  - UA Microscopic with Reflex to Culture; Future  - UA Microscopic with Reflex to Culture  - Urine Culture  - phenazopyridine (PYRIDIUM) 100 MG tablet; Take 1 tablet (100 mg) by mouth 3 times daily as needed for urinary tract discomfort  Dispense: 10 tablet; Refill: 0     Patient is encouraged to contact Urology as previously requested for any symptoms.    BMI  Estimated body mass index is 32.92 kg/m  as calculated from the following:    Height as of this encounter: 1.575 m (5' 2\").    Weight as of this encounter: 81.6 kg (180 lb).       Return in about 3 weeks (around 3/12/2024) for Medication review.      Camilo Tejeda is a 83 year old, presenting for the following health issues:  No chief complaint on file.        2/20/2024     2:04 PM   Additional Questions   Roomed by juanita   Accompanied by son     HPI     Genitourinary - Female  Onset/Duration: last night   Description:   Painful urination (Dysuria): No           Frequency: No  Blood in urine (Hematuria): No  Delay in urine (Hesitency): No  Intensity: severe  Progression of Symptoms:  worsening  Accompanying Signs & Symptoms:  Fever/chills: No  Flank pain: No  Nausea and vomiting: No  Vaginal symptoms: none  Abdominal/Pelvic Pain: YES  History:   History of frequent UTI s: YES  History of kidney stones: No  Sexually Active: No  Possibility of pregnancy: No  Precipitating or alleviating factors: None  Therapies tried and outcome: " "Pyridium   Patient was evaluated by Urology - patient states she didn't find the appointment helpful at all.  We reviewed the recommendations given - Mybetriq was trialed for overactive bladder, patient was to contact clinic if she developed UTI symptoms to have testing completed through [facility], they discussed regular bowels and regular urination schedule, etc.  Patient doesn't seem to remember any of this.  She does not the Myrbetriq caused her BP to become quite elevated, so she stopped that.  She is willing to try a different medication.      Review of Systems  Constitutional, HEENT, cardiovascular, pulmonary, gi and gu systems are negative, except as otherwise noted.      Objective    BP (!) 140/80 (BP Location: Left arm, Patient Position: Chair, Cuff Size: Adult Regular)   Pulse 60   Temp 97.8  F (36.6  C) (Tympanic)   Resp 18   Ht 1.575 m (5' 2\")   Wt 81.6 kg (180 lb)   SpO2 99%   BMI 32.92 kg/m    Body mass index is 32.92 kg/m .  Physical Exam   GENERAL: alert and no distress  PSYCH: mentation appears normal, affect normal/bright    UA shows some mild findings suggestive of UTI, mainly higher number of WBC on microscopic examination (urine dip cannot be completed as she did take a pyridium tablet today)        Signed Electronically by: Iris Becerril MD    "

## 2024-02-20 NOTE — TELEPHONE ENCOUNTER
10:00 AM    Reason for Call: OVERBOOK    Patient is having the following symptoms:  for pain in her vagina and possible UTI.    The patient is requesting an appointment for  with [facility].    Was an appointment offered for this call? Yes  If yes : Appointment type              Date    Preferred method for responding to this message: Telephone Call  What is your phone number ? [phone]    If we cannot reach you directly, may we leave a detailed response at the number you provided? Yes    Can this message wait until your PCP/provider returns, if unavailable today? YES, provider is in today    BOOM LOVE

## 2024-02-20 NOTE — PATIENT INSTRUCTIONS
We will wait for the urine culture to make sure we need antibiotics.  We don't want to start antibiotics and kill off good beneficial bacteria if we don't need the medicine for an infection.    We will start Vesicare for your overactive bladder.  Please watch your blood pressure closely.    We will contact you when we see your urine culture results.  Remember, the pyridium will make your urine orange, this is normal.

## 2024-02-22 DIAGNOSIS — N39.0 ACUTE UTI: Primary | ICD-10-CM

## 2024-02-22 LAB — BACTERIA UR CULT: ABNORMAL

## 2024-02-22 RX ORDER — SULFAMETHOXAZOLE/TRIMETHOPRIM 800-160 MG
1 TABLET ORAL 2 TIMES DAILY
Qty: 10 TABLET | Refills: 0 | Status: SHIPPED | OUTPATIENT
Start: 2024-02-22 | End: 2024-02-27

## 2024-02-27 ENCOUNTER — OFFICE VISIT (OUTPATIENT)
Dept: PODIATRY | Facility: OTHER | Age: 84
End: 2024-02-27
Attending: PODIATRIST
Payer: MEDICARE

## 2024-02-27 VITALS
SYSTOLIC BLOOD PRESSURE: 118 MMHG | HEART RATE: 57 BPM | RESPIRATION RATE: 20 BRPM | OXYGEN SATURATION: 96 % | TEMPERATURE: 96.3 F | DIASTOLIC BLOOD PRESSURE: 76 MMHG

## 2024-02-27 DIAGNOSIS — Z47.89 ENCOUNTER FOR OTHER ORTHOPEDIC AFTERCARE: Primary | ICD-10-CM

## 2024-02-27 PROCEDURE — 99024 POSTOP FOLLOW-UP VISIT: CPT | Performed by: PODIATRIST

## 2024-02-27 PROCEDURE — G0463 HOSPITAL OUTPT CLINIC VISIT: HCPCS | Mod: 25

## 2024-02-27 PROCEDURE — G0463 HOSPITAL OUTPT CLINIC VISIT: HCPCS

## 2024-02-27 RX ORDER — CEPHALEXIN 500 MG/1
500 CAPSULE ORAL 2 TIMES DAILY
COMMUNITY
Start: 2024-02-02 | End: 2024-04-26

## 2024-02-27 RX ORDER — ESTRADIOL 0.1 MG/G
CREAM VAGINAL
COMMUNITY
Start: 2024-01-05

## 2024-02-27 ASSESSMENT — PAIN SCALES - GENERAL: PAINLEVEL: NO PAIN (0)

## 2024-02-27 NOTE — PROGRESS NOTES
Chief complaint: Patient presents with:  Surgical Followup: LEFT FOOT      History of Present Illness: This 83 year old female is seen for post-op management:    Post-op: LEFT fourth claw toe correction  DOS: 02/12/2024    She has been wearing a CAM boot and using her walker to offload her foot. She has changed her dressing every three days with Xeroform, gauze and tape. She has a couple nights of pain, but her pain is improving with time.    No further pedal complaints today.       Historically, patient had a LEFT foot Bethea bunionectomy with interpositional graft in the 1st MTPJ (DOS 09/21/2020).      Patient also had a RIGHT foot 1st MTPJ fusion, Hammertoe correction digits 2-5, and two neuroma removals in 2014 by another provider.      /76   Pulse 57   Temp (!) 96.3  F (35.7  C)   Resp 20   SpO2 96%     Patient Active Problem List   Diagnosis    Hypothyroidism    Generalized anxiety disorder    Benign essential hypertension    GERD (gastroesophageal reflux disease)    Hepatitis    Contact dermatitis    Rosacea    Osteoarthritis    Fibromyalgia    Open wound of knee, leg, and ankle    Degeneration of lumbar or lumbosacral intervertebral disc    Anemia    Autoimmune hepatitis (H)    Morbid obesity (H)    Mild major depression (H)    Hallux valgus of left foot    Left foot pain    Ametropia    Decreased vision of right eye    Dry eye syndrome of both eyes    PCO (posterior capsular opacification), left    Ptosis of both eyelids    Esodeviation    S/p nephrectomy    Stage 3b chronic kidney disease (H)    Overactive bladder    Chalazion of right upper eyelid    Basal cell carcinoma (BCC) of skin of left lower extremity including hip    Mild episode of recurrent major depressive disorder (H24)    Acute right-sided low back pain with right-sided sciatica    Back stiffness    Muscle weakness    Chronic systolic congestive heart failure (H)       Past Surgical History:   Procedure Laterality Date     APPENDECTOMY      ARTHROPLASTY TOE(S) Left 9/21/2020    Procedure: Left  Bethea Arthroplasty with interpositional arthroplasty using graft.;  Surgeon: Gina Rodriguez DPM;  [facility]: [facility]    BACK SURGERY  2015    lumbar epidural injection    CARDIAC SURGERY  10/2017    angioplasty    CHOLECYSTECTOMY      COLONOSCOPY  07/27/2017    [facility]    COLONOSCOPY - HIM SCAN  05/15/2001    Small internal hemorrhoids; otherwise normal;[facility]    COLONOSCOPY - HIM SCAN  12/14/2006    Colonoscopy, REMV LESN, SNARE    ENT SURGERY      tonsillectomy    ESOPHAGOGASTRODUODENOSCOPY  07/27/2017    [facility]    EXCISE LESION LOWER EXTREMITY Left 4/19/2022    Procedure: Excision Left Medial Lower Leg Ulceration;  Surgeon: Kai Huynh MD;  [facility]    EXCISE LESION LOWER EXTREMITY Left 9/13/2022    Procedure: Wide local excision of left posterior leg;  Surgeon: Kai Huynh MD;  [facility]    EXCISE LESION UPPER EXTREMITY Right 4/19/2022    Procedure: Excision lesion right upper extremity;  Surgeon: Kai Huynh MD;  [facility]    EYE SURGERY      bilateral cataract removal    GI SURGERY      anal fistula    GYN SURGERY  1985    NICHOLAS BSO    GYN SURGERY      cessarian x 2    GYN SURGERY      tubal sterilazation    NEPHRECTOMY Right 05/02/2019    ORTHOPEDIC SURGERY      right knee    ORTHOPEDIC SURGERY  1986    plantar fascia release    ORTHOPEDIC SURGERY      left knee    ORTHOPEDIC SURGERY      right trigger finger release    ORTHOPEDIC SURGERY  2013    Right great toe MTPJ fusion, adductor tenotomy,correction of hammer toe    ORTHOPEDIC SURGERY  [id number]    multiple procedures left forefoot    REPAIR HAMMER TOE Left 2/12/2024    Procedure: LEFT fourth claw toe correction;  Surgeon: Gina Rodriguez DPM;  [facility]       Current Outpatient Medications   Medication    aspirin (ASA) 81 MG chewable tablet    cephALEXin (KEFLEX) 500 MG capsule    cyclobenzaprine (FLEXERIL) 10 MG tablet    estradiol  (ESTRACE) 0.1 MG/GM vaginal cream    fluticasone (FLONASE) 50 MCG/ACT nasal spray    GEMTESA 75 MG TABS tablet    HYDROcodone-acetaminophen (NORCO) 5-325 MG tablet    hydrocortisone 2.5 % cream    levothyroxine (SYNTHROID/LEVOTHROID) 112 MCG tablet    losartan (COZAAR) 100 MG tablet    melatonin 1 MG TABS tablet    nystatin (MYCOSTATIN) 074766 UNIT/GM external cream    nystatin (MYCOSTATIN) 491514 UNIT/GM external powder    omeprazole (PRILOSEC) 40 MG capsule    phenazopyridine (PYRIDIUM) 100 MG tablet    phenazopyridine (PYRIDIUM) 200 MG tablet    polyethylene glycol (MIRALAX) 17 g packet    propranolol (INDERAL) 10 MG tablet    solifenacin (VESICARE) 5 MG tablet    sulfamethoxazole-trimethoprim (BACTRIM DS) 800-160 MG tablet    budesonide (ENTOCORT EC) 3 MG EC capsule     No current facility-administered medications for this visit.          Allergies   Allergen Reactions    Fentanyl Other (See Comments)     Severe agitation when used during angiogram    Codeine Sulfate Other (See Comments)     hallucinations    Fentanyl And Related      Other reaction(s): Confusion    Morphine Other (See Comments)     Hallucinations with iv therapy    Tape [Adhesive Tape]     Tramadol Other (See Comments)     hallucination       Family History   Problem Relation Age of Onset    Arthritis Mother         rheumatoid    Heart Disease Mother         CHF    Alcohol/Drug Father         alcoholism    Allergies Father     Thyroid Disease Other     Diabetes No family hx of     Asthma No family hx of        Social History     Socioeconomic History    Marital status:      Spouse name: None    Number of children: None    Years of education: None    Highest education level: None   Tobacco Use    Smoking status: Never     Passive exposure: Never    Smokeless tobacco: Never   Vaping Use    Vaping Use: Never used   Substance and Sexual Activity    Alcohol use: No    Drug use: No    Sexual activity: Never   Other Topics Concern     Parent/sibling w/ CABG, MI or angioplasty before 65F 55M? No    Blood Transfusions Yes    Caffeine Concern Yes     Comment: soda rare     Social Determinants of Health     Financial Resource Strain: Low Risk  (1/4/2024)    Financial Resource Strain     Within the past 12 months, have you or your family members you live with been unable to get utilities (heat, electricity) when it was really needed?: No   Food Insecurity: High Risk (1/4/2024)    Food Insecurity     Within the past 12 months, did you worry that your food would run out before you got money to buy more?: Yes     Within the past 12 months, did the food you bought just not last and you didn t have money to get more?: Yes   Transportation Needs: Low Risk  (1/4/2024)    Transportation Needs     Within the past 12 months, has lack of transportation kept you from medical appointments, getting your medicines, non-medical meetings or appointments, work, or from getting things that you need?: No   Interpersonal Safety: Low Risk  (10/6/2023)    Interpersonal Safety     Do you feel physically and emotionally safe where you currently live?: Yes     Within the past 12 months, have you been hit, slapped, kicked or otherwise physically hurt by someone?: No     Within the past 12 months, have you been humiliated or emotionally abused in other ways by your partner or ex-partner?: No   Housing Stability: Low Risk  (1/4/2024)    Housing Stability     Do you have housing? : Yes     Are you worried about losing your housing?: No       ROS: 10 point ROS neg other than the symptoms noted above in the HPI.  EXAM  Constitutional: healthy, alert, and no distress    Psychiatric: mentation appears normal and affect normal/bright    VASCULAR:  -Dorsalis pedis pulse +2/4 bilaterally   -Posterior tibial pulse +1/4 bilaterally   -Moderate 2+ non-pitting edema to ankle and dorsum of foot bilaterally   NEURO:  -Light touch sensation intact to b/l plantar forefoot  DERM:  -Skin along  incision site(s) to the LEFT dorsal fourth toe is well approximated with no dehiscence.   ---Sutures intact and skin intact post removal of sutures.  ---No erythema  ---Moderate martin-incision edema and mild ecchymosis--par with post-operative course.   ---No dark or ascending erythema, no moderate calor, no malodor, no purulence, no other SOI.  MSK:  -Rectus position of toe of the LEFT fourth toe  ---Wire intact at the distal toe  ---No drainage  ---No severe erythema, no ascending erythema, no calor, no purulence, no malodor, no other signs of infection.     LEFT FOOT RADIOGRAPHS 02/19/2024  IMPRESSION:   Fourth digit surgical changes and pinning without complication.  FORREST DAVIS MD   ============================================================    ASSESSMENT:  (Z47.89) Encounter for other orthopedic aftercare  (primary encounter diagnosis)      PLAN:  -Patient evaluated and examined. Treatment options discussed with no educational barriers noted.  Post-op: LEFT fourth claw toe correction  DOS: 02/12/2024  Post-op:  -The toe is in a rectus position with the K-wire intact at the distal end of the toe. There are no acute signs of infection.    -Sutures removed from the LEFT fourth toe. No complications. The skin is intact. The pin is intact.   -Applied Xeroform to incision and distal wire due to scabs on the incision. Covered with dry gauze, Conform and tape. Continue every three days with this dressing. May replace the dorsal incision dressing with dry gauze in 1-2 weeks if there is still no drainage, but continue with the Xeroform over the wire with a new dressing every three days. Keep foot covered with gauze roll to cover the pin.    -Offloading: Patient is instructed to continue being 100% NWB. Wear the CAM boot and use crutches or a knee scooter for offloading. She is using a walker without complications.   ---Patient is having difficulty with balance with her CAM boot. She was dispensed a post-op shoe. She  does not want the pin to break so she is advised to wear the CAM boot, but she does have a Nitinol wire so she may use a post-op shoe when needed if she is having a lot of difficulty with balance.    -Will pull the pin in four more weeks.    -Patient and her  are in agreement with the above plan and all questions were answered to satisfaction.     Return to clinic four weeks for pin removal    Gina Rodriguez DPM

## 2024-02-28 DIAGNOSIS — R35.0 INCREASED FREQUENCY OF URINATION: ICD-10-CM

## 2024-02-29 RX ORDER — PHENAZOPYRIDINE HYDROCHLORIDE 100 MG/1
TABLET, FILM COATED ORAL
Qty: 10 TABLET | Refills: 0 | Status: SHIPPED | OUTPATIENT
Start: 2024-02-29

## 2024-02-29 NOTE — TELEPHONE ENCOUNTER
Routing refill request to provider for review/approval because:  Drug not on the [facility] or [facility] refill protocol or controlled substance

## 2024-02-29 NOTE — TELEPHONE ENCOUNTER
Pyridium      Last Written Prescription Date:  2/20/24  Last Fill Quantity: 10,   # refills: 0  Last Office Visit: 2/20/24  Future Office visit:    Next 5 appointments (look out 90 days)      Mar 26, 2024 12:30 PM  (Arrive by 12:15 PM)  Return Visit with Gina Rodriguez DPM  [facility] ([facility] ) [address] [place] [id number] [phone]     Apr 16, 2024 12:30 PM  (Arrive by 12:15 PM)  Return Visit with Gill Mcgill CNP  [facility] ([facility] ) [address] [place] [address] [phone]             Routing refill request to provider for review/approval because:

## 2024-03-13 DIAGNOSIS — M62.838 MUSCLE SPASM: ICD-10-CM

## 2024-03-13 DIAGNOSIS — R52 PAIN: ICD-10-CM

## 2024-03-14 RX ORDER — CYCLOBENZAPRINE HCL 10 MG
TABLET ORAL
Qty: 30 TABLET | Refills: 0 | Status: SHIPPED | OUTPATIENT
Start: 2024-03-14 | End: 2024-04-26 | Stop reason: SINTOL

## 2024-03-14 NOTE — TELEPHONE ENCOUNTER
CYCLOBENZAPRINE 10MG TABLET           Last Written Prescription Date:  8/24/23  Last Fill Quantity: 30,   # refills: 0  Last Office Visit: 2/20/24  Future Office visit:    Next 5 appointments (look out 90 days)      Mar 26, 2024 12:30 PM  (Arrive by 12:15 PM)  Return Visit with Gina Rodriguez DPM  [facility] ([facility] ) [address] [place] [id number] [phone]     Apr 16, 2024 12:30 PM  (Arrive by 12:15 PM)  Return Visit with Gill Mcgill CNP  [facility] ([facility] ) [address] [place] [address] [phone]             Routing refill request to provider for review/approval because:  Drug not on the [facility], [facility] or  [facility] refill protocol or controlled substance    PCP: Out of Office

## 2024-03-26 ENCOUNTER — TELEPHONE (OUTPATIENT)
Dept: INTERNAL MEDICINE | Facility: OTHER | Age: 84
End: 2024-03-26

## 2024-03-26 PROCEDURE — 81001 URINALYSIS AUTO W/SCOPE: CPT | Mod: ZL

## 2024-03-26 PROCEDURE — 87086 URINE CULTURE/COLONY COUNT: CPT | Mod: ZL

## 2024-03-26 NOTE — TELEPHONE ENCOUNTER
Noticed received from [facility] - patient experiencing symptoms of UTI with symptoms to include: pain with urination, hx of UTI's. Patient was requesting a UA to be ordered and completed on 3/27/24. Dr. Jones is Out of Clinic on 3/27/24, patient needs to be seen per Dr. Jones. Call placed to patient to provide an update, patient needs to be seen per Dr. Jones.     Notified patient she may come in at 1000 am on 3/27/24 for Office Visit with Dr. Campos. Patient agreed to appointment.       Next 5 appointments (look out 90 days)      Mar 27, 2024 10:30 AM  (Arrive by 10:15 AM)  SHORT with Zafar Campos DO  [facility] ([facility] ) [address]  [place] [id number] [phone]     Mar 27, 2024 12:45 PM  (Arrive by 12:30 PM)  Return Visit with Gina Rodriguez DPM  [facility] ([facility] ) [address] [place] [id number] [phone]     Apr 16, 2024 12:30 PM  (Arrive by 12:15 PM)  Return Visit with Gill Mcgill CNP  [facility] ([facility] ) [address] [place] [address]  [phone]

## 2024-03-27 ENCOUNTER — ANCILLARY PROCEDURE (OUTPATIENT)
Dept: GENERAL RADIOLOGY | Facility: OTHER | Age: 84
End: 2024-03-27
Attending: PODIATRIST
Payer: MEDICARE

## 2024-03-27 ENCOUNTER — LAB (OUTPATIENT)
Dept: LAB | Facility: OTHER | Age: 84
End: 2024-03-27
Attending: PODIATRIST
Payer: MEDICARE

## 2024-03-27 ENCOUNTER — OFFICE VISIT (OUTPATIENT)
Dept: PODIATRY | Facility: OTHER | Age: 84
End: 2024-03-27
Attending: PODIATRIST
Payer: MEDICARE

## 2024-03-27 ENCOUNTER — OFFICE VISIT (OUTPATIENT)
Dept: INTERNAL MEDICINE | Facility: OTHER | Age: 84
End: 2024-03-27
Attending: INTERNAL MEDICINE
Payer: MEDICARE

## 2024-03-27 VITALS
SYSTOLIC BLOOD PRESSURE: 124 MMHG | DIASTOLIC BLOOD PRESSURE: 84 MMHG | TEMPERATURE: 97.4 F | OXYGEN SATURATION: 98 % | RESPIRATION RATE: 20 BRPM | HEART RATE: 58 BPM

## 2024-03-27 VITALS
TEMPERATURE: 97.4 F | RESPIRATION RATE: 18 BRPM | DIASTOLIC BLOOD PRESSURE: 84 MMHG | HEART RATE: 58 BPM | OXYGEN SATURATION: 98 % | SYSTOLIC BLOOD PRESSURE: 124 MMHG

## 2024-03-27 DIAGNOSIS — R30.0 DYSURIA: Primary | ICD-10-CM

## 2024-03-27 DIAGNOSIS — Z47.89 ENCOUNTER FOR OTHER ORTHOPEDIC AFTERCARE: Primary | ICD-10-CM

## 2024-03-27 DIAGNOSIS — Z47.89 ENCOUNTER FOR OTHER ORTHOPEDIC AFTERCARE: ICD-10-CM

## 2024-03-27 DIAGNOSIS — N30.01 ACUTE CYSTITIS WITH HEMATURIA: ICD-10-CM

## 2024-03-27 DIAGNOSIS — R30.0 DYSURIA: ICD-10-CM

## 2024-03-27 LAB
ALBUMIN UR-MCNC: NEGATIVE MG/DL
APPEARANCE UR: CLEAR
BACTERIA #/AREA URNS HPF: ABNORMAL /HPF
BILIRUB UR QL STRIP: NEGATIVE
COLOR UR AUTO: YELLOW
GLUCOSE UR STRIP-MCNC: NEGATIVE MG/DL
HGB UR QL STRIP: NEGATIVE
KETONES UR STRIP-MCNC: NEGATIVE MG/DL
LEUKOCYTE ESTERASE UR QL STRIP: ABNORMAL
NITRATE UR QL: POSITIVE
PH UR STRIP: 7 [PH] (ref 5–7)
RBC #/AREA URNS AUTO: ABNORMAL /HPF
SP GR UR STRIP: 1.01 (ref 1–1.03)
SQUAMOUS #/AREA URNS AUTO: ABNORMAL /LPF
UROBILINOGEN UR STRIP-ACNC: 0.2 E.U./DL
WBC #/AREA URNS AUTO: ABNORMAL /HPF

## 2024-03-27 PROCEDURE — 99024 POSTOP FOLLOW-UP VISIT: CPT | Performed by: PODIATRIST

## 2024-03-27 PROCEDURE — G0463 HOSPITAL OUTPT CLINIC VISIT: HCPCS | Mod: 25,27

## 2024-03-27 PROCEDURE — G0463 HOSPITAL OUTPT CLINIC VISIT: HCPCS

## 2024-03-27 PROCEDURE — 73630 X-RAY EXAM OF FOOT: CPT | Mod: TC,LT

## 2024-03-27 PROCEDURE — 99213 OFFICE O/P EST LOW 20 MIN: CPT | Performed by: INTERNAL MEDICINE

## 2024-03-27 RX ORDER — CIPROFLOXACIN 250 MG/1
250 TABLET, FILM COATED ORAL 2 TIMES DAILY
Qty: 14 TABLET | Refills: 0 | Status: SHIPPED | OUTPATIENT
Start: 2024-03-27 | End: 2024-04-30

## 2024-03-27 RX ORDER — PHENAZOPYRIDINE HYDROCHLORIDE 100 MG/1
100 TABLET, FILM COATED ORAL 3 TIMES DAILY PRN
Qty: 10 TABLET | Refills: 0 | Status: SHIPPED | OUTPATIENT
Start: 2024-03-27 | End: 2024-04-30

## 2024-03-27 RX ORDER — MIRABEGRON 50 MG/1
1 TABLET, FILM COATED, EXTENDED RELEASE ORAL
COMMUNITY
Start: 2024-03-01 | End: 2024-04-26

## 2024-03-27 ASSESSMENT — PAIN SCALES - GENERAL
PAINLEVEL: SEVERE PAIN (7)
PAINLEVEL: EXTREME PAIN (8)

## 2024-03-27 NOTE — PROGRESS NOTES
Chief complaint: Patient presents with:  Surgical Followup: Left foot      History of Present Illness: This 83 year old female is seen for post-op management:    Post-op: LEFT fourth claw toe correction  DOS: 02/12/2024    She has been wearing a post-op shoe because the CAM boot was too uncomfortable for her. She says she has been walking most of the time with the post-op shoe on her foot. She has changed her dressing every three days with Xeroform over the pin, gauze and tape. She has had minimal discomfort with her toe.    No further pedal complaints today.       Historically, patient had a LEFT foot Bethea bunionectomy with interpositional graft in the 1st MTPJ (DOS 09/21/2020).      Patient also had a RIGHT foot 1st MTPJ fusion, Hammertoe correction digits 2-5, and two neuroma removals in 2014 by another provider.      /84   Pulse 58   Temp 97.4  F (36.3  C)   Resp 18   SpO2 98%     Patient Active Problem List   Diagnosis    Hypothyroidism    Generalized anxiety disorder    Benign essential hypertension    GERD (gastroesophageal reflux disease)    Hepatitis    Contact dermatitis    Rosacea    Osteoarthritis    Fibromyalgia    Open wound of knee, leg, and ankle    Degeneration of lumbar or lumbosacral intervertebral disc    Anemia    Autoimmune hepatitis (H)    Morbid obesity (H)    Mild major depression (H)    Hallux valgus of left foot    Left foot pain    Ametropia    Decreased vision of right eye    Dry eye syndrome of both eyes    PCO (posterior capsular opacification), left    Ptosis of both eyelids    Esodeviation    S/p nephrectomy    Stage 3b chronic kidney disease (H)    Overactive bladder    Chalazion of right upper eyelid    Basal cell carcinoma (BCC) of skin of left lower extremity including hip    Mild episode of recurrent major depressive disorder (H24)    Acute right-sided low back pain with right-sided sciatica    Back stiffness    Muscle weakness    Chronic systolic congestive heart  failure (H)       Past Surgical History:   Procedure Laterality Date    APPENDECTOMY      ARTHROPLASTY TOE(S) Left 9/21/2020    Procedure: Left  Bethea Arthroplasty with interpositional arthroplasty using graft.;  Surgeon: Gina Rodriguez DPM;  [facility]: [facility]    BACK SURGERY  2015    lumbar epidural injection    CARDIAC SURGERY  10/2017    angioplasty    CHOLECYSTECTOMY      COLONOSCOPY  07/27/2017    [facility]    COLONOSCOPY - HIM SCAN  05/15/2001    Small internal hemorrhoids; otherwise normal;[facility]    COLONOSCOPY - HIM SCAN  12/14/2006    Colonoscopy, REMV LESN, SNARE    ENT SURGERY      tonsillectomy    ESOPHAGOGASTRODUODENOSCOPY  07/27/2017    [facility]    EXCISE LESION LOWER EXTREMITY Left 4/19/2022    Procedure: Excision Left Medial Lower Leg Ulceration;  Surgeon: Kai Huynh MD;  [facility]    EXCISE LESION LOWER EXTREMITY Left 9/13/2022    Procedure: Wide local excision of left posterior leg;  Surgeon: Kai Huynh MD;  [facility]    EXCISE LESION UPPER EXTREMITY Right 4/19/2022    Procedure: Excision lesion right upper extremity;  Surgeon: Kai Huynh MD;  [facility]    EYE SURGERY      bilateral cataract removal    GI SURGERY      anal fistula    GYN SURGERY  1985    NICHOLAS BSO    GYN SURGERY      cessarian x 2    GYN SURGERY      tubal sterilazation    NEPHRECTOMY Right 05/02/2019    ORTHOPEDIC SURGERY      right knee    ORTHOPEDIC SURGERY  1986    plantar fascia release    ORTHOPEDIC SURGERY      left knee    ORTHOPEDIC SURGERY      right trigger finger release    ORTHOPEDIC SURGERY  2013    Right great toe MTPJ fusion, adductor tenotomy,correction of hammer toe    ORTHOPEDIC SURGERY  [id number]    multiple procedures left forefoot    REPAIR HAMMER TOE Left 2/12/2024    Procedure: LEFT fourth claw toe correction;  Surgeon: Gina Rodriguez DPM;  [facility]       Current Outpatient Medications   Medication    aspirin (ASA) 81 MG chewable tablet    ciprofloxacin (CIPRO)  250 MG tablet    cyclobenzaprine (FLEXERIL) 10 MG tablet    estradiol (ESTRACE) 0.1 MG/GM vaginal cream    fluticasone (FLONASE) 50 MCG/ACT nasal spray    GEMTESA 75 MG TABS tablet    hydrocortisone 2.5 % cream    levothyroxine (SYNTHROID/LEVOTHROID) 112 MCG tablet    losartan (COZAAR) 100 MG tablet    melatonin 1 MG TABS tablet    MYRBETRIQ 50 MG 24 hr tablet    nystatin (MYCOSTATIN) 716962 UNIT/GM external cream    nystatin (MYCOSTATIN) 074582 UNIT/GM external powder    omeprazole (PRILOSEC) 40 MG capsule    phenazopyridine (PYRIDIUM) 100 MG tablet    phenazopyridine (PYRIDIUM) 100 MG tablet    phenazopyridine (PYRIDIUM) 200 MG tablet    polyethylene glycol (MIRALAX) 17 g packet    propranolol (INDERAL) 10 MG tablet    solifenacin (VESICARE) 5 MG tablet    budesonide (ENTOCORT EC) 3 MG EC capsule    cephALEXin (KEFLEX) 500 MG capsule    HYDROcodone-acetaminophen (NORCO) 5-325 MG tablet     No current facility-administered medications for this visit.          Allergies   Allergen Reactions    Fentanyl Other (See Comments)     Severe agitation when used during angiogram    Codeine Sulfate Other (See Comments)     hallucinations    Fentanyl And Related      Other reaction(s): Confusion    Morphine Other (See Comments)     Hallucinations with iv therapy    Tape [Adhesive Tape]     Tramadol Other (See Comments)     hallucination       Family History   Problem Relation Age of Onset    Arthritis Mother         rheumatoid    Heart Disease Mother         CHF    Alcohol/Drug Father         alcoholism    Allergies Father     Thyroid Disease Other     Diabetes No family hx of     Asthma No family hx of        Social History     Socioeconomic History    Marital status:      Spouse name: None    Number of children: None    Years of education: None    Highest education level: None   Tobacco Use    Smoking status: Never     Passive exposure: Never    Smokeless tobacco: Never   Vaping Use    Vaping Use: Never used    Substance and Sexual Activity    Alcohol use: No    Drug use: No    Sexual activity: Never   Other Topics Concern    Parent/sibling w/ CABG, MI or angioplasty before 65F 55M? No    Blood Transfusions Yes    Caffeine Concern Yes     Comment: soda rare     Social Determinants of Health     Financial Resource Strain: Low Risk  (1/4/2024)    Financial Resource Strain     Within the past 12 months, have you or your family members you live with been unable to get utilities (heat, electricity) when it was really needed?: No   Food Insecurity: High Risk (1/4/2024)    Food Insecurity     Within the past 12 months, did you worry that your food would run out before you got money to buy more?: Yes     Within the past 12 months, did the food you bought just not last and you didn t have money to get more?: Yes   Transportation Needs: Low Risk  (1/4/2024)    Transportation Needs     Within the past 12 months, has lack of transportation kept you from medical appointments, getting your medicines, non-medical meetings or appointments, work, or from getting things that you need?: No   Interpersonal Safety: Low Risk  (10/6/2023)    Interpersonal Safety     Do you feel physically and emotionally safe where you currently live?: Yes     Within the past 12 months, have you been hit, slapped, kicked or otherwise physically hurt by someone?: No     Within the past 12 months, have you been humiliated or emotionally abused in other ways by your partner or ex-partner?: No   Housing Stability: Low Risk  (1/4/2024)    Housing Stability     Do you have housing? : Yes     Are you worried about losing your housing?: No       ROS: 10 point ROS neg other than the symptoms noted above in the HPI.  EXAM  Constitutional: healthy, alert, and no distress    Psychiatric: mentation appears normal and affect normal/bright    VASCULAR:  -Dorsalis pedis pulse +2/4 bilaterally   -Posterior tibial pulse +1/4 bilaterally   -Moderate 2+ non-pitting edema to ankle  and dorsum of foot bilaterally   NEURO:  -Light touch sensation intact to b/l plantar forefoot  DERM:  -Skin along incision site(s) to the LEFT dorsal fourth toe is well approximated with no dehiscence.   -Pin pushed further into the toe (Jurgan's ball is flush with the skin) but not bent  MSK:  -Rectus position of toe of the LEFT fourth toe  ---Wire intact at the distal toe  ---No drainage  ---No severe erythema, no ascending erythema, no calor, no purulence, no malodor, no other signs of infection.     LEFT FOOT RADIOGRAPHS 02/19/2024  IMPRESSION:   Fourth digit surgical changes and pinning without complication.  FORREST DAVIS MD     LEFT FOOT RADIOGRAPHS 03/27/2024  IMPRESSION: No change in alignment of the fourth toe following removal of percutaneous pin.    TIBURCIO SANDERS MD   ============================================================    ASSESSMENT:  (Z47.89) Encounter for other orthopedic aftercare  (primary encounter diagnosis)        PLAN:  -Patient evaluated and examined. Treatment options discussed with no educational barriers noted.  Post-op: LEFT fourth claw toe correction  DOS: 02/12/2024  Post-op:  -The toe is in a rectus position with the K-wire intact at the distal end of the toe. There are no acute signs of infection.    -The k-wire was pushed into the toe with the Jurgan's ball flush with the skin, but the wear was intact and was pulled from the toe. The toe is in a rectus position. Applied Xeroform, gauze and tape to the end of the toe. Patient to continue changing the dressing every 2-3 days. If the toe continues to bleed after this weekend, then the patient is advised to call the clinic.  -Radiographs obtained from 03/27/2024: Retained rectus position of toe post wire removal compared to position with the k-wire. Patient was called with the results.  -Patient's post-op shoe is significantly too large for her foot and she finds the CAM boot too cumbersome. Her surgery was more than six weeks  ago. She may slowly start transitioning back to walking while using her walker. She should still avoid forefoot pressure through the weekend as much as possible.  -Patient would like to transition to walking on her own and call if her pain worsens or if her toe still has drainage by the end of the weekend. She and her  have no further concerns today,  -Patient and her  are in agreement with the above plan and all questions were answered to satisfaction.     Return to clinic as needed per patient request    Gina Rodriguez DPM

## 2024-03-27 NOTE — PROGRESS NOTES
"  Assessment & Plan   Problem List Items Addressed This Visit    None  Visit Diagnoses       Dysuria    -  Primary    Relevant Medications    phenazopyridine (PYRIDIUM) 100 MG tablet TID prn    Other Relevant Orders    UA with Microscopic reflex to Culture - [facility] (Completed)    Acute cystitis with hematuria        Relevant Medications    ciprofloxacin (CIPRO) 250 MG tablet BID x 7 days.         In discussion with the patient and her  it was decided that we would go ahead with treatment at this time versus waiting upon culture results due to the fact that there is an upcoming holiday weekend and inclement weather patient and significant other are afraid that her infection will progress.      15 minutes spent by me on the date of the encounter doing chart review, review of test results, interpretation of tests, patient visit, and documentation      BMI  Estimated body mass index is 32.92 kg/m  as calculated from the following:    Height as of 2/20/24: 1.575 m (5' 2\").    Weight as of 2/20/24: 81.6 kg (180 lb).         Camilo Tejeda is a 83 year old, presenting for the following health issues:  No chief complaint on file.    MACHO Tejeda presents today with several days of dysuria and pain with urination.  She does have a history of recurrent UTIs.  Appears as though her last UTI was back in February 2024 at which time she grew out E. coli.  She does not have or report any fever at this time.  She does follow with urology however has not been recommended that she be placed on prophylactic antibiotics to date.      Pre-Provider Visit Orders- Urinalysis UA/UC  Patient reports the following symptoms:  possible urinary tract infection (UTI)   Does the patient report any of the following symptoms: vaginal discharge, vaginal itching, possible yeast infection, has a urinary catheter in place, or unable to void in a specimen cup?  No        Urinary symptoms to include: pain with urination, denies " urgency, frequency or abdominal pain.         Review of Systems  CONSTITUTIONAL: NEGATIVE for fever, chills, change in weight  : dysuria and urinary tract infection  PSYCHIATRIC: NEGATIVE for changes in mood or affect  ROS otherwise negative      Objective    There were no vitals taken for this visit.  There is no height or weight on file to calculate BMI.  Physical Exam   GENERAL: alert and no distress  PSYCH: mentation appears normal, affect normal/bright    Lab on 03/27/2024   Component Date Value Ref Range Status    Color Urine 03/26/2024 Yellow  Colorless, Straw, Light Yellow, Yellow Final    Appearance Urine 03/26/2024 Clear  Clear Final    Glucose Urine 03/26/2024 Negative  Negative mg/dL Final    Bilirubin Urine 03/26/2024 Negative  Negative Final    Ketones Urine 03/26/2024 Negative  Negative mg/dL Final    Specific Gravity Urine 03/26/2024 1.010  1.003 - 1.035 Final    Blood Urine 03/26/2024 Negative  Negative Final    pH Urine 03/26/2024 7.0  5.0 - 7.0 Final    Protein Albumin Urine 03/26/2024 Negative  Negative mg/dL Final    Urobilinogen Urine 03/26/2024 0.2  0.2, 1.0 E.U./dL Final    Nitrite Urine 03/26/2024 Positive (A)  Negative Final    Leukocyte Esterase Urine 03/26/2024 Large (A)  Negative Final    Bacteria Urine 03/26/2024 Moderate (A)  None Seen /HPF Final    RBC Urine 03/26/2024 0-2  0-2 /HPF /HPF Final    WBC Urine 03/26/2024  (A)  0-5 /HPF /HPF Final    Squamous Epithelials Urine 03/26/2024 Few (A)  None Seen /LPF Final     Results for orders placed or performed in visit on 03/27/24   UA with Microscopic reflex to Culture - [facility]     Status: Abnormal    Specimen: Urine, Midstream   Result Value Ref Range    Color Urine Yellow Colorless, Straw, Light Yellow, Yellow    Appearance Urine Clear Clear    Glucose Urine Negative Negative mg/dL    Bilirubin Urine Negative Negative    Ketones Urine Negative Negative mg/dL    Specific Gravity Urine 1.010 1.003 - 1.035    Blood Urine  Negative Negative    pH Urine 7.0 5.0 - 7.0    Protein Albumin Urine Negative Negative mg/dL    Urobilinogen Urine 0.2 0.2, 1.0 E.U./dL    Nitrite Urine Positive (A) Negative    Leukocyte Esterase Urine Large (A) Negative   Urine Microscopic Exam     Status: Abnormal   Result Value Ref Range    Bacteria Urine Moderate (A) None Seen /HPF    RBC Urine 0-2 0-2 /HPF /HPF    WBC Urine  (A) 0-5 /HPF /HPF    Squamous Epithelials Urine Few (A) None Seen /LPF     Results for orders placed or performed in visit on 03/27/24 (from the past 24 hour(s))   UA with Microscopic reflex to Culture - [facility]    Specimen: Urine, Midstream   Result Value Ref Range    Color Urine Yellow Colorless, Straw, Light Yellow, Yellow    Appearance Urine Clear Clear    Glucose Urine Negative Negative mg/dL    Bilirubin Urine Negative Negative    Ketones Urine Negative Negative mg/dL    Specific Gravity Urine 1.010 1.003 - 1.035    Blood Urine Negative Negative    pH Urine 7.0 5.0 - 7.0    Protein Albumin Urine Negative Negative mg/dL    Urobilinogen Urine 0.2 0.2, 1.0 E.U./dL    Nitrite Urine Positive (A) Negative    Leukocyte Esterase Urine Large (A) Negative   Urine Microscopic Exam   Result Value Ref Range    Bacteria Urine Moderate (A) None Seen /HPF    RBC Urine 0-2 0-2 /HPF /HPF    WBC Urine  (A) 0-5 /HPF /HPF    Squamous Epithelials Urine Few (A) None Seen /LPF           Signed Electronically by: Zafar Campos DO

## 2024-03-28 LAB — BACTERIA UR CULT: NORMAL

## 2024-04-24 NOTE — PROGRESS NOTES
"  Assessment & Plan     1. Confusion  Stop Flexeril, see below.  Will also refer back to Urology for evaluation, patient prefers to go back to Dr. Hong.    2. Acute cystitis without hematuria  As above  - Adult Urology  Referral; Future    3. Chronic right-sided low back pain with right-sided sciatica  Will stop Flexeril and try tizanidine.  Follow-up as needed.  - tiZANidine (ZANAFLEX) 4 MG tablet; Take 1 tablet (4 mg) by mouth nightly as needed for muscle spasms  Dispense: 30 tablet; Refill: 0         MED REC REQUIRED  Post Medication Reconciliation Status: discharge medications reconciled and changed, per note/orders  BMI  Estimated body mass index is 31.46 kg/m  as calculated from the following:    Height as of 2/20/24: 1.575 m (5' 2\").    Weight as of this encounter: 78 kg (172 lb).       Return if symptoms worsen or fail to improve.      Camilo Tejeda is a 83 year old, presenting for the following health issues:  Hospital F/U    Lists of hospitals [facility] the [place]       Hospital Follow-up Visit:    Hospital/Nursing Home/ Rehab Facility:  [facility]   Date of Admission: 4/15/24  Date of Discharge: 4/19/24  Reason(s) for Admission:presumed CVA (acute confusion and difficulty word finding)  Was the patient in the ICU or did the patient experience delirium during hospitalization?  Yes         4/26/2024     1:12 PM   Mini-Cog Total Score   Mini Cog Total Score 2     Do you have any other stressors you would like to discuss with your provider? No    Problems taking medications regularly:  None  Medication changes since discharge: yes  Problems adhering to non-medication therapy:  None    Summary of hospitalization:  CareEverywhere information obtained and reviewed  Diagnostic Tests/Treatments reviewed.  Follow up needed: none  Other Healthcare Providers Involved in Patient s Care:         Homecare  Update since discharge: stable.         Plan of care communicated with patient and family           Patient was admitted due to " acute confusion, stroke work-up ended up being negative.  She did have a UTI with 50,000-100,000 cfu.  She has been seen by Urology previously, and culture was recommended prior to treating further infections.  She has continued to take Flexeril at least daily, which I think likely contributed to confusion as it has in the past when she increased dosage.  In discussion with the patient and her son, I think it is in the patient's best interest to get rid of Flexeril altogether and change to something else to help with pain.  She reluctantly agrees, son expresses understanding.        Review of Systems  Constitutional, HEENT, cardiovascular, pulmonary, gi and gu systems are negative, except as otherwise noted.      Objective    /80 (BP Location: Right arm, Patient Position: Sitting, Cuff Size: Adult Large)   Pulse 73   Temp 97  F (36.1  C) (Tympanic)   Resp 18   Wt 78 kg (172 lb)   SpO2 99%   Breastfeeding No   BMI 31.46 kg/m    Body mass index is 31.46 kg/m .  Physical Exam   GENERAL: alert and no distress  PSYCH: mentation appears normal, affect normal/bright          Signed Electronically by: Iris Becerril MD

## 2024-04-26 ENCOUNTER — OFFICE VISIT (OUTPATIENT)
Dept: FAMILY MEDICINE | Facility: OTHER | Age: 84
End: 2024-04-26
Attending: FAMILY MEDICINE
Payer: MEDICARE

## 2024-04-26 VITALS
WEIGHT: 172 LBS | HEART RATE: 73 BPM | OXYGEN SATURATION: 99 % | DIASTOLIC BLOOD PRESSURE: 80 MMHG | RESPIRATION RATE: 18 BRPM | TEMPERATURE: 97 F | BODY MASS INDEX: 31.46 KG/M2 | SYSTOLIC BLOOD PRESSURE: 128 MMHG

## 2024-04-26 DIAGNOSIS — R41.0 CONFUSION: Primary | ICD-10-CM

## 2024-04-26 DIAGNOSIS — M54.41 CHRONIC RIGHT-SIDED LOW BACK PAIN WITH RIGHT-SIDED SCIATICA: ICD-10-CM

## 2024-04-26 DIAGNOSIS — N30.00 ACUTE CYSTITIS WITHOUT HEMATURIA: ICD-10-CM

## 2024-04-26 DIAGNOSIS — G89.29 CHRONIC RIGHT-SIDED LOW BACK PAIN WITH RIGHT-SIDED SCIATICA: ICD-10-CM

## 2024-04-26 PROCEDURE — G0463 HOSPITAL OUTPT CLINIC VISIT: HCPCS

## 2024-04-26 PROCEDURE — 99496 TRANSJ CARE MGMT HIGH F2F 7D: CPT | Performed by: FAMILY MEDICINE

## 2024-04-26 PROCEDURE — 99214 OFFICE O/P EST MOD 30 MIN: CPT | Mod: 24 | Performed by: FAMILY MEDICINE

## 2024-04-26 ASSESSMENT — PAIN SCALES - GENERAL: PAINLEVEL: EXTREME PAIN (8)

## 2024-04-30 ENCOUNTER — OFFICE VISIT (OUTPATIENT)
Dept: CARDIOLOGY | Facility: OTHER | Age: 84
End: 2024-04-30
Attending: NURSE PRACTITIONER
Payer: MEDICARE

## 2024-04-30 VITALS
WEIGHT: 182 LBS | SYSTOLIC BLOOD PRESSURE: 112 MMHG | OXYGEN SATURATION: 100 % | BODY MASS INDEX: 33.29 KG/M2 | DIASTOLIC BLOOD PRESSURE: 72 MMHG | HEART RATE: 70 BPM | RESPIRATION RATE: 16 BRPM

## 2024-04-30 DIAGNOSIS — Z98.890 S/P CORONARY ANGIOGRAM: ICD-10-CM

## 2024-04-30 DIAGNOSIS — N18.32 STAGE 3B CHRONIC KIDNEY DISEASE (H): ICD-10-CM

## 2024-04-30 DIAGNOSIS — E78.5 DYSLIPIDEMIA: ICD-10-CM

## 2024-04-30 DIAGNOSIS — I10 BENIGN ESSENTIAL HYPERTENSION: Primary | ICD-10-CM

## 2024-04-30 DIAGNOSIS — I50.30 NYHA CLASS 3 HEART FAILURE WITH PRESERVED EJECTION FRACTION (H): ICD-10-CM

## 2024-04-30 DIAGNOSIS — I25.10 CORONARY ARTERY DISEASE INVOLVING NATIVE CORONARY ARTERY OF NATIVE HEART WITHOUT ANGINA PECTORIS: ICD-10-CM

## 2024-04-30 PROCEDURE — G0463 HOSPITAL OUTPT CLINIC VISIT: HCPCS

## 2024-04-30 PROCEDURE — 99214 OFFICE O/P EST MOD 30 MIN: CPT | Performed by: NURSE PRACTITIONER

## 2024-04-30 ASSESSMENT — PAIN SCALES - GENERAL: PAINLEVEL: NO PAIN (0)

## 2024-04-30 NOTE — PROGRESS NOTES
[facility] HEART CARE   CARDIOLOGY PROGRESS NOTE     Chief Complaint   Patient presents with    Follow Up          Diagnosis:    ICD-10-CM    1. Hypertension with blood pressure goal less than 130/80  I10       2. Coronary artery disease involving native coronary artery of native heart without angina pectoris  I25.10       3. S/P coronary angiogram 9/29/2023 at [facility]  Z98.890       4. Dyslipidemia with LDl goal less than 100  E78.5       5. NYHA class 3 heart failure with preserved ejection fraction (H)  I50.30       6. Stage 3b chronic kidney disease (H)  N18.32               Assessment/Plan:    Exertional chest discomfort  Dyspnea on exertion  Abnormal stress test  History of nonobstructive coronary artery disease including catheterization in 2017 [facility]  Dyspnea on exertion, exertional chest discomfort abnormal stress test at [place] August 2023.  Referral to [facility] for coronary angiogram.   S/p coronary angiogram at [facility] 9/29/2023   non obstructive coronary artery disease, elevated LVEDP  Transthoracic echocardiogram at [facility] in [place] 9/13/2023  Borderline systolic LV function with LVEF 50-55%  Diastolic dysfunction  No significant valvular pathology      Essential hypertension with blood pressure goal less than 130/80, controlled  Continue losartan 100 mg once daily  DECREASE propanolol 10 mg twice daily (she is taking it as 20 mg once daily) to 10 mg once daily for a week, then stop with softer blood pressures here and at home per her son.   Heart healthy lifestyle encouraged    BP Readings from Last 6 Encounters:   04/30/24 112/72   04/26/24 128/80   03/27/24 124/84   03/27/24 124/84   02/27/24 118/76   02/20/24 (!) 140/80       Nonobstructive coronary artery disease  S/p coronary angiogram 9/29/2023  Dyslipidemia with LDL goal lass than 100  Heart healthy lifestyle encouraged  She was started on lipitor when she left the hospital but stopped due to side effects.     Recent Labs    Lab Test 09/05/23  1434 09/12/19  0912   CHOL 183 156   HDL 42* 51   * 80   TRIG 151* 123         Heart Failure with Preserved ejection Fraction (HFpEF)  Diastolic dysfunction on TTE 9/2023- [facility]  Elevated LVEDP on cardiac cath 9/29/2023- [facility]   Chronic HFpEF (EF 50-55%). Risk factors include female gender, age, HTN, and obesity  Stage C. NYHA Class III.      BP:   Controlled  Management as above  Fluid status   Continues with bilateral +1 lower extremity edema.   Sodium restriction of 2,500 mg daily  Fluid restriction of 1.5-2 liters daily  Aldosterone antagonist:   Chronic kidney disease with recent JERZY  Consider in the future  SGLT-2 Inhibitor:   Chronic UTIs, CKD with recent JERZY  Could consider in the futuer  Ischemia evaluation:   Coronary angiogram 9/29/2023 at [facility] showing non obstructive CAD  ACEi/ARB/ARNI:   n/a, no evidence for use in HFpEF  BB:   n/a, no evidence for use in HFpEF   SCD prophylaxis:   n/a, no evidence for use in HFpEF  NSAID use:   Contraindicated  Sleep apnea evaluation:   Declines    Wt Readings from Last 5 Encounters:   04/30/24 82.6 kg (182 lb)   04/26/24 78 kg (172 lb)   02/20/24 81.6 kg (180 lb)   02/12/24 81.6 kg (180 lb)   01/30/24 82.8 kg (182 lb 9.6 oz)     Chronic kidney disease  Reviewed with her today that she should be taking Tylenol and avoid NSAIDs such as Aleve, Advil as much as possible  Blood pressure control  She has been using salt alternative- reviewed importance of not overdoing salt alternative since they often have potassium chloride given her CKD  Estimated Creatinine Clearance: 36.6 mL/min (A) (based on SCr of 1.14 mg/dL (H)).    Hypothyroidism  Continue management by primary care provider  Recent TSH levels have been elevated but T4 levels have been normal  TSH   Date Value Ref Range Status   01/30/2024 17.59 (H) 0.30 - 4.20 uIU/mL Final   01/26/2022 6.68 (H) 0.40 - 4.00 mU/L Final   05/14/2021 3.25 0.40 - 4.00 mU/L Final  "        Follow-up with cardiology on an as needed basis.           Interval history:  Ileana Davis is a pleasant 83-year old female who presents for cardiology follow-up. Recall, she has a cardiac history including hypertension, HFpEF, non-obstructive CAD, hyperlipidemia.  She has a non-cardiac history including recurrent urinary tract infections, chronic lumbago, renal cell carcinoma status post right nephrectomy, chronic kidney disease, hypothyroidism.       Ms. Davis had coronary angiogram for abnormal stress test and dyspnea on exertion on 9/29/2023 at [facility] in [place]. Coronary angiogram revealed non-obstructive coronary artery disease, elevated LVEDP. Her losartan was increased due to hypertension even under sedation. She also had TTE done at [facility] showing LVEF 50-55%, diastolic dysfunction.     Recent hospitalization for possible CVA with focal neuro deficits, uncontrolled hypertension, no acute findings on imaging, acute cystitis.     Today, Ms. Davis has concerns with recent hospital discharge medications. She was discharged home on lipitor and Norvasc. She was feeling \"weak and couldn't walk or get up from chairs. I felt 10x worse than when I went to the hospital.\" Both of these medications were stopped. She continues to feel weak but has been up, moving, walking. Per her son- her blood pressure systolic was less than 100 most days while on the Norvasc. Since stopping the Norvasc her blood pressure has been between 100-120 systolic.  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 No episodes of chest discomfort. She denies dyspnea but son concerned that she has labored breathing. She tells me she does not notice this or feel limited by this. No episodes of racing/skipping/fluttering in her chest. LE persists but stable       Smoking History: Lifelong non-smoker. She grew up around heavy " "second hand smoke    Alcohol History: None    Substance Abuse History: None    Sleep History: Sleeps in bed. Lies supine with 1 pillow under her neck and 1 behind her knees. She does snore. No witnessed apnea. She is awake frequently throughout the night due to urinary frequency.     Family History: Mother- heart disease \"enlarged heart and took nitroglycerin;       HPI:    Ileana Davis is a pleasant 82-year old female who presents for cardiology consult regarding dyspnea on exertion and abnormal stress test. She has a cardiac history including hypertension.  She has a non-cardiac history including recurrent urinary tract infections, chronic lumbago, renal cell carcinoma status post right nephrectomy, chronic kidney disease, hypothyroidism.       Per Ileana and her son she has had dyspnea, dyspnea on exertion for at least the last six months. He has also noticed some harder breathing while just sitting at rest. In the morning she makes her bed, showers every other morning, gets dressed, does her hair. She is dyspneic and fatigued doing these activities. This has worsened over the last 6 months. Her son has also noticed that \"just walking out to the car\" she is dyspneic. She does tell me that she feels \"a slight pressure\" in her chest. This has been occurring often throughout the day with exertion. Her chest discomfort resolves with rest and dyspnea improves. No racing/skipping/fluttering sensation in her chest. She has had issues with bilateral LE edema for many years. She wears compression stockings and elevates her legs which is helpful. Increased LE edema correlates with salt intake and fluid intake.     Ani brought concerns regarding her dyspnea and chest discomfort to her primary care provider.  She underwent stress testing on August 2, 2023 which showed a small area of mild ischemia in the inferior lateral segments of the left ventricle.  She did have a cardiac catheterization in 2017 at [facility] " "showing nonobstructive coronary artery disease with no significant lesions.        Smoking History: Lifelong non-smoker. She grew up around heavy second hand smoke    Alcohol History: None    Substance Abuse History: None    Sleep History: Sleeps in bed. Lies supine with 1 pillow under her neck and 1 behind her knees. She does snore. No witnessed apnea. She is awake frequently throughout the night due to urinary frequency.     Family History: Mother- heart disease \"enlarged heart and took nitroglycerin;       UTI symptoms:    She has had symptoms of low back ache the last 4 days, generalized weakness in her legs, dysuria, pressure. She has had these symptoms for the last 4 days.           RELEVANT TESTING:  Transthoracic Echocardiogram at  4/2024  Interpretation Summary  Technically difficult study.  The left ventricle is normal size. The left ventricular systolic function is normal. Wall motion is normal.  Qualitative ejection fraction is 60-65% (normal).  The left ventricular diastolic function is mildly abnormal (Grade I).  Mild tricuspid regurgitation.  Estimated right ventricular systolic pressure 27mmHg.  Inferior vena cava size normal and collapsibility > 50% normal indicating normal right atrial pressure (3 mm Hg).  There is no pericardial effusion.  Negative bubble study. No PFO or atrial septal defect by agitated saline or color flow.       Nuclear med Lexiscan stress test August 2023 at [facility]    The nuclear stress test is abnormal.    There is a small area of mild ischemia in the inferolateral segment(s)  of the left ventricle.    The left ventricular ejection fraction at rest is 87%.  The left  ventricular ejection fraction at stress is 79%.    There is no prior study for comparison.      ECG Summary    ECG Baseline electrocardiogram demonstrates sinus rhythm.   Patient did not develop any acute EKG changes or arrhythmias during the Lexiscan portion of the study. "       Cardiac catheterization October 2017 at [facility]  CARDIAC FINDINGS:   DOMINANCE:   Right Dominant   LEFT MAIN:   is angiographically normal (0% Stenosis)   LEFT ANTERIOR DESCENDING :   Mid Segment 20-30 % Stenosis   CIRCUMFLEX:   is angiographically normal (0% Stenosis)   RIGHT CORONARY ARTERY:   is angiographically normal (0% Stenosis)   COLLATERALS:   No collateral flow       Past Medical History:   Diagnosis Date    Anemia 8/20/2015    Autoimmune hepatitis (H) 6/2/2016    Benign paroxysmal positional vertigo 10/7/2010    Contact dermatitis and other eczema, due to unspecified cause 10/7/2010    Esophageal reflux 10/7/2010    Generalized anxiety disorder 10/7/2010    Generalized osteoarthrosis, involving multiple sites 10/7/2010    Hepatitis, unspecified 10/7/2010    Infected wound     Myalgia and myositis, unspecified 10/7/2010    fibromyalgia    Nonallopathic lesion of cervical region, not elsewhere classified 12/5/2001    Nonallopathic lesion of thoracic region, not elsewhere classified 3/31/2005    Open wound of knee, leg, and ankle     Rosacea 10/7/2010    Unspecified essential hypertension 10/7/2010    Unspecified hypothyroidism 10/7/2010       Past Surgical History:   Procedure Laterality Date    APPENDECTOMY      ARTHROPLASTY TOE(S) Left 9/21/2020    Procedure: Left  Bethea Arthroplasty with interpositional arthroplasty using graft.;  Surgeon: Gina Rodriguez DPM;  Location: [facility] OR    BACK SURGERY  2015    lumbar epidural injection    CARDIAC SURGERY  10/2017    angioplasty    CHOLECYSTECTOMY      COLONOSCOPY  07/27/2017    [facility]    COLONOSCOPY - HIM SCAN  05/15/2001    Small internal hemorrhoids; otherwise normal;[facility]    COLONOSCOPY - HIM SCAN  12/14/2006    Colonoscopy, [facility], [facility]    ENT SURGERY      tonsillectomy    ESOPHAGOGASTRODUODENOSCOPY  07/27/2017    [facility]    EXCISE LESION LOWER EXTREMITY Left 4/19/2022    Procedure: Excision Left Medial Lower Leg Ulceration;   Surgeon: Kai Huynh MD;  Location: [facility] OR    EXCISE LESION LOWER EXTREMITY Left 9/13/2022    Procedure: Wide local excision of left posterior leg;  Surgeon: Kai Huynh MD;  Location: [facility]    EXCISE LESION UPPER EXTREMITY Right 4/19/2022    Procedure: Excision lesion right upper extremity;  Surgeon: Kai Huynh MD;  Location: [facility]    EYE SURGERY      bilateral cataract removal    GI SURGERY      anal fistula    GYN SURGERY  1985    [facility]    GYN SURGERY      cessarian x 2    GYN SURGERY      tubal sterilazation    NEPHRECTOMY Right 05/02/2019    ORTHOPEDIC SURGERY      right knee    ORTHOPEDIC SURGERY  1986    plantar fascia release    ORTHOPEDIC SURGERY      left knee    ORTHOPEDIC SURGERY      right trigger finger release    ORTHOPEDIC SURGERY  2013    Right great toe MTPJ fusion, adductor tenotomy,correction of hammer toe    ORTHOPEDIC SURGERY  [id number]    multiple procedures left forefoot    REPAIR HAMMER TOE Left 2/12/2024    Procedure: LEFT fourth claw toe correction;  Surgeon: Gina Rodriguez DPM;  Location: [facility] OR       Allergies   Allergen Reactions    Fentanyl Other (See Comments)     Severe agitation when used during angiogram    Codeine Sulfate Other (See Comments)     hallucinations    Fentanyl And Related      Other reaction(s): Confusion    Morphine Other (See Comments)     Hallucinations with iv therapy    Tape [Adhesive Tape]     Tramadol Other (See Comments)     hallucination       Current Outpatient Medications   Medication Sig Dispense Refill    estradiol (ESTRACE) 0.1 MG/GM vaginal cream Place vaginally three times a week      GEMTESA 75 MG TABS tablet Take 75 mg by mouth daily      hydrocortisone 2.5 % cream APPLY TWICE TIMES DAILY TO ACTIVE ECZEMA ON THE HANDS, FOLLOW WITH MOISTURIZING CREAM.      levothyroxine (SYNTHROID/LEVOTHROID) 112 MCG tablet Take 1 tablet (112 mcg) by mouth daily 30 tablet 3    losartan (COZAAR) 100 MG tablet Take 1 tablet (100 mg) by mouth daily  90 tablet 3    melatonin 1 MG TABS tablet Take 10 mg by mouth at bedtime      nystatin (MYCOSTATIN) 933833 UNIT/GM external cream Apply topically 2 times daily as needed for dry skin 30 g 2    nystatin (MYCOSTATIN) 765672 UNIT/GM external powder APPLY TO AFFECTED AREA NIGHTLY AS NEEDED FOR RASH 60 g 3    omeprazole (PRILOSEC) 40 MG capsule Take by mouth daily Before the same meal daily      phenazopyridine (PYRIDIUM) 100 MG tablet TAKE 1 TABLET BY MOUTH THREE TIMES DAILY AS NEEDED FOR  URINARY  TRACT  DISCOMFORT 10 tablet 0    polyethylene glycol (MIRALAX) 17 g packet Take 1 packet by mouth daily      tiZANidine (ZANAFLEX) 4 MG tablet Take 1 tablet (4 mg) by mouth nightly as needed for muscle spasms 30 tablet 0    aspirin (ASA) 81 MG chewable tablet Take 81 mg by mouth daily (Patient not taking: Reported on 4/30/2024)      fluticasone (FLONASE) 50 MCG/ACT nasal spray SPRAY 2 SPRAYS INTO BOTH NOSTRILS DAILY AS NEEDED (Patient not taking: Reported on 4/30/2024) 16 g 11       Social History     Socioeconomic History    Marital status:      Spouse name: Not on file    Number of children: Not on file    Years of education: Not on file    Highest education level: Not on file   Occupational History    Not on file   Tobacco Use    Smoking status: Never     Passive exposure: Never    Smokeless tobacco: Never   Vaping Use    Vaping status: Never Used   Substance and Sexual Activity    Alcohol use: No    Drug use: No    Sexual activity: Never   Other Topics Concern    Parent/sibling w/ CABG, MI or angioplasty before 65F 55M? No     Service Not Asked    Blood Transfusions Yes    Caffeine Concern Yes     Comment: soda rare    Occupational Exposure Not Asked    Hobby Hazards Not Asked    Sleep Concern Not Asked    Stress Concern Not Asked    Weight Concern Not Asked    Special Diet Not Asked    Back Care Not Asked    Exercise Not Asked    Bike Helmet Not Asked    Seat Belt Not Asked    Self-Exams Not Asked   Social  History Narrative    Not on file     Social Determinants of Health     Financial Resource Strain: Low Risk  (1/4/2024)    Financial Resource Strain     Within the past 12 months, have you or your family members you live with been unable to get utilities (heat, electricity) when it was really needed?: No   Food Insecurity: No Food Insecurity (4/16/2024)    Received from [facility]    Hunger Vital Sign     Worried About Running Out of Food in the Last Year: Never true     Ran Out of Food in the Last Year: Never true   Transportation Needs: No Transportation Needs (4/16/2024)    Received from [facility]    PRAPARE - Transportation     Lack of Transportation (Medical): No     Lack of Transportation (Non-Medical): No   Physical Activity: Not on file   Stress: Not on file   Social Connections: Not on file   Interpersonal Safety: Not At Risk (4/16/2024)    Received from [facility]    EH IP Custom IPV     Do you feel UNSAFE in any of your personal relationships with your family members or any other acquaintances?: No   Housing Stability: Low Risk  (4/16/2024)    Received from [facility]    Housing Stability Vital Sign     Unable to Pay for Housing in the Last Year: No     Number of Times Moved in the Last Year: 0     Homeless in the Last Year: No       TEST RESULTS:   No results found for any visits on 04/30/24.        Review of systems: Negative except that which was noted in the HPI.    Physical examination:    Vitals: /72   Pulse 70   Resp 16   Wt 82.6 kg (182 lb)   SpO2 100%   BMI 33.29 kg/m    BMI= Body mass index is 33.29 kg/m .      GENERAL APPEARANCE: healthy, alert and no distress  HEENT: no icterus, no xanthelasmas, normal pupil size and reaction, no cyanosis.  NECK: no adenopathy, no asymmetry, masses.  CHEST: lungs clear to auscultation - no rales, rhonchi or  wheezes, no use of accessory muscles, no retractions, respirations are unlabored, normal respiratory rate  CARDIOVASCULAR: regular rhythm, normal S1 with physiologic split S2, no S3 or S4 and no murmur, click or rub  EXTREMITIES: Trace bilateral lower extremity edema   NEURO: alert and oriented normal speech, and affect  VASC: No vascular bruits heard.  SKIN: no ecchymoses, no rashes        Thank you for allowing me to participate in the care of your patient. Please do not hesitate to contact me if you have any questions.       Gill Mcgill, CNP

## 2024-04-30 NOTE — PATIENT INSTRUCTIONS
Thank you for allowing Gill Mcgill CNP and our  team to participate in your care. Please call our office at [phone] with scheduling questions or if you need to cancel or change your appointment. With any other questions or concerns you may call cardiology nurse at  [phone].       If you experience chest pain, chest pressure, chest tightness, shortness of breath, fainting, lightheadedness, nausea, vomiting, or other concerning symptoms, please report to the Emergency Department or call 911. These symptoms may be emergent, and best treated in the Emergency Department.      Decrease propranolol 20 mg once daily to 10 mg once daily for a week, then stop    Re-start aspirin 81 mg once daily    Follow-up with cardiology on an as needed basis.       Gill Mcgill CNP

## 2024-05-02 DIAGNOSIS — Z53.9 PERSONS ENCOUNTERING HEALTH SERVICES FOR SPECIFIC PROCEDURES, NOT CARRIED OUT: Primary | ICD-10-CM

## 2024-05-02 PROCEDURE — G0180 MD CERTIFICATION HHA PATIENT: HCPCS | Performed by: FAMILY MEDICINE

## 2024-05-07 ENCOUNTER — MEDICAL CORRESPONDENCE (OUTPATIENT)
Dept: HEALTH INFORMATION MANAGEMENT | Facility: HOSPITAL | Age: 84
End: 2024-05-07

## 2024-05-08 ENCOUNTER — TELEPHONE (OUTPATIENT)
Dept: FAMILY MEDICINE | Facility: OTHER | Age: 84
End: 2024-05-08

## 2024-05-08 NOTE — TELEPHONE ENCOUNTER
10:52 AM    Reason for Call: Phone Call    Description: Patient called in stating that she has another bladder infection, needs someone to read her labs, and to send in a prescription to [facility] in [facility]. Please call patient back.     Was an appointment offered for this call? No  If yes : Appointment type              Date    Preferred method for responding to this message: Telephone Call  What is your phone number ? [phone] - nurse's station    If we cannot reach you directly, may we leave a detailed response at the number you provided? Yes    Can this message wait until your PCP/provider returns, if available today? Tasha Tavares

## 2024-05-08 NOTE — PROGRESS NOTES
"  Assessment & Plan     Urinary symptom or sign  No UTI today. Follow up with your urologist, Dr. Mcelroy as scheduled.    - UA Macroscopic with reflex to Microscopic and Culture; Future  - UA Macroscopic with reflex to Microscopic and Culture    Ordering of each unique test  I spent a total of 12 minutes on the day of the visit.   Time spent by me doing chart review, history and exam, documentation and further activities per the note      BMI  Estimated body mass index is 33.22 kg/m  as calculated from the following:    Height as of 2/20/24: 1.575 m (5' 2\").    Weight as of this encounter: 82.4 kg (181 lb 9.6 oz).         No follow-ups on file.    Subjective   Ileana is a 83 year old, presenting for the following health issues:  Urinary Problem    HPI     Genitourinary - Female  Onset/Duration: 1 week. Completed recent antibiotics and wants to make sure the infection is resolved.  Description:   Painful urination (Dysuria): YES           Frequency: YES   Blood in urine (Hematuria): No  Delay in urine (Hesitency): No  Intensity: mild  Progression of Symptoms:  worsening  Accompanying Signs & Symptoms:  Fever/chills: No  Flank pain: discussed and Ileana reports they have chronic low back pain and right hip pain. Points to the area and not located near flank. Denies any new pain.   Nausea and vomiting: No  Vaginal symptoms: none  Abdominal/Pelvic Pain: No  History:   History of frequent UTI s: YES  History of kidney stones: No  Sexually Active: No  Possibility of pregnancy: No  Precipitating or alleviating factors: None  Therapies tried and outcome: was recently on antibiotics for UTI        Objective    /80 (BP Location: Right arm, Patient Position: Sitting, Cuff Size: Adult Regular)   Pulse 72   Temp 97.5  F (36.4  C) (Tympanic)   Resp 20   Wt 82.4 kg (181 lb 9.6 oz)   SpO2 93%   BMI 33.22 kg/m    Body mass index is 33.22 kg/m .  Physical Exam   GENERAL: alert and no distress  EYES: Eyes grossly normal " to inspection, PERRL and conjunctivae and sclerae normal  BACK: no CVA tenderness, no paralumbar tenderness  PSYCH: mentation appears at baseline, affect normal/bright, judgement and insight intact, and appearance well groomed    Results for orders placed or performed in visit on 05/09/24   UA Macroscopic with reflex to Microscopic and Culture     Status: Normal    Specimen: Urine, Midstream   Result Value Ref Range    Color Urine Yellow Colorless, Straw, Light Yellow, Yellow    Appearance Urine Clear Clear    Glucose Urine Negative Negative mg/dL    Bilirubin Urine Negative Negative    Ketones Urine Negative Negative mg/dL    Specific Gravity Urine <=1.005 1.003 - 1.035    Blood Urine Negative Negative    pH Urine 7.0 5.0 - 7.0    Protein Albumin Urine Negative Negative mg/dL    Urobilinogen Urine 0.2 0.2, 1.0 E.U./dL    Nitrite Urine Negative Negative    Leukocyte Esterase Urine Negative Negative    Narrative    Microscopic not indicated             Signed Electronically by: Dayan Hines, CNP

## 2024-05-09 ENCOUNTER — DOCUMENTATION ONLY (OUTPATIENT)
Dept: OTHER | Facility: CLINIC | Age: 84
End: 2024-05-09
Payer: MEDICARE

## 2024-05-09 ENCOUNTER — OFFICE VISIT (OUTPATIENT)
Dept: FAMILY MEDICINE | Facility: OTHER | Age: 84
End: 2024-05-09
Attending: NURSE PRACTITIONER
Payer: MEDICARE

## 2024-05-09 VITALS
SYSTOLIC BLOOD PRESSURE: 126 MMHG | RESPIRATION RATE: 20 BRPM | BODY MASS INDEX: 33.22 KG/M2 | DIASTOLIC BLOOD PRESSURE: 80 MMHG | WEIGHT: 181.6 LBS | HEART RATE: 72 BPM | TEMPERATURE: 97.5 F | OXYGEN SATURATION: 93 %

## 2024-05-09 DIAGNOSIS — R39.9 URINARY SYMPTOM OR SIGN: Primary | ICD-10-CM

## 2024-05-09 LAB
ALBUMIN UR-MCNC: NEGATIVE MG/DL
APPEARANCE UR: CLEAR
BILIRUB UR QL STRIP: NEGATIVE
COLOR UR AUTO: YELLOW
GLUCOSE UR STRIP-MCNC: NEGATIVE MG/DL
HGB UR QL STRIP: NEGATIVE
KETONES UR STRIP-MCNC: NEGATIVE MG/DL
LEUKOCYTE ESTERASE UR QL STRIP: NEGATIVE
NITRATE UR QL: NEGATIVE
PH UR STRIP: 7 [PH] (ref 5–7)
SP GR UR STRIP: <=1.005 (ref 1–1.03)
UROBILINOGEN UR STRIP-ACNC: 0.2 E.U./DL

## 2024-05-09 PROCEDURE — G0463 HOSPITAL OUTPT CLINIC VISIT: HCPCS

## 2024-05-09 PROCEDURE — 99212 OFFICE O/P EST SF 10 MIN: CPT | Performed by: NURSE PRACTITIONER

## 2024-05-09 PROCEDURE — 81003 URINALYSIS AUTO W/O SCOPE: CPT | Mod: ZL | Performed by: NURSE PRACTITIONER

## 2024-05-09 RX ORDER — AMLODIPINE BESYLATE 5 MG/1
1 TABLET ORAL DAILY
COMMUNITY
Start: 2024-04-20

## 2024-05-09 RX ORDER — MEMANTINE HYDROCHLORIDE 5 MG/1
TABLET ORAL
COMMUNITY

## 2024-05-09 RX ORDER — ATORVASTATIN CALCIUM 80 MG/1
1 TABLET, FILM COATED ORAL AT BEDTIME
COMMUNITY
Start: 2024-04-19 | End: 2024-07-26

## 2024-05-09 ASSESSMENT — PAIN SCALES - GENERAL: PAINLEVEL: MILD PAIN (3)

## 2024-05-20 ENCOUNTER — TELEPHONE (OUTPATIENT)
Dept: FAMILY MEDICINE | Facility: OTHER | Age: 84
End: 2024-05-20

## 2024-05-20 ENCOUNTER — MEDICAL CORRESPONDENCE (OUTPATIENT)
Dept: HEALTH INFORMATION MANAGEMENT | Facility: HOSPITAL | Age: 84
End: 2024-05-20

## 2024-05-20 DIAGNOSIS — G89.29 CHRONIC RIGHT-SIDED LOW BACK PAIN WITH RIGHT-SIDED SCIATICA: ICD-10-CM

## 2024-05-20 DIAGNOSIS — M54.41 CHRONIC RIGHT-SIDED LOW BACK PAIN WITH RIGHT-SIDED SCIATICA: ICD-10-CM

## 2024-05-20 NOTE — TELEPHONE ENCOUNTER
Reason for call:  Medication      Have you contacted your pharmacy? No   If patient has contacted Pharmacy and it has been over 72hrs, continue to #2  Medication tizanidine 4 mg  What Pharmacy do you use? [facility] [place]      (Please note that the turn-around-time for prescriptions is 72 business hours; I am sending your request at this time. SEND TO appropriate Care Team Pool )

## 2024-05-21 DIAGNOSIS — M54.41 CHRONIC RIGHT-SIDED LOW BACK PAIN WITH RIGHT-SIDED SCIATICA: ICD-10-CM

## 2024-05-21 DIAGNOSIS — G89.29 CHRONIC RIGHT-SIDED LOW BACK PAIN WITH RIGHT-SIDED SCIATICA: ICD-10-CM

## 2024-05-21 NOTE — TELEPHONE ENCOUNTER
Reason for call:  Medication    Switch pharmacy  Have you contacted your pharmacy? Yes   If patient has contacted Pharmacy and it has been over 72hrs, continue to #2  Medication tiZANidine (ZANAFLEX) 4 MG tablet   What Pharmacy do you use? [facility] [place]      (Please note that the turn-around-time for prescriptions is 72 business hours; I am sending your request at this time. SEND TO appropriate Care Team Pool )

## 2024-05-28 ENCOUNTER — TELEPHONE (OUTPATIENT)
Dept: FAMILY MEDICINE | Facility: OTHER | Age: 84
End: 2024-05-28

## 2024-05-28 ENCOUNTER — MEDICAL CORRESPONDENCE (OUTPATIENT)
Dept: HEALTH INFORMATION MANAGEMENT | Facility: HOSPITAL | Age: 84
End: 2024-05-28

## 2024-05-28 DIAGNOSIS — E03.9 HYPOTHYROIDISM, UNSPECIFIED TYPE: ICD-10-CM

## 2024-05-30 RX ORDER — LEVOTHYROXINE SODIUM 112 UG/1
112 TABLET ORAL DAILY
Qty: 30 TABLET | Refills: 1 | OUTPATIENT
Start: 2024-05-30

## 2024-05-30 NOTE — TELEPHONE ENCOUNTER
Patient is due for lab recheck.  Please schedule lab appointment, then dosing can be assessed and refill sent.

## 2024-05-30 NOTE — TELEPHONE ENCOUNTER
Synthroid      Last Written Prescription Date:  2/1/24  Last Fill Quantity: 30,   # refills: 3  Last Office Visit: 5/9/24  Future Office visit:       Routing refill request to provider for review/approval because:

## 2024-05-31 ENCOUNTER — MEDICAL CORRESPONDENCE (OUTPATIENT)
Dept: HEALTH INFORMATION MANAGEMENT | Facility: CLINIC | Age: 84
End: 2024-05-31
Payer: MEDICARE

## 2024-06-05 ENCOUNTER — LAB (OUTPATIENT)
Dept: LAB | Facility: OTHER | Age: 84
End: 2024-06-05
Payer: MEDICARE

## 2024-06-05 DIAGNOSIS — E03.9 HYPOTHYROIDISM, UNSPECIFIED TYPE: ICD-10-CM

## 2024-06-05 LAB
HOLD SPECIMEN: NORMAL
TSH SERPL DL<=0.005 MIU/L-ACNC: 2.03 UIU/ML (ref 0.3–4.2)

## 2024-06-05 PROCEDURE — 36415 COLL VENOUS BLD VENIPUNCTURE: CPT | Mod: ZL

## 2024-06-05 PROCEDURE — 84443 ASSAY THYROID STIM HORMONE: CPT | Mod: ZL

## 2024-06-09 DIAGNOSIS — N32.81 OVERACTIVE BLADDER: Primary | ICD-10-CM

## 2024-06-10 NOTE — TELEPHONE ENCOUNTER
[facility]      Last Written Prescription Date:    Last Fill Quantity: ,   # refills:   Last Office Visit: 5-9-24  Future Office visit:       Routing refill request to provider for review/approval because:  Drug not active on patient's medication list

## 2024-06-11 RX ORDER — SOLIFENACIN SUCCINATE 5 MG/1
5 TABLET, FILM COATED ORAL DAILY
Qty: 30 TABLET | Refills: 2 | Status: SHIPPED | OUTPATIENT
Start: 2024-06-11 | End: 2024-08-27

## 2024-06-11 NOTE — TELEPHONE ENCOUNTER
Routing refill request to provider for review/approval because:  Drug not active on patient's medication list

## 2024-06-17 RX ORDER — LEVOTHYROXINE SODIUM 112 UG/1
112 TABLET ORAL DAILY
Qty: 30 TABLET | Refills: 3 | Status: SHIPPED | OUTPATIENT
Start: 2024-06-17 | End: 2024-09-10

## 2024-06-17 NOTE — TELEPHONE ENCOUNTER
Reason for call:  Medication      Have you contacted your pharmacy? Yes - patient states that below medication has not yet been filled by pharmacy  If patient has contacted Pharmacy and it has been over 72hrs, continue to #2  Medication levothyroxine (SYNTHROID/LEVOTHROID) 112 MCG tablet   What Pharmacy do you use? [facility] [place]      (Please note that the turn-around-time for prescriptions is 72 business hours; I am sending your request at this time. SEND TO appropriate Care Team Pool )

## 2024-06-21 ENCOUNTER — MEDICAL CORRESPONDENCE (OUTPATIENT)
Dept: HEALTH INFORMATION MANAGEMENT | Facility: HOSPITAL | Age: 84
End: 2024-06-21

## 2024-06-27 ENCOUNTER — TELEPHONE (OUTPATIENT)
Dept: FAMILY MEDICINE | Facility: OTHER | Age: 84
End: 2024-06-27

## 2024-06-27 NOTE — TELEPHONE ENCOUNTER
Next week ok to double book at the 8,11, or make a 1pm appointment if there is not one already.  Thank you.

## 2024-06-27 NOTE — TELEPHONE ENCOUNTER
Attempt # 1  Outcome: Left Message   Comment: LVM to schedule Hospital Follow Up next week with [facility]. See notes for scheduling times.

## 2024-06-27 NOTE — TELEPHONE ENCOUNTER
12:00 PM    Reason for Call: OVERBOOK    Patient is having the following symptoms: Discharging from [facility] today, needs a hospital follow up     The patient is requesting an appointment for 5-7 days with [facility].    Was an appointment offered for this call? No  If yes : Appointment type              Date    Preferred method for responding to this message: Telephone Call  What is your phone number ?    If we cannot reach you directly, may we leave a detailed response at the number you provided? Yes    Can this message wait until your PCP/provider returns, if unavailable today? Not applicable    Claire Bailon

## 2024-06-28 NOTE — TELEPHONE ENCOUNTER
"Spoke with family member, patient is still in hospital. She won't be discharge \"for a while\". They will let us know when she is discharged and needs an appt  " Carlita Alvarez  (RN)  2018 13:54:42

## 2024-07-01 ENCOUNTER — MEDICAL CORRESPONDENCE (OUTPATIENT)
Dept: HEALTH INFORMATION MANAGEMENT | Facility: HOSPITAL | Age: 84
End: 2024-07-01

## 2024-07-08 ENCOUNTER — MEDICAL CORRESPONDENCE (OUTPATIENT)
Dept: HEALTH INFORMATION MANAGEMENT | Facility: HOSPITAL | Age: 84
End: 2024-07-08

## 2024-07-10 ENCOUNTER — TELEPHONE (OUTPATIENT)
Dept: FAMILY MEDICINE | Facility: OTHER | Age: 84
End: 2024-07-10

## 2024-07-10 ENCOUNTER — TELEPHONE (OUTPATIENT)
Dept: INTERNAL MEDICINE | Facility: OTHER | Age: 84
End: 2024-07-10

## 2024-07-10 NOTE — TELEPHONE ENCOUNTER
Call from Nelly with [facility] requesting verbal orders to start home care to include: SNV/PT/OT. Patient was discharged from [facility] on 7/9/24.    Please advise.     Nelly can be reached at [phone].

## 2024-07-10 NOTE — TELEPHONE ENCOUNTER
Symptom or reason needing to speak to RN: Verbal orders      Best number to return call: Nelly Merritt [facility] [phone]     Best time to return call: Until 4:30pm

## 2024-07-11 ENCOUNTER — TELEPHONE (OUTPATIENT)
Dept: FAMILY MEDICINE | Facility: OTHER | Age: 84
End: 2024-07-11

## 2024-07-11 NOTE — TELEPHONE ENCOUNTER
Call returned to [facility] with [facility], spoke with Evon in Nelly's absence.     Notified of for verbal orders per Dr. Jones to start SNV/PT/OT.

## 2024-07-11 NOTE — TELEPHONE ENCOUNTER
Symptom or reason needing to speak to RN: VERBAL ORDERS     Best number to return call: [phone]      Best time to return call: Anytime

## 2024-07-11 NOTE — TELEPHONE ENCOUNTER
Telephone call received from nurse Sweeney with [facility].  Orders for PT/OT/SN received with the diagnosis of weakness.  They are unable to use the diagnosis of weakness and would like to change to Congestive Heart Failure.    Okay for verbal to change diagnosis?

## 2024-07-12 ENCOUNTER — TRANSFERRED RECORDS (OUTPATIENT)
Dept: HEALTH INFORMATION MANAGEMENT | Facility: HOSPITAL | Age: 84
End: 2024-07-12

## 2024-07-15 DIAGNOSIS — Z53.9 PERSONS ENCOUNTERING HEALTH SERVICES FOR SPECIFIC PROCEDURES, NOT CARRIED OUT: Primary | ICD-10-CM

## 2024-07-15 DIAGNOSIS — B37.2 YEAST DERMATITIS: ICD-10-CM

## 2024-07-15 PROCEDURE — G0180 MD CERTIFICATION HHA PATIENT: HCPCS | Performed by: FAMILY MEDICINE

## 2024-07-15 RX ORDER — NYSTATIN 100000 U/G
CREAM TOPICAL 2 TIMES DAILY PRN
Qty: 30 G | Refills: 2 | Status: SHIPPED | OUTPATIENT
Start: 2024-07-15

## 2024-07-15 NOTE — TELEPHONE ENCOUNTER
NYSTATIN 100,000UNIT/G CREAM         Last Written Prescription Date:  10/30/23  Last Fill Quantity: 60 g,   # refills: 3  Last Office Visit: 5/9/24- Donny   Future Office visit:    Next 5 appointments (look out 90 days)      Jul 26, 2024 1:30 PM  (Arrive by 1:15 PM)  Provider Visit with Iris Becerril MD  [facility] [address] [place] [address]  [phone]     Oct 01, 2024 1:30 PM  (Arrive by 1:15 PM)  Return Visit with Gill Mcgill CNP  [facility] ([facility] ) [address] [place] [id number] [phone]             Routing refill request to provider for review/approval because:    Antifungal Agents Wecsyx68/15/2024 10:11 AM   Protocol Details Always Fail Criteria

## 2024-07-19 ENCOUNTER — MEDICAL CORRESPONDENCE (OUTPATIENT)
Dept: HEALTH INFORMATION MANAGEMENT | Facility: CLINIC | Age: 84
End: 2024-07-19
Payer: MEDICARE

## 2024-07-22 NOTE — TELEPHONE ENCOUNTER
This will need to be resent to [facility] as it was previously sent to [facility].

## 2024-07-23 ENCOUNTER — MEDICAL CORRESPONDENCE (OUTPATIENT)
Dept: HEALTH INFORMATION MANAGEMENT | Facility: HOSPITAL | Age: 84
End: 2024-07-23

## 2024-07-23 NOTE — PROGRESS NOTES
"  Assessment & Plan     1. Generalized muscle weakness  Continue working with therapy, patient will have in home care as she is only leaving the house for medical appointments now    2. Moderate dementia without behavioral disturbance, psychotic disturbance, mood disturbance, or anxiety, unspecified dementia type (H)  Continue following with Neurology, Namenda was started    3. Hypothyroidism, unspecified type  I think there has been some confusion as to what dose she is supposed to take.  She has been taking 112 mcg since she has been home, but states she was taking 100 in the hospital.  Will check levels today, and will likely monitor and recheck in 6-8 weeks again.  - TSH with free T4 reflex; Future  - TSH with free T4 reflex    4. Chronic systolic congestive heart failure (H)  Refilled  - atorvastatin (LIPITOR) 80 MG tablet; Take 1 tablet (80 mg) by mouth at bedtime  Dispense: 90 tablet; Refill: 3    5. Hyponatremia  Labs updated  - Basic metabolic panel; Future  - Basic metabolic panel     The longitudinal plan of care for the diagnosis(es)/condition(s) as documented were addressed during this visit. Due to the added complexity in care, I will continue to support Ileana in the subsequent management and with ongoing continuity of care.     MED REC REQUIRED  Post Medication Reconciliation Status: discharge medications reconciled and changed, per note/orders  BMI  Estimated body mass index is 33.58 kg/m  as calculated from the following:    Height as of 2/20/24: 1.575 m (5' 2\").    Weight as of this encounter: 83.3 kg (183 lb 9.6 oz).       Return in about 2 months (around 9/26/2024) for Chronic Disease Management, Medication review.      Subjective   Ileana is a 83 year old, presenting for the following health issues:  Hospital F/U ([facility] [place] )    HPI       Hospital Follow-up Visit:    Hospital/Nursing Home/IP Rehab Facility:  [facility] [place]   Date of Admission: 6/19/24  Date of " Discharge: 7/9/24  Reason(s) for Admission: weakness, ambulatory dysfunction   Was the patient in the ICU or did the patient experience delirium during hospitalization?  Yes         4/26/2024     1:12 PM   Mini-Cog Total Score   Mini Cog Total Score 2   Not repeated today, was evaluated by Neurology and found to have moderate dementia    Do you have any other stressors you would like to discuss with your provider? No    Problems taking medications regularly:  None  Medication changes since discharge: None  Problems adhering to non-medication therapy:  None    Summary of hospitalization:  CareEverywhere information obtained and reviewed  Diagnostic Tests/Treatments reviewed.  Follow up needed: Follow-up sodium and thyroid  Other Healthcare Providers Involved in Patient s Care:         Specialist appointment - Neurology  Update since discharge: stable.         Plan of care communicated with patient and family               Review of Systems  Constitutional, HEENT, cardiovascular, pulmonary, gi and gu systems are negative, except as otherwise noted.      Objective    /75 (BP Location: Left arm, Patient Position: Sitting, Cuff Size: Adult Large)   Pulse 59   Temp 98.3  F (36.8  C) (Tympanic)   Resp 16   Wt 83.3 kg (183 lb 9.6 oz)   SpO2 98%   BMI 33.58 kg/m    Body mass index is 33.58 kg/m .  Physical Exam   GENERAL: alert and no distress  PSYCH: mentation appears normal, affect normal/bright          Signed Electronically by: Iris Becerril MD

## 2024-07-25 ENCOUNTER — DOCUMENTATION ONLY (OUTPATIENT)
Dept: OTHER | Facility: CLINIC | Age: 84
End: 2024-07-25
Payer: MEDICARE

## 2024-07-26 ENCOUNTER — OFFICE VISIT (OUTPATIENT)
Dept: FAMILY MEDICINE | Facility: OTHER | Age: 84
End: 2024-07-26
Attending: FAMILY MEDICINE
Payer: MEDICARE

## 2024-07-26 VITALS
HEART RATE: 59 BPM | WEIGHT: 183.6 LBS | TEMPERATURE: 98.3 F | SYSTOLIC BLOOD PRESSURE: 118 MMHG | OXYGEN SATURATION: 98 % | BODY MASS INDEX: 33.58 KG/M2 | DIASTOLIC BLOOD PRESSURE: 75 MMHG | RESPIRATION RATE: 16 BRPM

## 2024-07-26 DIAGNOSIS — E03.9 HYPOTHYROIDISM, UNSPECIFIED TYPE: ICD-10-CM

## 2024-07-26 DIAGNOSIS — E87.1 HYPONATREMIA: ICD-10-CM

## 2024-07-26 DIAGNOSIS — F03.B0 MODERATE DEMENTIA WITHOUT BEHAVIORAL DISTURBANCE, PSYCHOTIC DISTURBANCE, MOOD DISTURBANCE, OR ANXIETY, UNSPECIFIED DEMENTIA TYPE (H): ICD-10-CM

## 2024-07-26 DIAGNOSIS — M62.81 GENERALIZED MUSCLE WEAKNESS: Primary | ICD-10-CM

## 2024-07-26 DIAGNOSIS — I50.22 CHRONIC SYSTOLIC CONGESTIVE HEART FAILURE (H): ICD-10-CM

## 2024-07-26 LAB
ANION GAP SERPL CALCULATED.3IONS-SCNC: 13 MMOL/L (ref 7–15)
BUN SERPL-MCNC: 21.1 MG/DL (ref 8–23)
CALCIUM SERPL-MCNC: 9 MG/DL (ref 8.8–10.4)
CHLORIDE SERPL-SCNC: 99 MMOL/L (ref 98–107)
CREAT SERPL-MCNC: 1.22 MG/DL (ref 0.51–0.95)
EGFRCR SERPLBLD CKD-EPI 2021: 44 ML/MIN/1.73M2
GLUCOSE SERPL-MCNC: 96 MG/DL (ref 70–99)
HCO3 SERPL-SCNC: 21 MMOL/L (ref 22–29)
HOLD SPECIMEN: NORMAL
POTASSIUM SERPL-SCNC: 4.2 MMOL/L (ref 3.4–5.3)
SODIUM SERPL-SCNC: 133 MMOL/L (ref 135–145)
TSH SERPL DL<=0.005 MIU/L-ACNC: 0.84 UIU/ML (ref 0.3–4.2)

## 2024-07-26 PROCEDURE — 36415 COLL VENOUS BLD VENIPUNCTURE: CPT | Mod: ZL | Performed by: FAMILY MEDICINE

## 2024-07-26 PROCEDURE — 80048 BASIC METABOLIC PNL TOTAL CA: CPT | Mod: ZL | Performed by: FAMILY MEDICINE

## 2024-07-26 PROCEDURE — 99214 OFFICE O/P EST MOD 30 MIN: CPT | Performed by: FAMILY MEDICINE

## 2024-07-26 PROCEDURE — G2211 COMPLEX E/M VISIT ADD ON: HCPCS | Performed by: FAMILY MEDICINE

## 2024-07-26 PROCEDURE — 84443 ASSAY THYROID STIM HORMONE: CPT | Mod: ZL | Performed by: FAMILY MEDICINE

## 2024-07-26 PROCEDURE — G0463 HOSPITAL OUTPT CLINIC VISIT: HCPCS

## 2024-07-26 RX ORDER — ATORVASTATIN CALCIUM 80 MG/1
80 TABLET, FILM COATED ORAL AT BEDTIME
Qty: 90 TABLET | Refills: 3 | Status: SHIPPED | OUTPATIENT
Start: 2024-07-26

## 2024-07-26 ASSESSMENT — PAIN SCALES - GENERAL: PAINLEVEL: NO PAIN (0)

## 2024-07-29 DIAGNOSIS — E03.9 HYPOTHYROIDISM, UNSPECIFIED TYPE: Primary | ICD-10-CM

## 2024-08-01 ENCOUNTER — MEDICAL CORRESPONDENCE (OUTPATIENT)
Dept: HEALTH INFORMATION MANAGEMENT | Facility: CLINIC | Age: 84
End: 2024-08-01
Payer: MEDICARE

## 2024-08-05 ENCOUNTER — MEDICAL CORRESPONDENCE (OUTPATIENT)
Dept: HEALTH INFORMATION MANAGEMENT | Facility: CLINIC | Age: 84
End: 2024-08-05
Payer: MEDICARE

## 2024-08-06 ENCOUNTER — TELEPHONE (OUTPATIENT)
Dept: FAMILY MEDICINE | Facility: OTHER | Age: 84
End: 2024-08-06

## 2024-08-06 NOTE — TELEPHONE ENCOUNTER
Call from Slim with [facility] requesting VO to continue with PT and SNV 1 x per week for 3 weeks. Notified ok to continue with PT and SNV's as above per Dr. Jones.

## 2024-08-06 NOTE — TELEPHONE ENCOUNTER
Symptom or reason needing to speak to RN: VERBAL ORDERS      Best number to return call: [phone]      Best time to return call: Anytime

## 2024-08-06 NOTE — TELEPHONE ENCOUNTER
11:28 AM    Reason for Call: Phone Call    Description: Slim from [facility] called to update PCP. States patient is discharging from occupational therapy on 08-. Please reach out to Slim with questions if deemed necessary.     Was an appointment offered for this call? No  If yes : Appointment type              Date    Preferred method for responding to this message: Telephone Call  What is your phone number ?    If we cannot reach you directly, may we leave a detailed response at the number you provided? Yes    Can this message wait until your PCP/provider returns, if available today? Not applicable    Christy Bartholomew

## 2024-08-23 ENCOUNTER — TELEPHONE (OUTPATIENT)
Dept: FAMILY MEDICINE | Facility: OTHER | Age: 84
End: 2024-08-23

## 2024-08-23 NOTE — TELEPHONE ENCOUNTER
SIERRA Smalls from [facility] called stating pt Will be discharged from [facility] on Aug 27

## 2024-08-26 ENCOUNTER — TELEPHONE (OUTPATIENT)
Dept: FAMILY MEDICINE | Facility: OTHER | Age: 84
End: 2024-08-26

## 2024-08-26 NOTE — TELEPHONE ENCOUNTER
Voicemail message received from Sandrita with [facility] requesting SNV x 1 for 8/27/24 to discharge from [facility].     Sandrita can be reached at [phone]    Routing to Dr. Jones for approval.

## 2024-08-26 NOTE — TELEPHONE ENCOUNTER
Symptom or reason needing to speak to RN: verbal      Best number to return call: [phone]     Best time to return call: anytime

## 2024-08-27 DIAGNOSIS — N32.81 OVERACTIVE BLADDER: ICD-10-CM

## 2024-08-27 RX ORDER — SOLIFENACIN SUCCINATE 5 MG/1
5 TABLET, FILM COATED ORAL DAILY
Qty: 30 TABLET | Refills: 4 | Status: SHIPPED | OUTPATIENT
Start: 2024-08-27 | End: 2024-08-28

## 2024-08-27 RX ORDER — SOLIFENACIN SUCCINATE 5 MG/1
5 TABLET, FILM COATED ORAL DAILY
Qty: 30 TABLET | Refills: 4 | Status: SHIPPED | OUTPATIENT
Start: 2024-08-27 | End: 2024-08-27

## 2024-08-28 RX ORDER — SOLIFENACIN SUCCINATE 5 MG/1
5 TABLET, FILM COATED ORAL DAILY
Qty: 30 TABLET | Refills: 4 | Status: SHIPPED | OUTPATIENT
Start: 2024-08-28 | End: 2024-10-01

## 2024-08-30 ENCOUNTER — MEDICAL CORRESPONDENCE (OUTPATIENT)
Dept: HEALTH INFORMATION MANAGEMENT | Facility: CLINIC | Age: 84
End: 2024-08-30
Payer: MEDICARE

## 2024-09-01 DIAGNOSIS — I10 BENIGN ESSENTIAL HYPERTENSION: Primary | ICD-10-CM

## 2024-09-04 NOTE — TELEPHONE ENCOUNTER
PROPRANOLOL 10MG TABLET       Last Written Prescription Date:  --  Last Fill Quantity: --,   # refills: --  Last Office Visit: 7/26/24  Future Office visit:    Next 5 appointments (look out 90 days)      Oct 01, 2024 1:30 PM  (Arrive by 1:15 PM)  Return Visit with Gill Mcgill CNP  [facility] ([facility] ) [address] [place] [id number] [phone]             Routing refill request to provider for review/approval because:  Drug not active on patient's medication list (Discontinued)   Medication is reported/historical

## 2024-09-05 RX ORDER — PROPRANOLOL HYDROCHLORIDE 10 MG/1
10 TABLET ORAL 2 TIMES DAILY
Qty: 180 TABLET | Refills: 0 | Status: SHIPPED | OUTPATIENT
Start: 2024-09-05

## 2024-09-10 DIAGNOSIS — E03.9 HYPOTHYROIDISM, UNSPECIFIED TYPE: ICD-10-CM

## 2024-09-10 RX ORDER — LEVOTHYROXINE SODIUM 112 UG/1
112 TABLET ORAL DAILY
Qty: 30 TABLET | Refills: 10 | Status: SHIPPED | OUTPATIENT
Start: 2024-09-10

## 2024-09-12 ENCOUNTER — TELEPHONE (OUTPATIENT)
Dept: FAMILY MEDICINE | Facility: OTHER | Age: 84
End: 2024-09-12

## 2024-09-12 DIAGNOSIS — R60.0 PERIPHERAL EDEMA: Primary | ICD-10-CM

## 2024-09-12 NOTE — TELEPHONE ENCOUNTER
Please call and talk to her son - has patient been wearing compression stockings?  Elevating legs?  She tends to be pretty sedentary, so if she is just sitting, she will develop edema.

## 2024-09-12 NOTE — TELEPHONE ENCOUNTER
Telephone call placed to son Baldemar to inquire about PT.  PT services were complete through home care about 3-4 weeks ago.  Son states swelling has been present since hospital and that PT did not do anything to address the swelling when they were doing in home visits following hospitalization.

## 2024-09-12 NOTE — TELEPHONE ENCOUNTER
Is she working with PT as an outpatient at all?  I would be worried about trying diuretics in her given the medication issues she has dealt with recently.  If she is not working with PT, I will order that referral, as there are things they can do to help with the swelling (wraps, etc).

## 2024-09-12 NOTE — TELEPHONE ENCOUNTER
10:08 AM    Reason for Call: OVERBOOK    Patient is having the following symptoms: edema in both legs and feet for a few weeks, PT was in the hospital last month    The patient is requesting an appointment for as soon as possible with PCP.    Was an appointment offered for this call? Yes    Preferred method for responding to this message: Telephone Call  What is your phone number ? [phone]    If we cannot reach you directly, may we leave a detailed response at the number you provided? Yes    Can this message wait until your PCP/provider returns, if unavailable today? YES    Saul Soliz

## 2024-09-12 NOTE — TELEPHONE ENCOUNTER
We can order separate referral for PT/OT to treat edema.  Would they be able to go in for appointments?

## 2024-09-12 NOTE — TELEPHONE ENCOUNTER
Telephone call placed to patient's son Baldemar.  He states that patient is wearing her compression stockings.  Patient elevates feet in recliner and lays in bed for about one hour once a day and elevates her feet up on 4 pillows.  He states she is up and walking as best and she can but is having trouble walking.  He feels the difficulty is from the swelling.     Will provide update to Dr. Jones for review and advise.

## 2024-09-13 ENCOUNTER — TELEPHONE (OUTPATIENT)
Dept: FAMILY MEDICINE | Facility: OTHER | Age: 84
End: 2024-09-13

## 2024-09-13 NOTE — TELEPHONE ENCOUNTER
If she is able to leave the home, insurance would not likely cover home visits.  Referral for [facility] ordered.

## 2024-09-13 NOTE — TELEPHONE ENCOUNTER
9:57 AM    Reason for Call: Phone Call    Description: PT son just spoke to nurse and has some follow up questions on if a lab needs to be done.     Was an appointment offered for this call? No       Preferred method for responding to this message: Telephone Call  What is your phone number ? [phone]    If we cannot reach you directly, may we leave a detailed response at the number you provided? Yes    Can this message wait until your PCP/provider returns, if available today? Not applicable    Saul Soliz

## 2024-09-13 NOTE — TELEPHONE ENCOUNTER
Patients son notified of referral via telephone call.  Son inquired about labs due.  Per lab not on 7/26/24- patient to return for lab check in 6-8 weeks for follow up on TSH.  Son is inquiring if sodium could be checked at that time as well since she is coming in for lab only.  Cardiology appointment coming up on 10/1/24 as well.    Appointment scheduled for lab only:  Future Appointments 9/13/2024 - 3/12/2025        Date Visit Type Length Department Provider     9/20/2024 10:00 AM LAB 15 min [facility] LABORATORY [facility] LAB    Location Instructions:     From [place] - follow [address]nbsp; Take a right at the intersection across from [employer].&nbsp; The clinic will be on your right.  From [place] - follow [address]nbsp; Take a right onto [address].&nbsp; Take a left at the intersection across from [employer].&nbsp; The clinic will be on your right.  From [place] - follow [address]nbsp; Take a left onto [address].&nbsp; Take a left at the intersection across from [employer].&nbsp; The clinic will be on your right.              10/1/2024  1:30 PM RETURN 30 min [place] CARDIOLOGY Gill Mcgill, LAVELLE    Location Instructions:     From [place] - follow [address]nbsp; Take a right at the intersection across from [employer].&nbsp; The clinic will be on your right.  From [place] - follow [address]nbsp; Take a right onto [address].&nbsp; Take a left at the intersection across from [employer].&nbsp; The clinic will be on your right.  From [place] - follow [address]nbsp; Take a left onto [address].&nbsp; Take a left at the intersection across from [employer].&nbsp; The clinic will be on your right.

## 2024-09-13 NOTE — TELEPHONE ENCOUNTER
Telephone call placed to son Baldemar to inquire about ability to attend appointments.  He states that patient is able to get out to appointments but they would prefer in home if able.

## 2024-09-16 ENCOUNTER — TRANSFERRED RECORDS (OUTPATIENT)
Dept: HEALTH INFORMATION MANAGEMENT | Facility: CLINIC | Age: 84
End: 2024-09-16
Payer: MEDICARE

## 2024-09-19 ENCOUNTER — TELEPHONE (OUTPATIENT)
Dept: FAMILY MEDICINE | Facility: OTHER | Age: 84
End: 2024-09-19

## 2024-09-19 NOTE — TELEPHONE ENCOUNTER
10:13 AM    Reason for Call: OVERBOOK    Patient is having the following symptoms: FELL ON 9-19-24 - SON WONDERS WHY SHE KEEPS FALLING - AMBULANCE CAME TO LIFT HER - HAD ABLATION LAST WEEK AND IT'S SORE AFTER FALL - SWOLLEN LEGS     The patient is requesting an appointment for ASAP with DR BUTCHER.    Was an appointment offered for this call? No  If yes : Appointment type              Date    Preferred method for responding to this message: Telephone Call  What is your phone number ? [phone]     If we cannot reach you directly, may we leave a detailed response at the number you provided? Yes    Can this message wait until your PCP/provider returns, if unavailable today? Not applicable, PROVIDER IN CLINIC    Kerri Tavares

## 2024-09-19 NOTE — TELEPHONE ENCOUNTER
Unfortunately, her falls are likely multifactorial - she has had issues with sodium, weakness, etc.  OK for appointment next week.

## 2024-09-20 ENCOUNTER — OFFICE VISIT (OUTPATIENT)
Dept: FAMILY MEDICINE | Facility: OTHER | Age: 84
End: 2024-09-20
Attending: FAMILY MEDICINE
Payer: MEDICARE

## 2024-09-20 ENCOUNTER — LAB (OUTPATIENT)
Dept: LAB | Facility: OTHER | Age: 84
End: 2024-09-20
Attending: FAMILY MEDICINE
Payer: MEDICARE

## 2024-09-20 VITALS
HEART RATE: 64 BPM | OXYGEN SATURATION: 100 % | WEIGHT: 178.5 LBS | DIASTOLIC BLOOD PRESSURE: 90 MMHG | TEMPERATURE: 97.3 F | BODY MASS INDEX: 32.65 KG/M2 | SYSTOLIC BLOOD PRESSURE: 148 MMHG | RESPIRATION RATE: 20 BRPM

## 2024-09-20 DIAGNOSIS — Z23 NEED FOR PROPHYLACTIC VACCINATION AND INOCULATION AGAINST INFLUENZA: ICD-10-CM

## 2024-09-20 DIAGNOSIS — R60.0 PERIPHERAL EDEMA: ICD-10-CM
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
## 2024-09-20 DIAGNOSIS — K75.4 AUTOIMMUNE HEPATITIS (H): ICD-10-CM

## 2024-09-20 DIAGNOSIS — E03.9 HYPOTHYROIDISM, UNSPECIFIED TYPE: ICD-10-CM

## 2024-09-20 DIAGNOSIS — I50.22 CHRONIC SYSTOLIC CONGESTIVE HEART FAILURE (H): ICD-10-CM

## 2024-09-20 DIAGNOSIS — N18.32 STAGE 3B CHRONIC KIDNEY DISEASE (H): ICD-10-CM

## 2024-09-20 DIAGNOSIS — R29.898 WEAKNESS OF BOTH LOWER EXTREMITIES: ICD-10-CM

## 2024-09-20 LAB
ANION GAP SERPL CALCULATED.3IONS-SCNC: 12 MMOL/L (ref 7–15)
BUN SERPL-MCNC: 22.8 MG/DL (ref 8–23)
CALCIUM SERPL-MCNC: 9 MG/DL (ref 8.8–10.4)
CHLORIDE SERPL-SCNC: 98 MMOL/L (ref 98–107)
CREAT SERPL-MCNC: 1.37 MG/DL (ref 0.51–0.95)
EGFRCR SERPLBLD CKD-EPI 2021: 38 ML/MIN/1.73M2
GLUCOSE SERPL-MCNC: 106 MG/DL (ref 70–99)
HCO3 SERPL-SCNC: 20 MMOL/L (ref 22–29)
HOLD SPECIMEN: NORMAL
POTASSIUM SERPL-SCNC: 4.2 MMOL/L (ref 3.4–5.3)
SODIUM SERPL-SCNC: 130 MMOL/L (ref 135–145)
T4 FREE SERPL-MCNC: 1.67 NG/DL (ref 0.9–1.7)
TSH SERPL DL<=0.005 MIU/L-ACNC: 4.66 UIU/ML (ref 0.3–4.2)

## 2024-09-20 PROCEDURE — 84439 ASSAY OF FREE THYROXINE: CPT | Mod: ZL

## 2024-09-20 PROCEDURE — 84443 ASSAY THYROID STIM HORMONE: CPT | Mod: ZL

## 2024-09-20 PROCEDURE — G0463 HOSPITAL OUTPT CLINIC VISIT: HCPCS | Mod: 25

## 2024-09-20 PROCEDURE — 99214 OFFICE O/P EST MOD 30 MIN: CPT | Performed by: FAMILY MEDICINE

## 2024-09-20 PROCEDURE — 36415 COLL VENOUS BLD VENIPUNCTURE: CPT | Mod: ZL

## 2024-09-20 PROCEDURE — 90662 IIV NO PRSV INCREASED AG IM: CPT

## 2024-09-20 PROCEDURE — 80048 BASIC METABOLIC PNL TOTAL CA: CPT | Mod: ZL

## 2024-09-20 PROCEDURE — G2211 COMPLEX E/M VISIT ADD ON: HCPCS | Performed by: FAMILY MEDICINE

## 2024-09-20 RX ORDER — TRIMETHOPRIM 100 MG/1
TABLET ORAL
COMMUNITY
Start: 2024-09-09

## 2024-09-20 ASSESSMENT — ANXIETY QUESTIONNAIRES
3. WORRYING TOO MUCH ABOUT DIFFERENT THINGS: SEVERAL DAYS
IF YOU CHECKED OFF ANY PROBLEMS ON THIS QUESTIONNAIRE, HOW DIFFICULT HAVE THESE PROBLEMS MADE IT FOR YOU TO DO YOUR WORK, TAKE CARE OF THINGS AT HOME, OR GET ALONG WITH OTHER PEOPLE: VERY DIFFICULT
4. TROUBLE RELAXING: SEVERAL DAYS
2. NOT BEING ABLE TO STOP OR CONTROL WORRYING: NOT AT ALL
7. FEELING AFRAID AS IF SOMETHING AWFUL MIGHT HAPPEN: NEARLY EVERY DAY
6. BECOMING EASILY ANNOYED OR IRRITABLE: NOT AT ALL
1. FEELING NERVOUS, ANXIOUS, OR ON EDGE: NEARLY EVERY DAY
GAD7 TOTAL SCORE: 8
GAD7 TOTAL SCORE: 8
5. BEING SO RESTLESS THAT IT IS HARD TO SIT STILL: NOT AT ALL

## 2024-09-20 ASSESSMENT — PATIENT HEALTH QUESTIONNAIRE - PHQ9: SUM OF ALL RESPONSES TO PHQ QUESTIONS 1-9: 12

## 2024-09-20 ASSESSMENT — PAIN SCALES - GENERAL: PAINLEVEL: MODERATE PAIN (4)

## 2024-09-20 NOTE — PROGRESS NOTES
"  Assessment & Plan     1. Fall, sequela  I think her falls are multifactorial.  I did order PT referral earlier this week to work on swelling, and also to help direct home therapy for lower extremity weakness.  I encouraged her to follow through with those appointments.    2. Peripheral edema  As above    3. Weakness of both lower extremities  As above    4. Stage 3b chronic kidney disease (H)  No changes at this time    5. Autoimmune hepatitis (H)  Continue working with GI    6. Chronic systolic congestive heart failure (H)  Continue working with Cardiology    7. Need for prophylactic vaccination and inoculation against influenza  Updated  - INFLUENZA HIGH DOSE, TRIVALENT, PF (FLUZONE)       30 minutes spent by me on the date of the encounter doing chart review, history and exam, documentation and further activities per the note    The longitudinal plan of care for the diagnosis(es)/condition(s) as documented were addressed during this visit. Due to the added complexity in care, I will continue to support Ileana in the subsequent management and with ongoing continuity of care.       BMI  Estimated body mass index is 32.65 kg/m  as calculated from the following:    Height as of 2/20/24: 1.575 m (5' 2\").    Weight as of this encounter: 81 kg (178 lb 8 oz).   Weight management plan: Discussed healthy diet and exercise guidelines      Return if symptoms worsen or fail to improve.      Subjective   Ileana is a 84 year old, presenting for the following health issues:  Fall    HPI     Concern - Falls  Onset: Yesterday morning but has been going on a lot over the last year  Description: Fall  Intensity: mild  Progression of Symptoms:  worsening  Accompanying Signs & Symptoms: legs are weak, pt states her feet are swollen  Previous history of similar problem: yes  Precipitating factors:        Worsened by: movement  Alleviating factors:        Improved by: none  Therapies tried and outcome: katia stockings  After discussion, " I do think her falls are multifactorial.  She did have in home PT after her hospitalization.  She is working with Cardiology.  She is also seeing Neurology due to memory issues.      Review of Systems  Constitutional, HEENT, cardiovascular, pulmonary, gi and gu systems are negative, except as otherwise noted.      Objective    BP (!) 148/90 (BP Location: Right arm, Patient Position: Sitting, Cuff Size: Adult Regular)   Pulse 64   Temp 97.3  F (36.3  C) (Tympanic)   Resp 20   Wt 81 kg (178 lb 8 oz)   SpO2 100%   BMI 32.65 kg/m    Body mass index is 32.65 kg/m .  Physical Exam   GENERAL: alert and no distress  PSYCH: mentation appears normal and affect normal/bright          Signed Electronically by: Iris Becerril MD

## 2024-09-27 DIAGNOSIS — N18.32 STAGE 3B CHRONIC KIDNEY DISEASE (H): Primary | ICD-10-CM

## 2024-10-01 ENCOUNTER — APPOINTMENT (OUTPATIENT)
Dept: LAB | Facility: OTHER | Age: 84
End: 2024-10-01
Attending: NURSE PRACTITIONER
Payer: MEDICARE

## 2024-10-01 ENCOUNTER — OFFICE VISIT (OUTPATIENT)
Dept: CARDIOLOGY | Facility: OTHER | Age: 84
End: 2024-10-01
Attending: NURSE PRACTITIONER
Payer: MEDICARE

## 2024-10-01 VITALS
DIASTOLIC BLOOD PRESSURE: 88 MMHG | TEMPERATURE: 97.3 F | BODY MASS INDEX: 29.99 KG/M2 | RESPIRATION RATE: 16 BRPM | HEIGHT: 65 IN | HEART RATE: 61 BPM | WEIGHT: 180 LBS | SYSTOLIC BLOOD PRESSURE: 148 MMHG | OXYGEN SATURATION: 98 %

## 2024-10-01 DIAGNOSIS — I50.30 NYHA CLASS 3 HEART FAILURE WITH PRESERVED EJECTION FRACTION (H): Primary | ICD-10-CM

## 2024-10-01 DIAGNOSIS — Z98.890 S/P CORONARY ANGIOGRAM: ICD-10-CM

## 2024-10-01 DIAGNOSIS — I10 BENIGN ESSENTIAL HYPERTENSION: Primary | ICD-10-CM

## 2024-10-01 DIAGNOSIS — N18.32 STAGE 3B CHRONIC KIDNEY DISEASE (H): ICD-10-CM

## 2024-10-01 DIAGNOSIS — R60.0 BILATERAL LEG EDEMA: ICD-10-CM

## 2024-10-01 DIAGNOSIS — E78.5 DYSLIPIDEMIA: ICD-10-CM

## 2024-10-01 DIAGNOSIS — I25.10 CORONARY ARTERY DISEASE INVOLVING NATIVE CORONARY ARTERY OF NATIVE HEART WITHOUT ANGINA PECTORIS: ICD-10-CM

## 2024-10-01 DIAGNOSIS — R06.09 DYSPNEA ON EXERTION: ICD-10-CM

## 2024-10-01 DIAGNOSIS — I10 BENIGN ESSENTIAL HYPERTENSION: ICD-10-CM

## 2024-10-01 DIAGNOSIS — I50.30 NYHA CLASS 3 HEART FAILURE WITH PRESERVED EJECTION FRACTION (H): ICD-10-CM

## 2024-10-01 LAB
ANION GAP SERPL CALCULATED.3IONS-SCNC: 15 MMOL/L (ref 7–15)
ATRIAL RATE - MUSE: 59 BPM
BUN SERPL-MCNC: 21.7 MG/DL (ref 8–23)
CALCIUM SERPL-MCNC: 9.1 MG/DL (ref 8.8–10.4)
CHLORIDE SERPL-SCNC: 96 MMOL/L (ref 98–107)
CREAT SERPL-MCNC: 1.49 MG/DL (ref 0.51–0.95)
DIASTOLIC BLOOD PRESSURE - MUSE: NORMAL MMHG
EGFRCR SERPLBLD CKD-EPI 2021: 34 ML/MIN/1.73M2
GLUCOSE SERPL-MCNC: 90 MG/DL (ref 70–99)
HCO3 SERPL-SCNC: 19 MMOL/L (ref 22–29)
HOLD SPECIMEN: NORMAL
INTERPRETATION ECG - MUSE: NORMAL
P AXIS - MUSE: 0 DEGREES
POTASSIUM SERPL-SCNC: 4.7 MMOL/L (ref 3.4–5.3)
PR INTERVAL - MUSE: 162 MS
QRS DURATION - MUSE: 94 MS
QT - MUSE: 478 MS
QTC - MUSE: 473 MS
R AXIS - MUSE: -44 DEGREES
SODIUM SERPL-SCNC: 130 MMOL/L (ref 135–145)
SYSTOLIC BLOOD PRESSURE - MUSE: NORMAL MMHG
T AXIS - MUSE: 1 DEGREES
VENTRICULAR RATE- MUSE: 59 BPM

## 2024-10-01 PROCEDURE — 93005 ELECTROCARDIOGRAM TRACING: CPT | Performed by: NURSE PRACTITIONER

## 2024-10-01 PROCEDURE — 93010 ELECTROCARDIOGRAM REPORT: CPT | Performed by: INTERNAL MEDICINE

## 2024-10-01 PROCEDURE — G0463 HOSPITAL OUTPT CLINIC VISIT: HCPCS | Mod: 25

## 2024-10-01 PROCEDURE — 82310 ASSAY OF CALCIUM: CPT | Mod: ZL | Performed by: NURSE PRACTITIONER

## 2024-10-01 PROCEDURE — 36415 COLL VENOUS BLD VENIPUNCTURE: CPT | Mod: ZL | Performed by: NURSE PRACTITIONER

## 2024-10-01 PROCEDURE — 80048 BASIC METABOLIC PNL TOTAL CA: CPT | Mod: ZL | Performed by: NURSE PRACTITIONER

## 2024-10-01 PROCEDURE — 99214 OFFICE O/P EST MOD 30 MIN: CPT | Performed by: NURSE PRACTITIONER

## 2024-10-01 RX ORDER — FUROSEMIDE 20 MG
20 TABLET ORAL DAILY
Qty: 30 TABLET | Refills: 1 | Status: SHIPPED | OUTPATIENT
Start: 2024-10-01

## 2024-10-01 ASSESSMENT — PAIN SCALES - GENERAL: PAINLEVEL: SEVERE PAIN (6)

## 2024-10-01 NOTE — PATIENT INSTRUCTIONS
Thank you for allowing Gill Mcgill CNP and our  team to participate in your care. Please call our office at [phone] with scheduling questions or if you need to cancel or change your appointment. With any other questions or concerns you may call cardiology nurse at  [phone].       If you experience chest pain, chest pressure, chest tightness, shortness of breath, fainting, lightheadedness, nausea, vomiting, or other concerning symptoms, please report to the Emergency Department or call 911. These symptoms may be emergent, and best treated in the Emergency Department.    START furosemide 20 mg once daily in the morning  Follow a fluid restriction of 1.5 liters which is 1500 mL or 50 ounces of total fluid intake daily  Daily weights  Monitor symptoms of LE edema  Stop drinking gatorade, powerade    Follow-up in 1 week with labs prior.     Gill Mcgill CNP     BP Readings from Last 6 Encounters:   10/01/24 (!) 148/88   09/20/24 (!) 148/90   07/26/24 118/75   05/09/24 126/80   04/30/24 112/72   04/26/24 128/80       Wt Readings from Last 5 Encounters:   10/01/24 81.6 kg (180 lb)   09/20/24 81 kg (178 lb 8 oz)   07/26/24 83.3 kg (183 lb 9.6 oz)   05/09/24 82.4 kg (181 lb 9.6 oz)   04/30/24 82.6 kg (182 lb)

## 2024-10-01 NOTE — PROGRESS NOTES
[facility] HEART CARE   CARDIOLOGY PROGRESS NOTE     Chief Complaint   Patient presents with    Follow Up          Diagnosis:    ICD-10-CM    1. NYHA class 3 heart failure with preserved ejection fraction (H)  I50.30 furosemide (LASIX) 20 MG tablet     Basic metabolic panel      2. Benign essential hypertension  I10 EKG 12-lead complete w/read - (Clinic Performed)      3. Stage 3b chronic kidney disease (H)  N18.32 Basic metabolic panel      4. Coronary artery disease involving native coronary artery of native heart without angina pectoris  I25.10       5. S/P coronary angiogram 9/29/2023 at [facility]  Z98.890       6. Dyslipidemia with LDl goal less than 100  E78.5       7. Dyspnea on exertion  R06.09       8. Bilateral leg edema  R60.0               Assessment/Plan:    Exertional chest discomfort  Dyspnea on exertion  Abnormal stress test  History of nonobstructive coronary artery disease including catheterization in 2017 [facility]  Dyspnea on exertion, exertional chest discomfort abnormal stress test at [facility] August 2023.  Referral to [facility] for coronary angiogram.   S/p coronary angiogram at [facility] 9/29/2023   non obstructive coronary artery disease, elevated LVEDP  Transthoracic echocardiogram at [facility] in [place] 9/13/2023  Borderline systolic LV function with LVEF 50-55%  Diastolic dysfunction  No significant valvular pathology      Essential hypertension with blood pressure goal less than 130/80, uncontrolled  Continue losartan 100 mg once daily  Continue propanolol 10 mg twice daily  Heart healthy lifestyle encouraged    BP Readings from Last 6 Encounters:   10/08/24 132/82   10/01/24 (!) 148/88   09/20/24 (!) 148/90   07/26/24 118/75   05/09/24 126/80   04/30/24 112/72       Nonobstructive coronary artery disease  S/p coronary angiogram 9/29/2023  Dyslipidemia with LDL goal lass than 100  Heart healthy lifestyle encouraged  Previously on lipitor but stopped due to side effects.      Recent Labs   Lab Test 09/05/23  1434 09/12/19  0912   CHOL 183 156   HDL 42* 51   * 80   TRIG 151* 123         Heart Failure with Preserved ejection Fraction (HFpEF)  Diastolic dysfunction on TTE 9/2023- [facility]  Mildly elevated LVEDP on cardiac cath 9/29/2023- [facility]   Chronic HFpEF (EF 50-55%). Risk factors include female gender, age, HTN, and obesity  Stage C. NYHA Class II.      BP:   Controlled  Management as above  Fluid status   Continues with bilateral +1 lower extremity edema, dyspnea on exertion, weight gain  START furosemide 20 mg once daily  Sodium restriction of 2,500 mg daily  Fluid restriction of 1.5-2 liters daily  Aldosterone antagonist:   Chronic kidney disease with recent JERZY  Consider in the future  SGLT-2 Inhibitor:   Chronic UTIs, CKD with recent JERZY  Could consider in the futuer  Ischemia evaluation:   Coronary angiogram 9/29/2023 at [facility] showing non obstructive CAD  ACEi/ARB/ARNI:   n/a, no evidence for use in HFpEF  BB:   n/a, no evidence for use in HFpEF   SCD prophylaxis:   n/a, no evidence for use in HFpEF  NSAID use:   Contraindicated  Sleep apnea evaluation:   Declines    Wt Readings from Last 5 Encounters:   10/08/24 83.9 kg (185 lb)   10/01/24 81.6 kg (180 lb)   09/20/24 81 kg (178 lb 8 oz)   07/26/24 83.3 kg (183 lb 9.6 oz)   05/09/24 82.4 kg (181 lb 9.6 oz)     Chronic kidney disease  Reviewed with her today that she should be taking Tylenol and avoid NSAIDs such as Aleve, Advil as much as possible  Blood pressure control  She has been using salt alternative- reviewed importance of not overdoing salt alternative since they often have potassium chloride given her CKD  Estimated Creatinine Clearance: 27.5 mL/min (A) (based on SCr of 1.58 mg/dL (H)).    Hypothyroidism  Continue management by primary care provider  Recent TSH levels have been elevated but T4 levels have been normal  TSH   Date Value Ref Range Status   09/20/2024 4.66 (H) 0.30 - 4.20 uIU/mL  "Final   01/26/2022 6.68 (H) 0.40 - 4.00 mU/L Final   05/14/2021 3.25 0.40 - 4.00 mU/L Final           Follow-up in 1 week with labs prior.            Interval history:  Ileana Davis is a pleasant 83-year old female who presents for cardiology follow-up. Recall, she has a cardiac history including hypertension, HFpEF, non-obstructive CAD, hyperlipidemia.  She has a non-cardiac history including recurrent urinary tract infections, chronic lumbago, renal cell carcinoma status post right nephrectomy, chronic kidney disease, hypothyroidism.       Ms. Davis had coronary angiogram for abnormal stress test and dyspnea on exertion on 9/29/2023 at [facility] in [place]. Coronary angiogram revealed non-obstructive coronary artery disease, elevated LVEDP. Her losartan was increased due to hypertension even under sedation. She also had TTE done at [facility] showing LVEF 50-55%, diastolic dysfunction.     Today, Ms. Davis has endorses that she does feel dyspneic after doing any level of activity-- even doing dishes. She admits she has not done much since hospitalization in April 2024. No chest pain or pressure. Occasional episode of dizziness and then loses her balance- movement of her head can elicit this.   She denies symptoms of racing, skipping, fluttering in chest.   She has had ongoing LE edema. She is wondering if a medication has been contributing to this.  She does wear compression stockings but does not think they have been helpful.       Smoking History: Lifelong non-smoker. She grew up around heavy second hand smoke    Alcohol History: None    Substance Abuse History: None    Sleep History: Sleeps in bed. Lies supine with 1 pillow under her neck and 1 behind her knees. She does snore. No witnessed apnea. She is awake frequently throughout the night due to urinary frequency.     Family History: Mother- heart disease \"enlarged heart and took nitroglycerin;       HPI:    Ileana Davis is a pleasant " "82-year old female who presents for cardiology consult regarding dyspnea on exertion and abnormal stress test. She has a cardiac history including hypertension.  She has a non-cardiac history including recurrent urinary tract infections, chronic lumbago, renal cell carcinoma status post right nephrectomy, chronic kidney disease, hypothyroidism.       Per Ileeta and her son she has had dyspnea, dyspnea on exertion for at least the last six months. He has also noticed some harder breathing while just sitting at rest. In the morning she makes her bed, showers every other morning, gets dressed, does her hair. She is dyspneic and fatigued doing these activities. This has worsened over the last 6 months. Her son has also noticed that \"just walking out to the car\" she is dyspneic. She does tell me that she feels \"a slight pressure\" in her chest. This has been occurring often throughout the day with exertion. Her chest discomfort resolves with rest and dyspnea improves. No racing/skipping/fluttering sensation in her chest. She has had issues with bilateral LE edema for many years. She wears compression stockings and elevates her legs which is helpful. Increased LE edema correlates with salt intake and fluid intake.     Ani brought concerns regarding her dyspnea and chest discomfort to her primary care provider.  She underwent stress testing on August 2, 2023 which showed a small area of mild ischemia in the inferior lateral segments of the left ventricle.  She did have a cardiac catheterization in 2017 at [facility] showing nonobstructive coronary artery disease with no significant lesions.        Smoking History: Lifelong non-smoker. She grew up around heavy second hand smoke    Alcohol History: None    Substance Abuse History: None    Sleep History: Sleeps in bed. Lies supine with 1 pillow under her neck and 1 behind her knees. She does snore. No witnessed apnea. She is awake frequently throughout the night due to " "urinary frequency.     Family History: Mother- heart disease \"enlarged heart and took nitroglycerin;       UTI symptoms:    She has had symptoms of low back ache the last 4 days, generalized weakness in her legs, dysuria, pressure. She has had these symptoms for the last 4 days.           RELEVANT TESTING:  Cardiac Catheterization 9/28/2023 at [facility]          Transthoracic Echocardiogram at [facility] 4/2024  Interpretation Summary  Technically difficult study.  The left ventricle is normal size. The left ventricular systolic function is normal. Wall motion is normal.  Qualitative ejection fraction is 60-65% (normal).  The left ventricular diastolic function is mildly abnormal (Grade I).  Mild tricuspid regurgitation.  Estimated right ventricular systolic pressure 27mmHg.  Inferior vena cava size normal and collapsibility > 50% normal indicating normal right atrial pressure (3 mm Hg).  There is no pericardial effusion.  Negative bubble study. No PFO or atrial septal defect by agitated saline or color flow.       Nuclear med Lexiscan stress test August 2023 at [facility]    The nuclear stress test is abnormal.    There is a small area of mild ischemia in the inferolateral segment(s)  of the left ventricle.    The left ventricular ejection fraction at rest is 87%.  The left  ventricular ejection fraction at stress is 79%.    There is no prior study for comparison.      ECG Summary    ECG Baseline electrocardiogram demonstrates sinus rhythm.   Patient did not develop any acute EKG changes or arrhythmias during the Lexiscan portion of the study.       Cardiac catheterization October 2017 at [facility]  CARDIAC FINDINGS:   DOMINANCE:   Right Dominant   LEFT MAIN:   is angiographically normal (0% Stenosis)   LEFT ANTERIOR DESCENDING :   Mid Segment 20-30 % Stenosis   CIRCUMFLEX:   is angiographically normal (0% Stenosis)   RIGHT CORONARY ARTERY:   is angiographically normal (0% Stenosis) "   COLLATERALS:   No collateral flow       Past Medical History:   Diagnosis Date    Anemia 8/20/2015    Autoimmune hepatitis (H) 6/2/2016    Benign paroxysmal positional vertigo 10/7/2010    Contact dermatitis and other eczema, due to unspecified cause 10/7/2010    Esophageal reflux 10/7/2010    Generalized anxiety disorder 10/7/2010    Generalized osteoarthrosis, involving multiple sites 10/7/2010    Hepatitis, unspecified 10/7/2010    Infected wound     Myalgia and myositis, unspecified 10/7/2010    fibromyalgia    Nonallopathic lesion of cervical region, not elsewhere classified 12/5/2001    Nonallopathic lesion of thoracic region, not elsewhere classified 3/31/2005    Open wound of knee, leg, and ankle     Rosacea 10/7/2010    Unspecified essential hypertension 10/7/2010    Unspecified hypothyroidism 10/7/2010       Past Surgical History:   Procedure Laterality Date    APPENDECTOMY      ARTHROPLASTY TOE(S) Left 9/21/2020    Procedure: Left  Bethea Arthroplasty with interpositional arthroplasty using graft.;  Surgeon: Gina Rodriguez DPM;  Location: [facility] OR    BACK SURGERY  2015    lumbar epidural injection    CARDIAC SURGERY  10/2017    angioplasty    CHOLECYSTECTOMY      COLONOSCOPY  07/27/2017    [facility]    COLONOSCOPY - HIM SCAN  05/15/2001    Small internal hemorrhoids; otherwise normal;[facility]    COLONOSCOPY - HIM SCAN  12/14/2006    Colonoscopy, [facility], JAYLON    ENT SURGERY      tonsillectomy    ESOPHAGOGASTRODUODENOSCOPY  07/27/2017    [facility]    EXCISE LESION LOWER EXTREMITY Left 4/19/2022    Procedure: Excision Left Medial Lower Leg Ulceration;  Surgeon: Kai Huynh MD;  Location: [facility] OR    EXCISE LESION LOWER EXTREMITY Left 9/13/2022    Procedure: Wide local excision of left posterior leg;  Surgeon: Kai Huynh MD;  Location: [facility]    EXCISE LESION UPPER EXTREMITY Right 4/19/2022    Procedure: Excision lesion right upper extremity;  Surgeon: Kai Huynh MD;  Location: [facility] OR    EYE  SURGERY      bilateral cataract removal    GI SURGERY      anal fistula    GYN SURGERY  1985    [facility]    GYN SURGERY      cessarian x 2    GYN SURGERY      tubal sterilazation    NEPHRECTOMY Right 05/02/2019    ORTHOPEDIC SURGERY      right knee    ORTHOPEDIC SURGERY  1986    plantar fascia release    ORTHOPEDIC SURGERY      left knee    ORTHOPEDIC SURGERY      right trigger finger release    ORTHOPEDIC SURGERY  2013    Right great toe MTPJ fusion, adductor tenotomy,correction of hammer toe    ORTHOPEDIC SURGERY  [id number]    multiple procedures left forefoot    REPAIR HAMMER TOE Left 2/12/2024    Procedure: LEFT fourth claw toe correction;  Surgeon: Gina Rodriguez DPM;  Location: [facility] OR       Allergies   Allergen Reactions    Fentanyl Other (See Comments)     Severe agitation when used during angiogram    Latex Rash    Codeine Sulfate Other (See Comments)     hallucinations    Fentanyl And Related      Other reaction(s): Confusion    Morphine Other (See Comments)     Hallucinations with iv therapy    Tape [Adhesive Tape]     Tramadol Other (See Comments)     hallucination       Current Outpatient Medications   Medication Sig Dispense Refill    amLODIPine (NORVASC) 5 MG tablet Take 1 tablet by mouth daily      aspirin (ASA) 81 MG chewable tablet Take 81 mg by mouth daily      atorvastatin (LIPITOR) 80 MG tablet Take 1 tablet (80 mg) by mouth at bedtime 90 tablet 3    estradiol (ESTRACE) 0.1 MG/GM vaginal cream Place vaginally three times a week      fluticasone (FLONASE) 50 MCG/ACT nasal spray SPRAY 2 SPRAYS INTO BOTH NOSTRILS DAILY AS NEEDED 16 g 11    furosemide (LASIX) 20 MG tablet Take 1 tablet (20 mg) by mouth daily. 30 tablet 1    hydrocortisone 2.5 % cream APPLY TWICE TIMES DAILY TO ACTIVE ECZEMA ON THE HANDS, FOLLOW WITH MOISTURIZING CREAM.      levothyroxine (SYNTHROID/LEVOTHROID) 112 MCG tablet TAKE 1 TABLET BY MOUTH ONCEDAILY 30 tablet 10    losartan (COZAAR) 100 MG tablet Take 1 tablet (100 mg) by  mouth daily 90 tablet 3    melatonin 1 MG TABS tablet Take 10 mg by mouth at bedtime      memantine (NAMENDA) 5 MG tablet TAKE 1 TAB BY MOUTH DAILY X 1 WK; THEN 1 TAB TWICE DAILY X 1 WK; THEN 1 TAB IN THE MORNING 2 TABS IN THE EVENING X 1 WK; THEN 2 TABS TWICE      nystatin (MYCOSTATIN) 073501 UNIT/GM external cream APPLY TOPICALLY 2 TIMES DAILY AS NEEDED FOR DRY SKIN 30 g 2    nystatin (MYCOSTATIN) 518462 UNIT/GM external powder APPLY TO AFFECTED AREA NIGHTLY AS NEEDED FOR RASH 60 g 3    omeprazole (PRILOSEC) 40 MG capsule Take by mouth daily Before the same meal daily      phenazopyridine (PYRIDIUM) 100 MG tablet TAKE 1 TABLET BY MOUTH THREE TIMES DAILY AS NEEDED FOR  URINARY  TRACT  DISCOMFORT 10 tablet 0    polyethylene glycol (MIRALAX) 17 g packet Take 1 packet by mouth daily      propranolol (INDERAL) 10 MG tablet Take 1 tablet (10 mg) by mouth 2 times daily. 180 tablet 0    trimethoprim (TRIMPEX) 100 MG tablet TAKE 1 TABLET BY MOUTH AT BEDTIME. INDICATIONS INFECTION         Social History     Socioeconomic History    Marital status:      Spouse name: Not on file    Number of children: Not on file    Years of education: Not on file    Highest education level: Not on file   Occupational History    Not on file   Tobacco Use    Smoking status: Never     Passive exposure: Never    Smokeless tobacco: Never   Vaping Use    Vaping status: Never Used   Substance and Sexual Activity    Alcohol use: No    Drug use: No    Sexual activity: Never   Other Topics Concern    Parent/sibling w/ CABG, MI or angioplasty before 65F 55M? No     Service Not Asked    Blood Transfusions Yes    Caffeine Concern Yes     Comment: soda rare    Occupational Exposure Not Asked    Hobby Hazards Not Asked    Sleep Concern Not Asked    Stress Concern Not Asked    Weight Concern Not Asked    Special Diet Not Asked    Back Care Not Asked    Exercise Not Asked    Bike Helmet Not Asked    Seat Belt Not Asked    Self-Exams Not Asked    Social History Narrative    Not on file     Social Determinants of Health     Financial Resource Strain: Low Risk  (1/4/2024)    Financial Resource Strain     Within the past 12 months, have you or your family members you live with been unable to get utilities (heat, electricity) when it was really needed?: No   Food Insecurity: No Food Insecurity (6/19/2024)    Received from [facility]    Hunger Vital Sign     Worried About Running Out of Food in the Last Year: Never true     Ran Out of Food in the Last Year: Never true   Transportation Needs: No Transportation Needs (6/19/2024)    Received from [facility]    PRAPARE - Transportation     Lack of Transportation (Medical): No     Lack of Transportation (Non-Medical): No   Physical Activity: Not on file   Stress: Not on file   Social Connections: Not on file   Interpersonal Safety: Low Risk  (9/20/2024)    Interpersonal Safety     Do you feel physically and emotionally safe where you currently live?: Yes     Within the past 12 months, have you been hit, slapped, kicked or otherwise physically hurt by someone?: No     Within the past 12 months, have you been humiliated or emotionally abused in other ways by your partner or ex-partner?: No   Housing Stability: Low Risk  (6/19/2024)    Received from [facility]    Housing Stability Vital Sign     Unable to Pay for Housing in the Last Year: No     Number of Times Moved in the Last Year: 0     Homeless in the Last Year: No       TEST RESULTS:   Results for orders placed or performed in visit on 10/01/24   Basic metabolic panel     Status: Abnormal   Result Value Ref Range    Sodium 130 (L) 135 - 145 mmol/L    Potassium 4.7 3.4 - 5.3 mmol/L    Chloride 96 (L) 98 - 107 mmol/L    Carbon Dioxide (CO2) 19 (L) 22 - 29 mmol/L    Anion Gap 15 7 - 15 mmol/L    Urea Nitrogen 21.7 8.0 - 23.0 mg/dL    Creatinine 1.49 (H) 0.51 -  "0.95 mg/dL    GFR Estimate 34 (L) >60 mL/min/1.73m2    Calcium 9.1 8.8 - 10.4 mg/dL    Glucose 90 70 - 99 mg/dL   Extra Tube     Status: None    Narrative    The following orders were created for panel order Extra Tube.  Procedure                               Abnormality         Status                     ---------                               -----------         ------                     Extra Purple Top Tube[[id number]]                            Final result                 Please view results for these tests on the individual orders.   Extra Purple Top Tube     Status: None   Result Value Ref Range    Hold Specimen [facility]    EKG 12-lead complete w/read - (Clinic Performed)     Status: None   Result Value Ref Range    Systolic Blood Pressure  mmHg    Diastolic Blood Pressure  mmHg    Ventricular Rate 59 BPM    Atrial Rate 59 BPM    NC Interval 162 ms    QRS Duration 94 ms     ms    QTc 473 ms    P Axis 0 degrees    R AXIS -44 degrees    T Axis 1 degrees    Interpretation ECG       Sinus bradycardia  Left axis deviation  Moderate voltage criteria for LVH, may be normal variant ( R in aVL , Tutu product )  Abnormal ECG  When compared with ECG of 30-Jan-2024 13:30,  No significant change was found  Confirmed by MD Andres, Zafar ([id number]) on 10/1/2024 3:08:06 PM             Review of systems: Negative except that which was noted in the HPI.    Physical examination:    Vitals: BP (!) 148/88 (BP Location: Left arm, Patient Position: Sitting, Cuff Size: Adult Regular)   Pulse 61   Temp 97.3  F (36.3  C) (Tympanic)   Resp 16   Ht 1.651 m (5' 5\")   Wt 81.6 kg (180 lb)   SpO2 98%   BMI 29.95 kg/m    BMI= Body mass index is 29.95 kg/m .      GENERAL APPEARANCE: healthy, alert and no distress  CHEST: lungs clear to auscultation - no rales, rhonchi or wheezes, no use of accessory muscles, no retractions, respirations are unlabored, normal respiratory rate  CARDIOVASCULAR: regular rhythm, normal S1 with " physiologic split S2, no S3 or S4 and no murmur, click or rub  EXTREMITIES: +1 bilateral lower extremity edema   NEURO: alert and oriented normal speech, and affect  VASC: No carotid bruits heard.    Thank you for allowing me to participate in the care of your patient. Please do not hesitate to contact me if you have any questions.       Gill Mcgill, CNP

## 2024-10-08 ENCOUNTER — OFFICE VISIT (OUTPATIENT)
Dept: CARDIOLOGY | Facility: OTHER | Age: 84
End: 2024-10-08
Attending: NURSE PRACTITIONER
Payer: MEDICARE

## 2024-10-08 ENCOUNTER — APPOINTMENT (OUTPATIENT)
Dept: LAB | Facility: OTHER | Age: 84
End: 2024-10-08
Payer: MEDICARE

## 2024-10-08 VITALS
BODY MASS INDEX: 30.82 KG/M2 | HEART RATE: 64 BPM | SYSTOLIC BLOOD PRESSURE: 132 MMHG | HEIGHT: 65 IN | WEIGHT: 185 LBS | DIASTOLIC BLOOD PRESSURE: 82 MMHG | RESPIRATION RATE: 20 BRPM | OXYGEN SATURATION: 99 %

## 2024-10-08 DIAGNOSIS — N18.32 STAGE 3B CHRONIC KIDNEY DISEASE (H): ICD-10-CM

## 2024-10-08 DIAGNOSIS — R06.09 DOE (DYSPNEA ON EXERTION): ICD-10-CM

## 2024-10-08 DIAGNOSIS — R60.0 LEG EDEMA: ICD-10-CM

## 2024-10-08 DIAGNOSIS — I50.30 NYHA CLASS 3 HEART FAILURE WITH PRESERVED EJECTION FRACTION (H): Primary | ICD-10-CM

## 2024-10-08 DIAGNOSIS — I10 BENIGN ESSENTIAL HYPERTENSION: ICD-10-CM

## 2024-10-08 DIAGNOSIS — E87.1 HYPONATREMIA: ICD-10-CM

## 2024-10-08 LAB
ANION GAP SERPL CALCULATED.3IONS-SCNC: 13 MMOL/L (ref 7–15)
BUN SERPL-MCNC: 19.7 MG/DL (ref 8–23)
CALCIUM SERPL-MCNC: 8.9 MG/DL (ref 8.8–10.4)
CHLORIDE SERPL-SCNC: 94 MMOL/L (ref 98–107)
CREAT SERPL-MCNC: 1.58 MG/DL (ref 0.51–0.95)
EGFRCR SERPLBLD CKD-EPI 2021: 32 ML/MIN/1.73M2
GLUCOSE SERPL-MCNC: 101 MG/DL (ref 70–99)
HCO3 SERPL-SCNC: 22 MMOL/L (ref 22–29)
HOLD SPECIMEN: NORMAL
NT-PROBNP SERPL-MCNC: 331 PG/ML (ref 0–1800)
POTASSIUM SERPL-SCNC: 3.8 MMOL/L (ref 3.4–5.3)
SODIUM SERPL-SCNC: 129 MMOL/L (ref 135–145)

## 2024-10-08 PROCEDURE — 80048 BASIC METABOLIC PNL TOTAL CA: CPT | Mod: ZL | Performed by: NURSE PRACTITIONER

## 2024-10-08 PROCEDURE — 36415 COLL VENOUS BLD VENIPUNCTURE: CPT | Mod: ZL | Performed by: NURSE PRACTITIONER

## 2024-10-08 PROCEDURE — G0463 HOSPITAL OUTPT CLINIC VISIT: HCPCS

## 2024-10-08 PROCEDURE — 99214 OFFICE O/P EST MOD 30 MIN: CPT | Performed by: NURSE PRACTITIONER

## 2024-10-08 PROCEDURE — 83880 ASSAY OF NATRIURETIC PEPTIDE: CPT | Mod: ZL | Performed by: NURSE PRACTITIONER

## 2024-10-08 ASSESSMENT — PAIN SCALES - GENERAL: PAINLEVEL: NO PAIN (0)

## 2024-10-08 NOTE — PATIENT INSTRUCTIONS
Thank you for allowing Gill Mcgill CNP and our  team to participate in your care. Please call our office at [phone] with scheduling questions or if you need to cancel or change your appointment. With any other questions or concerns you may call cardiology nurse at  [phone].       If you experience chest pain, chest pressure, chest tightness, shortness of breath, fainting, lightheadedness, nausea, vomiting, or other concerning symptoms, please report to the Emergency Department or call 911. These symptoms may be emergent, and best treated in the Emergency Department.    No changes today    Labs will be sent to [place] and results should be back this afternoon. We will call with any concerns.    Continue 2,000 mg sodium restriction daily and less than 50 ounces of fluid daily.     Follow-up in 3 months, certainly sooner with acute concerns      Gill Mcgill CNP

## 2024-10-08 NOTE — PROGRESS NOTES
[facility] HEART CARE   CARDIOLOGY PROGRESS NOTE     Chief Complaint   Patient presents with    Follow Up          Diagnosis:    ICD-10-CM    1. NYHA class 3 heart failure with preserved ejection fraction (H)  I50.30 Basic metabolic panel              Assessment/Plan:    Exertional chest discomfort  Dyspnea on exertion  Abnormal stress test  History of nonobstructive coronary artery disease including catheterization in 2017 [facility]  Dyspnea on exertion, exertional chest discomfort abnormal stress test at [place] August 2023.  Referral to [facility] for coronary angiogram.   S/p coronary angiogram at [facility] 9/29/2023   non obstructive coronary artery disease, elevated LVEDP  Transthoracic echocardiogram at [facility] in [place] 9/13/2023  Borderline systolic LV function with LVEF 50-55%  Diastolic dysfunction  No significant valvular pathology      Essential hypertension with blood pressure goal less than 130/80, controlled  Continue losartan 100 mg once daily  DECREASE propanolol 10 mg twice daily (she is taking it as 20 mg once daily) to 10 mg once daily for a week, then stop with softer blood pressures here and at home per her son.   Heart healthy lifestyle encouraged    BP Readings from Last 6 Encounters:   10/08/24 132/82   10/01/24 (!) 148/88   09/20/24 (!) 148/90   07/26/24 118/75   05/09/24 126/80   04/30/24 112/72       Nonobstructive coronary artery disease  S/p coronary angiogram 9/29/2023  Dyslipidemia with LDL goal lass than 100  Heart healthy lifestyle encouraged  She was started on lipitor when she left the hospital but stopped due to side effects.     Recent Labs   Lab Test 09/05/23  1434 09/12/19  0912   CHOL 183 156   HDL 42* 51   * 80   TRIG 151* 123         Heart Failure with Preserved ejection Fraction (HFpEF)  Diastolic dysfunction on TTE 9/2023- [facility]  Elevated LVEDP on cardiac cath 9/29/2023- [facility]   Chronic HFpEF (EF 50-55%). Risk factors include female gender,  age, HTN, and obesity  Stage C. NYHA Class II.      BP:   Controlled  Management as above  Fluid status   Continues with bilateral +1 lower extremity edema.   Sodium restriction of 2,500 mg daily  Fluid restriction of 1.5-2 liters daily  Aldosterone antagonist:   Chronic kidney disease with recent JERZY  Consider in the future  SGLT-2 Inhibitor:   Chronic UTIs, CKD with recent JERZY  Could consider in the futuer  Ischemia evaluation:   Coronary angiogram 9/29/2023 at [facility] showing non obstructive CAD  ACEi/ARB/ARNI:   n/a, no evidence for use in HFpEF  BB:   n/a, no evidence for use in HFpEF   SCD prophylaxis:   n/a, no evidence for use in HFpEF  NSAID use:   Contraindicated  Sleep apnea evaluation:   Declines    Wt Readings from Last 5 Encounters:   10/08/24 83.9 kg (185 lb)   10/01/24 81.6 kg (180 lb)   09/20/24 81 kg (178 lb 8 oz)   07/26/24 83.3 kg (183 lb 9.6 oz)   05/09/24 82.4 kg (181 lb 9.6 oz)     Chronic kidney disease  Reviewed with her today that she should be taking Tylenol and avoid NSAIDs such as Aleve, Advil as much as possible  Blood pressure control  She has been using salt alternative- reviewed importance of not overdoing salt alternative since they often have potassium chloride given her CKD  Estimated Creatinine Clearance: 29.5 mL/min (A) (based on SCr of 1.49 mg/dL (H)).    Hypothyroidism  Continue management by primary care provider  Recent TSH levels have been elevated but T4 levels have been normal  TSH   Date Value Ref Range Status   09/20/2024 4.66 (H) 0.30 - 4.20 uIU/mL Final   01/26/2022 6.68 (H) 0.40 - 4.00 mU/L Final   05/14/2021 3.25 0.40 - 4.00 mU/L Final         Follow-up with cardiology on an as needed basis.           Interval history:  Ileana Davis is a pleasant 83-year old female who presents for cardiology follow-up. Recall, she has a cardiac history including hypertension, HFpEF, non-obstructive CAD, hyperlipidemia.  She has a non-cardiac history including recurrent  "urinary tract infections, chronic lumbago, renal cell carcinoma status post right nephrectomy, chronic kidney disease, hypothyroidism.       Ms. Davis had coronary angiogram for abnormal stress test and dyspnea on exertion on 9/29/2023 at [facility] in [place]. Coronary angiogram revealed non-obstructive coronary artery disease, elevated LVEDP. Her losartan was increased due to hypertension even under sedation. She also had TTE done at [facility] showing LVEF 50-55%, diastolic dysfunction.     Recent hospitalization for possible CVA with focal neuro deficits, uncontrolled hypertension, no acute findings on imaging, acute cystitis.     Today, Ms. Davis has concerns with recent hospital discharge medications. She was discharged home on lipitor and Norvasc. She was feeling \"weak and couldn't walk or get up from chairs. I felt 10x worse than when I went to the hospital.\" Both of these medications were stopped. She continues to feel weak but has been up, moving, walking. Per her son- her blood pressure systolic was less than 100 most days while on the Norvasc. Since stopping the Norvasc her blood pressure has been between 100-120 systolic.  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 No episodes of chest discomfort. She denies dyspnea but son concerned that she has labored breathing. She tells me she does not notice this or feel limited by this. No episodes of racing/skipping/fluttering in her chest. LE persists but stable       ***  Today, Ms. Davis has endorses that she does feel dyspneic after doing any level of activity-- even doing dishes. She admits she has not done much since hospitalization in April 2024. No chest pain or pressure. Occasional episode of dizziness and then loses her balance- movement of her head can elicit this.   She denies symptoms of racing, skipping, fluttering in chest.   She has " "had ongoing LE edema. She is wondering if a medication has been contributing to this.  She does wear compression stockings but does not think they have been helpful.                 Smoking History: Lifelong non-smoker. She grew up around heavy second hand smoke    Alcohol History: None    Substance Abuse History: None    Sleep History: Sleeps in bed. Lies supine with 1 pillow under her neck and 1 behind her knees. She does snore. No witnessed apnea. She is awake frequently throughout the night due to urinary frequency.     Family History: Mother- heart disease \"enlarged heart and took nitroglycerin;       HPI:    Ileana Davis is a pleasant 82-year old female who presents for cardiology consult regarding dyspnea on exertion and abnormal stress test. She has a cardiac history including hypertension.  She has a non-cardiac history including recurrent urinary tract infections, chronic lumbago, renal cell carcinoma status post right nephrectomy, chronic kidney disease, hypothyroidism.       Per Ileana and her son she has had dyspnea, dyspnea on exertion for at least the last six months. He has also noticed some harder breathing while just sitting at rest. In the morning she makes her bed, showers every other morning, gets dressed, does her hair. She is dyspneic and fatigued doing these activities. This has worsened over the last 6 months. Her son has also noticed that \"just walking out to the car\" she is dyspneic. She does tell me that she feels \"a slight pressure\" in her chest. This has been occurring often throughout the day with exertion. Her chest discomfort resolves with rest and dyspnea improves. No racing/skipping/fluttering sensation in her chest. She has had issues with bilateral LE edema for many years. She wears compression stockings and elevates her legs which is helpful. Increased LE edema correlates with salt intake and fluid intake.     Illemauro brought concerns regarding her dyspnea and chest " "discomfort to her primary care provider.  She underwent stress testing on August 2, 2023 which showed a small area of mild ischemia in the inferior lateral segments of the left ventricle.  She did have a cardiac catheterization in 2017 at [facility] showing nonobstructive coronary artery disease with no significant lesions.        Smoking History: Lifelong non-smoker. She grew up around heavy second hand smoke    Alcohol History: None    Substance Abuse History: None    Sleep History: Sleeps in bed. Lies supine with 1 pillow under her neck and 1 behind her knees. She does snore. No witnessed apnea. She is awake frequently throughout the night due to urinary frequency.     Family History: Mother- heart disease \"enlarged heart and took nitroglycerin;       UTI symptoms:    She has had symptoms of low back ache the last 4 days, generalized weakness in her legs, dysuria, pressure. She has had these symptoms for the last 4 days.           RELEVANT TESTING:  Transthoracic Echocardiogram at [facility] 4/2024  Interpretation Summary  Technically difficult study.  The left ventricle is normal size. The left ventricular systolic function is normal. Wall motion is normal.  Qualitative ejection fraction is 60-65% (normal).  The left ventricular diastolic function is mildly abnormal (Grade I).  Mild tricuspid regurgitation.  Estimated right ventricular systolic pressure 27mmHg.  Inferior vena cava size normal and collapsibility > 50% normal indicating normal right atrial pressure (3 mm Hg).  There is no pericardial effusion.  Negative bubble study. No PFO or atrial septal defect by agitated saline or color flow.       Nuclear med Lexiscan stress test August 2023 at [place]  Narrative    The nuclear stress test is abnormal.    There is a small area of mild ischemia in the inferolateral segment(s)  of the left ventricle.    The left ventricular ejection fraction at rest is 87%.  The left  ventricular ejection fraction " at stress is 79%.    There is no prior study for comparison.      ECG Summary    ECG Baseline electrocardiogram demonstrates sinus rhythm.   Patient did not develop any acute EKG changes or arrhythmias during the Lexiscan portion of the study.       Cardiac catheterization October 2017 at [facility]  CARDIAC FINDINGS:   DOMINANCE:   Right Dominant   LEFT MAIN:   is angiographically normal (0% Stenosis)   LEFT ANTERIOR DESCENDING :   Mid Segment 20-30 % Stenosis   CIRCUMFLEX:   is angiographically normal (0% Stenosis)   RIGHT CORONARY ARTERY:   is angiographically normal (0% Stenosis)   COLLATERALS:   No collateral flow       Past Medical History:   Diagnosis Date    Anemia 8/20/2015    Autoimmune hepatitis (H) 6/2/2016    Benign paroxysmal positional vertigo 10/7/2010    Contact dermatitis and other eczema, due to unspecified cause 10/7/2010    Esophageal reflux 10/7/2010    Generalized anxiety disorder 10/7/2010    Generalized osteoarthrosis, involving multiple sites 10/7/2010    Hepatitis, unspecified 10/7/2010    Infected wound     Myalgia and myositis, unspecified 10/7/2010    fibromyalgia    Nonallopathic lesion of cervical region, not elsewhere classified 12/5/2001    Nonallopathic lesion of thoracic region, not elsewhere classified 3/31/2005    Open wound of knee, leg, and ankle     Rosacea 10/7/2010    Unspecified essential hypertension 10/7/2010    Unspecified hypothyroidism 10/7/2010       Past Surgical History:   Procedure Laterality Date    APPENDECTOMY      ARTHROPLASTY TOE(S) Left 9/21/2020    Procedure: Left  Bethea Arthroplasty with interpositional arthroplasty using graft.;  Surgeon: Gina Rodriguez DPM;  Location: [facility] OR    BACK SURGERY  2015    lumbar epidural injection    CARDIAC SURGERY  10/2017    angioplasty    CHOLECYSTECTOMY      COLONOSCOPY  07/27/2017    [facility]    COLONOSCOPY - HIM SCAN  05/15/2001    Small internal hemorrhoids; otherwise normal;[facility]    COLONOSCOPY - HIM SCAN   12/14/2006    Colonoscopy, REMV JESUSITA, SNARE    ENT SURGERY      tonsillectomy    ESOPHAGOGASTRODUODENOSCOPY  07/27/2017    [facility]    EXCISE LESION LOWER EXTREMITY Left 4/19/2022    Procedure: Excision Left Medial Lower Leg Ulceration;  Surgeon: Kai Huynh MD;  Location: [facility] OR    EXCISE LESION LOWER EXTREMITY Left 9/13/2022    Procedure: Wide local excision of left posterior leg;  Surgeon: Kai Huynh MD;  Location: [facility] OR    EXCISE LESION UPPER EXTREMITY Right 4/19/2022    Procedure: Excision lesion right upper extremity;  Surgeon: Kai Huynh MD;  Location: [facility] OR    EYE SURGERY      bilateral cataract removal    GI SURGERY      anal fistula    GYN SURGERY  1985    NICHOLAS BSO    GYN SURGERY      cessarian x 2    GYN SURGERY      tubal sterilazation    NEPHRECTOMY Right 05/02/2019    ORTHOPEDIC SURGERY      right knee    ORTHOPEDIC SURGERY  1986    plantar fascia release    ORTHOPEDIC SURGERY      left knee    ORTHOPEDIC SURGERY      right trigger finger release    ORTHOPEDIC SURGERY  2013    Right great toe MTPJ fusion, adductor tenotomy,correction of hammer toe    ORTHOPEDIC SURGERY  [id number]    multiple procedures left forefoot    REPAIR HAMMER TOE Left 2/12/2024    Procedure: LEFT fourth claw toe correction;  Surgeon: Gina Rodriguez DPM;  Location: [facility] OR       Allergies   Allergen Reactions    Fentanyl Other (See Comments)     Severe agitation when used during angiogram    Latex Rash    Codeine Sulfate Other (See Comments)     hallucinations    Fentanyl And Related      Other reaction(s): Confusion    Morphine Other (See Comments)     Hallucinations with iv therapy    Tape [Adhesive Tape]     Tramadol Other (See Comments)     hallucination       Current Outpatient Medications   Medication Sig Dispense Refill    amLODIPine (NORVASC) 5 MG tablet Take 1 tablet by mouth daily      aspirin (ASA) 81 MG chewable tablet Take 81 mg by mouth daily      atorvastatin (LIPITOR) 80 MG tablet Take 1 tablet  (80 mg) by mouth at bedtime 90 tablet 3    estradiol (ESTRACE) 0.1 MG/GM vaginal cream Place vaginally three times a week      fluticasone (FLONASE) 50 MCG/ACT nasal spray SPRAY 2 SPRAYS INTO BOTH NOSTRILS DAILY AS NEEDED 16 g 11    furosemide (LASIX) 20 MG tablet Take 1 tablet (20 mg) by mouth daily. 30 tablet 1    hydrocortisone 2.5 % cream APPLY TWICE TIMES DAILY TO ACTIVE ECZEMA ON THE HANDS, FOLLOW WITH MOISTURIZING CREAM.      levothyroxine (SYNTHROID/LEVOTHROID) 112 MCG tablet TAKE 1 TABLET BY MOUTH ONCEDAILY 30 tablet 10    losartan (COZAAR) 100 MG tablet Take 1 tablet (100 mg) by mouth daily 90 tablet 3    melatonin 1 MG TABS tablet Take 10 mg by mouth at bedtime      memantine (NAMENDA) 5 MG tablet TAKE 1 TAB BY MOUTH DAILY X 1 WK; THEN 1 TAB TWICE DAILY X 1 WK; THEN 1 TAB IN THE MORNING 2 TABS IN THE EVENING X 1 WK; THEN 2 TABS TWICE      nystatin (MYCOSTATIN) 504428 UNIT/GM external cream APPLY TOPICALLY 2 TIMES DAILY AS NEEDED FOR DRY SKIN 30 g 2    nystatin (MYCOSTATIN) 814946 UNIT/GM external powder APPLY TO AFFECTED AREA NIGHTLY AS NEEDED FOR RASH 60 g 3    omeprazole (PRILOSEC) 40 MG capsule Take by mouth daily Before the same meal daily      phenazopyridine (PYRIDIUM) 100 MG tablet TAKE 1 TABLET BY MOUTH THREE TIMES DAILY AS NEEDED FOR  URINARY  TRACT  DISCOMFORT 10 tablet 0    polyethylene glycol (MIRALAX) 17 g packet Take 1 packet by mouth daily      propranolol (INDERAL) 10 MG tablet Take 1 tablet (10 mg) by mouth 2 times daily. 180 tablet 0    trimethoprim (TRIMPEX) 100 MG tablet TAKE 1 TABLET BY MOUTH AT BEDTIME. INDICATIONS INFECTION         Social History     Socioeconomic History    Marital status:      Spouse name: Not on file    Number of children: Not on file    Years of education: Not on file    Highest education level: Not on file   Occupational History    Not on file   Tobacco Use    Smoking status: Never     Passive exposure: Never    Smokeless tobacco: Never   Vaping Use     Vaping status: Never Used   Substance and Sexual Activity    Alcohol use: No    Drug use: No    Sexual activity: Never   Other Topics Concern    Parent/sibling w/ CABG, MI or angioplasty before 65F 55M? No     Service Not Asked    Blood Transfusions Yes    Caffeine Concern Yes     Comment: soda rare    Occupational Exposure Not Asked    Hobby Hazards Not Asked    Sleep Concern Not Asked    Stress Concern Not Asked    Weight Concern Not Asked    Special Diet Not Asked    Back Care Not Asked    Exercise Not Asked    Bike Helmet Not Asked    Seat Belt Not Asked    Self-Exams Not Asked   Social History Narrative    Not on file     Social Determinants of Health     Financial Resource Strain: Low Risk  (1/4/2024)    Financial Resource Strain     Within the past 12 months, have you or your family members you live with been unable to get utilities (heat, electricity) when it was really needed?: No   Food Insecurity: No Food Insecurity (6/19/2024)    Received from [facility]    Hunger Vital Sign     Worried About Running Out of Food in the Last Year: Never true     Ran Out of Food in the Last Year: Never true   Transportation Needs: No Transportation Needs (6/19/2024)    Received from [facility]    PRAPARE - Transportation     Lack of Transportation (Medical): No     Lack of Transportation (Non-Medical): No   Physical Activity: Not on file   Stress: Not on file   Social Connections: Not on file   Interpersonal Safety: Low Risk  (9/20/2024)    Interpersonal Safety     Do you feel physically and emotionally safe where you currently live?: Yes     Within the past 12 months, have you been hit, slapped, kicked or otherwise physically hurt by someone?: No     Within the past 12 months, have you been humiliated or emotionally abused in other ways by your partner or ex-partner?: No   Housing Stability: Low Risk  (6/19/2024)    [facility]  "[facility]    Housing Stability Vital Sign     Unable to Pay for Housing in the Last Year: No     Number of Times Moved in the Last Year: 0     Homeless in the Last Year: No       TEST RESULTS:   Results for orders placed or performed in visit on 10/08/24   Extra Tube     Status: None (In process)    Narrative    The following orders were created for panel order Extra Tube.  Procedure                               Abnormality         Status                     ---------                               -----------         ------                     Extra Purple Top Tube[[id number]]                            In process                   Please view results for these tests on the individual orders.           Review of systems: Negative except that which was noted in the HPI.    Physical examination:    Vitals: /82   Pulse 64   Resp 20   Ht 1.651 m (5' 5\")   Wt 83.9 kg (185 lb)   SpO2 99%   BMI 30.79 kg/m    BMI= Body mass index is 30.79 kg/m .      GENERAL APPEARANCE: healthy, alert and no distress  HEENT: no icterus, no xanthelasmas, normal pupil size and reaction, no cyanosis.  NECK: no adenopathy, no asymmetry, masses.  CHEST: lungs clear to auscultation - no rales, rhonchi or wheezes, no use of accessory muscles, no retractions, respirations are unlabored, normal respiratory rate  CARDIOVASCULAR: regular rhythm, normal S1 with physiologic split S2, no S3 or S4 and no murmur, click or rub  EXTREMITIES: Trace bilateral lower extremity edema   NEURO: alert and oriented normal speech, and affect  VASC: No vascular bruits heard.  SKIN: no ecchymoses, no rashes        Thank you for allowing me to participate in the care of your patient. Please do not hesitate to contact me if you have any questions.       Gill Mcgill, LAVELLE      "

## 2024-10-08 NOTE — PROGRESS NOTES
[facility] HEART CARE   CARDIOLOGY PROGRESS NOTE     Chief Complaint   Patient presents with    Follow Up          Diagnosis:    ICD-10-CM    1. NYHA class 3 heart failure with preserved ejection fraction (H)  I50.30 Basic metabolic panel     N terminal pro BNP outpatient     N terminal pro BNP outpatient      2. Leg edema  R60.0 N terminal pro BNP outpatient     N terminal pro BNP outpatient      3. ACE (dyspnea on exertion)  R06.09 N terminal pro BNP outpatient     N terminal pro BNP outpatient      4. Stage 3b chronic kidney disease (H)  N18.32 Basic metabolic panel      5. Benign essential hypertension  I10       6. Hyponatremia  E87.1 Basic metabolic panel              Assessment/Plan:    Exertional chest discomfort  Dyspnea on exertion  Abnormal stress test  History of nonobstructive coronary artery disease including catheterization in 2017 [facility]  Dyspnea on exertion, exertional chest discomfort abnormal stress test at [place] August 2023.  Referral to [facility] for coronary angiogram.   S/p coronary angiogram at [facility] 9/29/2023   non obstructive coronary artery disease, elevated LVEDP  Transthoracic echocardiogram at [facility] in [place] 9/13/2023  Borderline systolic LV function with LVEF 50-55%  Diastolic dysfunction  No significant valvular pathology      Essential hypertension with blood pressure goal less than 130/80, uncontrolled  Continue losartan 100 mg once daily  Continue propanolol 10 mg twice daily  Heart healthy lifestyle encouraged    BP Readings from Last 6 Encounters:   10/08/24 132/82   10/01/24 (!) 148/88   09/20/24 (!) 148/90   07/26/24 118/75   05/09/24 126/80   04/30/24 112/72       Nonobstructive coronary artery disease  S/p coronary angiogram 9/29/2023  Dyslipidemia with LDL goal lass than 100  Heart healthy lifestyle encouraged  Previously on lipitor but stopped due to side effects.     Recent Labs   Lab Test 09/05/23  1434 09/12/19  0912   CHOL 183 156   HDL 42* 51    * 80   TRIG 151* 123         Heart Failure with Preserved ejection Fraction (HFpEF)  Diastolic dysfunction on TTE 9/2023- [facility]  Mildly elevated LVEDP on cardiac cath 9/29/2023- [facility]   Chronic HFpEF (EF 50-55%). Risk factors include female gender, age, HTN, and obesity  Stage C. NYHA Class II.      BP:   Controlled  Management as above  Fluid status   Continues with bilateral +1 lower extremity edema, dyspnea on exertion, weight gain  Continue furosemide 20 mg once daily. Labs today to recheck sodium, kidney functioning.  Sodium restriction of 2,500 mg daily  Fluid restriction of 1.5-2 liters daily  Aldosterone antagonist:   Chronic kidney disease with recent JERZY  Consider in the future  SGLT-2 Inhibitor:   Chronic UTIs, CKD with recent JERZY  Could consider in the futuer  Ischemia evaluation:   Coronary angiogram 9/29/2023 at [facility] showing non obstructive CAD  ACEi/ARB/ARNI:   n/a, no evidence for use in HFpEF  BB:   n/a, no evidence for use in HFpEF   SCD prophylaxis:   n/a, no evidence for use in HFpEF  NSAID use:   Contraindicated  Sleep apnea evaluation:   Declines    Wt Readings from Last 5 Encounters:   10/08/24 83.9 kg (185 lb)   10/01/24 81.6 kg (180 lb)   09/20/24 81 kg (178 lb 8 oz)   07/26/24 83.3 kg (183 lb 9.6 oz)   05/09/24 82.4 kg (181 lb 9.6 oz)     Hyponatremia  History of CKD, hyponatermia going back to 2021  Follow fluid restriction    Chronic kidney disease  Reviewed with her today that she should be taking Tylenol and avoid NSAIDs such as Aleve, Advil as much as possible  Blood pressure control  She has been using salt alternative- reviewed importance of not overdoing salt alternative since they often have potassium chloride given her CKD  Estimated Creatinine Clearance: 27.9 mL/min (A) (based on SCr of 1.58 mg/dL (H)).    Hypothyroidism  Continue management by primary care provider  Recent TSH levels have been elevated but T4 levels have been normal  TSH   Date Value  Ref Range Status   09/20/2024 4.66 (H) 0.30 - 4.20 uIU/mL Final   01/26/2022 6.68 (H) 0.40 - 4.00 mU/L Final   05/14/2021 3.25 0.40 - 4.00 mU/L Final       Plan to re-check BMP in 2 weeks with sodium of 129 today. This has been stable for her but she had had lower sodium levels.   Follow-up in 3 months, certainly sooner with acute concerns          Interval history:  Ileana Davis is a pleasant 84-year old female who presents for cardiology follow-up. Recall, she has a cardiac history including hypertension, HFpEF, non-obstructive CAD, hyperlipidemia.  She has a non-cardiac history including recurrent urinary tract infections, chronic lumbago, renal cell carcinoma status post right nephrectomy, chronic kidney disease, hypothyroidism.       Ms. Davis had coronary angiogram for abnormal stress test and dyspnea on exertion on 9/29/2023 at [facility] in [place]. Coronary angiogram revealed non-obstructive coronary artery disease, elevated LVEDP. Her losartan was increased due to hypertension even under sedation. She also had TTE done at [facility] showing LVEF 50-55%, diastolic dysfunction.     Today, Ms. Davis presents to follow-up after starting furosemide last week for  ongoing dyspnea on exertion- after doing any level of activity-- even doing dishes and bilateral LE edema. She has not noticed any changes to her breathing or LE edema since starting furosemide. She has had increased urinary output but admits she has been drinking at least 50 ounces of fluid daily. She did stop drinking gatorade. She continues with urinary frequency but no worse than usual and when she does void she is voiding large amounts.   She continues to deny chest pain or pressure. Occasional episode of dizziness and then loses her balance- movement of her head can elicit this.   She denies symptoms of racing, skipping, fluttering in chest.       Smoking History: Lifelong non-smoker. She grew up around heavy second hand  "smoke    Alcohol History: None    Substance Abuse History: None    Sleep History: Sleeps in bed. Lies supine with 1 pillow under her neck and 1 behind her knees. She does snore. No witnessed apnea. She is awake frequently throughout the night due to urinary frequency.     Family History: Mother- heart disease \"enlarged heart and took nitroglycerin;       HPI:    Ileana Davis is a pleasant 82-year old female who presents for cardiology consult regarding dyspnea on exertion and abnormal stress test. She has a cardiac history including hypertension.  She has a non-cardiac history including recurrent urinary tract infections, chronic lumbago, renal cell carcinoma status post right nephrectomy, chronic kidney disease, hypothyroidism.       Per Ileana and her son she has had dyspnea, dyspnea on exertion for at least the last six months. He has also noticed some harder breathing while just sitting at rest. In the morning she makes her bed, showers every other morning, gets dressed, does her hair. She is dyspneic and fatigued doing these activities. This has worsened over the last 6 months. Her son has also noticed that \"just walking out to the car\" she is dyspneic. She does tell me that she feels \"a slight pressure\" in her chest. This has been occurring often throughout the day with exertion. Her chest discomfort resolves with rest and dyspnea improves. No racing/skipping/fluttering sensation in her chest. She has had issues with bilateral LE edema for many years. She wears compression stockings and elevates her legs which is helpful. Increased LE edema correlates with salt intake and fluid intake.     Ani brought concerns regarding her dyspnea and chest discomfort to her primary care provider.  She underwent stress testing on August 2, 2023 which showed a small area of mild ischemia in the inferior lateral segments of the left ventricle.  She did have a cardiac catheterization in 2017 at [facility] showing " "nonobstructive coronary artery disease with no significant lesions.        Smoking History: Lifelong non-smoker. She grew up around heavy second hand smoke    Alcohol History: None    Substance Abuse History: None    Sleep History: Sleeps in bed. Lies supine with 1 pillow under her neck and 1 behind her knees. She does snore. No witnessed apnea. She is awake frequently throughout the night due to urinary frequency.     Family History: Mother- heart disease \"enlarged heart and took nitroglycerin;       UTI symptoms:    She has had symptoms of low back ache the last 4 days, generalized weakness in her legs, dysuria, pressure. She has had these symptoms for the last 4 days.           RELEVANT TESTING:  Cardiac Catheterization 9/28/2023 at [facility]          Transthoracic Echocardiogram at [facility] 4/2024  Interpretation Summary  Technically difficult study.  The left ventricle is normal size. The left ventricular systolic function is normal. Wall motion is normal.  Qualitative ejection fraction is 60-65% (normal).  The left ventricular diastolic function is mildly abnormal (Grade I).  Mild tricuspid regurgitation.  Estimated right ventricular systolic pressure 27mmHg.  Inferior vena cava size normal and collapsibility > 50% normal indicating normal right atrial pressure (3 mm Hg).  There is no pericardial effusion.  Negative bubble study. No PFO or atrial septal defect by agitated saline or color flow.       Nuclear med Lexiscan stress test August 2023 at [facility]    The nuclear stress test is abnormal.    There is a small area of mild ischemia in the inferolateral segment(s)  of the left ventricle.    The left ventricular ejection fraction at rest is 87%.  The left  ventricular ejection fraction at stress is 79%.    There is no prior study for comparison.      ECG Summary    ECG Baseline electrocardiogram demonstrates sinus rhythm.   Patient did not develop any acute EKG changes or " arrhythmias during the Lexiscan portion of the study.       Cardiac catheterization October 2017 at [facility]  CARDIAC FINDINGS:   DOMINANCE:   Right Dominant   LEFT MAIN:   is angiographically normal (0% Stenosis)   LEFT ANTERIOR DESCENDING :   Mid Segment 20-30 % Stenosis   CIRCUMFLEX:   is angiographically normal (0% Stenosis)   RIGHT CORONARY ARTERY:   is angiographically normal (0% Stenosis)   COLLATERALS:   No collateral flow       Past Medical History:   Diagnosis Date    Anemia 8/20/2015    Autoimmune hepatitis (H) 6/2/2016    Benign paroxysmal positional vertigo 10/7/2010    Contact dermatitis and other eczema, due to unspecified cause 10/7/2010    Esophageal reflux 10/7/2010    Generalized anxiety disorder 10/7/2010    Generalized osteoarthrosis, involving multiple sites 10/7/2010    Hepatitis, unspecified 10/7/2010    Infected wound     Myalgia and myositis, unspecified 10/7/2010    fibromyalgia    Nonallopathic lesion of cervical region, not elsewhere classified 12/5/2001    Nonallopathic lesion of thoracic region, not elsewhere classified 3/31/2005    Open wound of knee, leg, and ankle     Rosacea 10/7/2010    Unspecified essential hypertension 10/7/2010    Unspecified hypothyroidism 10/7/2010       Past Surgical History:   Procedure Laterality Date    APPENDECTOMY      ARTHROPLASTY TOE(S) Left 9/21/2020    Procedure: Left  Bethea Arthroplasty with interpositional arthroplasty using graft.;  Surgeon: Gina Rodriguez DPM;  Location: [facility] OR    BACK SURGERY  2015    lumbar epidural injection    CARDIAC SURGERY  10/2017    angioplasty    CHOLECYSTECTOMY      COLONOSCOPY  07/27/2017    [facility]    COLONOSCOPY - HIM SCAN  05/15/2001    Small internal hemorrhoids; otherwise normal;[facility]    COLONOSCOPY - HIM SCAN  12/14/2006    Colonoscopy, [facility], [facility]    ENT SURGERY      tonsillectomy    ESOPHAGOGASTRODUODENOSCOPY  07/27/2017    [facility]    EXCISE LESION LOWER EXTREMITY Left 4/19/2022     Procedure: Excision Left Medial Lower Leg Ulceration;  Surgeon: Kai Huynh MD;  Location: [facility] OR    EXCISE LESION LOWER EXTREMITY Left 9/13/2022    Procedure: Wide local excision of left posterior leg;  Surgeon: Kai Huynh MD;  Location: [facility]    EXCISE LESION UPPER EXTREMITY Right 4/19/2022    Procedure: Excision lesion right upper extremity;  Surgeon: Kai Huynh MD;  Location: [facility]    EYE SURGERY      bilateral cataract removal    GI SURGERY      anal fistula    GYN SURGERY  1985    [facility]    GYN SURGERY      cessarian x 2    GYN SURGERY      tubal sterilazation    NEPHRECTOMY Right 05/02/2019    ORTHOPEDIC SURGERY      right knee    ORTHOPEDIC SURGERY  1986    plantar fascia release    ORTHOPEDIC SURGERY      left knee    ORTHOPEDIC SURGERY      right trigger finger release    ORTHOPEDIC SURGERY  2013    Right great toe MTPJ fusion, adductor tenotomy,correction of hammer toe    ORTHOPEDIC SURGERY  [id number]    multiple procedures left forefoot    REPAIR HAMMER TOE Left 2/12/2024    Procedure: LEFT fourth claw toe correction;  Surgeon: Gina Rodriguez DPM;  Location: [facility] OR       Allergies   Allergen Reactions    Fentanyl Other (See Comments)     Severe agitation when used during angiogram    Latex Rash    Codeine Sulfate Other (See Comments)     hallucinations    Fentanyl And Related      Other reaction(s): Confusion    Morphine Other (See Comments)     Hallucinations with iv therapy    Tape [Adhesive Tape]     Tramadol Other (See Comments)     hallucination       Current Outpatient Medications   Medication Sig Dispense Refill    amLODIPine (NORVASC) 5 MG tablet Take 1 tablet by mouth daily      aspirin (ASA) 81 MG chewable tablet Take 81 mg by mouth daily      atorvastatin (LIPITOR) 80 MG tablet Take 1 tablet (80 mg) by mouth at bedtime 90 tablet 3    estradiol (ESTRACE) 0.1 MG/GM vaginal cream Place vaginally three times a week      fluticasone (FLONASE) 50 MCG/ACT nasal spray SPRAY 2  SPRAYS INTO BOTH NOSTRILS DAILY AS NEEDED 16 g 11    furosemide (LASIX) 20 MG tablet Take 1 tablet (20 mg) by mouth daily. 30 tablet 1    hydrocortisone 2.5 % cream APPLY TWICE TIMES DAILY TO ACTIVE ECZEMA ON THE HANDS, FOLLOW WITH MOISTURIZING CREAM.      levothyroxine (SYNTHROID/LEVOTHROID) 112 MCG tablet TAKE 1 TABLET BY MOUTH ONCEDAILY 30 tablet 10    losartan (COZAAR) 100 MG tablet Take 1 tablet (100 mg) by mouth daily 90 tablet 3    melatonin 1 MG TABS tablet Take 10 mg by mouth at bedtime      memantine (NAMENDA) 5 MG tablet TAKE 1 TAB BY MOUTH DAILY X 1 WK; THEN 1 TAB TWICE DAILY X 1 WK; THEN 1 TAB IN THE MORNING 2 TABS IN THE EVENING X 1 WK; THEN 2 TABS TWICE      nystatin (MYCOSTATIN) 043221 UNIT/GM external cream APPLY TOPICALLY 2 TIMES DAILY AS NEEDED FOR DRY SKIN 30 g 2    nystatin (MYCOSTATIN) 508673 UNIT/GM external powder APPLY TO AFFECTED AREA NIGHTLY AS NEEDED FOR RASH 60 g 3    omeprazole (PRILOSEC) 40 MG capsule Take by mouth daily Before the same meal daily      phenazopyridine (PYRIDIUM) 100 MG tablet TAKE 1 TABLET BY MOUTH THREE TIMES DAILY AS NEEDED FOR  URINARY  TRACT  DISCOMFORT 10 tablet 0    polyethylene glycol (MIRALAX) 17 g packet Take 1 packet by mouth daily      propranolol (INDERAL) 10 MG tablet Take 1 tablet (10 mg) by mouth 2 times daily. 180 tablet 0    trimethoprim (TRIMPEX) 100 MG tablet TAKE 1 TABLET BY MOUTH AT BEDTIME. INDICATIONS INFECTION         Social History     Socioeconomic History    Marital status:      Spouse name: Not on file    Number of children: Not on file    Years of education: Not on file    Highest education level: Not on file   Occupational History    Not on file   Tobacco Use    Smoking status: Never     Passive exposure: Never    Smokeless tobacco: Never   Vaping Use    Vaping status: Never Used   Substance and Sexual Activity    Alcohol use: No    Drug use: No    Sexual activity: Never   Other Topics Concern    Parent/sibling w/ CABG, MI or  angioplasty before 65F 55M? No     Service Not Asked    Blood Transfusions Yes    Caffeine Concern Yes     Comment: soda rare    Occupational Exposure Not Asked    Hobby Hazards Not Asked    Sleep Concern Not Asked    Stress Concern Not Asked    Weight Concern Not Asked    Special Diet Not Asked    Back Care Not Asked    Exercise Not Asked    Bike Helmet Not Asked    Seat Belt Not Asked    Self-Exams Not Asked   Social History Narrative    Not on file     Social Determinants of Health     Financial Resource Strain: Low Risk  (1/4/2024)    Financial Resource Strain     Within the past 12 months, have you or your family members you live with been unable to get utilities (heat, electricity) when it was really needed?: No   Food Insecurity: No Food Insecurity (6/19/2024)    Received from [facility]    Hunger Vital Sign     Worried About Running Out of Food in the Last Year: Never true     Ran Out of Food in the Last Year: Never true   Transportation Needs: No Transportation Needs (6/19/2024)    Received from [facility]    PRAPARE - Transportation     Lack of Transportation (Medical): No     Lack of Transportation (Non-Medical): No   Physical Activity: Not on file   Stress: Not on file   Social Connections: Not on file   Interpersonal Safety: Low Risk  (9/20/2024)    Interpersonal Safety     Do you feel physically and emotionally safe where you currently live?: Yes     Within the past 12 months, have you been hit, slapped, kicked or otherwise physically hurt by someone?: No     Within the past 12 months, have you been humiliated or emotionally abused in other ways by your partner or ex-partner?: No   Housing Stability: Low Risk  (6/19/2024)    Received from [facility]    Housing Stability Vital Sign     Unable to Pay for Housing in the Last Year: No     Number of Times Moved in the Last Year: 0     Homeless  "in the Last Year: No       TEST RESULTS:   Results for orders placed or performed in visit on 10/08/24   Basic metabolic panel     Status: Abnormal   Result Value Ref Range    Sodium 129 (L) 135 - 145 mmol/L    Potassium 3.8 3.4 - 5.3 mmol/L    Chloride 94 (L) 98 - 107 mmol/L    Carbon Dioxide (CO2) 22 22 - 29 mmol/L    Anion Gap 13 7 - 15 mmol/L    Urea Nitrogen 19.7 8.0 - 23.0 mg/dL    Creatinine 1.58 (H) 0.51 - 0.95 mg/dL    GFR Estimate 32 (L) >60 mL/min/1.73m2    Calcium 8.9 8.8 - 10.4 mg/dL    Glucose 101 (H) 70 - 99 mg/dL   Extra Tube     Status: None    Narrative    The following orders were created for panel order Extra Tube.  Procedure                               Abnormality         Status                     ---------                               -----------         ------                     Extra Purple Top Tube[[id number]]                            Final result                 Please view results for these tests on the individual orders.   Extra Purple Top Tube     Status: None   Result Value Ref Range    Hold Specimen JIC    N terminal pro BNP outpatient     Status: Normal   Result Value Ref Range    N Terminal Pro BNP Outpatient 331 0 - 1,800 pg/mL           Review of systems: Negative except that which was noted in the HPI.    Physical examination:    Vitals: /82   Pulse 64   Resp 20   Ht 1.651 m (5' 5\")   Wt 83.9 kg (185 lb)   SpO2 99%   BMI 30.79 kg/m    BMI= Body mass index is 30.79 kg/m .      GENERAL APPEARANCE: healthy, alert and no distress  CHEST: lungs clear to auscultation - no rales, rhonchi or wheezes, no use of accessory muscles, no retractions, respirations are unlabored, normal respiratory rate  CARDIOVASCULAR: regular rhythm, normal S1 with physiologic split S2, no S3 or S4 and no murmur, click or rub  EXTREMITIES: +1 bilateral lower extremity edema L>R  NEURO: alert and oriented normal speech, and affect  VASC: No carotid bruits heard.    Thank you for allowing me to " participate in the care of your patient. Please do not hesitate to contact me if you have any questions.       Gill Mcgill, CNP

## 2024-10-10 DIAGNOSIS — B37.2 YEAST DERMATITIS: ICD-10-CM

## 2024-10-10 RX ORDER — NYSTATIN 100000 [USP'U]/G
POWDER TOPICAL
Qty: 60 G | Refills: 3 | Status: SHIPPED | OUTPATIENT
Start: 2024-10-10

## 2024-10-10 NOTE — TELEPHONE ENCOUNTER
Nystatin      Last Written Prescription Date:  10/30/23  Last Fill Quantity: 60g,   # refills: 3  Last Office Visit: 9/20/24  Future Office visit:    Next 5 appointments (look out 90 days)      Jan 07, 2025 10:30 AM  (Arrive by 10:15 AM)  Return Visit with Gill Mcgill CNP  [facility] ([facility] ) [address] [place] [id number] [phone]             Routing refill request to provider for review/approval because:

## 2024-10-17 ENCOUNTER — TRANSFERRED RECORDS (OUTPATIENT)
Dept: HEALTH INFORMATION MANAGEMENT | Facility: CLINIC | Age: 84
End: 2024-10-17
Payer: MEDICARE

## 2024-10-21 ENCOUNTER — TELEPHONE (OUTPATIENT)
Dept: FAMILY MEDICINE | Facility: OTHER | Age: 84
End: 2024-10-21

## 2024-10-21 ENCOUNTER — OFFICE VISIT (OUTPATIENT)
Dept: FAMILY MEDICINE | Facility: OTHER | Age: 84
End: 2024-10-21
Attending: FAMILY MEDICINE
Payer: MEDICARE

## 2024-10-21 VITALS
TEMPERATURE: 98.1 F | RESPIRATION RATE: 18 BRPM | HEART RATE: 73 BPM | WEIGHT: 187.1 LBS | DIASTOLIC BLOOD PRESSURE: 78 MMHG | BODY MASS INDEX: 31.14 KG/M2 | SYSTOLIC BLOOD PRESSURE: 110 MMHG | OXYGEN SATURATION: 99 %

## 2024-10-21 DIAGNOSIS — B02.9 HERPES ZOSTER WITHOUT COMPLICATION: Primary | ICD-10-CM

## 2024-10-21 PROCEDURE — 99213 OFFICE O/P EST LOW 20 MIN: CPT | Performed by: FAMILY MEDICINE

## 2024-10-21 PROCEDURE — G0463 HOSPITAL OUTPT CLINIC VISIT: HCPCS

## 2024-10-21 PROCEDURE — G2211 COMPLEX E/M VISIT ADD ON: HCPCS | Performed by: FAMILY MEDICINE

## 2024-10-21 RX ORDER — VALACYCLOVIR HYDROCHLORIDE 1 G/1
1000 TABLET, FILM COATED ORAL 3 TIMES DAILY
Qty: 21 TABLET | Refills: 0 | Status: SHIPPED | OUTPATIENT
Start: 2024-10-21 | End: 2024-10-28

## 2024-10-21 RX ORDER — GABAPENTIN 100 MG/1
100 CAPSULE ORAL 3 TIMES DAILY PRN
Qty: 60 CAPSULE | Refills: 1 | Status: SHIPPED | OUTPATIENT
Start: 2024-10-21

## 2024-10-21 ASSESSMENT — PAIN SCALES - GENERAL: PAINLEVEL: EXTREME PAIN (8)

## 2024-10-21 NOTE — TELEPHONE ENCOUNTER
10:56 AM    Reason for Call: OVERBOOK    Patient is having the following symptoms: painful sores on back, states it may be shingles for 3 days.    The patient is requesting an appointment for Today with Dr Becerril.    Was an appointment offered for this call? No  If yes : Appointment type              Date    Preferred method for responding to this message: Telephone Call  What is your phone number ?[phone]     If we cannot reach you directly, may we leave a detailed response at the number you provided? Yes    Can this message wait until your PCP/provider returns, if unavailable today? Not applicable    Christy Bartholomew

## 2024-10-21 NOTE — PROGRESS NOTES
Assessment & Plan     1. Herpes zoster without complication  We are right on the cusp of the benefit of antivirals, but I did err on the side of starting Valtrex.  Gabapentin also sent to help with pain.  We discussed the use of topical lidocaine preparations to help as well.  Follow-up as needed.  - gabapentin (NEURONTIN) 100 MG capsule; Take 1 capsule (100 mg) by mouth 3 times daily as needed for neuropathic pain.  Dispense: 60 capsule; Refill: 1  - valACYclovir (VALTREX) 1000 mg tablet; Take 1 tablet (1,000 mg) by mouth 3 times daily for 7 days.  Dispense: 21 tablet; Refill: 0       The longitudinal plan of care for the diagnosis(es)/condition(s) as documented were addressed during this visit. Due to the added complexity in care, I will continue to support Ileana in the subsequent management and with ongoing continuity of care.     Return if symptoms worsen or fail to improve.      Subjective   Ileana is a 84 year old, presenting for the following health issues:  Derm Problem (Rash)        10/21/2024     2:29 PM   Additional Questions   Roomed by Jose Prescott lpn   Accompanied by Baldemar (son)     History of Present Illness       Reason for visit:  Shingles   She is taking medications regularly.       Rash  Onset/Duration: 2 to 3 days ago  Description  Location: low back  Character: burning, red, grouped spots  Itching: no  Intensity:  severe  Progression of Symptoms:  same  Accompanying signs and symptoms:   Fever: No  Body aches or joint pain: No  Sore throat symptoms: No  Recent cold symptoms: No  History:           Previous episodes of similar rash: yes  New exposures:  None  Recent travel: No  Exposure to similar rash: No  Precipitating or alleviating factors: none  Therapies tried and outcome: silanpas ointment        Review of Systems  Constitutional, HEENT, cardiovascular, pulmonary, gi and gu systems are negative, except as otherwise noted.      Objective    /78 (BP Location: Left arm, Patient  Position: Sitting, Cuff Size: Adult Regular)   Pulse 73   Temp 98.1  F (36.7  C) (Tympanic)   Resp 18   Wt 84.9 kg (187 lb 1.6 oz)   SpO2 99%   BMI 31.14 kg/m    Body mass index is 31.14 kg/m .  Physical Exam   GENERAL: alert and no distress  SKIN: erythematous lesions clustered along right flank, consistent with shingles  PSYCH: mentation appears normal, affect normal/bright          Signed Electronically by: Iris Becerril MD

## 2024-10-31 NOTE — PROGRESS NOTES
"  Assessment & Plan     1. Acute UTI (Primary)  Based on previous culture, we will retreat with cipro.  Follow-up if no improvement noted.  - ciprofloxacin (CIPRO) 250 MG tablet; Take 1 tablet (250 mg) by mouth daily for 5 days.  Dispense: 5 tablet; Refill: 0    2. Weakness  With constellation of symptoms, will start with Ct head.  Consider MRI if needed  - CT Head w/o & w Contrast    3. Memory changes  As above  - CT Head w/o & w Contrast       The longitudinal plan of care for the diagnosis(es)/condition(s) as documented were addressed during this visit. Due to the added complexity in care, I will continue to support Ileana in the subsequent management and with ongoing continuity of care.    MED REC REQUIRED  Post Medication Reconciliation Status: discharge medications reconciled, continue medications without change      Return if symptoms worsen or fail to improve.      Camilo Tejeda is a 84 year old, presenting for the following health issues:  ER F/U    HPI     ED/UC Followup:    Facility:  [facility]  Date of visit: 10/23/24  Reason for visit: Acute cystitis without hermaturia  Current Status: still light headed, fatigue, can not walk good  Son is worried about further cognitive decline and continues worsening weakness.      Review of Systems  Constitutional, HEENT, cardiovascular, pulmonary, gi and gu systems are negative, except as otherwise noted.      Objective    /84 (BP Location: Left arm, Patient Position: Sitting, Cuff Size: Adult Large)   Pulse 72   Temp 98.6  F (37  C) (Tympanic)   Resp 20   Ht 1.651 m (5' 5\")   SpO2 99%   Breastfeeding No   BMI 31.14 kg/m    Body mass index is 31.14 kg/m .  Physical Exam   GENERAL: alert and no distress  PSYCH: mentation appears normal, affect normal/bright          Signed Electronically by: Iris Becerril MD    "

## 2024-11-01 ENCOUNTER — TRANSFERRED RECORDS (OUTPATIENT)
Dept: HEALTH INFORMATION MANAGEMENT | Facility: CLINIC | Age: 84
End: 2024-11-01
Payer: MEDICARE

## 2024-11-01 ENCOUNTER — OFFICE VISIT (OUTPATIENT)
Dept: FAMILY MEDICINE | Facility: OTHER | Age: 84
End: 2024-11-01
Attending: FAMILY MEDICINE
Payer: MEDICARE

## 2024-11-01 VITALS
HEIGHT: 65 IN | SYSTOLIC BLOOD PRESSURE: 137 MMHG | OXYGEN SATURATION: 99 % | HEART RATE: 72 BPM | RESPIRATION RATE: 20 BRPM | DIASTOLIC BLOOD PRESSURE: 84 MMHG | TEMPERATURE: 98.6 F | BODY MASS INDEX: 31.14 KG/M2

## 2024-11-01 DIAGNOSIS — R53.1 WEAKNESS: ICD-10-CM

## 2024-11-01 DIAGNOSIS — N39.0 ACUTE UTI: Primary | ICD-10-CM

## 2024-11-01 DIAGNOSIS — R41.3 MEMORY CHANGES: ICD-10-CM

## 2024-11-01 PROCEDURE — G2211 COMPLEX E/M VISIT ADD ON: HCPCS | Performed by: FAMILY MEDICINE

## 2024-11-01 PROCEDURE — 99214 OFFICE O/P EST MOD 30 MIN: CPT | Performed by: FAMILY MEDICINE

## 2024-11-01 PROCEDURE — G0463 HOSPITAL OUTPT CLINIC VISIT: HCPCS

## 2024-11-01 RX ORDER — CIPROFLOXACIN 250 MG/1
250 TABLET, FILM COATED ORAL DAILY
Qty: 5 TABLET | Refills: 0 | Status: SHIPPED | OUTPATIENT
Start: 2024-11-01 | End: 2024-11-06

## 2024-11-01 ASSESSMENT — PAIN SCALES - GENERAL: PAINLEVEL_OUTOF10: SEVERE PAIN (7)

## 2024-11-01 ASSESSMENT — PATIENT HEALTH QUESTIONNAIRE - PHQ9: SUM OF ALL RESPONSES TO PHQ QUESTIONS 1-9: 0

## 2024-11-08 ENCOUNTER — TRANSFERRED RECORDS (OUTPATIENT)
Dept: HEALTH INFORMATION MANAGEMENT | Facility: CLINIC | Age: 84
End: 2024-11-08
Payer: MEDICARE

## 2024-11-13 DIAGNOSIS — I10 BENIGN ESSENTIAL HYPERTENSION: ICD-10-CM

## 2024-11-14 RX ORDER — PROPRANOLOL HYDROCHLORIDE 10 MG/1
10 TABLET ORAL 2 TIMES DAILY
Qty: 180 TABLET | Refills: 2 | Status: SHIPPED | OUTPATIENT
Start: 2024-11-14

## 2024-11-18 ENCOUNTER — TELEPHONE (OUTPATIENT)
Dept: FAMILY MEDICINE | Facility: OTHER | Age: 84
End: 2024-11-18

## 2024-11-18 DIAGNOSIS — L85.3 DRY SKIN: Primary | ICD-10-CM

## 2024-11-18 NOTE — TELEPHONE ENCOUNTER
2:10 PM    Reason for Call: Phone Call    Description: Patient wants to see Dermatology. Please put in referral for her hands. Already scheduled with Dermatology, offered appointment sooner with PCP, patient declined.    Was an appointment offered for this call? Yes  If yes : Appointment type- Dermatology              Date: 3/4/24    Preferred method for responding to this message: Telephone Call  What is your phone number ? [phone]    If we cannot reach you directly, may we leave a detailed response at the number you provided? Yes    Can this message wait until your PCP/provider returns, if available today? Not applicable    Joanna Esposito

## 2024-11-19 DIAGNOSIS — I50.30 NYHA CLASS 3 HEART FAILURE WITH PRESERVED EJECTION FRACTION (H): ICD-10-CM

## 2024-11-20 NOTE — TELEPHONE ENCOUNTER
FUROSEMIDE 20MG TABLET         Last Written Prescription Date:  10/1/24  Last Fill Quantity: 30,   # refills: 1  Last Office Visit: 11/1/24  Future Office visit:    Next 5 appointments (look out 90 days)      Jan 07, 2025 10:30 AM  (Arrive by 10:15 AM)  Return Visit with Gill Mcgill CNP  [facility] ([facility] [address] [place] [address] [phone]             Routing refill request to provider for review/approval because:  Diuretics (Including Combos) Protocol [id number]/19/2024 04:36 PM   Protocol Details Has GFR on file in past 12 months and most recent value is normal       GFR Estimate   Date Value Ref Range Status   10/08/2024 32 (L) >60 mL/min/1.73m2 Final     Comment:     eGFR calculated using 2021 CKD-EPI equation.   06/16/2021 40 (L) >60 mL/min/[1.73_m2] Final     Comment:     Non  GFR Calc  Starting 12/18/2018, serum creatinine based estimated GFR (eGFR) will be   calculated using the Chronic Kidney Disease Epidemiology Collaboration   (CKD-EPI) equation.

## 2024-11-21 RX ORDER — FUROSEMIDE 20 MG/1
TABLET ORAL
Qty: 45 TABLET | Refills: 3 | Status: SHIPPED | OUTPATIENT
Start: 2024-11-21

## 2025-01-11 ENCOUNTER — HEALTH MAINTENANCE LETTER (OUTPATIENT)
Age: 85
End: 2025-01-11

## 2025-02-28 DIAGNOSIS — I10 BENIGN ESSENTIAL HYPERTENSION: ICD-10-CM

## 2025-02-28 NOTE — TELEPHONE ENCOUNTER
LOSARTAN 100MG TABLET         Last Written Prescription Date:  11/22/24  Last Fill Quantity: 90,   # refills: 0  Last Office Visit: 11/1/24  Future Office visit:       Routing refill request to provider for review/approval because:  Angiotensin-II Receptors Negeie0302/28/2025 01:09 AM   Protocol Details Has GFR on file in past 12 months and most recent value is normal       GFR Estimate   Date Value Ref Range Status   10/08/2024 32 (L) >60 mL/min/1.73m2 Final     Comment:     eGFR calculated using 2021 CKD-EPI equation.   06/16/2021 40 (L) >60 mL/min/[1.73_m2] Final     Comment:     Non  GFR Calc  Starting 12/18/2018, serum creatinine based estimated GFR (eGFR) will be   calculated using the Chronic Kidney Disease Epidemiology Collaboration   (CKD-EPI) equation.

## 2025-03-03 RX ORDER — LOSARTAN POTASSIUM 100 MG/1
100 TABLET ORAL DAILY
Qty: 90 TABLET | Refills: 0 | Status: SHIPPED | OUTPATIENT
Start: 2025-03-03

## 2025-03-04 ENCOUNTER — OFFICE VISIT (OUTPATIENT)
Dept: DERMATOLOGY | Facility: OTHER | Age: 85
End: 2025-03-04
Attending: DERMATOLOGY
Payer: MEDICARE

## 2025-03-04 VITALS — SYSTOLIC BLOOD PRESSURE: 134 MMHG | DIASTOLIC BLOOD PRESSURE: 68 MMHG | HEART RATE: 57 BPM | OXYGEN SATURATION: 99 %

## 2025-03-04 DIAGNOSIS — L85.3 DRY SKIN: ICD-10-CM

## 2025-03-04 DIAGNOSIS — L57.0 ACTINIC KERATOSIS: Primary | ICD-10-CM

## 2025-03-04 PROCEDURE — 17003 DESTRUCT PREMALG LES 2-14: CPT | Performed by: DERMATOLOGY

## 2025-03-04 PROCEDURE — G0463 HOSPITAL OUTPT CLINIC VISIT: HCPCS

## 2025-03-04 PROCEDURE — 17000 DESTRUCT PREMALG LESION: CPT | Performed by: DERMATOLOGY

## 2025-03-04 RX ORDER — MEMANTINE HYDROCHLORIDE 10 MG/1
1 TABLET ORAL
COMMUNITY
Start: 2024-08-15

## 2025-03-04 RX ORDER — BUDESONIDE 3 MG/1
3 CAPSULE, COATED PELLETS ORAL EVERY MORNING
COMMUNITY
Start: 2024-12-21

## 2025-03-04 ASSESSMENT — PAIN SCALES - GENERAL: PAINLEVEL_OUTOF10: MODERATE PAIN (5)

## 2025-03-04 NOTE — LETTER
3/4/2025       RE: Ileana Davis  [address]  [place] [address]     Dear Colleague,    Thank you for referring your patient, Ileana Davis, to the [facility]. Please see a copy of my visit note below.    S: Mrs. Welander comes in with her spouse because of some lesions on her hands.  She was last seen in January 2024.  She has not noticed anything bleeding crusting or tender.    O: Present on the dorsum of her hands are multiple actinic keratoses.  We checked her back and chest as well and there we found some scattered seborrheic keratoses.  We found no skin cancer like lesions in any of those sites..    A: Multiple actinic keratoses and numerous seborrheic keratoses.    P: Cryotherapy to 3 actinic keratoses.  Tolerated well the seborrheic keratoses were not treated.  She was reassured that we found no skin cancer or areas of concern.  She did demonstrate some considerable xerosis.    15 minutes spent in reviewing her record, discussing the findings, counseling her on what to look for in the future, and charting.    Again, thank you for allowing me to participate in the care of your patient.      Sincerely,    SACHIN Arroyo MD

## 2025-03-12 NOTE — PROGRESS NOTES
S: Mrs. Welander comes in with her spouse because of some lesions on her hands.  She was last seen in January 2024.  She has not noticed anything bleeding crusting or tender.    O: Present on the dorsum of her hands are multiple actinic keratoses.  We checked her back and chest as well and there we found some scattered seborrheic keratoses.  We found no skin cancer like lesions in any of those sites..    A: Multiple actinic keratoses and numerous seborrheic keratoses.    P: Cryotherapy to 3 actinic keratoses.  Tolerated well the seborrheic keratoses were not treated.  She was reassured that we found no skin cancer or areas of concern.  She did demonstrate some considerable xerosis.    15 minutes spent in reviewing her record, discussing the findings, counseling her on what to look for in the future, and charting.

## 2025-04-15 DIAGNOSIS — Z16.21 INFECTION DUE TO VANCOMYCIN RESISTANT ENTEROCOCCUS FAECIUM: Primary | ICD-10-CM

## 2025-04-15 DIAGNOSIS — A49.8 INFECTION DUE TO VANCOMYCIN RESISTANT ENTEROCOCCUS FAECIUM: Primary | ICD-10-CM

## 2025-04-15 DIAGNOSIS — N39.0 ACUTE UTI: ICD-10-CM

## 2025-04-15 RX ORDER — LINEZOLID 600 MG/1
600 TABLET, FILM COATED ORAL 2 TIMES DAILY
Qty: 28 TABLET | Refills: 0 | Status: SHIPPED | OUTPATIENT
Start: 2025-04-15 | End: 2025-04-29

## 2025-05-12 ENCOUNTER — TELEPHONE (OUTPATIENT)
Dept: FAMILY MEDICINE | Facility: OTHER | Age: 85
End: 2025-05-12

## 2025-05-12 NOTE — TELEPHONE ENCOUNTER
8:09 AM    Reason for Call: OVERBOOK    Patient is having the following symptoms: contusion lower right extremity/ hospital follow up / [facility] / discharging on 05/12      The patient is requesting an appointment for 5-7 day follow up with PCP.    Was an appointment offered for this call? No  If yes : Appointment type              Date    Preferred method for responding to this message: Telephone Call  What is your phone number ? [phone]     If we cannot reach you directly, may we leave a detailed response at the number you provided? Yes    Can this message wait until your PCP/provider returns, if unavailable today? YES    Saul Soliz

## 2025-05-13 NOTE — TELEPHONE ENCOUNTER
Call patient to schedule, advised patient is being admitted to nursing home care. Declined appointment at this time.

## 2025-08-26 DIAGNOSIS — I50.22 CHRONIC SYSTOLIC CONGESTIVE HEART FAILURE (H): ICD-10-CM

## 2025-08-26 DIAGNOSIS — I10 BENIGN ESSENTIAL HYPERTENSION: ICD-10-CM

## 2025-08-26 RX ORDER — ATORVASTATIN CALCIUM 80 MG/1
80 TABLET, FILM COATED ORAL AT BEDTIME
Qty: 90 TABLET | Refills: 0 | Status: SHIPPED | OUTPATIENT
Start: 2025-08-26

## 2025-08-26 RX ORDER — PROPRANOLOL HYDROCHLORIDE 10 MG/1
10 TABLET ORAL 2 TIMES DAILY
Qty: 180 TABLET | Refills: 1 | Status: SHIPPED | OUTPATIENT
Start: 2025-08-26

## (undated) DEVICE — GOWN-SURG XXL LVL 3 REINFORCED

## (undated) DEVICE — GAUZE SPONGE CURITY 4X4 12PL

## (undated) DEVICE — Device

## (undated) DEVICE — SOL WATER IRRIG 1000ML BOTTLE 2F7114

## (undated) DEVICE — TRAY-SKIN PREP POVIDONE/IODINE

## (undated) DEVICE — SUCTION TUBE-YANKAUR

## (undated) DEVICE — CAUTERY PAD-POLYHESIVE II ADULT

## (undated) DEVICE — COVER LT HANDLE 2/PK 5160-2FG

## (undated) DEVICE — PACK-LAPAROTOMY-CUSTOM

## (undated) DEVICE — TUBING-SUCTION 20FT

## (undated) DEVICE — DRAPE BACK TABLE  44X90" 8377

## (undated) DEVICE — SUTURE-SILK 0 SH K834H

## (undated) DEVICE — NEEDLE HYPO MONOJECT STANDARD 22GA 1 1/2IN BLUE 1188822112

## (undated) DEVICE — IRRIGATION-H2O 1000ML

## (undated) DEVICE — BDG-ESMARK 4 INCH X 9 FT

## (undated) DEVICE — DRAPE-U DRAPE-CLEAR 47" X 51"

## (undated) DEVICE — DRSG-SPONGE STERILE 4X4 10/SOFT PK

## (undated) DEVICE — IRRIGATION-NACL 1000ML

## (undated) DEVICE — DRSG-KERLIX ROLL 4.5 X 4.1YD

## (undated) DEVICE — CAUTERY PENCIL-SMOKE EVACUATION

## (undated) DEVICE — ESU GROUND PAD ADULT W/CORD E7507

## (undated) DEVICE — DRAPE-THREE QUARTER (LARGE) SHEET

## (undated) DEVICE — LABEL-STERILE PREPRINTED FOR OR

## (undated) DEVICE — STERI-STRIP-1/2" X 4"

## (undated) DEVICE — GLOVE BIOGEL 6.5 LATEX

## (undated) DEVICE — PACK-BASIN SET-UP

## (undated) DEVICE — CAUTERY-MEGADYNE TIP

## (undated) DEVICE — DRSG-KERLIX 6 X 6 3/4 FLUFF

## (undated) DEVICE — SU VICRYL 4-0 SH-1 27" J315H

## (undated) DEVICE — DRSG-SPONGE STERILE 4 X 4

## (undated) DEVICE — PREP CHLORAPREP 26ML TINTED HI-LITE ORANGE 930815

## (undated) DEVICE — PACK-SET UP-CUSTOM

## (undated) DEVICE — APPLICATOR-CHLORAPREP 26ML TINTED CHG 2%+ 70% IPA-SURGICAL

## (undated) DEVICE — BIN-MINI, MAXI, MICRO DRIVER BLADES BIN

## (undated) DEVICE — BLADE-SCALPEL #15

## (undated) DEVICE — BLADE SAW 25.0MM X 9.0MM KM3-111

## (undated) DEVICE — DRSG-ISLAND 4IN X 5IN

## (undated) DEVICE — BRACE-ANKLE MEDIUM LOW TOP

## (undated) DEVICE — DRSG ABDOMINAL PAD UNSTERILE 5X9" 9190

## (undated) DEVICE — NDL-25G 1-1/2" NON-SAFETY

## (undated) DEVICE — SU VICRYL 3-0 SH 27" UND J416H

## (undated) DEVICE — PACK-LAPAROSCOPY-CUSTOM

## (undated) DEVICE — SUTURE-ETHILON 3-0 FS-1 663G

## (undated) DEVICE — LABEL STERILE PREPRINTED FOR OR FRRH01-2M

## (undated) DEVICE — DRSG KERLIX SUPER SPONGE 6X6.75" 2585

## (undated) DEVICE — SYRINGE-10CC LUER LOCK

## (undated) DEVICE — DRAPE STERI TOWEL LG 1010

## (undated) DEVICE — CANISTER-SUCTION 2000CC

## (undated) DEVICE — SUTURE-PROLENE 3-0 PS-1 8663G

## (undated) DEVICE — DRSG GAUZE 4X4" 3033

## (undated) DEVICE — SUTURE-VICRYL 3-0 SH J416H

## (undated) DEVICE — SOL NACL 0.9% IRRIG 1000ML BOTTLE 2F7124

## (undated) DEVICE — DRSG-ABDOMINAL 5 X 9

## (undated) DEVICE — GOWN-SURG XL LVL 3 REINFORCED

## (undated) DEVICE — GLV-8.0 BIOGEL PF/LF BLUE INDICATOR UNDERGLOVE

## (undated) DEVICE — DRSG KERLIX 4 1/2"X4YDS ROLL 6715

## (undated) DEVICE — GLV-6.5 BIOGEL LATEX GEN SURG

## (undated) DEVICE — BNDG ESMARK 6" STERILE LF 820-3612

## (undated) DEVICE — TUBING SUCTION 20FT N620A

## (undated) DEVICE — GUIDEWIRE W/TROCAR TIP 0.062"

## (undated) DEVICE — DRSG XEROFORM GAUZE 1X8" LP 8884433301

## (undated) DEVICE — BDG-ELASTIC 4 INCH

## (undated) DEVICE — DRAPE EXTREMITY UPPER 120X76" 29414

## (undated) DEVICE — CANISTER SUCTION MEDI-VAC GUARDIAN 2000ML 90D 65651-220

## (undated) DEVICE — CUFF-DISP STERILE 30IN SINGLE BLADDER

## (undated) DEVICE — SUTURE-ETHILON 4-0 FS-1 1629H

## (undated) DEVICE — GLV-7.5 BIOGEL LATEX

## (undated) DEVICE — BLADE-SAW 25.0MM X 9.0MM

## (undated) DEVICE — LIGHT HANDLE COVER FOR SKYTRON LIGHTS

## (undated) DEVICE — SU ETHILON 4-0 FS-2 18" 662G

## (undated) DEVICE — BLADE 15 RB BK SS STRL LF DISPLF DISP 371215

## (undated) DEVICE — GLOVE 7.0 BIOGEL PF/LF BLUE INDICATOR UNDERGLOVE 40670

## (undated) DEVICE — GLV-7.0 PROTEXIS PI BLUE W/NEU-THERA LF/PF

## (undated) DEVICE — DRAPE-EXTREMITY SHEET

## (undated) DEVICE — DRSG-IODOFORM GAUZE 1/4 X 5 YDS.

## (undated) DEVICE — DRSG-XEROFORM 1X8

## (undated) DEVICE — LIGHT HANDLE COVER

## (undated) DEVICE — PACK LAPAROTOMY CUSTOM SBA32LPMBG

## (undated) RX ORDER — FENTANYL CITRATE 50 UG/ML
INJECTION, SOLUTION INTRAMUSCULAR; INTRAVENOUS
Status: DISPENSED
Start: 2020-09-21

## (undated) RX ORDER — PROPOFOL 10 MG/ML
INJECTION, EMULSION INTRAVENOUS
Status: DISPENSED
Start: 2020-09-21

## (undated) RX ORDER — PROPOFOL 10 MG/ML
INJECTION, EMULSION INTRAVENOUS
Status: DISPENSED
Start: 2022-04-19

## (undated) RX ORDER — DEXMEDETOMIDINE HYDROCHLORIDE 100 UG/ML
INJECTION, SOLUTION INTRAVENOUS
Status: DISPENSED
Start: 2022-04-19

## (undated) RX ORDER — HYDROMORPHONE HYDROCHLORIDE 2 MG/ML
INJECTION, SOLUTION INTRAMUSCULAR; INTRAVENOUS; SUBCUTANEOUS
Status: DISPENSED
Start: 2020-09-21

## (undated) RX ORDER — FENTANYL CITRATE-0.9 % NACL/PF 10 MCG/ML
PLASTIC BAG, INJECTION (ML) INTRAVENOUS
Status: DISPENSED
Start: 2022-04-19

## (undated) RX ORDER — HYDROMORPHONE HYDROCHLORIDE 1 MG/ML
INJECTION, SOLUTION INTRAMUSCULAR; INTRAVENOUS; SUBCUTANEOUS
Status: DISPENSED
Start: 2024-02-12

## (undated) RX ORDER — LIDOCAINE HYDROCHLORIDE 20 MG/ML
INJECTION, SOLUTION EPIDURAL; INFILTRATION; INTRACAUDAL; PERINEURAL
Status: DISPENSED
Start: 2022-04-19

## (undated) RX ORDER — LIDOCAINE HYDROCHLORIDE 20 MG/ML
INJECTION, SOLUTION EPIDURAL; INFILTRATION; INTRACAUDAL; PERINEURAL
Status: DISPENSED
Start: 2022-09-13

## (undated) RX ORDER — PROPOFOL 10 MG/ML
INJECTION, EMULSION INTRAVENOUS
Status: DISPENSED
Start: 2022-09-13